# Patient Record
Sex: MALE | Race: WHITE | NOT HISPANIC OR LATINO | Employment: OTHER | URBAN - NONMETROPOLITAN AREA
[De-identification: names, ages, dates, MRNs, and addresses within clinical notes are randomized per-mention and may not be internally consistent; named-entity substitution may affect disease eponyms.]

---

## 2021-02-11 ENCOUNTER — IMMUNIZATION (OUTPATIENT)
Dept: VACCINE CLINIC | Facility: HOSPITAL | Age: 86
End: 2021-02-11

## 2021-02-11 PROCEDURE — 0001A: CPT | Performed by: INTERNAL MEDICINE

## 2021-02-11 PROCEDURE — 91300 HC SARSCOV02 VAC 30MCG/0.3ML IM: CPT | Performed by: INTERNAL MEDICINE

## 2021-03-05 ENCOUNTER — IMMUNIZATION (OUTPATIENT)
Dept: VACCINE CLINIC | Facility: HOSPITAL | Age: 86
End: 2021-03-05

## 2021-03-05 PROCEDURE — 0002A: CPT | Performed by: INTERNAL MEDICINE

## 2021-03-05 PROCEDURE — 91300 HC SARSCOV02 VAC 30MCG/0.3ML IM: CPT | Performed by: INTERNAL MEDICINE

## 2021-09-01 ENCOUNTER — APPOINTMENT (OUTPATIENT)
Dept: CT IMAGING | Facility: HOSPITAL | Age: 86
End: 2021-09-01

## 2021-09-01 ENCOUNTER — APPOINTMENT (OUTPATIENT)
Dept: GENERAL RADIOLOGY | Facility: HOSPITAL | Age: 86
End: 2021-09-01

## 2021-09-01 ENCOUNTER — HOSPITAL ENCOUNTER (INPATIENT)
Facility: HOSPITAL | Age: 86
LOS: 14 days | Discharge: HOME HEALTH | End: 2021-09-15
Attending: STUDENT IN AN ORGANIZED HEALTH CARE EDUCATION/TRAINING PROGRAM | Admitting: INTERNAL MEDICINE

## 2021-09-01 DIAGNOSIS — I50.9 CONGESTIVE HEART FAILURE, UNSPECIFIED HF CHRONICITY, UNSPECIFIED HEART FAILURE TYPE: ICD-10-CM

## 2021-09-01 DIAGNOSIS — R00.1 SYMPTOMATIC BRADYCARDIA: ICD-10-CM

## 2021-09-01 DIAGNOSIS — U07.1 COVID-19: Primary | ICD-10-CM

## 2021-09-01 DIAGNOSIS — J96.01 ACUTE RESPIRATORY FAILURE WITH HYPOXIA: ICD-10-CM

## 2021-09-01 DIAGNOSIS — R53.81 DEBILITY: ICD-10-CM

## 2021-09-01 LAB
A-A DO2: 46.9 MMHG (ref 0–300)
ABO GROUP BLD: NORMAL
ABO GROUP BLD: NORMAL
ALBUMIN SERPL-MCNC: 3.72 G/DL (ref 3.5–5.2)
ALBUMIN/GLOB SERPL: 1.2 G/DL
ALP SERPL-CCNC: 86 U/L (ref 39–117)
ALT SERPL W P-5'-P-CCNC: 44 U/L (ref 1–41)
ANION GAP SERPL CALCULATED.3IONS-SCNC: 15.1 MMOL/L (ref 5–15)
ANION GAP SERPL CALCULATED.3IONS-SCNC: 9.1 MMOL/L (ref 5–15)
APTT PPP: 30.5 SECONDS (ref 25.5–35.4)
ARTERIAL PATENCY WRIST A: POSITIVE
AST SERPL-CCNC: 65 U/L (ref 1–40)
ATMOSPHERIC PRESS: 722 MMHG
BACTERIA UR QL AUTO: ABNORMAL /HPF
BASE EXCESS BLDA CALC-SCNC: 0.7 MMOL/L (ref 0–2)
BASOPHILS # BLD AUTO: 0 10*3/MM3 (ref 0–0.2)
BASOPHILS NFR BLD AUTO: 0 % (ref 0–1.5)
BDY SITE: ABNORMAL
BILIRUB CONJ SERPL-MCNC: 0.2 MG/DL (ref 0–0.3)
BILIRUB SERPL-MCNC: 0.5 MG/DL (ref 0–1.2)
BILIRUB UR QL STRIP: NEGATIVE
BLD GP AB SCN SERPL QL: NEGATIVE
BODY TEMPERATURE: 0 C
BUN SERPL-MCNC: 15 MG/DL (ref 8–23)
BUN SERPL-MCNC: 19 MG/DL (ref 8–23)
BUN/CREAT SERPL: 12.6 (ref 7–25)
BUN/CREAT SERPL: 13.2 (ref 7–25)
CALCIUM SPEC-SCNC: 8.5 MG/DL (ref 8.2–9.6)
CALCIUM SPEC-SCNC: 8.6 MG/DL (ref 8.2–9.6)
CHLORIDE SERPL-SCNC: 104 MMOL/L (ref 98–107)
CHLORIDE SERPL-SCNC: 97 MMOL/L (ref 98–107)
CLARITY UR: CLEAR
CO2 BLDA-SCNC: 26.5 MMOL/L (ref 22–33)
CO2 SERPL-SCNC: 19.9 MMOL/L (ref 22–29)
CO2 SERPL-SCNC: 25.9 MMOL/L (ref 22–29)
COHGB MFR BLD: 0.8 % (ref 0–5)
COLOR UR: YELLOW
CREAT SERPL-MCNC: 1.19 MG/DL (ref 0.76–1.27)
CREAT SERPL-MCNC: 1.44 MG/DL (ref 0.76–1.27)
CRP SERPL-MCNC: <0.3 MG/DL (ref 0–0.5)
D DIMER PPP FEU-MCNC: 6.46 MCGFEU/ML (ref 0–0.5)
DEPRECATED RDW RBC AUTO: 43.2 FL (ref 37–54)
EOSINOPHIL # BLD AUTO: 0.02 10*3/MM3 (ref 0–0.4)
EOSINOPHIL NFR BLD AUTO: 0.5 % (ref 0.3–6.2)
ERYTHROCYTE [DISTWIDTH] IN BLOOD BY AUTOMATED COUNT: 12.6 % (ref 12.3–15.4)
FERRITIN SERPL-MCNC: 105.5 NG/ML (ref 30–400)
FLUAV SUBTYP SPEC NAA+PROBE: NOT DETECTED
FLUBV RNA ISLT QL NAA+PROBE: NOT DETECTED
GFR SERPL CREATININE-BSD FRML MDRD: 46 ML/MIN/1.73
GFR SERPL CREATININE-BSD FRML MDRD: 57 ML/MIN/1.73
GLOBULIN UR ELPH-MCNC: 3.2 GM/DL
GLUCOSE SERPL-MCNC: 107 MG/DL (ref 65–99)
GLUCOSE SERPL-MCNC: 198 MG/DL (ref 65–99)
GLUCOSE UR STRIP-MCNC: NEGATIVE MG/DL
HCO3 BLDA-SCNC: 25.3 MMOL/L (ref 20–26)
HCT VFR BLD AUTO: 40.3 % (ref 37.5–51)
HCT VFR BLD CALC: 40 % (ref 38–51)
HGB BLD-MCNC: 13.3 G/DL (ref 13–17.7)
HGB BLDA-MCNC: 13 G/DL (ref 14–18)
HGB UR QL STRIP.AUTO: ABNORMAL
HYALINE CASTS UR QL AUTO: ABNORMAL /LPF
IMM GRANULOCYTES # BLD AUTO: 0.02 10*3/MM3 (ref 0–0.05)
IMM GRANULOCYTES NFR BLD AUTO: 0.5 % (ref 0–0.5)
INHALED O2 CONCENTRATION: 21 %
INR PPP: 1.03 (ref 0.9–1.1)
KETONES UR QL STRIP: NEGATIVE
L PNEUMO1 AG UR QL IA: NEGATIVE
LDH SERPL-CCNC: 235 U/L (ref 135–225)
LEUKOCYTE ESTERASE UR QL STRIP.AUTO: NEGATIVE
LYMPHOCYTES # BLD AUTO: 1.03 10*3/MM3 (ref 0.7–3.1)
LYMPHOCYTES NFR BLD AUTO: 25.6 % (ref 19.6–45.3)
Lab: ABNORMAL
Lab: ABNORMAL
MAGNESIUM SERPL-MCNC: 1.9 MG/DL (ref 1.7–2.3)
MCH RBC QN AUTO: 31 PG (ref 26.6–33)
MCHC RBC AUTO-ENTMCNC: 33 G/DL (ref 31.5–35.7)
MCV RBC AUTO: 93.9 FL (ref 79–97)
METHGB BLD QL: 0 % (ref 0–3)
MODALITY: ABNORMAL
MONOCYTES # BLD AUTO: 0.4 10*3/MM3 (ref 0.1–0.9)
MONOCYTES NFR BLD AUTO: 9.9 % (ref 5–12)
NEUTROPHILS NFR BLD AUTO: 2.56 10*3/MM3 (ref 1.7–7)
NEUTROPHILS NFR BLD AUTO: 63.5 % (ref 42.7–76)
NITRITE UR QL STRIP: NEGATIVE
NOTE: ABNORMAL
NOTIFIED BY: ABNORMAL
NOTIFIED WHO: ABNORMAL
NRBC BLD AUTO-RTO: 0 /100 WBC (ref 0–0.2)
NT-PROBNP SERPL-MCNC: 274.8 PG/ML (ref 0–1800)
OXYHGB MFR BLDV: 86.2 % (ref 94–99)
PCO2 BLDA: 39.5 MM HG (ref 35–45)
PCO2 TEMP ADJ BLD: ABNORMAL MM[HG]
PH BLDA: 7.41 PH UNITS (ref 7.35–7.45)
PH UR STRIP.AUTO: 5.5 [PH] (ref 5–8)
PH, TEMP CORRECTED: ABNORMAL
PLATELET # BLD AUTO: 115 10*3/MM3 (ref 140–450)
PMV BLD AUTO: 10.1 FL (ref 6–12)
PO2 BLDA: 50.1 MM HG (ref 83–108)
PO2 TEMP ADJ BLD: ABNORMAL MM[HG]
POTASSIUM SERPL-SCNC: 4.1 MMOL/L (ref 3.5–5.2)
POTASSIUM SERPL-SCNC: 4.1 MMOL/L (ref 3.5–5.2)
PROCALCITONIN SERPL-MCNC: 0.08 NG/ML (ref 0–0.25)
PROT SERPL-MCNC: 6.9 G/DL (ref 6–8.5)
PROT UR QL STRIP: NEGATIVE
PROTHROMBIN TIME: 13.9 SECONDS (ref 12.8–14.5)
RBC # BLD AUTO: 4.29 10*6/MM3 (ref 4.14–5.8)
RBC # UR: ABNORMAL /HPF
REF LAB TEST METHOD: ABNORMAL
RH BLD: NEGATIVE
RH BLD: NEGATIVE
SAO2 % BLDCOA: 86.9 % (ref 94–99)
SARS-COV-2 RNA PNL SPEC NAA+PROBE: DETECTED
SODIUM SERPL-SCNC: 132 MMOL/L (ref 136–145)
SODIUM SERPL-SCNC: 139 MMOL/L (ref 136–145)
SP GR UR STRIP: 1.02 (ref 1–1.03)
SQUAMOUS #/AREA URNS HPF: ABNORMAL /HPF
T&S EXPIRATION DATE: NORMAL
TROPONIN T SERPL-MCNC: <0.01 NG/ML (ref 0–0.03)
TROPONIN T SERPL-MCNC: <0.01 NG/ML (ref 0–0.03)
TSH SERPL DL<=0.05 MIU/L-ACNC: 1.64 UIU/ML (ref 0.27–4.2)
UROBILINOGEN UR QL STRIP: ABNORMAL
VENTILATOR MODE: ABNORMAL
WBC # BLD AUTO: 4.03 10*3/MM3 (ref 3.4–10.8)
WBC UR QL AUTO: ABNORMAL /HPF

## 2021-09-01 PROCEDURE — 72125 CT NECK SPINE W/O DYE: CPT | Performed by: RADIOLOGY

## 2021-09-01 PROCEDURE — 85730 THROMBOPLASTIN TIME PARTIAL: CPT | Performed by: STUDENT IN AN ORGANIZED HEALTH CARE EDUCATION/TRAINING PROGRAM

## 2021-09-01 PROCEDURE — 36600 WITHDRAWAL OF ARTERIAL BLOOD: CPT

## 2021-09-01 PROCEDURE — 70450 CT HEAD/BRAIN W/O DYE: CPT | Performed by: RADIOLOGY

## 2021-09-01 PROCEDURE — 85610 PROTHROMBIN TIME: CPT | Performed by: STUDENT IN AN ORGANIZED HEALTH CARE EDUCATION/TRAINING PROGRAM

## 2021-09-01 PROCEDURE — 25010000002 CEFTRIAXONE PER 250 MG: Performed by: STUDENT IN AN ORGANIZED HEALTH CARE EDUCATION/TRAINING PROGRAM

## 2021-09-01 PROCEDURE — 84443 ASSAY THYROID STIM HORMONE: CPT | Performed by: NURSE PRACTITIONER

## 2021-09-01 PROCEDURE — 87040 BLOOD CULTURE FOR BACTERIA: CPT | Performed by: STUDENT IN AN ORGANIZED HEALTH CARE EDUCATION/TRAINING PROGRAM

## 2021-09-01 PROCEDURE — 81001 URINALYSIS AUTO W/SCOPE: CPT | Performed by: STUDENT IN AN ORGANIZED HEALTH CARE EDUCATION/TRAINING PROGRAM

## 2021-09-01 PROCEDURE — 93005 ELECTROCARDIOGRAM TRACING: CPT | Performed by: STUDENT IN AN ORGANIZED HEALTH CARE EDUCATION/TRAINING PROGRAM

## 2021-09-01 PROCEDURE — 85379 FIBRIN DEGRADATION QUANT: CPT | Performed by: STUDENT IN AN ORGANIZED HEALTH CARE EDUCATION/TRAINING PROGRAM

## 2021-09-01 PROCEDURE — 82248 BILIRUBIN DIRECT: CPT | Performed by: INTERNAL MEDICINE

## 2021-09-01 PROCEDURE — 25010000002 ENOXAPARIN PER 10 MG: Performed by: INTERNAL MEDICINE

## 2021-09-01 PROCEDURE — 94640 AIRWAY INHALATION TREATMENT: CPT

## 2021-09-01 PROCEDURE — 25010000002 FUROSEMIDE PER 20 MG: Performed by: STUDENT IN AN ORGANIZED HEALTH CARE EDUCATION/TRAINING PROGRAM

## 2021-09-01 PROCEDURE — 71275 CT ANGIOGRAPHY CHEST: CPT | Performed by: RADIOLOGY

## 2021-09-01 PROCEDURE — 83050 HGB METHEMOGLOBIN QUAN: CPT

## 2021-09-01 PROCEDURE — 72125 CT NECK SPINE W/O DYE: CPT

## 2021-09-01 PROCEDURE — 99285 EMERGENCY DEPT VISIT HI MDM: CPT

## 2021-09-01 PROCEDURE — 87899 AGENT NOS ASSAY W/OPTIC: CPT | Performed by: NURSE PRACTITIONER

## 2021-09-01 PROCEDURE — 25010000002 IOPAMIDOL 61 % SOLUTION: Performed by: STUDENT IN AN ORGANIZED HEALTH CARE EDUCATION/TRAINING PROGRAM

## 2021-09-01 PROCEDURE — 82805 BLOOD GASES W/O2 SATURATION: CPT

## 2021-09-01 PROCEDURE — 84145 PROCALCITONIN (PCT): CPT | Performed by: INTERNAL MEDICINE

## 2021-09-01 PROCEDURE — 84484 ASSAY OF TROPONIN QUANT: CPT | Performed by: STUDENT IN AN ORGANIZED HEALTH CARE EDUCATION/TRAINING PROGRAM

## 2021-09-01 PROCEDURE — 25010000002 DEXAMETHASONE PER 1 MG: Performed by: STUDENT IN AN ORGANIZED HEALTH CARE EDUCATION/TRAINING PROGRAM

## 2021-09-01 PROCEDURE — 84484 ASSAY OF TROPONIN QUANT: CPT | Performed by: NURSE PRACTITIONER

## 2021-09-01 PROCEDURE — 71275 CT ANGIOGRAPHY CHEST: CPT

## 2021-09-01 PROCEDURE — 86900 BLOOD TYPING SEROLOGIC ABO: CPT

## 2021-09-01 PROCEDURE — 84145 PROCALCITONIN (PCT): CPT | Performed by: STUDENT IN AN ORGANIZED HEALTH CARE EDUCATION/TRAINING PROGRAM

## 2021-09-01 PROCEDURE — 93010 ELECTROCARDIOGRAM REPORT: CPT | Performed by: INTERNAL MEDICINE

## 2021-09-01 PROCEDURE — 86901 BLOOD TYPING SEROLOGIC RH(D): CPT | Performed by: STUDENT IN AN ORGANIZED HEALTH CARE EDUCATION/TRAINING PROGRAM

## 2021-09-01 PROCEDURE — 85025 COMPLETE CBC W/AUTO DIFF WBC: CPT | Performed by: STUDENT IN AN ORGANIZED HEALTH CARE EDUCATION/TRAINING PROGRAM

## 2021-09-01 PROCEDURE — 83735 ASSAY OF MAGNESIUM: CPT | Performed by: STUDENT IN AN ORGANIZED HEALTH CARE EDUCATION/TRAINING PROGRAM

## 2021-09-01 PROCEDURE — XW033E5 INTRODUCTION OF REMDESIVIR ANTI-INFECTIVE INTO PERIPHERAL VEIN, PERCUTANEOUS APPROACH, NEW TECHNOLOGY GROUP 5: ICD-10-PCS | Performed by: INTERNAL MEDICINE

## 2021-09-01 PROCEDURE — 86140 C-REACTIVE PROTEIN: CPT | Performed by: STUDENT IN AN ORGANIZED HEALTH CARE EDUCATION/TRAINING PROGRAM

## 2021-09-01 PROCEDURE — 86900 BLOOD TYPING SEROLOGIC ABO: CPT | Performed by: STUDENT IN AN ORGANIZED HEALTH CARE EDUCATION/TRAINING PROGRAM

## 2021-09-01 PROCEDURE — 87636 SARSCOV2 & INF A&B AMP PRB: CPT | Performed by: STUDENT IN AN ORGANIZED HEALTH CARE EDUCATION/TRAINING PROGRAM

## 2021-09-01 PROCEDURE — 99222 1ST HOSP IP/OBS MODERATE 55: CPT | Performed by: NURSE PRACTITIONER

## 2021-09-01 PROCEDURE — 70450 CT HEAD/BRAIN W/O DYE: CPT

## 2021-09-01 PROCEDURE — 71045 X-RAY EXAM CHEST 1 VIEW: CPT | Performed by: RADIOLOGY

## 2021-09-01 PROCEDURE — 83880 ASSAY OF NATRIURETIC PEPTIDE: CPT | Performed by: INTERNAL MEDICINE

## 2021-09-01 PROCEDURE — 86850 RBC ANTIBODY SCREEN: CPT | Performed by: STUDENT IN AN ORGANIZED HEALTH CARE EDUCATION/TRAINING PROGRAM

## 2021-09-01 PROCEDURE — 80053 COMPREHEN METABOLIC PANEL: CPT | Performed by: STUDENT IN AN ORGANIZED HEALTH CARE EDUCATION/TRAINING PROGRAM

## 2021-09-01 PROCEDURE — 82728 ASSAY OF FERRITIN: CPT | Performed by: STUDENT IN AN ORGANIZED HEALTH CARE EDUCATION/TRAINING PROGRAM

## 2021-09-01 PROCEDURE — 82375 ASSAY CARBOXYHB QUANT: CPT

## 2021-09-01 PROCEDURE — 83615 LACTATE (LD) (LDH) ENZYME: CPT | Performed by: STUDENT IN AN ORGANIZED HEALTH CARE EDUCATION/TRAINING PROGRAM

## 2021-09-01 PROCEDURE — 86901 BLOOD TYPING SEROLOGIC RH(D): CPT

## 2021-09-01 PROCEDURE — 71045 X-RAY EXAM CHEST 1 VIEW: CPT

## 2021-09-01 RX ORDER — DEXAMETHASONE SODIUM PHOSPHATE 4 MG/ML
6 INJECTION, SOLUTION INTRA-ARTICULAR; INTRALESIONAL; INTRAMUSCULAR; INTRAVENOUS; SOFT TISSUE ONCE
Status: COMPLETED | OUTPATIENT
Start: 2021-09-01 | End: 2021-09-01

## 2021-09-01 RX ORDER — DEXAMETHASONE 4 MG/1
6 TABLET ORAL DAILY
Status: COMPLETED | OUTPATIENT
Start: 2021-09-02 | End: 2021-09-11

## 2021-09-01 RX ORDER — TERAZOSIN 1 MG/1
2 CAPSULE ORAL NIGHTLY
Status: DISCONTINUED | OUTPATIENT
Start: 2021-09-02 | End: 2021-09-08

## 2021-09-01 RX ORDER — LEVOTHYROXINE SODIUM 0.05 MG/1
50 TABLET ORAL DAILY
Status: ON HOLD | COMMUNITY
End: 2023-03-15

## 2021-09-01 RX ORDER — ASPIRIN 81 MG/1
81 TABLET ORAL DAILY
Status: DISCONTINUED | OUTPATIENT
Start: 2021-09-02 | End: 2021-09-15 | Stop reason: HOSPADM

## 2021-09-01 RX ORDER — GUAIFENESIN 600 MG/1
600 TABLET, EXTENDED RELEASE ORAL EVERY 12 HOURS SCHEDULED
Status: DISCONTINUED | OUTPATIENT
Start: 2021-09-01 | End: 2021-09-15 | Stop reason: HOSPADM

## 2021-09-01 RX ORDER — TERAZOSIN 2 MG/1
2 CAPSULE ORAL NIGHTLY
COMMUNITY

## 2021-09-01 RX ORDER — ALBUTEROL SULFATE 90 UG/1
2 AEROSOL, METERED RESPIRATORY (INHALATION)
Status: DISCONTINUED | OUTPATIENT
Start: 2021-09-01 | End: 2021-09-15 | Stop reason: HOSPADM

## 2021-09-01 RX ORDER — DEXAMETHASONE SODIUM PHOSPHATE 4 MG/ML
6 INJECTION, SOLUTION INTRA-ARTICULAR; INTRALESIONAL; INTRAMUSCULAR; INTRAVENOUS; SOFT TISSUE DAILY
Status: COMPLETED | OUTPATIENT
Start: 2021-09-02 | End: 2021-09-11

## 2021-09-01 RX ORDER — ASPIRIN 81 MG/1
81 TABLET ORAL DAILY
COMMUNITY

## 2021-09-01 RX ORDER — FUROSEMIDE 10 MG/ML
20 INJECTION INTRAMUSCULAR; INTRAVENOUS ONCE
Status: COMPLETED | OUTPATIENT
Start: 2021-09-01 | End: 2021-09-01

## 2021-09-01 RX ORDER — FINASTERIDE 5 MG/1
5 TABLET, FILM COATED ORAL DAILY
Status: DISCONTINUED | OUTPATIENT
Start: 2021-09-02 | End: 2021-09-15 | Stop reason: HOSPADM

## 2021-09-01 RX ORDER — CARBOXYMETHYLCELLULOSE SODIUM 5 MG/ML
1 SOLUTION/ DROPS OPHTHALMIC 3 TIMES DAILY PRN
Status: DISCONTINUED | OUTPATIENT
Start: 2021-09-01 | End: 2021-09-15 | Stop reason: HOSPADM

## 2021-09-01 RX ORDER — LEVOTHYROXINE SODIUM 0.05 MG/1
50 TABLET ORAL DAILY
Status: DISCONTINUED | OUTPATIENT
Start: 2021-09-02 | End: 2021-09-15 | Stop reason: HOSPADM

## 2021-09-01 RX ORDER — FINASTERIDE 5 MG/1
5 TABLET, FILM COATED ORAL DAILY
COMMUNITY

## 2021-09-01 RX ORDER — CARBOXYMETHYLCELLULOSE SODIUM 5 MG/ML
1 SOLUTION/ DROPS OPHTHALMIC 3 TIMES DAILY PRN
COMMUNITY

## 2021-09-01 RX ADMIN — REMDESIVIR 200 MG: 100 INJECTION, POWDER, LYOPHILIZED, FOR SOLUTION INTRAVENOUS at 19:57

## 2021-09-01 RX ADMIN — CEFTRIAXONE 1 G: 1 INJECTION, POWDER, FOR SOLUTION INTRAMUSCULAR; INTRAVENOUS at 16:27

## 2021-09-01 RX ADMIN — IOPAMIDOL 89 ML: 612 INJECTION, SOLUTION INTRAVENOUS at 16:00

## 2021-09-01 RX ADMIN — FUROSEMIDE 20 MG: 20 INJECTION, SOLUTION INTRAMUSCULAR; INTRAVENOUS at 17:39

## 2021-09-01 RX ADMIN — ENOXAPARIN SODIUM 40 MG: 40 INJECTION SUBCUTANEOUS at 21:16

## 2021-09-01 RX ADMIN — DEXAMETHASONE SODIUM PHOSPHATE 6 MG: 4 INJECTION, SOLUTION INTRA-ARTICULAR; INTRALESIONAL; INTRAMUSCULAR; INTRAVENOUS; SOFT TISSUE at 16:27

## 2021-09-01 RX ADMIN — SODIUM CHLORIDE 1000 ML: 9 INJECTION, SOLUTION INTRAVENOUS at 07:05

## 2021-09-01 RX ADMIN — ALBUTEROL SULFATE 2 PUFF: 90 AEROSOL, METERED RESPIRATORY (INHALATION) at 21:17

## 2021-09-01 RX ADMIN — DOXYCYCLINE 100 MG: 100 INJECTION, POWDER, LYOPHILIZED, FOR SOLUTION INTRAVENOUS at 17:39

## 2021-09-01 RX ADMIN — GUAIFENESIN 600 MG: 600 TABLET, EXTENDED RELEASE ORAL at 21:16

## 2021-09-01 RX ADMIN — TERAZOSIN HYDROCHLORIDE 2 MG: 1 CAPSULE ORAL at 23:03

## 2021-09-02 ENCOUNTER — APPOINTMENT (OUTPATIENT)
Dept: CARDIOLOGY | Facility: HOSPITAL | Age: 86
End: 2021-09-02

## 2021-09-02 ENCOUNTER — APPOINTMENT (OUTPATIENT)
Dept: ULTRASOUND IMAGING | Facility: HOSPITAL | Age: 86
End: 2021-09-02

## 2021-09-02 LAB
ALBUMIN SERPL-MCNC: 3.69 G/DL (ref 3.5–5.2)
ALBUMIN/GLOB SERPL: 1.4 G/DL
ALP SERPL-CCNC: 79 U/L (ref 39–117)
ALT SERPL W P-5'-P-CCNC: 40 U/L (ref 1–41)
ANION GAP SERPL CALCULATED.3IONS-SCNC: 11.5 MMOL/L (ref 5–15)
AST SERPL-CCNC: 58 U/L (ref 1–40)
BASOPHILS # BLD AUTO: 0.01 10*3/MM3 (ref 0–0.2)
BASOPHILS NFR BLD AUTO: 0.3 % (ref 0–1.5)
BH CV ECHO MEAS - AI DEC SLOPE: 70.9 CM/SEC^2
BH CV ECHO MEAS - AI MAX PG: 38.7 MMHG
BH CV ECHO MEAS - AI MAX VEL: 311 CM/SEC
BH CV ECHO MEAS - AI P1/2T: 1285 MSEC
BH CV ECHO MEAS - AO MAX PG: 7.1 MMHG
BH CV ECHO MEAS - AO MEAN PG: 3 MMHG
BH CV ECHO MEAS - AO V2 MAX: 133 CM/SEC
BH CV ECHO MEAS - AO V2 MEAN: 79.9 CM/SEC
BH CV ECHO MEAS - AO V2 VTI: 26.4 CM
BH CV ECHO MEAS - BSA(HAYCOCK): 1.8 M^2
BH CV ECHO MEAS - BSA: 1.8 M^2
BH CV ECHO MEAS - BZI_BMI: 22.2 KILOGRAMS/M^2
BH CV ECHO MEAS - BZI_METRIC_HEIGHT: 172.7 CM
BH CV ECHO MEAS - BZI_METRIC_WEIGHT: 66.2 KG
BH CV ECHO MEAS - EDV(CUBED): 39 ML
BH CV ECHO MEAS - EDV(MOD-SP4): 58.1 ML
BH CV ECHO MEAS - EDV(TEICH): 47.1 ML
BH CV ECHO MEAS - EF(CUBED): 47.2 %
BH CV ECHO MEAS - EF(MOD-SP4): 69 %
BH CV ECHO MEAS - EF(TEICH): 40.5 %
BH CV ECHO MEAS - ESV(CUBED): 20.6 ML
BH CV ECHO MEAS - ESV(MOD-SP4): 18 ML
BH CV ECHO MEAS - ESV(TEICH): 28 ML
BH CV ECHO MEAS - FS: 19.2 %
BH CV ECHO MEAS - IVS/LVPW: 1.1
BH CV ECHO MEAS - IVSD: 1.4 CM
BH CV ECHO MEAS - LV DIASTOLIC VOL/BSA (35-75): 32.5 ML/M^2
BH CV ECHO MEAS - LV MASS(C)D: 158.9 GRAMS
BH CV ECHO MEAS - LV MASS(C)DI: 88.9 GRAMS/M^2
BH CV ECHO MEAS - LV SYSTOLIC VOL/BSA (12-30): 10.1 ML/M^2
BH CV ECHO MEAS - LVIDD: 3.4 CM
BH CV ECHO MEAS - LVIDS: 2.7 CM
BH CV ECHO MEAS - LVLD AP4: 6.8 CM
BH CV ECHO MEAS - LVLS AP4: 5 CM
BH CV ECHO MEAS - LVPWD: 1.3 CM
BH CV ECHO MEAS - MV A MAX VEL: 97.7 CM/SEC
BH CV ECHO MEAS - MV E MAX VEL: 72 CM/SEC
BH CV ECHO MEAS - MV E/A: 0.74
BH CV ECHO MEAS - SI(CUBED): 10.3 ML/M^2
BH CV ECHO MEAS - SI(MOD-SP4): 22.4 ML/M^2
BH CV ECHO MEAS - SI(TEICH): 10.7 ML/M^2
BH CV ECHO MEAS - SV(CUBED): 18.4 ML
BH CV ECHO MEAS - SV(MOD-SP4): 40.1 ML
BH CV ECHO MEAS - SV(TEICH): 19.1 ML
BILIRUB CONJ SERPL-MCNC: 0.2 MG/DL (ref 0–0.3)
BILIRUB SERPL-MCNC: 0.4 MG/DL (ref 0–1.2)
BUN SERPL-MCNC: 18 MG/DL (ref 8–23)
BUN/CREAT SERPL: 15.3 (ref 7–25)
CALCIUM SPEC-SCNC: 8.4 MG/DL (ref 8.2–9.6)
CHLORIDE SERPL-SCNC: 103 MMOL/L (ref 98–107)
CO2 SERPL-SCNC: 21.5 MMOL/L (ref 22–29)
CREAT SERPL-MCNC: 1.18 MG/DL (ref 0.76–1.27)
CRP SERPL-MCNC: 0.98 MG/DL (ref 0–0.5)
D DIMER PPP FEU-MCNC: 2.24 MCGFEU/ML (ref 0–0.5)
DEPRECATED RDW RBC AUTO: 42 FL (ref 37–54)
EOSINOPHIL # BLD AUTO: 0 10*3/MM3 (ref 0–0.4)
EOSINOPHIL NFR BLD AUTO: 0 % (ref 0.3–6.2)
ERYTHROCYTE [DISTWIDTH] IN BLOOD BY AUTOMATED COUNT: 12.4 % (ref 12.3–15.4)
GFR SERPL CREATININE-BSD FRML MDRD: 57 ML/MIN/1.73
GLOBULIN UR ELPH-MCNC: 2.7 GM/DL
GLUCOSE BLDC GLUCOMTR-MCNC: 131 MG/DL (ref 70–130)
GLUCOSE BLDC GLUCOMTR-MCNC: 152 MG/DL (ref 70–130)
GLUCOSE BLDC GLUCOMTR-MCNC: 156 MG/DL (ref 70–130)
GLUCOSE BLDC GLUCOMTR-MCNC: 172 MG/DL (ref 70–130)
GLUCOSE SERPL-MCNC: 153 MG/DL (ref 65–99)
HBA1C MFR BLD: 5.6 % (ref 4.8–5.6)
HCT VFR BLD AUTO: 39.2 % (ref 37.5–51)
HGB BLD-MCNC: 13 G/DL (ref 13–17.7)
IMM GRANULOCYTES # BLD AUTO: 0.01 10*3/MM3 (ref 0–0.05)
IMM GRANULOCYTES NFR BLD AUTO: 0.3 % (ref 0–0.5)
INR PPP: 0.99 (ref 0.9–1.1)
LYMPHOCYTES # BLD AUTO: 0.62 10*3/MM3 (ref 0.7–3.1)
LYMPHOCYTES NFR BLD AUTO: 21.3 % (ref 19.6–45.3)
MAGNESIUM SERPL-MCNC: 2 MG/DL (ref 1.7–2.3)
MAXIMAL PREDICTED HEART RATE: 126 BPM
MCH RBC QN AUTO: 30.4 PG (ref 26.6–33)
MCHC RBC AUTO-ENTMCNC: 33.2 G/DL (ref 31.5–35.7)
MCV RBC AUTO: 91.8 FL (ref 79–97)
MONOCYTES # BLD AUTO: 0.32 10*3/MM3 (ref 0.1–0.9)
MONOCYTES NFR BLD AUTO: 11 % (ref 5–12)
NEUTROPHILS NFR BLD AUTO: 1.95 10*3/MM3 (ref 1.7–7)
NEUTROPHILS NFR BLD AUTO: 67.1 % (ref 42.7–76)
NRBC BLD AUTO-RTO: 0 /100 WBC (ref 0–0.2)
PHOSPHATE SERPL-MCNC: 3.2 MG/DL (ref 2.5–4.5)
PLATELET # BLD AUTO: 108 10*3/MM3 (ref 140–450)
PMV BLD AUTO: 10 FL (ref 6–12)
POTASSIUM SERPL-SCNC: 4 MMOL/L (ref 3.5–5.2)
PROCALCITONIN SERPL-MCNC: 0.07 NG/ML (ref 0–0.25)
PROT SERPL-MCNC: 6.4 G/DL (ref 6–8.5)
PROTHROMBIN TIME: 13.5 SECONDS (ref 12.8–14.5)
QT INTERVAL: 424 MS
QTC INTERVAL: 464 MS
RBC # BLD AUTO: 4.27 10*6/MM3 (ref 4.14–5.8)
S PNEUM AG SPEC QL LA: NEGATIVE
SODIUM SERPL-SCNC: 136 MMOL/L (ref 136–145)
STRESS TARGET HR: 107 BPM
TROPONIN T SERPL-MCNC: <0.01 NG/ML (ref 0–0.03)
TROPONIN T SERPL-MCNC: <0.01 NG/ML (ref 0–0.03)
WBC # BLD AUTO: 2.91 10*3/MM3 (ref 3.4–10.8)

## 2021-09-02 PROCEDURE — 83735 ASSAY OF MAGNESIUM: CPT | Performed by: INTERNAL MEDICINE

## 2021-09-02 PROCEDURE — 93306 TTE W/DOPPLER COMPLETE: CPT

## 2021-09-02 PROCEDURE — 63710000001 DEXAMETHASONE PER 0.25 MG: Performed by: INTERNAL MEDICINE

## 2021-09-02 PROCEDURE — 99233 SBSQ HOSP IP/OBS HIGH 50: CPT | Performed by: NURSE PRACTITIONER

## 2021-09-02 PROCEDURE — 93970 EXTREMITY STUDY: CPT | Performed by: RADIOLOGY

## 2021-09-02 PROCEDURE — 85379 FIBRIN DEGRADATION QUANT: CPT | Performed by: INTERNAL MEDICINE

## 2021-09-02 PROCEDURE — 94799 UNLISTED PULMONARY SVC/PX: CPT

## 2021-09-02 PROCEDURE — 85610 PROTHROMBIN TIME: CPT | Performed by: INTERNAL MEDICINE

## 2021-09-02 PROCEDURE — 93970 EXTREMITY STUDY: CPT

## 2021-09-02 PROCEDURE — 82248 BILIRUBIN DIRECT: CPT | Performed by: INTERNAL MEDICINE

## 2021-09-02 PROCEDURE — 84100 ASSAY OF PHOSPHORUS: CPT | Performed by: INTERNAL MEDICINE

## 2021-09-02 PROCEDURE — 85025 COMPLETE CBC W/AUTO DIFF WBC: CPT | Performed by: INTERNAL MEDICINE

## 2021-09-02 PROCEDURE — 63710000001 INSULIN ASPART PER 5 UNITS: Performed by: NURSE PRACTITIONER

## 2021-09-02 PROCEDURE — 82962 GLUCOSE BLOOD TEST: CPT

## 2021-09-02 PROCEDURE — 86140 C-REACTIVE PROTEIN: CPT | Performed by: INTERNAL MEDICINE

## 2021-09-02 PROCEDURE — 80053 COMPREHEN METABOLIC PANEL: CPT | Performed by: INTERNAL MEDICINE

## 2021-09-02 PROCEDURE — 84484 ASSAY OF TROPONIN QUANT: CPT | Performed by: NURSE PRACTITIONER

## 2021-09-02 PROCEDURE — 25010000002 ENOXAPARIN PER 10 MG: Performed by: INTERNAL MEDICINE

## 2021-09-02 PROCEDURE — 83036 HEMOGLOBIN GLYCOSYLATED A1C: CPT | Performed by: NURSE PRACTITIONER

## 2021-09-02 RX ORDER — DEXTROSE MONOHYDRATE 25 G/50ML
25 INJECTION, SOLUTION INTRAVENOUS
Status: DISCONTINUED | OUTPATIENT
Start: 2021-09-02 | End: 2021-09-15 | Stop reason: HOSPADM

## 2021-09-02 RX ORDER — NICOTINE POLACRILEX 4 MG
15 LOZENGE BUCCAL
Status: DISCONTINUED | OUTPATIENT
Start: 2021-09-02 | End: 2021-09-15 | Stop reason: HOSPADM

## 2021-09-02 RX ADMIN — ENOXAPARIN SODIUM 40 MG: 40 INJECTION SUBCUTANEOUS at 09:24

## 2021-09-02 RX ADMIN — MUPIROCIN: 20 OINTMENT TOPICAL at 12:20

## 2021-09-02 RX ADMIN — ALBUTEROL SULFATE 2 PUFF: 90 AEROSOL, METERED RESPIRATORY (INHALATION) at 12:20

## 2021-09-02 RX ADMIN — CARBOXYMETHYLCELLULOSE SODIUM 1 DROP: 5 SOLUTION/ DROPS OPHTHALMIC at 17:15

## 2021-09-02 RX ADMIN — ENOXAPARIN SODIUM 40 MG: 40 INJECTION SUBCUTANEOUS at 21:14

## 2021-09-02 RX ADMIN — REMDESIVIR 100 MG: 100 INJECTION, POWDER, LYOPHILIZED, FOR SOLUTION INTRAVENOUS at 21:13

## 2021-09-02 RX ADMIN — Medication 1 TABLET: at 09:24

## 2021-09-02 RX ADMIN — TERAZOSIN HYDROCHLORIDE 2 MG: 1 CAPSULE ORAL at 21:14

## 2021-09-02 RX ADMIN — LEVOTHYROXINE SODIUM 50 MCG: 50 TABLET ORAL at 09:24

## 2021-09-02 RX ADMIN — MUPIROCIN: 20 OINTMENT TOPICAL at 21:14

## 2021-09-02 RX ADMIN — INSULIN ASPART 2 UNITS: 100 INJECTION, SOLUTION INTRAVENOUS; SUBCUTANEOUS at 09:23

## 2021-09-02 RX ADMIN — ALBUTEROL SULFATE 2 PUFF: 90 AEROSOL, METERED RESPIRATORY (INHALATION) at 17:16

## 2021-09-02 RX ADMIN — GUAIFENESIN 600 MG: 600 TABLET, EXTENDED RELEASE ORAL at 21:14

## 2021-09-02 RX ADMIN — INSULIN ASPART 2 UNITS: 100 INJECTION, SOLUTION INTRAVENOUS; SUBCUTANEOUS at 17:18

## 2021-09-02 RX ADMIN — FINASTERIDE 5 MG: 5 TABLET, FILM COATED ORAL at 09:24

## 2021-09-02 RX ADMIN — DEXAMETHASONE 6 MG: 4 TABLET ORAL at 09:24

## 2021-09-02 RX ADMIN — GUAIFENESIN 600 MG: 600 TABLET, EXTENDED RELEASE ORAL at 09:24

## 2021-09-02 RX ADMIN — ASPIRIN 81 MG: 81 TABLET, COATED ORAL at 09:24

## 2021-09-03 LAB
ALBUMIN SERPL-MCNC: 3.81 G/DL (ref 3.5–5.2)
ALBUMIN/GLOB SERPL: 1.3 G/DL
ALP SERPL-CCNC: 80 U/L (ref 39–117)
ALT SERPL W P-5'-P-CCNC: 35 U/L (ref 1–41)
ANION GAP SERPL CALCULATED.3IONS-SCNC: 11.4 MMOL/L (ref 5–15)
AST SERPL-CCNC: 47 U/L (ref 1–40)
BASOPHILS # BLD AUTO: 0 10*3/MM3 (ref 0–0.2)
BASOPHILS NFR BLD AUTO: 0 % (ref 0–1.5)
BILIRUB CONJ SERPL-MCNC: <0.2 MG/DL (ref 0–0.3)
BILIRUB SERPL-MCNC: 0.4 MG/DL (ref 0–1.2)
BUN SERPL-MCNC: 26 MG/DL (ref 8–23)
BUN/CREAT SERPL: 23.6 (ref 7–25)
CALCIUM SPEC-SCNC: 9.1 MG/DL (ref 8.2–9.6)
CHLORIDE SERPL-SCNC: 103 MMOL/L (ref 98–107)
CO2 SERPL-SCNC: 23.6 MMOL/L (ref 22–29)
CREAT SERPL-MCNC: 1.1 MG/DL (ref 0.76–1.27)
CRP SERPL-MCNC: 0.93 MG/DL (ref 0–0.5)
DEPRECATED RDW RBC AUTO: 42 FL (ref 37–54)
EOSINOPHIL # BLD AUTO: 0 10*3/MM3 (ref 0–0.4)
EOSINOPHIL NFR BLD AUTO: 0 % (ref 0.3–6.2)
ERYTHROCYTE [DISTWIDTH] IN BLOOD BY AUTOMATED COUNT: 12.4 % (ref 12.3–15.4)
GFR SERPL CREATININE-BSD FRML MDRD: 62 ML/MIN/1.73
GLOBULIN UR ELPH-MCNC: 2.9 GM/DL
GLUCOSE BLDC GLUCOMTR-MCNC: 134 MG/DL (ref 70–130)
GLUCOSE BLDC GLUCOMTR-MCNC: 135 MG/DL (ref 70–130)
GLUCOSE BLDC GLUCOMTR-MCNC: 147 MG/DL (ref 70–130)
GLUCOSE BLDC GLUCOMTR-MCNC: 241 MG/DL (ref 70–130)
GLUCOSE BLDC GLUCOMTR-MCNC: 96 MG/DL (ref 70–130)
GLUCOSE SERPL-MCNC: 130 MG/DL (ref 65–99)
HCT VFR BLD AUTO: 40.2 % (ref 37.5–51)
HGB BLD-MCNC: 13.5 G/DL (ref 13–17.7)
IMM GRANULOCYTES # BLD AUTO: 0.02 10*3/MM3 (ref 0–0.05)
IMM GRANULOCYTES NFR BLD AUTO: 0.3 % (ref 0–0.5)
LYMPHOCYTES # BLD AUTO: 1.31 10*3/MM3 (ref 0.7–3.1)
LYMPHOCYTES NFR BLD AUTO: 21.2 % (ref 19.6–45.3)
MAGNESIUM SERPL-MCNC: 2 MG/DL (ref 1.7–2.3)
MCH RBC QN AUTO: 30.8 PG (ref 26.6–33)
MCHC RBC AUTO-ENTMCNC: 33.6 G/DL (ref 31.5–35.7)
MCV RBC AUTO: 91.8 FL (ref 79–97)
MONOCYTES # BLD AUTO: 0.53 10*3/MM3 (ref 0.1–0.9)
MONOCYTES NFR BLD AUTO: 8.6 % (ref 5–12)
NEUTROPHILS NFR BLD AUTO: 4.31 10*3/MM3 (ref 1.7–7)
NEUTROPHILS NFR BLD AUTO: 69.9 % (ref 42.7–76)
NRBC BLD AUTO-RTO: 0 /100 WBC (ref 0–0.2)
PHOSPHATE SERPL-MCNC: 3 MG/DL (ref 2.5–4.5)
PLAT MORPH BLD: NORMAL
PLATELET # BLD AUTO: 116 10*3/MM3 (ref 140–450)
PMV BLD AUTO: 10.9 FL (ref 6–12)
POTASSIUM SERPL-SCNC: 4.4 MMOL/L (ref 3.5–5.2)
PROT SERPL-MCNC: 6.7 G/DL (ref 6–8.5)
RBC # BLD AUTO: 4.38 10*6/MM3 (ref 4.14–5.8)
RBC MORPH BLD: NORMAL
SODIUM SERPL-SCNC: 138 MMOL/L (ref 136–145)
WBC # BLD AUTO: 6.17 10*3/MM3 (ref 3.4–10.8)

## 2021-09-03 PROCEDURE — 85025 COMPLETE CBC W/AUTO DIFF WBC: CPT | Performed by: INTERNAL MEDICINE

## 2021-09-03 PROCEDURE — 86140 C-REACTIVE PROTEIN: CPT | Performed by: INTERNAL MEDICINE

## 2021-09-03 PROCEDURE — 94799 UNLISTED PULMONARY SVC/PX: CPT

## 2021-09-03 PROCEDURE — 82248 BILIRUBIN DIRECT: CPT | Performed by: INTERNAL MEDICINE

## 2021-09-03 PROCEDURE — 83735 ASSAY OF MAGNESIUM: CPT | Performed by: INTERNAL MEDICINE

## 2021-09-03 PROCEDURE — 25010000002 ENOXAPARIN PER 10 MG: Performed by: INTERNAL MEDICINE

## 2021-09-03 PROCEDURE — 63710000001 DEXAMETHASONE PER 0.25 MG: Performed by: INTERNAL MEDICINE

## 2021-09-03 PROCEDURE — 85007 BL SMEAR W/DIFF WBC COUNT: CPT | Performed by: INTERNAL MEDICINE

## 2021-09-03 PROCEDURE — 84100 ASSAY OF PHOSPHORUS: CPT | Performed by: INTERNAL MEDICINE

## 2021-09-03 PROCEDURE — 82962 GLUCOSE BLOOD TEST: CPT

## 2021-09-03 PROCEDURE — 99232 SBSQ HOSP IP/OBS MODERATE 35: CPT | Performed by: NURSE PRACTITIONER

## 2021-09-03 PROCEDURE — 80053 COMPREHEN METABOLIC PANEL: CPT | Performed by: INTERNAL MEDICINE

## 2021-09-03 RX ADMIN — ASPIRIN 81 MG: 81 TABLET, COATED ORAL at 08:28

## 2021-09-03 RX ADMIN — ALBUTEROL SULFATE 2 PUFF: 90 AEROSOL, METERED RESPIRATORY (INHALATION) at 08:00

## 2021-09-03 RX ADMIN — LEVOTHYROXINE SODIUM 50 MCG: 50 TABLET ORAL at 08:28

## 2021-09-03 RX ADMIN — REMDESIVIR 100 MG: 100 INJECTION, POWDER, LYOPHILIZED, FOR SOLUTION INTRAVENOUS at 18:26

## 2021-09-03 RX ADMIN — FINASTERIDE 5 MG: 5 TABLET, FILM COATED ORAL at 08:28

## 2021-09-03 RX ADMIN — TERAZOSIN HYDROCHLORIDE 2 MG: 1 CAPSULE ORAL at 21:23

## 2021-09-03 RX ADMIN — GUAIFENESIN 600 MG: 600 TABLET, EXTENDED RELEASE ORAL at 21:23

## 2021-09-03 RX ADMIN — CARBOXYMETHYLCELLULOSE SODIUM 1 DROP: 5 SOLUTION/ DROPS OPHTHALMIC at 13:07

## 2021-09-03 RX ADMIN — GUAIFENESIN 600 MG: 600 TABLET, EXTENDED RELEASE ORAL at 08:28

## 2021-09-03 RX ADMIN — ALBUTEROL SULFATE 2 PUFF: 90 AEROSOL, METERED RESPIRATORY (INHALATION) at 18:39

## 2021-09-03 RX ADMIN — MUPIROCIN: 20 OINTMENT TOPICAL at 21:25

## 2021-09-03 RX ADMIN — ALBUTEROL SULFATE 2 PUFF: 90 AEROSOL, METERED RESPIRATORY (INHALATION) at 21:24

## 2021-09-03 RX ADMIN — Medication 1 TABLET: at 08:28

## 2021-09-03 RX ADMIN — DEXAMETHASONE 6 MG: 4 TABLET ORAL at 08:29

## 2021-09-03 RX ADMIN — ENOXAPARIN SODIUM 40 MG: 40 INJECTION SUBCUTANEOUS at 21:24

## 2021-09-03 RX ADMIN — MUPIROCIN: 20 OINTMENT TOPICAL at 08:29

## 2021-09-03 RX ADMIN — ENOXAPARIN SODIUM 40 MG: 40 INJECTION SUBCUTANEOUS at 08:29

## 2021-09-04 ENCOUNTER — APPOINTMENT (OUTPATIENT)
Dept: ULTRASOUND IMAGING | Facility: HOSPITAL | Age: 86
End: 2021-09-04

## 2021-09-04 LAB
25(OH)D3 SERPL-MCNC: 36 NG/ML (ref 30–100)
ALBUMIN SERPL-MCNC: 3.46 G/DL (ref 3.5–5.2)
ALBUMIN/GLOB SERPL: 1.3 G/DL
ALP SERPL-CCNC: 72 U/L (ref 39–117)
ALT SERPL W P-5'-P-CCNC: 27 U/L (ref 1–41)
ANION GAP SERPL CALCULATED.3IONS-SCNC: 9.2 MMOL/L (ref 5–15)
AST SERPL-CCNC: 36 U/L (ref 1–40)
BASOPHILS # BLD AUTO: 0 10*3/MM3 (ref 0–0.2)
BASOPHILS NFR BLD AUTO: 0 % (ref 0–1.5)
BILIRUB CONJ SERPL-MCNC: <0.2 MG/DL (ref 0–0.3)
BILIRUB SERPL-MCNC: 0.3 MG/DL (ref 0–1.2)
BUN SERPL-MCNC: 29 MG/DL (ref 8–23)
BUN/CREAT SERPL: 25.7 (ref 7–25)
CALCIUM SPEC-SCNC: 8.5 MG/DL (ref 8.2–9.6)
CHLORIDE SERPL-SCNC: 103 MMOL/L (ref 98–107)
CO2 SERPL-SCNC: 24.8 MMOL/L (ref 22–29)
CREAT SERPL-MCNC: 1.13 MG/DL (ref 0.76–1.27)
CRP SERPL-MCNC: 0.52 MG/DL (ref 0–0.5)
D DIMER PPP FEU-MCNC: 1.23 MCGFEU/ML (ref 0–0.5)
DEPRECATED RDW RBC AUTO: 41.6 FL (ref 37–54)
EOSINOPHIL # BLD AUTO: 0 10*3/MM3 (ref 0–0.4)
EOSINOPHIL NFR BLD AUTO: 0 % (ref 0.3–6.2)
ERYTHROCYTE [DISTWIDTH] IN BLOOD BY AUTOMATED COUNT: 12.5 % (ref 12.3–15.4)
GFR SERPL CREATININE-BSD FRML MDRD: 60 ML/MIN/1.73
GLOBULIN UR ELPH-MCNC: 2.6 GM/DL
GLUCOSE BLDC GLUCOMTR-MCNC: 120 MG/DL (ref 70–130)
GLUCOSE BLDC GLUCOMTR-MCNC: 151 MG/DL (ref 70–130)
GLUCOSE BLDC GLUCOMTR-MCNC: 158 MG/DL (ref 70–130)
GLUCOSE BLDC GLUCOMTR-MCNC: 164 MG/DL (ref 70–130)
GLUCOSE SERPL-MCNC: 124 MG/DL (ref 65–99)
HCT VFR BLD AUTO: 38.2 % (ref 37.5–51)
HGB BLD-MCNC: 12.9 G/DL (ref 13–17.7)
IMM GRANULOCYTES # BLD AUTO: 0.03 10*3/MM3 (ref 0–0.05)
IMM GRANULOCYTES NFR BLD AUTO: 0.5 % (ref 0–0.5)
INR PPP: 1.13 (ref 0.9–1.1)
LYMPHOCYTES # BLD AUTO: 1.12 10*3/MM3 (ref 0.7–3.1)
LYMPHOCYTES NFR BLD AUTO: 17.1 % (ref 19.6–45.3)
MAGNESIUM SERPL-MCNC: 2 MG/DL (ref 1.7–2.3)
MCH RBC QN AUTO: 30.8 PG (ref 26.6–33)
MCHC RBC AUTO-ENTMCNC: 33.8 G/DL (ref 31.5–35.7)
MCV RBC AUTO: 91.2 FL (ref 79–97)
MONOCYTES # BLD AUTO: 0.55 10*3/MM3 (ref 0.1–0.9)
MONOCYTES NFR BLD AUTO: 8.4 % (ref 5–12)
NEUTROPHILS NFR BLD AUTO: 4.86 10*3/MM3 (ref 1.7–7)
NEUTROPHILS NFR BLD AUTO: 74 % (ref 42.7–76)
NRBC BLD AUTO-RTO: 0 /100 WBC (ref 0–0.2)
PHOSPHATE SERPL-MCNC: 3 MG/DL (ref 2.5–4.5)
PLATELET # BLD AUTO: 144 10*3/MM3 (ref 140–450)
PMV BLD AUTO: 11 FL (ref 6–12)
POTASSIUM SERPL-SCNC: 4.3 MMOL/L (ref 3.5–5.2)
PROT SERPL-MCNC: 6.1 G/DL (ref 6–8.5)
PROTHROMBIN TIME: 14.9 SECONDS (ref 12.8–14.5)
RBC # BLD AUTO: 4.19 10*6/MM3 (ref 4.14–5.8)
SODIUM SERPL-SCNC: 137 MMOL/L (ref 136–145)
TROPONIN T SERPL-MCNC: <0.01 NG/ML (ref 0–0.03)
WBC # BLD AUTO: 6.56 10*3/MM3 (ref 3.4–10.8)

## 2021-09-04 PROCEDURE — 93005 ELECTROCARDIOGRAM TRACING: CPT | Performed by: NURSE PRACTITIONER

## 2021-09-04 PROCEDURE — 93880 EXTRACRANIAL BILAT STUDY: CPT | Performed by: RADIOLOGY

## 2021-09-04 PROCEDURE — 63710000001 DEXAMETHASONE PER 0.25 MG: Performed by: INTERNAL MEDICINE

## 2021-09-04 PROCEDURE — 85025 COMPLETE CBC W/AUTO DIFF WBC: CPT | Performed by: INTERNAL MEDICINE

## 2021-09-04 PROCEDURE — 99222 1ST HOSP IP/OBS MODERATE 55: CPT | Performed by: INTERNAL MEDICINE

## 2021-09-04 PROCEDURE — 25010000002 ENOXAPARIN PER 10 MG: Performed by: INTERNAL MEDICINE

## 2021-09-04 PROCEDURE — 99233 SBSQ HOSP IP/OBS HIGH 50: CPT | Performed by: NURSE PRACTITIONER

## 2021-09-04 PROCEDURE — 84484 ASSAY OF TROPONIN QUANT: CPT | Performed by: INTERNAL MEDICINE

## 2021-09-04 PROCEDURE — 85379 FIBRIN DEGRADATION QUANT: CPT | Performed by: INTERNAL MEDICINE

## 2021-09-04 PROCEDURE — 82306 VITAMIN D 25 HYDROXY: CPT | Performed by: NURSE PRACTITIONER

## 2021-09-04 PROCEDURE — 93880 EXTRACRANIAL BILAT STUDY: CPT

## 2021-09-04 PROCEDURE — 85610 PROTHROMBIN TIME: CPT | Performed by: INTERNAL MEDICINE

## 2021-09-04 PROCEDURE — 83735 ASSAY OF MAGNESIUM: CPT | Performed by: INTERNAL MEDICINE

## 2021-09-04 PROCEDURE — 94760 N-INVAS EAR/PLS OXIMETRY 1: CPT

## 2021-09-04 PROCEDURE — 93010 ELECTROCARDIOGRAM REPORT: CPT | Performed by: INTERNAL MEDICINE

## 2021-09-04 PROCEDURE — 93005 ELECTROCARDIOGRAM TRACING: CPT | Performed by: INTERNAL MEDICINE

## 2021-09-04 PROCEDURE — 82962 GLUCOSE BLOOD TEST: CPT

## 2021-09-04 PROCEDURE — 82248 BILIRUBIN DIRECT: CPT | Performed by: INTERNAL MEDICINE

## 2021-09-04 PROCEDURE — 80053 COMPREHEN METABOLIC PANEL: CPT | Performed by: INTERNAL MEDICINE

## 2021-09-04 PROCEDURE — 94799 UNLISTED PULMONARY SVC/PX: CPT

## 2021-09-04 PROCEDURE — 84100 ASSAY OF PHOSPHORUS: CPT | Performed by: INTERNAL MEDICINE

## 2021-09-04 PROCEDURE — 63710000001 INSULIN ASPART PER 5 UNITS: Performed by: NURSE PRACTITIONER

## 2021-09-04 PROCEDURE — 86140 C-REACTIVE PROTEIN: CPT | Performed by: INTERNAL MEDICINE

## 2021-09-04 RX ORDER — POLYETHYLENE GLYCOL 3350 17 G/17G
17 POWDER, FOR SOLUTION ORAL DAILY
Status: DISCONTINUED | OUTPATIENT
Start: 2021-09-04 | End: 2021-09-15 | Stop reason: HOSPADM

## 2021-09-04 RX ADMIN — LEVOTHYROXINE SODIUM 50 MCG: 50 TABLET ORAL at 08:53

## 2021-09-04 RX ADMIN — MUPIROCIN: 20 OINTMENT TOPICAL at 08:53

## 2021-09-04 RX ADMIN — ENOXAPARIN SODIUM 40 MG: 40 INJECTION SUBCUTANEOUS at 22:17

## 2021-09-04 RX ADMIN — ALBUTEROL SULFATE 2 PUFF: 90 AEROSOL, METERED RESPIRATORY (INHALATION) at 06:43

## 2021-09-04 RX ADMIN — INSULIN ASPART 2 UNITS: 100 INJECTION, SOLUTION INTRAVENOUS; SUBCUTANEOUS at 16:54

## 2021-09-04 RX ADMIN — Medication 1 TABLET: at 08:53

## 2021-09-04 RX ADMIN — FINASTERIDE 5 MG: 5 TABLET, FILM COATED ORAL at 08:53

## 2021-09-04 RX ADMIN — ALBUTEROL SULFATE 2 PUFF: 90 AEROSOL, METERED RESPIRATORY (INHALATION) at 04:34

## 2021-09-04 RX ADMIN — ASPIRIN 81 MG: 81 TABLET, COATED ORAL at 08:53

## 2021-09-04 RX ADMIN — INSULIN ASPART 2 UNITS: 100 INJECTION, SOLUTION INTRAVENOUS; SUBCUTANEOUS at 11:24

## 2021-09-04 RX ADMIN — ALBUTEROL SULFATE 2 PUFF: 90 AEROSOL, METERED RESPIRATORY (INHALATION) at 15:04

## 2021-09-04 RX ADMIN — MUPIROCIN: 20 OINTMENT TOPICAL at 22:15

## 2021-09-04 RX ADMIN — ENOXAPARIN SODIUM 40 MG: 40 INJECTION SUBCUTANEOUS at 08:53

## 2021-09-04 RX ADMIN — DEXAMETHASONE 6 MG: 4 TABLET ORAL at 08:53

## 2021-09-04 RX ADMIN — GUAIFENESIN 600 MG: 600 TABLET, EXTENDED RELEASE ORAL at 22:15

## 2021-09-04 RX ADMIN — REMDESIVIR 100 MG: 100 INJECTION, POWDER, LYOPHILIZED, FOR SOLUTION INTRAVENOUS at 16:55

## 2021-09-04 RX ADMIN — TERAZOSIN HYDROCHLORIDE 2 MG: 1 CAPSULE ORAL at 22:16

## 2021-09-04 RX ADMIN — POLYETHYLENE GLYCOL (3350) 17 G: 17 POWDER, FOR SOLUTION ORAL at 11:24

## 2021-09-04 RX ADMIN — ALBUTEROL SULFATE 2 PUFF: 90 AEROSOL, METERED RESPIRATORY (INHALATION) at 18:45

## 2021-09-04 RX ADMIN — GUAIFENESIN 600 MG: 600 TABLET, EXTENDED RELEASE ORAL at 08:53

## 2021-09-05 ENCOUNTER — APPOINTMENT (OUTPATIENT)
Dept: CT IMAGING | Facility: HOSPITAL | Age: 86
End: 2021-09-05

## 2021-09-05 LAB
ALBUMIN SERPL-MCNC: 3.46 G/DL (ref 3.5–5.2)
ALBUMIN/GLOB SERPL: 1.3 G/DL
ALP SERPL-CCNC: 71 U/L (ref 39–117)
ALT SERPL W P-5'-P-CCNC: 27 U/L (ref 1–41)
ANION GAP SERPL CALCULATED.3IONS-SCNC: 10.2 MMOL/L (ref 5–15)
AST SERPL-CCNC: 32 U/L (ref 1–40)
BASOPHILS # BLD AUTO: 0.01 10*3/MM3 (ref 0–0.2)
BASOPHILS NFR BLD AUTO: 0.1 % (ref 0–1.5)
BILIRUB CONJ SERPL-MCNC: 0.2 MG/DL (ref 0–0.3)
BILIRUB SERPL-MCNC: 0.5 MG/DL (ref 0–1.2)
BUN SERPL-MCNC: 30 MG/DL (ref 8–23)
BUN/CREAT SERPL: 26.1 (ref 7–25)
CALCIUM SPEC-SCNC: 8.6 MG/DL (ref 8.2–9.6)
CHLORIDE SERPL-SCNC: 104 MMOL/L (ref 98–107)
CO2 SERPL-SCNC: 23.8 MMOL/L (ref 22–29)
CREAT SERPL-MCNC: 1.15 MG/DL (ref 0.76–1.27)
CRP SERPL-MCNC: 0.31 MG/DL (ref 0–0.5)
DEPRECATED RDW RBC AUTO: 41.9 FL (ref 37–54)
EOSINOPHIL # BLD AUTO: 0 10*3/MM3 (ref 0–0.4)
EOSINOPHIL NFR BLD AUTO: 0 % (ref 0.3–6.2)
ERYTHROCYTE [DISTWIDTH] IN BLOOD BY AUTOMATED COUNT: 12.6 % (ref 12.3–15.4)
GFR SERPL CREATININE-BSD FRML MDRD: 59 ML/MIN/1.73
GLOBULIN UR ELPH-MCNC: 2.7 GM/DL
GLUCOSE BLDC GLUCOMTR-MCNC: 107 MG/DL (ref 70–130)
GLUCOSE BLDC GLUCOMTR-MCNC: 126 MG/DL (ref 70–130)
GLUCOSE BLDC GLUCOMTR-MCNC: 165 MG/DL (ref 70–130)
GLUCOSE BLDC GLUCOMTR-MCNC: 203 MG/DL (ref 70–130)
GLUCOSE SERPL-MCNC: 105 MG/DL (ref 65–99)
HCT VFR BLD AUTO: 38.8 % (ref 37.5–51)
HGB BLD-MCNC: 12.9 G/DL (ref 13–17.7)
IMM GRANULOCYTES # BLD AUTO: 0.03 10*3/MM3 (ref 0–0.05)
IMM GRANULOCYTES NFR BLD AUTO: 0.4 % (ref 0–0.5)
LYMPHOCYTES # BLD AUTO: 1.33 10*3/MM3 (ref 0.7–3.1)
LYMPHOCYTES NFR BLD AUTO: 19.1 % (ref 19.6–45.3)
MAGNESIUM SERPL-MCNC: 2 MG/DL (ref 1.7–2.3)
MCH RBC QN AUTO: 30.4 PG (ref 26.6–33)
MCHC RBC AUTO-ENTMCNC: 33.2 G/DL (ref 31.5–35.7)
MCV RBC AUTO: 91.5 FL (ref 79–97)
MONOCYTES # BLD AUTO: 0.6 10*3/MM3 (ref 0.1–0.9)
MONOCYTES NFR BLD AUTO: 8.6 % (ref 5–12)
NEUTROPHILS NFR BLD AUTO: 5 10*3/MM3 (ref 1.7–7)
NEUTROPHILS NFR BLD AUTO: 71.8 % (ref 42.7–76)
NRBC BLD AUTO-RTO: 0 /100 WBC (ref 0–0.2)
PHOSPHATE SERPL-MCNC: 3 MG/DL (ref 2.5–4.5)
PLATELET # BLD AUTO: 152 10*3/MM3 (ref 140–450)
PMV BLD AUTO: 10.8 FL (ref 6–12)
POTASSIUM SERPL-SCNC: 4 MMOL/L (ref 3.5–5.2)
PROT SERPL-MCNC: 6.2 G/DL (ref 6–8.5)
QT INTERVAL: 436 MS
QT INTERVAL: 440 MS
QTC INTERVAL: 438 MS
QTC INTERVAL: 450 MS
RBC # BLD AUTO: 4.24 10*6/MM3 (ref 4.14–5.8)
SODIUM SERPL-SCNC: 138 MMOL/L (ref 136–145)
WBC # BLD AUTO: 6.97 10*3/MM3 (ref 3.4–10.8)

## 2021-09-05 PROCEDURE — 25010000002 ENOXAPARIN PER 10 MG: Performed by: INTERNAL MEDICINE

## 2021-09-05 PROCEDURE — 94799 UNLISTED PULMONARY SVC/PX: CPT

## 2021-09-05 PROCEDURE — 70486 CT MAXILLOFACIAL W/O DYE: CPT | Performed by: RADIOLOGY

## 2021-09-05 PROCEDURE — 83735 ASSAY OF MAGNESIUM: CPT | Performed by: INTERNAL MEDICINE

## 2021-09-05 PROCEDURE — 70450 CT HEAD/BRAIN W/O DYE: CPT

## 2021-09-05 PROCEDURE — 70450 CT HEAD/BRAIN W/O DYE: CPT | Performed by: RADIOLOGY

## 2021-09-05 PROCEDURE — 80053 COMPREHEN METABOLIC PANEL: CPT | Performed by: INTERNAL MEDICINE

## 2021-09-05 PROCEDURE — 70486 CT MAXILLOFACIAL W/O DYE: CPT

## 2021-09-05 PROCEDURE — 82248 BILIRUBIN DIRECT: CPT | Performed by: INTERNAL MEDICINE

## 2021-09-05 PROCEDURE — 86140 C-REACTIVE PROTEIN: CPT | Performed by: INTERNAL MEDICINE

## 2021-09-05 PROCEDURE — 63710000001 INSULIN ASPART PER 5 UNITS: Performed by: NURSE PRACTITIONER

## 2021-09-05 PROCEDURE — 82962 GLUCOSE BLOOD TEST: CPT

## 2021-09-05 PROCEDURE — 63710000001 DEXAMETHASONE PER 0.25 MG: Performed by: INTERNAL MEDICINE

## 2021-09-05 PROCEDURE — 99233 SBSQ HOSP IP/OBS HIGH 50: CPT | Performed by: NURSE PRACTITIONER

## 2021-09-05 PROCEDURE — 85025 COMPLETE CBC W/AUTO DIFF WBC: CPT | Performed by: INTERNAL MEDICINE

## 2021-09-05 PROCEDURE — 94760 N-INVAS EAR/PLS OXIMETRY 1: CPT

## 2021-09-05 PROCEDURE — 84100 ASSAY OF PHOSPHORUS: CPT | Performed by: INTERNAL MEDICINE

## 2021-09-05 RX ORDER — BISACODYL 10 MG
10 SUPPOSITORY, RECTAL RECTAL ONCE
Status: COMPLETED | OUTPATIENT
Start: 2021-09-05 | End: 2021-09-05

## 2021-09-05 RX ORDER — AMOXICILLIN 250 MG
1 CAPSULE ORAL NIGHTLY PRN
Status: DISCONTINUED | OUTPATIENT
Start: 2021-09-05 | End: 2021-09-15 | Stop reason: HOSPADM

## 2021-09-05 RX ADMIN — DEXAMETHASONE 6 MG: 4 TABLET ORAL at 09:25

## 2021-09-05 RX ADMIN — GUAIFENESIN 600 MG: 600 TABLET, EXTENDED RELEASE ORAL at 09:26

## 2021-09-05 RX ADMIN — ALBUTEROL SULFATE 2 PUFF: 90 AEROSOL, METERED RESPIRATORY (INHALATION) at 08:09

## 2021-09-05 RX ADMIN — BISACODYL 10 MG: 10 SUPPOSITORY RECTAL at 23:46

## 2021-09-05 RX ADMIN — REMDESIVIR 100 MG: 100 INJECTION, POWDER, LYOPHILIZED, FOR SOLUTION INTRAVENOUS at 16:55

## 2021-09-05 RX ADMIN — ENOXAPARIN SODIUM 40 MG: 40 INJECTION SUBCUTANEOUS at 20:43

## 2021-09-05 RX ADMIN — MUPIROCIN: 20 OINTMENT TOPICAL at 09:26

## 2021-09-05 RX ADMIN — GUAIFENESIN 600 MG: 600 TABLET, EXTENDED RELEASE ORAL at 20:43

## 2021-09-05 RX ADMIN — LEVOTHYROXINE SODIUM 50 MCG: 50 TABLET ORAL at 09:24

## 2021-09-05 RX ADMIN — ENOXAPARIN SODIUM 40 MG: 40 INJECTION SUBCUTANEOUS at 09:25

## 2021-09-05 RX ADMIN — MUPIROCIN: 20 OINTMENT TOPICAL at 20:43

## 2021-09-05 RX ADMIN — ALBUTEROL SULFATE 2 PUFF: 90 AEROSOL, METERED RESPIRATORY (INHALATION) at 18:51

## 2021-09-05 RX ADMIN — Medication 1 TABLET: at 09:25

## 2021-09-05 RX ADMIN — ALBUTEROL SULFATE 2 PUFF: 90 AEROSOL, METERED RESPIRATORY (INHALATION) at 13:00

## 2021-09-05 RX ADMIN — POLYETHYLENE GLYCOL (3350) 17 G: 17 POWDER, FOR SOLUTION ORAL at 10:07

## 2021-09-05 RX ADMIN — DOCUSATE SODIUM 50 MG AND SENNOSIDES 8.6 MG 1 TABLET: 8.6; 5 TABLET, FILM COATED ORAL at 20:44

## 2021-09-05 RX ADMIN — TERAZOSIN HYDROCHLORIDE 2 MG: 1 CAPSULE ORAL at 20:43

## 2021-09-05 RX ADMIN — FINASTERIDE 5 MG: 5 TABLET, FILM COATED ORAL at 09:25

## 2021-09-05 RX ADMIN — ALBUTEROL SULFATE 2 PUFF: 90 AEROSOL, METERED RESPIRATORY (INHALATION) at 02:16

## 2021-09-05 RX ADMIN — DOCUSATE SODIUM 50 MG AND SENNOSIDES 8.6 MG 1 TABLET: 8.6; 5 TABLET, FILM COATED ORAL at 06:14

## 2021-09-05 RX ADMIN — ASPIRIN 81 MG: 81 TABLET, COATED ORAL at 09:24

## 2021-09-05 RX ADMIN — INSULIN ASPART 2 UNITS: 100 INJECTION, SOLUTION INTRAVENOUS; SUBCUTANEOUS at 16:56

## 2021-09-06 LAB
ALBUMIN SERPL-MCNC: 3.54 G/DL (ref 3.5–5.2)
ALBUMIN/GLOB SERPL: 1.2 G/DL
ALP SERPL-CCNC: 69 U/L (ref 39–117)
ALT SERPL W P-5'-P-CCNC: 27 U/L (ref 1–41)
ANION GAP SERPL CALCULATED.3IONS-SCNC: 9.4 MMOL/L (ref 5–15)
AST SERPL-CCNC: 28 U/L (ref 1–40)
BACTERIA SPEC AEROBE CULT: NORMAL
BACTERIA SPEC AEROBE CULT: NORMAL
BASOPHILS # BLD AUTO: 0.01 10*3/MM3 (ref 0–0.2)
BASOPHILS NFR BLD AUTO: 0.1 % (ref 0–1.5)
BILIRUB CONJ SERPL-MCNC: 0.2 MG/DL (ref 0–0.3)
BILIRUB SERPL-MCNC: 0.5 MG/DL (ref 0–1.2)
BUN SERPL-MCNC: 30 MG/DL (ref 8–23)
BUN/CREAT SERPL: 25 (ref 7–25)
CALCIUM SPEC-SCNC: 9.1 MG/DL (ref 8.2–9.6)
CHLORIDE SERPL-SCNC: 103 MMOL/L (ref 98–107)
CO2 SERPL-SCNC: 24.6 MMOL/L (ref 22–29)
CREAT SERPL-MCNC: 1.2 MG/DL (ref 0.76–1.27)
CRP SERPL-MCNC: <0.3 MG/DL (ref 0–0.5)
D DIMER PPP FEU-MCNC: 0.98 MCGFEU/ML (ref 0–0.5)
DEPRECATED RDW RBC AUTO: 41.7 FL (ref 37–54)
EOSINOPHIL # BLD AUTO: 0.01 10*3/MM3 (ref 0–0.4)
EOSINOPHIL NFR BLD AUTO: 0.1 % (ref 0.3–6.2)
ERYTHROCYTE [DISTWIDTH] IN BLOOD BY AUTOMATED COUNT: 12.4 % (ref 12.3–15.4)
GFR SERPL CREATININE-BSD FRML MDRD: 56 ML/MIN/1.73
GLOBULIN UR ELPH-MCNC: 3.1 GM/DL
GLUCOSE BLDC GLUCOMTR-MCNC: 102 MG/DL (ref 70–130)
GLUCOSE BLDC GLUCOMTR-MCNC: 151 MG/DL (ref 70–130)
GLUCOSE BLDC GLUCOMTR-MCNC: 155 MG/DL (ref 70–130)
GLUCOSE BLDC GLUCOMTR-MCNC: 275 MG/DL (ref 70–130)
GLUCOSE SERPL-MCNC: 140 MG/DL (ref 65–99)
HCT VFR BLD AUTO: 39.5 % (ref 37.5–51)
HGB BLD-MCNC: 13.4 G/DL (ref 13–17.7)
IMM GRANULOCYTES # BLD AUTO: 0.1 10*3/MM3 (ref 0–0.05)
IMM GRANULOCYTES NFR BLD AUTO: 1.4 % (ref 0–0.5)
INR PPP: 1.07 (ref 0.9–1.1)
LYMPHOCYTES # BLD AUTO: 1.01 10*3/MM3 (ref 0.7–3.1)
LYMPHOCYTES NFR BLD AUTO: 13.9 % (ref 19.6–45.3)
MAGNESIUM SERPL-MCNC: 2.2 MG/DL (ref 1.7–2.3)
MCH RBC QN AUTO: 30.8 PG (ref 26.6–33)
MCHC RBC AUTO-ENTMCNC: 33.9 G/DL (ref 31.5–35.7)
MCV RBC AUTO: 90.8 FL (ref 79–97)
MONOCYTES # BLD AUTO: 0.58 10*3/MM3 (ref 0.1–0.9)
MONOCYTES NFR BLD AUTO: 8 % (ref 5–12)
NEUTROPHILS NFR BLD AUTO: 5.57 10*3/MM3 (ref 1.7–7)
NEUTROPHILS NFR BLD AUTO: 76.5 % (ref 42.7–76)
NRBC BLD AUTO-RTO: 0 /100 WBC (ref 0–0.2)
PHOSPHATE SERPL-MCNC: 3.3 MG/DL (ref 2.5–4.5)
PLATELET # BLD AUTO: 157 10*3/MM3 (ref 140–450)
PMV BLD AUTO: 10.8 FL (ref 6–12)
POTASSIUM SERPL-SCNC: 4.7 MMOL/L (ref 3.5–5.2)
PROT SERPL-MCNC: 6.6 G/DL (ref 6–8.5)
PROTHROMBIN TIME: 14.3 SECONDS (ref 12.8–14.5)
RBC # BLD AUTO: 4.35 10*6/MM3 (ref 4.14–5.8)
SODIUM SERPL-SCNC: 137 MMOL/L (ref 136–145)
WBC # BLD AUTO: 7.28 10*3/MM3 (ref 3.4–10.8)

## 2021-09-06 PROCEDURE — 85025 COMPLETE CBC W/AUTO DIFF WBC: CPT | Performed by: INTERNAL MEDICINE

## 2021-09-06 PROCEDURE — 99232 SBSQ HOSP IP/OBS MODERATE 35: CPT | Performed by: NURSE PRACTITIONER

## 2021-09-06 PROCEDURE — 63710000001 INSULIN ASPART PER 5 UNITS: Performed by: NURSE PRACTITIONER

## 2021-09-06 PROCEDURE — 97163 PT EVAL HIGH COMPLEX 45 MIN: CPT

## 2021-09-06 PROCEDURE — 85610 PROTHROMBIN TIME: CPT | Performed by: INTERNAL MEDICINE

## 2021-09-06 PROCEDURE — 63710000001 DEXAMETHASONE PER 0.25 MG: Performed by: INTERNAL MEDICINE

## 2021-09-06 PROCEDURE — 94799 UNLISTED PULMONARY SVC/PX: CPT

## 2021-09-06 PROCEDURE — 82248 BILIRUBIN DIRECT: CPT | Performed by: INTERNAL MEDICINE

## 2021-09-06 PROCEDURE — 86140 C-REACTIVE PROTEIN: CPT | Performed by: INTERNAL MEDICINE

## 2021-09-06 PROCEDURE — 85379 FIBRIN DEGRADATION QUANT: CPT | Performed by: INTERNAL MEDICINE

## 2021-09-06 PROCEDURE — 25010000002 ENOXAPARIN PER 10 MG: Performed by: INTERNAL MEDICINE

## 2021-09-06 PROCEDURE — 82962 GLUCOSE BLOOD TEST: CPT

## 2021-09-06 PROCEDURE — 84100 ASSAY OF PHOSPHORUS: CPT | Performed by: INTERNAL MEDICINE

## 2021-09-06 PROCEDURE — 80053 COMPREHEN METABOLIC PANEL: CPT | Performed by: INTERNAL MEDICINE

## 2021-09-06 PROCEDURE — 83735 ASSAY OF MAGNESIUM: CPT | Performed by: INTERNAL MEDICINE

## 2021-09-06 RX ADMIN — Medication 1 TABLET: at 08:45

## 2021-09-06 RX ADMIN — ENOXAPARIN SODIUM 40 MG: 40 INJECTION SUBCUTANEOUS at 08:45

## 2021-09-06 RX ADMIN — TERAZOSIN HYDROCHLORIDE 2 MG: 1 CAPSULE ORAL at 19:39

## 2021-09-06 RX ADMIN — GUAIFENESIN 600 MG: 600 TABLET, EXTENDED RELEASE ORAL at 08:45

## 2021-09-06 RX ADMIN — GUAIFENESIN 600 MG: 600 TABLET, EXTENDED RELEASE ORAL at 19:39

## 2021-09-06 RX ADMIN — DEXAMETHASONE 6 MG: 4 TABLET ORAL at 08:45

## 2021-09-06 RX ADMIN — ENOXAPARIN SODIUM 40 MG: 40 INJECTION SUBCUTANEOUS at 19:39

## 2021-09-06 RX ADMIN — ALBUTEROL SULFATE 2 PUFF: 90 AEROSOL, METERED RESPIRATORY (INHALATION) at 19:37

## 2021-09-06 RX ADMIN — MUPIROCIN: 20 OINTMENT TOPICAL at 08:46

## 2021-09-06 RX ADMIN — LEVOTHYROXINE SODIUM 50 MCG: 50 TABLET ORAL at 08:45

## 2021-09-06 RX ADMIN — ALBUTEROL SULFATE 2 PUFF: 90 AEROSOL, METERED RESPIRATORY (INHALATION) at 07:12

## 2021-09-06 RX ADMIN — FINASTERIDE 5 MG: 5 TABLET, FILM COATED ORAL at 08:45

## 2021-09-06 RX ADMIN — MUPIROCIN: 20 OINTMENT TOPICAL at 19:37

## 2021-09-06 RX ADMIN — INSULIN ASPART 2 UNITS: 100 INJECTION, SOLUTION INTRAVENOUS; SUBCUTANEOUS at 11:42

## 2021-09-06 RX ADMIN — ASPIRIN 81 MG: 81 TABLET, COATED ORAL at 08:45

## 2021-09-06 RX ADMIN — INSULIN ASPART 2 UNITS: 100 INJECTION, SOLUTION INTRAVENOUS; SUBCUTANEOUS at 17:13

## 2021-09-07 LAB
ALBUMIN SERPL-MCNC: 3.53 G/DL (ref 3.5–5.2)
ALP SERPL-CCNC: 72 U/L (ref 39–117)
ALT SERPL W P-5'-P-CCNC: 28 U/L (ref 1–41)
ANION GAP SERPL CALCULATED.3IONS-SCNC: 11.1 MMOL/L (ref 5–15)
AST SERPL-CCNC: 30 U/L (ref 1–40)
BASOPHILS # BLD AUTO: 0.02 10*3/MM3 (ref 0–0.2)
BASOPHILS NFR BLD AUTO: 0.2 % (ref 0–1.5)
BILIRUB CONJ SERPL-MCNC: 0.2 MG/DL (ref 0–0.3)
BILIRUB INDIRECT SERPL-MCNC: 0.3 MG/DL
BILIRUB SERPL-MCNC: 0.5 MG/DL (ref 0–1.2)
BUN SERPL-MCNC: 33 MG/DL (ref 8–23)
BUN/CREAT SERPL: 29.2 (ref 7–25)
CALCIUM SPEC-SCNC: 8.7 MG/DL (ref 8.2–9.6)
CHLORIDE SERPL-SCNC: 101 MMOL/L (ref 98–107)
CO2 SERPL-SCNC: 19.9 MMOL/L (ref 22–29)
CREAT SERPL-MCNC: 1.08 MG/DL (ref 0.76–1.27)
CREAT SERPL-MCNC: 1.13 MG/DL (ref 0.76–1.27)
DEPRECATED RDW RBC AUTO: 43.2 FL (ref 37–54)
EOSINOPHIL # BLD AUTO: 0.01 10*3/MM3 (ref 0–0.4)
EOSINOPHIL NFR BLD AUTO: 0.1 % (ref 0.3–6.2)
ERYTHROCYTE [DISTWIDTH] IN BLOOD BY AUTOMATED COUNT: 12.5 % (ref 12.3–15.4)
GFR SERPL CREATININE-BSD FRML MDRD: 60 ML/MIN/1.73
GFR SERPL CREATININE-BSD FRML MDRD: 64 ML/MIN/1.73
GLUCOSE BLDC GLUCOMTR-MCNC: 102 MG/DL (ref 70–130)
GLUCOSE BLDC GLUCOMTR-MCNC: 167 MG/DL (ref 70–130)
GLUCOSE BLDC GLUCOMTR-MCNC: 176 MG/DL (ref 70–130)
GLUCOSE BLDC GLUCOMTR-MCNC: 183 MG/DL (ref 70–130)
GLUCOSE SERPL-MCNC: 171 MG/DL (ref 65–99)
HCT VFR BLD AUTO: 40.3 % (ref 37.5–51)
HGB BLD-MCNC: 13.3 G/DL (ref 13–17.7)
IMM GRANULOCYTES # BLD AUTO: 0.2 10*3/MM3 (ref 0–0.05)
IMM GRANULOCYTES NFR BLD AUTO: 1.9 % (ref 0–0.5)
LYMPHOCYTES # BLD AUTO: 1.33 10*3/MM3 (ref 0.7–3.1)
LYMPHOCYTES NFR BLD AUTO: 12.5 % (ref 19.6–45.3)
MCH RBC QN AUTO: 30.6 PG (ref 26.6–33)
MCHC RBC AUTO-ENTMCNC: 33 G/DL (ref 31.5–35.7)
MCV RBC AUTO: 92.6 FL (ref 79–97)
MONOCYTES # BLD AUTO: 0.58 10*3/MM3 (ref 0.1–0.9)
MONOCYTES NFR BLD AUTO: 5.5 % (ref 5–12)
NEUTROPHILS NFR BLD AUTO: 79.8 % (ref 42.7–76)
NEUTROPHILS NFR BLD AUTO: 8.49 10*3/MM3 (ref 1.7–7)
NRBC BLD AUTO-RTO: 0 /100 WBC (ref 0–0.2)
PLATELET # BLD AUTO: 170 10*3/MM3 (ref 140–450)
PMV BLD AUTO: 11.3 FL (ref 6–12)
POTASSIUM SERPL-SCNC: 3.8 MMOL/L (ref 3.5–5.2)
PROT SERPL-MCNC: 6.3 G/DL (ref 6–8.5)
RBC # BLD AUTO: 4.35 10*6/MM3 (ref 4.14–5.8)
SODIUM SERPL-SCNC: 132 MMOL/L (ref 136–145)
WBC # BLD AUTO: 10.63 10*3/MM3 (ref 3.4–10.8)

## 2021-09-07 PROCEDURE — 80076 HEPATIC FUNCTION PANEL: CPT | Performed by: INTERNAL MEDICINE

## 2021-09-07 PROCEDURE — 63710000001 INSULIN ASPART PER 5 UNITS: Performed by: NURSE PRACTITIONER

## 2021-09-07 PROCEDURE — 94799 UNLISTED PULMONARY SVC/PX: CPT

## 2021-09-07 PROCEDURE — 63710000001 DEXAMETHASONE PER 0.25 MG: Performed by: INTERNAL MEDICINE

## 2021-09-07 PROCEDURE — 82962 GLUCOSE BLOOD TEST: CPT

## 2021-09-07 PROCEDURE — 99222 1ST HOSP IP/OBS MODERATE 55: CPT | Performed by: INTERNAL MEDICINE

## 2021-09-07 PROCEDURE — 97110 THERAPEUTIC EXERCISES: CPT

## 2021-09-07 PROCEDURE — 25010000002 ENOXAPARIN PER 10 MG: Performed by: INTERNAL MEDICINE

## 2021-09-07 PROCEDURE — 85025 COMPLETE CBC W/AUTO DIFF WBC: CPT | Performed by: NURSE PRACTITIONER

## 2021-09-07 PROCEDURE — 82565 ASSAY OF CREATININE: CPT | Performed by: INTERNAL MEDICINE

## 2021-09-07 PROCEDURE — 97116 GAIT TRAINING THERAPY: CPT

## 2021-09-07 PROCEDURE — 99232 SBSQ HOSP IP/OBS MODERATE 35: CPT | Performed by: NURSE PRACTITIONER

## 2021-09-07 PROCEDURE — 80048 BASIC METABOLIC PNL TOTAL CA: CPT | Performed by: NURSE PRACTITIONER

## 2021-09-07 RX ADMIN — POLYETHYLENE GLYCOL (3350) 17 G: 17 POWDER, FOR SOLUTION ORAL at 08:31

## 2021-09-07 RX ADMIN — LEVOTHYROXINE SODIUM 50 MCG: 50 TABLET ORAL at 08:29

## 2021-09-07 RX ADMIN — ALBUTEROL SULFATE 2 PUFF: 90 AEROSOL, METERED RESPIRATORY (INHALATION) at 06:12

## 2021-09-07 RX ADMIN — TERAZOSIN HYDROCHLORIDE 2 MG: 1 CAPSULE ORAL at 20:16

## 2021-09-07 RX ADMIN — INSULIN ASPART 2 UNITS: 100 INJECTION, SOLUTION INTRAVENOUS; SUBCUTANEOUS at 12:03

## 2021-09-07 RX ADMIN — ASPIRIN 81 MG: 81 TABLET, COATED ORAL at 08:29

## 2021-09-07 RX ADMIN — MUPIROCIN: 20 OINTMENT TOPICAL at 08:30

## 2021-09-07 RX ADMIN — INSULIN ASPART 2 UNITS: 100 INJECTION, SOLUTION INTRAVENOUS; SUBCUTANEOUS at 16:50

## 2021-09-07 RX ADMIN — GUAIFENESIN 600 MG: 600 TABLET, EXTENDED RELEASE ORAL at 20:16

## 2021-09-07 RX ADMIN — ENOXAPARIN SODIUM 40 MG: 40 INJECTION SUBCUTANEOUS at 20:16

## 2021-09-07 RX ADMIN — FINASTERIDE 5 MG: 5 TABLET, FILM COATED ORAL at 08:29

## 2021-09-07 RX ADMIN — Medication 1 TABLET: at 08:29

## 2021-09-07 RX ADMIN — GUAIFENESIN 600 MG: 600 TABLET, EXTENDED RELEASE ORAL at 08:29

## 2021-09-07 RX ADMIN — DEXAMETHASONE 6 MG: 4 TABLET ORAL at 08:29

## 2021-09-07 RX ADMIN — ALBUTEROL SULFATE 2 PUFF: 90 AEROSOL, METERED RESPIRATORY (INHALATION) at 18:23

## 2021-09-07 RX ADMIN — ENOXAPARIN SODIUM 40 MG: 40 INJECTION SUBCUTANEOUS at 12:01

## 2021-09-07 RX ADMIN — ALBUTEROL SULFATE 2 PUFF: 90 AEROSOL, METERED RESPIRATORY (INHALATION) at 12:01

## 2021-09-07 RX ADMIN — MUPIROCIN 1 APPLICATION: 20 OINTMENT TOPICAL at 20:16

## 2021-09-08 LAB
ALBUMIN SERPL-MCNC: 3.43 G/DL (ref 3.5–5.2)
ALBUMIN/GLOB SERPL: 1.3 G/DL
ALP SERPL-CCNC: 79 U/L (ref 39–117)
ALT SERPL W P-5'-P-CCNC: 30 U/L (ref 1–41)
ANION GAP SERPL CALCULATED.3IONS-SCNC: 12.4 MMOL/L (ref 5–15)
AST SERPL-CCNC: 27 U/L (ref 1–40)
BILIRUB CONJ SERPL-MCNC: <0.2 MG/DL (ref 0–0.3)
BILIRUB SERPL-MCNC: 0.4 MG/DL (ref 0–1.2)
BUN SERPL-MCNC: 34 MG/DL (ref 8–23)
BUN/CREAT SERPL: 29.1 (ref 7–25)
CALCIUM SPEC-SCNC: 9.1 MG/DL (ref 8.2–9.6)
CHLORIDE SERPL-SCNC: 104 MMOL/L (ref 98–107)
CO2 SERPL-SCNC: 20.6 MMOL/L (ref 22–29)
CREAT SERPL-MCNC: 1.17 MG/DL (ref 0.76–1.27)
CRP SERPL-MCNC: <0.3 MG/DL (ref 0–0.5)
D DIMER PPP FEU-MCNC: 0.88 MCGFEU/ML (ref 0–0.5)
DEPRECATED RDW RBC AUTO: 42.1 FL (ref 37–54)
ERYTHROCYTE [DISTWIDTH] IN BLOOD BY AUTOMATED COUNT: 12.6 % (ref 12.3–15.4)
GFR SERPL CREATININE-BSD FRML MDRD: 58 ML/MIN/1.73
GLOBULIN UR ELPH-MCNC: 2.6 GM/DL
GLUCOSE BLDC GLUCOMTR-MCNC: 109 MG/DL (ref 70–130)
GLUCOSE BLDC GLUCOMTR-MCNC: 137 MG/DL (ref 70–130)
GLUCOSE BLDC GLUCOMTR-MCNC: 141 MG/DL (ref 70–130)
GLUCOSE BLDC GLUCOMTR-MCNC: 90 MG/DL (ref 70–130)
GLUCOSE SERPL-MCNC: 149 MG/DL (ref 65–99)
HCT VFR BLD AUTO: 39.3 % (ref 37.5–51)
HGB BLD-MCNC: 13.3 G/DL (ref 13–17.7)
INR PPP: 1.13 (ref 0.9–1.1)
LYMPHOCYTES # BLD MANUAL: 1.44 10*3/MM3 (ref 0.7–3.1)
LYMPHOCYTES NFR BLD MANUAL: 11 % (ref 19.6–45.3)
LYMPHOCYTES NFR BLD MANUAL: 3 % (ref 5–12)
MAGNESIUM SERPL-MCNC: 2.2 MG/DL (ref 1.7–2.3)
MCH RBC QN AUTO: 30.9 PG (ref 26.6–33)
MCHC RBC AUTO-ENTMCNC: 33.8 G/DL (ref 31.5–35.7)
MCV RBC AUTO: 91.4 FL (ref 79–97)
METAMYELOCYTES NFR BLD MANUAL: 2 % (ref 0–0)
MONOCYTES # BLD AUTO: 0.39 10*3/MM3 (ref 0.1–0.9)
MYELOCYTES NFR BLD MANUAL: 1 % (ref 0–0)
NEUTROPHILS # BLD AUTO: 10.83 10*3/MM3 (ref 1.7–7)
NEUTROPHILS NFR BLD MANUAL: 82 % (ref 42.7–76)
NEUTS BAND NFR BLD MANUAL: 1 % (ref 0–5)
PLAT MORPH BLD: NORMAL
PLATELET # BLD AUTO: 192 10*3/MM3 (ref 140–450)
PMV BLD AUTO: 11.5 FL (ref 6–12)
POTASSIUM SERPL-SCNC: 4.3 MMOL/L (ref 3.5–5.2)
PROT SERPL-MCNC: 6 G/DL (ref 6–8.5)
PROTHROMBIN TIME: 14.9 SECONDS (ref 12.8–14.5)
RBC # BLD AUTO: 4.3 10*6/MM3 (ref 4.14–5.8)
RBC MORPH BLD: NORMAL
SODIUM SERPL-SCNC: 137 MMOL/L (ref 136–145)
WBC # BLD AUTO: 13.05 10*3/MM3 (ref 3.4–10.8)

## 2021-09-08 PROCEDURE — 82962 GLUCOSE BLOOD TEST: CPT

## 2021-09-08 PROCEDURE — 94799 UNLISTED PULMONARY SVC/PX: CPT

## 2021-09-08 PROCEDURE — 25010000002 ENOXAPARIN PER 10 MG: Performed by: INTERNAL MEDICINE

## 2021-09-08 PROCEDURE — 82248 BILIRUBIN DIRECT: CPT | Performed by: INTERNAL MEDICINE

## 2021-09-08 PROCEDURE — 85007 BL SMEAR W/DIFF WBC COUNT: CPT | Performed by: NURSE PRACTITIONER

## 2021-09-08 PROCEDURE — 83735 ASSAY OF MAGNESIUM: CPT | Performed by: NURSE PRACTITIONER

## 2021-09-08 PROCEDURE — 97116 GAIT TRAINING THERAPY: CPT

## 2021-09-08 PROCEDURE — 85025 COMPLETE CBC W/AUTO DIFF WBC: CPT | Performed by: NURSE PRACTITIONER

## 2021-09-08 PROCEDURE — 80053 COMPREHEN METABOLIC PANEL: CPT | Performed by: NURSE PRACTITIONER

## 2021-09-08 PROCEDURE — 25010000002 DEXAMETHASONE PER 1 MG: Performed by: INTERNAL MEDICINE

## 2021-09-08 PROCEDURE — 97110 THERAPEUTIC EXERCISES: CPT

## 2021-09-08 PROCEDURE — 86140 C-REACTIVE PROTEIN: CPT | Performed by: NURSE PRACTITIONER

## 2021-09-08 PROCEDURE — 85379 FIBRIN DEGRADATION QUANT: CPT | Performed by: INTERNAL MEDICINE

## 2021-09-08 PROCEDURE — 85610 PROTHROMBIN TIME: CPT | Performed by: INTERNAL MEDICINE

## 2021-09-08 PROCEDURE — 99232 SBSQ HOSP IP/OBS MODERATE 35: CPT | Performed by: INTERNAL MEDICINE

## 2021-09-08 RX ADMIN — FINASTERIDE 5 MG: 5 TABLET, FILM COATED ORAL at 09:04

## 2021-09-08 RX ADMIN — Medication 1 TABLET: at 09:04

## 2021-09-08 RX ADMIN — POLYETHYLENE GLYCOL (3350) 17 G: 17 POWDER, FOR SOLUTION ORAL at 09:05

## 2021-09-08 RX ADMIN — ALBUTEROL SULFATE 2 PUFF: 90 AEROSOL, METERED RESPIRATORY (INHALATION) at 03:04

## 2021-09-08 RX ADMIN — MUPIROCIN: 20 OINTMENT TOPICAL at 09:06

## 2021-09-08 RX ADMIN — GUAIFENESIN 600 MG: 600 TABLET, EXTENDED RELEASE ORAL at 20:04

## 2021-09-08 RX ADMIN — GUAIFENESIN 600 MG: 600 TABLET, EXTENDED RELEASE ORAL at 09:04

## 2021-09-08 RX ADMIN — ALBUTEROL SULFATE 2 PUFF: 90 AEROSOL, METERED RESPIRATORY (INHALATION) at 13:00

## 2021-09-08 RX ADMIN — ALBUTEROL SULFATE 2 PUFF: 90 AEROSOL, METERED RESPIRATORY (INHALATION) at 07:50

## 2021-09-08 RX ADMIN — ENOXAPARIN SODIUM 40 MG: 40 INJECTION SUBCUTANEOUS at 09:05

## 2021-09-08 RX ADMIN — MUPIROCIN: 20 OINTMENT TOPICAL at 20:07

## 2021-09-08 RX ADMIN — ASPIRIN 81 MG: 81 TABLET, COATED ORAL at 09:04

## 2021-09-08 RX ADMIN — DEXAMETHASONE SODIUM PHOSPHATE 6 MG: 4 INJECTION, SOLUTION INTRAMUSCULAR; INTRAVENOUS at 09:05

## 2021-09-08 RX ADMIN — ALBUTEROL SULFATE 2 PUFF: 90 AEROSOL, METERED RESPIRATORY (INHALATION) at 20:04

## 2021-09-08 RX ADMIN — LEVOTHYROXINE SODIUM 50 MCG: 50 TABLET ORAL at 09:04

## 2021-09-08 RX ADMIN — ENOXAPARIN SODIUM 40 MG: 40 INJECTION SUBCUTANEOUS at 20:05

## 2021-09-09 PROBLEM — R00.1 SYMPTOMATIC BRADYCARDIA: Status: ACTIVE | Noted: 2021-09-01

## 2021-09-09 LAB
ALBUMIN SERPL-MCNC: 3.42 G/DL (ref 3.5–5.2)
ALP SERPL-CCNC: 73 U/L (ref 39–117)
ALT SERPL W P-5'-P-CCNC: 40 U/L (ref 1–41)
AST SERPL-CCNC: 33 U/L (ref 1–40)
BILIRUB CONJ SERPL-MCNC: <0.2 MG/DL (ref 0–0.3)
BILIRUB INDIRECT SERPL-MCNC: ABNORMAL MG/DL
BILIRUB SERPL-MCNC: 0.6 MG/DL (ref 0–1.2)
CREAT SERPL-MCNC: 1.29 MG/DL (ref 0.76–1.27)
GFR SERPL CREATININE-BSD FRML MDRD: 52 ML/MIN/1.73
GLUCOSE BLDC GLUCOMTR-MCNC: 123 MG/DL (ref 70–130)
GLUCOSE BLDC GLUCOMTR-MCNC: 192 MG/DL (ref 70–130)
GLUCOSE BLDC GLUCOMTR-MCNC: 98 MG/DL (ref 70–130)
PROT SERPL-MCNC: 6 G/DL (ref 6–8.5)

## 2021-09-09 PROCEDURE — 82962 GLUCOSE BLOOD TEST: CPT

## 2021-09-09 PROCEDURE — 63710000001 INSULIN ASPART PER 5 UNITS: Performed by: NURSE PRACTITIONER

## 2021-09-09 PROCEDURE — 82565 ASSAY OF CREATININE: CPT | Performed by: INTERNAL MEDICINE

## 2021-09-09 PROCEDURE — 25010000002 ENOXAPARIN PER 10 MG: Performed by: INTERNAL MEDICINE

## 2021-09-09 PROCEDURE — 99232 SBSQ HOSP IP/OBS MODERATE 35: CPT | Performed by: INTERNAL MEDICINE

## 2021-09-09 PROCEDURE — 97116 GAIT TRAINING THERAPY: CPT

## 2021-09-09 PROCEDURE — 63710000001 DEXAMETHASONE PER 0.25 MG: Performed by: INTERNAL MEDICINE

## 2021-09-09 PROCEDURE — 97110 THERAPEUTIC EXERCISES: CPT

## 2021-09-09 PROCEDURE — 80076 HEPATIC FUNCTION PANEL: CPT | Performed by: INTERNAL MEDICINE

## 2021-09-09 RX ORDER — ONDANSETRON 2 MG/ML
4 INJECTION INTRAMUSCULAR; INTRAVENOUS EVERY 6 HOURS PRN
Status: DISCONTINUED | OUTPATIENT
Start: 2021-09-09 | End: 2021-09-15 | Stop reason: HOSPADM

## 2021-09-09 RX ADMIN — ASPIRIN 81 MG: 81 TABLET, COATED ORAL at 09:45

## 2021-09-09 RX ADMIN — FINASTERIDE 5 MG: 5 TABLET, FILM COATED ORAL at 09:45

## 2021-09-09 RX ADMIN — MUPIROCIN: 20 OINTMENT TOPICAL at 09:45

## 2021-09-09 RX ADMIN — DEXAMETHASONE 6 MG: 4 TABLET ORAL at 09:45

## 2021-09-09 RX ADMIN — ENOXAPARIN SODIUM 40 MG: 40 INJECTION SUBCUTANEOUS at 09:44

## 2021-09-09 RX ADMIN — ALBUTEROL SULFATE 2 PUFF: 90 AEROSOL, METERED RESPIRATORY (INHALATION) at 16:53

## 2021-09-09 RX ADMIN — LEVOTHYROXINE SODIUM 50 MCG: 50 TABLET ORAL at 09:45

## 2021-09-09 RX ADMIN — ALBUTEROL SULFATE 2 PUFF: 90 AEROSOL, METERED RESPIRATORY (INHALATION) at 09:44

## 2021-09-09 RX ADMIN — ENOXAPARIN SODIUM 40 MG: 40 INJECTION SUBCUTANEOUS at 21:12

## 2021-09-09 RX ADMIN — MUPIROCIN: 20 OINTMENT TOPICAL at 21:12

## 2021-09-09 RX ADMIN — POLYETHYLENE GLYCOL (3350) 17 G: 17 POWDER, FOR SOLUTION ORAL at 09:45

## 2021-09-09 RX ADMIN — GUAIFENESIN 600 MG: 600 TABLET, EXTENDED RELEASE ORAL at 21:12

## 2021-09-09 RX ADMIN — INSULIN ASPART 2 UNITS: 100 INJECTION, SOLUTION INTRAVENOUS; SUBCUTANEOUS at 16:53

## 2021-09-09 RX ADMIN — Medication 1 TABLET: at 09:45

## 2021-09-09 RX ADMIN — GUAIFENESIN 600 MG: 600 TABLET, EXTENDED RELEASE ORAL at 09:45

## 2021-09-10 LAB
ALBUMIN SERPL-MCNC: 3.2 G/DL (ref 3.5–5.2)
ALP SERPL-CCNC: 80 U/L (ref 39–117)
ALT SERPL W P-5'-P-CCNC: 39 U/L (ref 1–41)
AST SERPL-CCNC: 28 U/L (ref 1–40)
BILIRUB CONJ SERPL-MCNC: <0.2 MG/DL (ref 0–0.3)
BILIRUB INDIRECT SERPL-MCNC: ABNORMAL MG/DL
BILIRUB SERPL-MCNC: 0.5 MG/DL (ref 0–1.2)
CREAT SERPL-MCNC: 1.21 MG/DL (ref 0.76–1.27)
D DIMER PPP FEU-MCNC: 0.71 MCGFEU/ML (ref 0–0.5)
GFR SERPL CREATININE-BSD FRML MDRD: 56 ML/MIN/1.73
GLUCOSE BLDC GLUCOMTR-MCNC: 120 MG/DL (ref 70–130)
GLUCOSE BLDC GLUCOMTR-MCNC: 122 MG/DL (ref 70–130)
GLUCOSE BLDC GLUCOMTR-MCNC: 147 MG/DL (ref 70–130)
GLUCOSE BLDC GLUCOMTR-MCNC: 190 MG/DL (ref 70–130)
INR PPP: 1.01 (ref 0.9–1.1)
PROT SERPL-MCNC: 6.1 G/DL (ref 6–8.5)
PROTHROMBIN TIME: 13.7 SECONDS (ref 12.8–14.5)

## 2021-09-10 PROCEDURE — 25010000002 ENOXAPARIN PER 10 MG: Performed by: INTERNAL MEDICINE

## 2021-09-10 PROCEDURE — 97116 GAIT TRAINING THERAPY: CPT

## 2021-09-10 PROCEDURE — 63710000001 DEXAMETHASONE PER 0.25 MG: Performed by: INTERNAL MEDICINE

## 2021-09-10 PROCEDURE — 82565 ASSAY OF CREATININE: CPT | Performed by: INTERNAL MEDICINE

## 2021-09-10 PROCEDURE — 85379 FIBRIN DEGRADATION QUANT: CPT | Performed by: INTERNAL MEDICINE

## 2021-09-10 PROCEDURE — 94799 UNLISTED PULMONARY SVC/PX: CPT

## 2021-09-10 PROCEDURE — 99232 SBSQ HOSP IP/OBS MODERATE 35: CPT | Performed by: INTERNAL MEDICINE

## 2021-09-10 PROCEDURE — 97110 THERAPEUTIC EXERCISES: CPT

## 2021-09-10 PROCEDURE — 85610 PROTHROMBIN TIME: CPT | Performed by: INTERNAL MEDICINE

## 2021-09-10 PROCEDURE — 80076 HEPATIC FUNCTION PANEL: CPT | Performed by: INTERNAL MEDICINE

## 2021-09-10 PROCEDURE — 82962 GLUCOSE BLOOD TEST: CPT

## 2021-09-10 PROCEDURE — 99231 SBSQ HOSP IP/OBS SF/LOW 25: CPT | Performed by: INTERNAL MEDICINE

## 2021-09-10 RX ADMIN — ENOXAPARIN SODIUM 40 MG: 40 INJECTION SUBCUTANEOUS at 08:10

## 2021-09-10 RX ADMIN — MUPIROCIN: 20 OINTMENT TOPICAL at 09:26

## 2021-09-10 RX ADMIN — ALBUTEROL SULFATE 2 PUFF: 90 AEROSOL, METERED RESPIRATORY (INHALATION) at 12:46

## 2021-09-10 RX ADMIN — LEVOTHYROXINE SODIUM 50 MCG: 50 TABLET ORAL at 09:25

## 2021-09-10 RX ADMIN — GUAIFENESIN 600 MG: 600 TABLET, EXTENDED RELEASE ORAL at 21:27

## 2021-09-10 RX ADMIN — ENOXAPARIN SODIUM 40 MG: 40 INJECTION SUBCUTANEOUS at 21:28

## 2021-09-10 RX ADMIN — MUPIROCIN: 20 OINTMENT TOPICAL at 21:27

## 2021-09-10 RX ADMIN — DEXAMETHASONE 6 MG: 4 TABLET ORAL at 09:25

## 2021-09-10 RX ADMIN — FINASTERIDE 5 MG: 5 TABLET, FILM COATED ORAL at 09:25

## 2021-09-10 RX ADMIN — GUAIFENESIN 600 MG: 600 TABLET, EXTENDED RELEASE ORAL at 09:25

## 2021-09-10 RX ADMIN — POLYETHYLENE GLYCOL (3350) 17 G: 17 POWDER, FOR SOLUTION ORAL at 09:26

## 2021-09-10 RX ADMIN — ASPIRIN 81 MG: 81 TABLET, COATED ORAL at 09:25

## 2021-09-10 RX ADMIN — ALBUTEROL SULFATE 2 PUFF: 90 AEROSOL, METERED RESPIRATORY (INHALATION) at 19:14

## 2021-09-10 RX ADMIN — Medication 1 TABLET: at 09:25

## 2021-09-10 RX ADMIN — ALBUTEROL SULFATE 2 PUFF: 90 AEROSOL, METERED RESPIRATORY (INHALATION) at 08:52

## 2021-09-11 LAB
ALBUMIN SERPL-MCNC: 3.36 G/DL (ref 3.5–5.2)
ALP SERPL-CCNC: 71 U/L (ref 39–117)
ALT SERPL W P-5'-P-CCNC: 37 U/L (ref 1–41)
AST SERPL-CCNC: 25 U/L (ref 1–40)
BILIRUB CONJ SERPL-MCNC: <0.2 MG/DL (ref 0–0.3)
BILIRUB INDIRECT SERPL-MCNC: ABNORMAL MG/DL
BILIRUB SERPL-MCNC: 0.6 MG/DL (ref 0–1.2)
CREAT SERPL-MCNC: 1.29 MG/DL (ref 0.76–1.27)
GFR SERPL CREATININE-BSD FRML MDRD: 52 ML/MIN/1.73
GLUCOSE BLDC GLUCOMTR-MCNC: 115 MG/DL (ref 70–130)
GLUCOSE BLDC GLUCOMTR-MCNC: 162 MG/DL (ref 70–130)
GLUCOSE BLDC GLUCOMTR-MCNC: 177 MG/DL (ref 70–130)
GLUCOSE BLDC GLUCOMTR-MCNC: 91 MG/DL (ref 70–130)
PROT SERPL-MCNC: 6.2 G/DL (ref 6–8.5)

## 2021-09-11 PROCEDURE — 80076 HEPATIC FUNCTION PANEL: CPT | Performed by: INTERNAL MEDICINE

## 2021-09-11 PROCEDURE — 94799 UNLISTED PULMONARY SVC/PX: CPT

## 2021-09-11 PROCEDURE — 25010000002 ENOXAPARIN PER 10 MG: Performed by: INTERNAL MEDICINE

## 2021-09-11 PROCEDURE — 63710000001 DEXAMETHASONE PER 0.25 MG: Performed by: INTERNAL MEDICINE

## 2021-09-11 PROCEDURE — 82565 ASSAY OF CREATININE: CPT | Performed by: INTERNAL MEDICINE

## 2021-09-11 PROCEDURE — 82962 GLUCOSE BLOOD TEST: CPT

## 2021-09-11 PROCEDURE — 99231 SBSQ HOSP IP/OBS SF/LOW 25: CPT | Performed by: INTERNAL MEDICINE

## 2021-09-11 PROCEDURE — 63710000001 INSULIN ASPART PER 5 UNITS: Performed by: NURSE PRACTITIONER

## 2021-09-11 RX ORDER — POTASSIUM CHLORIDE 1.5 G/1.77G
40 POWDER, FOR SOLUTION ORAL ONCE
Status: DISCONTINUED | OUTPATIENT
Start: 2021-09-11 | End: 2021-09-11

## 2021-09-11 RX ADMIN — POLYETHYLENE GLYCOL (3350) 17 G: 17 POWDER, FOR SOLUTION ORAL at 09:36

## 2021-09-11 RX ADMIN — ALBUTEROL SULFATE 2 PUFF: 90 AEROSOL, METERED RESPIRATORY (INHALATION) at 12:24

## 2021-09-11 RX ADMIN — Medication 1 TABLET: at 09:35

## 2021-09-11 RX ADMIN — GUAIFENESIN 600 MG: 600 TABLET, EXTENDED RELEASE ORAL at 09:35

## 2021-09-11 RX ADMIN — INSULIN ASPART 2 UNITS: 100 INJECTION, SOLUTION INTRAVENOUS; SUBCUTANEOUS at 17:33

## 2021-09-11 RX ADMIN — ENOXAPARIN SODIUM 40 MG: 40 INJECTION SUBCUTANEOUS at 20:17

## 2021-09-11 RX ADMIN — ALBUTEROL SULFATE 2 PUFF: 90 AEROSOL, METERED RESPIRATORY (INHALATION) at 20:17

## 2021-09-11 RX ADMIN — DEXAMETHASONE 6 MG: 4 TABLET ORAL at 09:35

## 2021-09-11 RX ADMIN — FINASTERIDE 5 MG: 5 TABLET, FILM COATED ORAL at 09:35

## 2021-09-11 RX ADMIN — ASPIRIN 81 MG: 81 TABLET, COATED ORAL at 09:35

## 2021-09-11 RX ADMIN — LEVOTHYROXINE SODIUM 50 MCG: 50 TABLET ORAL at 09:35

## 2021-09-11 RX ADMIN — MUPIROCIN: 20 OINTMENT TOPICAL at 09:38

## 2021-09-11 RX ADMIN — ALBUTEROL SULFATE 2 PUFF: 90 AEROSOL, METERED RESPIRATORY (INHALATION) at 09:38

## 2021-09-11 RX ADMIN — GUAIFENESIN 600 MG: 600 TABLET, EXTENDED RELEASE ORAL at 20:17

## 2021-09-11 RX ADMIN — MUPIROCIN: 20 OINTMENT TOPICAL at 20:17

## 2021-09-11 RX ADMIN — ENOXAPARIN SODIUM 40 MG: 40 INJECTION SUBCUTANEOUS at 09:36

## 2021-09-12 LAB
ALBUMIN SERPL-MCNC: 3.38 G/DL (ref 3.5–5.2)
ALP SERPL-CCNC: 75 U/L (ref 39–117)
ALT SERPL W P-5'-P-CCNC: 43 U/L (ref 1–41)
ANION GAP SERPL CALCULATED.3IONS-SCNC: 11 MMOL/L (ref 5–15)
AST SERPL-CCNC: 28 U/L (ref 1–40)
BILIRUB CONJ SERPL-MCNC: <0.2 MG/DL (ref 0–0.3)
BILIRUB INDIRECT SERPL-MCNC: ABNORMAL MG/DL
BILIRUB SERPL-MCNC: 0.6 MG/DL (ref 0–1.2)
BUN SERPL-MCNC: 36 MG/DL (ref 8–23)
BUN/CREAT SERPL: 32.7 (ref 7–25)
CALCIUM SPEC-SCNC: 9.2 MG/DL (ref 8.2–9.6)
CHLORIDE SERPL-SCNC: 102 MMOL/L (ref 98–107)
CO2 SERPL-SCNC: 23 MMOL/L (ref 22–29)
CREAT SERPL-MCNC: 1.1 MG/DL (ref 0.76–1.27)
D DIMER PPP FEU-MCNC: 0.61 MCGFEU/ML (ref 0–0.5)
DEPRECATED RDW RBC AUTO: 43.6 FL (ref 37–54)
ERYTHROCYTE [DISTWIDTH] IN BLOOD BY AUTOMATED COUNT: 12.9 % (ref 12.3–15.4)
GFR SERPL CREATININE-BSD FRML MDRD: 62 ML/MIN/1.73
GLUCOSE BLDC GLUCOMTR-MCNC: 100 MG/DL (ref 70–130)
GLUCOSE BLDC GLUCOMTR-MCNC: 109 MG/DL (ref 70–130)
GLUCOSE BLDC GLUCOMTR-MCNC: 119 MG/DL (ref 70–130)
GLUCOSE BLDC GLUCOMTR-MCNC: 126 MG/DL (ref 70–130)
GLUCOSE SERPL-MCNC: 115 MG/DL (ref 65–99)
HCT VFR BLD AUTO: 41.5 % (ref 37.5–51)
HGB BLD-MCNC: 13.7 G/DL (ref 13–17.7)
INR PPP: 1 (ref 0.9–1.1)
MCH RBC QN AUTO: 30.9 PG (ref 26.6–33)
MCHC RBC AUTO-ENTMCNC: 33 G/DL (ref 31.5–35.7)
MCV RBC AUTO: 93.5 FL (ref 79–97)
PLATELET # BLD AUTO: 221 10*3/MM3 (ref 140–450)
PMV BLD AUTO: 10.9 FL (ref 6–12)
POTASSIUM SERPL-SCNC: 4.6 MMOL/L (ref 3.5–5.2)
PROT SERPL-MCNC: 6.3 G/DL (ref 6–8.5)
PROTHROMBIN TIME: 13.6 SECONDS (ref 12.8–14.5)
RBC # BLD AUTO: 4.44 10*6/MM3 (ref 4.14–5.8)
SODIUM SERPL-SCNC: 136 MMOL/L (ref 136–145)
WBC # BLD AUTO: 15.35 10*3/MM3 (ref 3.4–10.8)

## 2021-09-12 PROCEDURE — 94799 UNLISTED PULMONARY SVC/PX: CPT

## 2021-09-12 PROCEDURE — 25010000002 ENOXAPARIN PER 10 MG: Performed by: INTERNAL MEDICINE

## 2021-09-12 PROCEDURE — 85027 COMPLETE CBC AUTOMATED: CPT | Performed by: INTERNAL MEDICINE

## 2021-09-12 PROCEDURE — 85610 PROTHROMBIN TIME: CPT | Performed by: INTERNAL MEDICINE

## 2021-09-12 PROCEDURE — 85379 FIBRIN DEGRADATION QUANT: CPT | Performed by: INTERNAL MEDICINE

## 2021-09-12 PROCEDURE — 99232 SBSQ HOSP IP/OBS MODERATE 35: CPT | Performed by: INTERNAL MEDICINE

## 2021-09-12 PROCEDURE — 80048 BASIC METABOLIC PNL TOTAL CA: CPT | Performed by: INTERNAL MEDICINE

## 2021-09-12 PROCEDURE — 80076 HEPATIC FUNCTION PANEL: CPT | Performed by: INTERNAL MEDICINE

## 2021-09-12 PROCEDURE — 82962 GLUCOSE BLOOD TEST: CPT

## 2021-09-12 RX ADMIN — ALBUTEROL SULFATE 2 PUFF: 90 AEROSOL, METERED RESPIRATORY (INHALATION) at 00:30

## 2021-09-12 RX ADMIN — ALBUTEROL SULFATE 2 PUFF: 90 AEROSOL, METERED RESPIRATORY (INHALATION) at 22:40

## 2021-09-12 RX ADMIN — MUPIROCIN: 20 OINTMENT TOPICAL at 20:23

## 2021-09-12 RX ADMIN — LEVOTHYROXINE SODIUM 50 MCG: 50 TABLET ORAL at 08:44

## 2021-09-12 RX ADMIN — ALBUTEROL SULFATE 2 PUFF: 90 AEROSOL, METERED RESPIRATORY (INHALATION) at 08:23

## 2021-09-12 RX ADMIN — GUAIFENESIN 600 MG: 600 TABLET, EXTENDED RELEASE ORAL at 08:44

## 2021-09-12 RX ADMIN — POLYETHYLENE GLYCOL (3350) 17 G: 17 POWDER, FOR SOLUTION ORAL at 08:44

## 2021-09-12 RX ADMIN — GUAIFENESIN 600 MG: 600 TABLET, EXTENDED RELEASE ORAL at 20:23

## 2021-09-12 RX ADMIN — CARBOXYMETHYLCELLULOSE SODIUM 1 DROP: 5 SOLUTION/ DROPS OPHTHALMIC at 00:30

## 2021-09-12 RX ADMIN — ALBUTEROL SULFATE 2 PUFF: 90 AEROSOL, METERED RESPIRATORY (INHALATION) at 12:55

## 2021-09-12 RX ADMIN — ASPIRIN 81 MG: 81 TABLET, COATED ORAL at 08:44

## 2021-09-12 RX ADMIN — FINASTERIDE 5 MG: 5 TABLET, FILM COATED ORAL at 08:44

## 2021-09-12 RX ADMIN — ENOXAPARIN SODIUM 40 MG: 40 INJECTION SUBCUTANEOUS at 20:22

## 2021-09-12 RX ADMIN — ENOXAPARIN SODIUM 40 MG: 40 INJECTION SUBCUTANEOUS at 08:44

## 2021-09-12 RX ADMIN — Medication 1 TABLET: at 08:44

## 2021-09-12 RX ADMIN — MUPIROCIN: 20 OINTMENT TOPICAL at 08:44

## 2021-09-13 ENCOUNTER — APPOINTMENT (OUTPATIENT)
Dept: GENERAL RADIOLOGY | Facility: HOSPITAL | Age: 86
End: 2021-09-13

## 2021-09-13 LAB
ALBUMIN SERPL-MCNC: 3.52 G/DL (ref 3.5–5.2)
ALBUMIN/GLOB SERPL: 1.4 G/DL
ALP SERPL-CCNC: 82 U/L (ref 39–117)
ALT SERPL W P-5'-P-CCNC: 52 U/L (ref 1–41)
ANION GAP SERPL CALCULATED.3IONS-SCNC: 8 MMOL/L (ref 5–15)
AST SERPL-CCNC: 40 U/L (ref 1–40)
BASOPHILS # BLD AUTO: 0.04 10*3/MM3 (ref 0–0.2)
BASOPHILS NFR BLD AUTO: 0.2 % (ref 0–1.5)
BILIRUB SERPL-MCNC: 0.8 MG/DL (ref 0–1.2)
BUN SERPL-MCNC: 39 MG/DL (ref 8–23)
BUN/CREAT SERPL: 30.2 (ref 7–25)
CALCIUM SPEC-SCNC: 9.2 MG/DL (ref 8.2–9.6)
CHLORIDE SERPL-SCNC: 101 MMOL/L (ref 98–107)
CO2 SERPL-SCNC: 25 MMOL/L (ref 22–29)
CREAT SERPL-MCNC: 1.29 MG/DL (ref 0.76–1.27)
DEPRECATED RDW RBC AUTO: 44.2 FL (ref 37–54)
EOSINOPHIL # BLD AUTO: 0.21 10*3/MM3 (ref 0–0.4)
EOSINOPHIL NFR BLD AUTO: 1.2 % (ref 0.3–6.2)
ERYTHROCYTE [DISTWIDTH] IN BLOOD BY AUTOMATED COUNT: 12.9 % (ref 12.3–15.4)
GFR SERPL CREATININE-BSD FRML MDRD: 52 ML/MIN/1.73
GLOBULIN UR ELPH-MCNC: 2.6 GM/DL
GLUCOSE BLDC GLUCOMTR-MCNC: 100 MG/DL (ref 70–130)
GLUCOSE BLDC GLUCOMTR-MCNC: 101 MG/DL (ref 70–130)
GLUCOSE BLDC GLUCOMTR-MCNC: 115 MG/DL (ref 70–130)
GLUCOSE BLDC GLUCOMTR-MCNC: 93 MG/DL (ref 70–130)
GLUCOSE SERPL-MCNC: 92 MG/DL (ref 65–99)
HCT VFR BLD AUTO: 42.1 % (ref 37.5–51)
HGB BLD-MCNC: 13.8 G/DL (ref 13–17.7)
IMM GRANULOCYTES # BLD AUTO: 0.75 10*3/MM3 (ref 0–0.05)
IMM GRANULOCYTES NFR BLD AUTO: 4.2 % (ref 0–0.5)
LYMPHOCYTES # BLD AUTO: 1.88 10*3/MM3 (ref 0.7–3.1)
LYMPHOCYTES NFR BLD AUTO: 10.5 % (ref 19.6–45.3)
MAGNESIUM SERPL-MCNC: 2.1 MG/DL (ref 1.7–2.3)
MCH RBC QN AUTO: 30.9 PG (ref 26.6–33)
MCHC RBC AUTO-ENTMCNC: 32.8 G/DL (ref 31.5–35.7)
MCV RBC AUTO: 94.2 FL (ref 79–97)
MONOCYTES # BLD AUTO: 1.22 10*3/MM3 (ref 0.1–0.9)
MONOCYTES NFR BLD AUTO: 6.8 % (ref 5–12)
NEUTROPHILS NFR BLD AUTO: 13.81 10*3/MM3 (ref 1.7–7)
NEUTROPHILS NFR BLD AUTO: 77.1 % (ref 42.7–76)
NRBC BLD AUTO-RTO: 0 /100 WBC (ref 0–0.2)
PHOSPHATE SERPL-MCNC: 3.6 MG/DL (ref 2.5–4.5)
PLAT MORPH BLD: NORMAL
PLATELET # BLD AUTO: 193 10*3/MM3 (ref 140–450)
PMV BLD AUTO: 10.1 FL (ref 6–12)
POTASSIUM SERPL-SCNC: 4.3 MMOL/L (ref 3.5–5.2)
PROT SERPL-MCNC: 6.1 G/DL (ref 6–8.5)
RBC # BLD AUTO: 4.47 10*6/MM3 (ref 4.14–5.8)
RBC MORPH BLD: NORMAL
SODIUM SERPL-SCNC: 134 MMOL/L (ref 136–145)
WBC # BLD AUTO: 17.91 10*3/MM3 (ref 3.4–10.8)

## 2021-09-13 PROCEDURE — 84100 ASSAY OF PHOSPHORUS: CPT | Performed by: INTERNAL MEDICINE

## 2021-09-13 PROCEDURE — L3660 SO 8 AB RSTR CAN/WEB PRE OTS: HCPCS | Performed by: INTERNAL MEDICINE

## 2021-09-13 PROCEDURE — C1898 LEAD, PMKR, OTHER THAN TRANS: HCPCS | Performed by: INTERNAL MEDICINE

## 2021-09-13 PROCEDURE — 99153 MOD SED SAME PHYS/QHP EA: CPT | Performed by: INTERNAL MEDICINE

## 2021-09-13 PROCEDURE — 25010000002 VANCOMYCIN 1 G RECONSTITUTED SOLUTION 1 EACH VIAL: Performed by: INTERNAL MEDICINE

## 2021-09-13 PROCEDURE — 82962 GLUCOSE BLOOD TEST: CPT

## 2021-09-13 PROCEDURE — 99152 MOD SED SAME PHYS/QHP 5/>YRS: CPT | Performed by: INTERNAL MEDICINE

## 2021-09-13 PROCEDURE — 99232 SBSQ HOSP IP/OBS MODERATE 35: CPT | Performed by: INTERNAL MEDICINE

## 2021-09-13 PROCEDURE — 85007 BL SMEAR W/DIFF WBC COUNT: CPT | Performed by: INTERNAL MEDICINE

## 2021-09-13 PROCEDURE — 71045 X-RAY EXAM CHEST 1 VIEW: CPT

## 2021-09-13 PROCEDURE — 02H63JZ INSERTION OF PACEMAKER LEAD INTO RIGHT ATRIUM, PERCUTANEOUS APPROACH: ICD-10-PCS | Performed by: INTERNAL MEDICINE

## 2021-09-13 PROCEDURE — 85025 COMPLETE CBC W/AUTO DIFF WBC: CPT | Performed by: INTERNAL MEDICINE

## 2021-09-13 PROCEDURE — 25010000002 FENTANYL CITRATE (PF) 50 MCG/ML SOLUTION: Performed by: INTERNAL MEDICINE

## 2021-09-13 PROCEDURE — 0JH606Z INSERTION OF PACEMAKER, DUAL CHAMBER INTO CHEST SUBCUTANEOUS TISSUE AND FASCIA, OPEN APPROACH: ICD-10-PCS | Performed by: INTERNAL MEDICINE

## 2021-09-13 PROCEDURE — 25010000002 VANCOMYCIN 5 G RECONSTITUTED SOLUTION 5,000 MG VIAL: Performed by: INTERNAL MEDICINE

## 2021-09-13 PROCEDURE — 83735 ASSAY OF MAGNESIUM: CPT | Performed by: INTERNAL MEDICINE

## 2021-09-13 PROCEDURE — C1785 PMKR, DUAL, RATE-RESP: HCPCS | Performed by: INTERNAL MEDICINE

## 2021-09-13 PROCEDURE — 02HK3JZ INSERTION OF PACEMAKER LEAD INTO RIGHT VENTRICLE, PERCUTANEOUS APPROACH: ICD-10-PCS | Performed by: INTERNAL MEDICINE

## 2021-09-13 PROCEDURE — 33208 INSRT HEART PM ATRIAL & VENT: CPT | Performed by: INTERNAL MEDICINE

## 2021-09-13 PROCEDURE — 93010 ELECTROCARDIOGRAM REPORT: CPT | Performed by: INTERNAL MEDICINE

## 2021-09-13 PROCEDURE — 97165 OT EVAL LOW COMPLEX 30 MIN: CPT

## 2021-09-13 PROCEDURE — 25010000002 ENOXAPARIN PER 10 MG: Performed by: INTERNAL MEDICINE

## 2021-09-13 PROCEDURE — 94799 UNLISTED PULMONARY SVC/PX: CPT

## 2021-09-13 PROCEDURE — 93005 ELECTROCARDIOGRAM TRACING: CPT | Performed by: INTERNAL MEDICINE

## 2021-09-13 PROCEDURE — 80053 COMPREHEN METABOLIC PANEL: CPT | Performed by: INTERNAL MEDICINE

## 2021-09-13 DEVICE — GEN PM ASSURITY MRI DR RF PM2272: Type: IMPLANTABLE DEVICE | Status: FUNCTIONAL

## 2021-09-13 DEVICE — IMPLANTABLE DEVICE: Type: IMPLANTABLE DEVICE | Status: FUNCTIONAL

## 2021-09-13 DEVICE — LD PM ISOFLX OPTIM BIPOL IS1 J 7F46CM: Type: IMPLANTABLE DEVICE | Status: FUNCTIONAL

## 2021-09-13 RX ORDER — SODIUM CHLORIDE 0.9 % (FLUSH) 0.9 %
10 SYRINGE (ML) INJECTION AS NEEDED
Status: DISCONTINUED | OUTPATIENT
Start: 2021-09-13 | End: 2021-09-15 | Stop reason: HOSPADM

## 2021-09-13 RX ORDER — SODIUM CHLORIDE 9 MG/ML
INJECTION, SOLUTION INTRAVENOUS CONTINUOUS PRN
Status: COMPLETED | OUTPATIENT
Start: 2021-09-13 | End: 2021-09-13

## 2021-09-13 RX ORDER — FENTANYL CITRATE 50 UG/ML
INJECTION, SOLUTION INTRAMUSCULAR; INTRAVENOUS AS NEEDED
Status: DISCONTINUED | OUTPATIENT
Start: 2021-09-13 | End: 2021-09-13 | Stop reason: HOSPADM

## 2021-09-13 RX ORDER — SODIUM CHLORIDE 0.9 % (FLUSH) 0.9 %
3 SYRINGE (ML) INJECTION EVERY 12 HOURS SCHEDULED
Status: DISCONTINUED | OUTPATIENT
Start: 2021-09-13 | End: 2021-09-15 | Stop reason: HOSPADM

## 2021-09-13 RX ORDER — LIDOCAINE HYDROCHLORIDE 20 MG/ML
INJECTION, SOLUTION INFILTRATION; PERINEURAL AS NEEDED
Status: DISCONTINUED | OUTPATIENT
Start: 2021-09-13 | End: 2021-09-13 | Stop reason: HOSPADM

## 2021-09-13 RX ADMIN — GUAIFENESIN 600 MG: 600 TABLET, EXTENDED RELEASE ORAL at 20:20

## 2021-09-13 RX ADMIN — MUPIROCIN: 20 OINTMENT TOPICAL at 08:15

## 2021-09-13 RX ADMIN — SODIUM CHLORIDE, PRESERVATIVE FREE 3 ML: 5 INJECTION INTRAVENOUS at 20:20

## 2021-09-13 RX ADMIN — ALBUTEROL SULFATE 2 PUFF: 90 AEROSOL, METERED RESPIRATORY (INHALATION) at 01:00

## 2021-09-13 RX ADMIN — MUPIROCIN: 20 OINTMENT TOPICAL at 20:20

## 2021-09-13 RX ADMIN — ENOXAPARIN SODIUM 40 MG: 40 INJECTION SUBCUTANEOUS at 08:15

## 2021-09-14 LAB
GLUCOSE BLDC GLUCOMTR-MCNC: 107 MG/DL (ref 70–130)
GLUCOSE BLDC GLUCOMTR-MCNC: 116 MG/DL (ref 70–130)
GLUCOSE BLDC GLUCOMTR-MCNC: 133 MG/DL (ref 70–130)
GLUCOSE BLDC GLUCOMTR-MCNC: 94 MG/DL (ref 70–130)

## 2021-09-14 PROCEDURE — 94799 UNLISTED PULMONARY SVC/PX: CPT

## 2021-09-14 PROCEDURE — 99232 SBSQ HOSP IP/OBS MODERATE 35: CPT | Performed by: SPECIALIST

## 2021-09-14 PROCEDURE — 82962 GLUCOSE BLOOD TEST: CPT

## 2021-09-14 PROCEDURE — 25010000002 ENOXAPARIN PER 10 MG: Performed by: INTERNAL MEDICINE

## 2021-09-14 PROCEDURE — 99231 SBSQ HOSP IP/OBS SF/LOW 25: CPT | Performed by: INTERNAL MEDICINE

## 2021-09-14 PROCEDURE — 97110 THERAPEUTIC EXERCISES: CPT

## 2021-09-14 PROCEDURE — 97116 GAIT TRAINING THERAPY: CPT

## 2021-09-14 RX ADMIN — ALBUTEROL SULFATE 2 PUFF: 90 AEROSOL, METERED RESPIRATORY (INHALATION) at 08:27

## 2021-09-14 RX ADMIN — MUPIROCIN: 20 OINTMENT TOPICAL at 08:29

## 2021-09-14 RX ADMIN — Medication 1 TABLET: at 08:26

## 2021-09-14 RX ADMIN — SODIUM CHLORIDE, PRESERVATIVE FREE 3 ML: 5 INJECTION INTRAVENOUS at 20:31

## 2021-09-14 RX ADMIN — ASPIRIN 81 MG: 81 TABLET, COATED ORAL at 08:26

## 2021-09-14 RX ADMIN — GUAIFENESIN 600 MG: 600 TABLET, EXTENDED RELEASE ORAL at 08:26

## 2021-09-14 RX ADMIN — LEVOTHYROXINE SODIUM 50 MCG: 50 TABLET ORAL at 08:26

## 2021-09-14 RX ADMIN — ENOXAPARIN SODIUM 40 MG: 40 INJECTION SUBCUTANEOUS at 08:26

## 2021-09-14 RX ADMIN — MUPIROCIN: 20 OINTMENT TOPICAL at 20:32

## 2021-09-14 RX ADMIN — POLYETHYLENE GLYCOL (3350) 17 G: 17 POWDER, FOR SOLUTION ORAL at 08:26

## 2021-09-14 RX ADMIN — SODIUM CHLORIDE, PRESERVATIVE FREE 3 ML: 5 INJECTION INTRAVENOUS at 08:29

## 2021-09-14 RX ADMIN — ALBUTEROL SULFATE 2 PUFF: 90 AEROSOL, METERED RESPIRATORY (INHALATION) at 19:11

## 2021-09-14 RX ADMIN — GUAIFENESIN 600 MG: 600 TABLET, EXTENDED RELEASE ORAL at 20:31

## 2021-09-14 RX ADMIN — FINASTERIDE 5 MG: 5 TABLET, FILM COATED ORAL at 08:26

## 2021-09-15 ENCOUNTER — READMISSION MANAGEMENT (OUTPATIENT)
Dept: CALL CENTER | Facility: HOSPITAL | Age: 86
End: 2021-09-15

## 2021-09-15 VITALS
WEIGHT: 143.2 LBS | BODY MASS INDEX: 21.7 KG/M2 | TEMPERATURE: 97.6 F | DIASTOLIC BLOOD PRESSURE: 54 MMHG | HEART RATE: 71 BPM | OXYGEN SATURATION: 97 % | SYSTOLIC BLOOD PRESSURE: 95 MMHG | HEIGHT: 68 IN | RESPIRATION RATE: 18 BRPM

## 2021-09-15 LAB
ALBUMIN SERPL-MCNC: 3.25 G/DL (ref 3.5–5.2)
ALBUMIN/GLOB SERPL: 1.2 G/DL
ALP SERPL-CCNC: 103 U/L (ref 39–117)
ALT SERPL W P-5'-P-CCNC: 28 U/L (ref 1–41)
ANION GAP SERPL CALCULATED.3IONS-SCNC: 9.3 MMOL/L (ref 5–15)
AST SERPL-CCNC: 18 U/L (ref 1–40)
BILIRUB SERPL-MCNC: 0.9 MG/DL (ref 0–1.2)
BUN SERPL-MCNC: 29 MG/DL (ref 8–23)
BUN/CREAT SERPL: 25.7 (ref 7–25)
CALCIUM SPEC-SCNC: 8.7 MG/DL (ref 8.2–9.6)
CHLORIDE SERPL-SCNC: 101 MMOL/L (ref 98–107)
CO2 SERPL-SCNC: 23.7 MMOL/L (ref 22–29)
CREAT SERPL-MCNC: 1.13 MG/DL (ref 0.76–1.27)
DEPRECATED RDW RBC AUTO: 45.4 FL (ref 37–54)
ERYTHROCYTE [DISTWIDTH] IN BLOOD BY AUTOMATED COUNT: 12.8 % (ref 12.3–15.4)
GFR SERPL CREATININE-BSD FRML MDRD: 60 ML/MIN/1.73
GLOBULIN UR ELPH-MCNC: 2.7 GM/DL
GLUCOSE BLDC GLUCOMTR-MCNC: 109 MG/DL (ref 70–130)
GLUCOSE BLDC GLUCOMTR-MCNC: 125 MG/DL (ref 70–130)
GLUCOSE SERPL-MCNC: 132 MG/DL (ref 65–99)
HCT VFR BLD AUTO: 42 % (ref 37.5–51)
HGB BLD-MCNC: 13.4 G/DL (ref 13–17.7)
MCH RBC QN AUTO: 30.9 PG (ref 26.6–33)
MCHC RBC AUTO-ENTMCNC: 31.9 G/DL (ref 31.5–35.7)
MCV RBC AUTO: 97 FL (ref 79–97)
PLATELET # BLD AUTO: 159 10*3/MM3 (ref 140–450)
PMV BLD AUTO: 10 FL (ref 6–12)
POTASSIUM SERPL-SCNC: 4.2 MMOL/L (ref 3.5–5.2)
PROT SERPL-MCNC: 5.9 G/DL (ref 6–8.5)
QT INTERVAL: 392 MS
QTC INTERVAL: 449 MS
RBC # BLD AUTO: 4.33 10*6/MM3 (ref 4.14–5.8)
SODIUM SERPL-SCNC: 134 MMOL/L (ref 136–145)
WBC # BLD AUTO: 17.83 10*3/MM3 (ref 3.4–10.8)

## 2021-09-15 PROCEDURE — 94799 UNLISTED PULMONARY SVC/PX: CPT

## 2021-09-15 PROCEDURE — 85027 COMPLETE CBC AUTOMATED: CPT | Performed by: INTERNAL MEDICINE

## 2021-09-15 PROCEDURE — 25010000002 ENOXAPARIN PER 10 MG: Performed by: INTERNAL MEDICINE

## 2021-09-15 PROCEDURE — 82962 GLUCOSE BLOOD TEST: CPT

## 2021-09-15 PROCEDURE — 80053 COMPREHEN METABOLIC PANEL: CPT | Performed by: INTERNAL MEDICINE

## 2021-09-15 PROCEDURE — 99239 HOSP IP/OBS DSCHRG MGMT >30: CPT | Performed by: PHYSICIAN ASSISTANT

## 2021-09-15 RX ORDER — ALBUTEROL SULFATE 90 UG/1
2 AEROSOL, METERED RESPIRATORY (INHALATION)
Qty: 18 G | Refills: 0 | Status: SHIPPED | OUTPATIENT
Start: 2021-09-15 | End: 2021-10-16

## 2021-09-15 RX ADMIN — MUPIROCIN: 20 OINTMENT TOPICAL at 09:19

## 2021-09-15 RX ADMIN — ENOXAPARIN SODIUM 40 MG: 40 INJECTION SUBCUTANEOUS at 09:18

## 2021-09-15 RX ADMIN — LEVOTHYROXINE SODIUM 50 MCG: 50 TABLET ORAL at 09:18

## 2021-09-15 RX ADMIN — GUAIFENESIN 600 MG: 600 TABLET, EXTENDED RELEASE ORAL at 09:18

## 2021-09-15 RX ADMIN — ASPIRIN 81 MG: 81 TABLET, COATED ORAL at 09:18

## 2021-09-15 RX ADMIN — ALBUTEROL SULFATE 2 PUFF: 90 AEROSOL, METERED RESPIRATORY (INHALATION) at 08:59

## 2021-09-15 RX ADMIN — SODIUM CHLORIDE, PRESERVATIVE FREE 3 ML: 5 INJECTION INTRAVENOUS at 09:18

## 2021-09-15 RX ADMIN — POLYETHYLENE GLYCOL (3350) 17 G: 17 POWDER, FOR SOLUTION ORAL at 09:18

## 2021-09-15 RX ADMIN — ALBUTEROL SULFATE 2 PUFF: 90 AEROSOL, METERED RESPIRATORY (INHALATION) at 03:08

## 2021-09-15 RX ADMIN — Medication 1 TABLET: at 09:19

## 2021-09-15 RX ADMIN — FINASTERIDE 5 MG: 5 TABLET, FILM COATED ORAL at 09:19

## 2021-09-15 NOTE — OUTREACH NOTE
Prep Survey      Responses   [facility] patient discharged from?  [facility]   Is LACE score < 7 ?  No   Emergency Room discharge w/ pulse ox?  No   Eligibility  Readm Mgmt   Discharge diagnosis  Sepsis secondary to COVID-19 pneumonia   Does the patient have one of the following disease processes/diagnoses(primary or secondary)?  COVID-19   Does the patient have Home health ordered?  Yes   What is the Home health agency?   [facility]    Is there a DME ordered?  Yes   What DME was ordered?  [employer] [facility]    Prep survey completed?  Yes          Rubi Boyd RN

## 2021-09-16 ENCOUNTER — READMISSION MANAGEMENT (OUTPATIENT)
Dept: CALL CENTER | Facility: HOSPITAL | Age: 86
End: 2021-09-16

## 2021-09-16 NOTE — OUTREACH NOTE
COVID-19 Week 1 Survey      Responses   [facility] patient discharged from?  [facility]   Does the patient have one of the following disease processes/diagnoses(primary or secondary)?  COVID-19   COVID-19 underlying condition?  None   Call Number  Call 1   Week 1 Call successful?  Yes   Call start time  1240   Call end time  1247   Discharge diagnosis  Sepsis secondary to COVID-19 pneumonia   Is patient permission given to speak with other caregiver?  Yes   List who call center can speak with  wife   Person spoke with today (if not patient) and relationship  wife   Meds reviewed with patient/caregiver?  Yes   Is the patient having any side effects they believe may be caused by any medication additions or changes?  No   Does the patient have all medications ordered at discharge?  Yes   Is the patient taking all medications as directed (includes completed medication regime)?  Yes   Comments regarding appointments  scheduled in 1 week   Does the patient have a primary care provider?   Yes   Has the patient kept scheduled appointments due by today?  N/A   What is the Home health agency?   [facility]    Has home health visited the patient within 72 hours of discharge?  No   What DME was ordered?  [employer] [facility]    Psychosocial issues?  No   Notified Case Management  Home Health   Comments  have not heard from  services as of yet   Did the patient receive a copy of their discharge instructions?  Yes   Did the patient receive a copy of COVID-19 specific instructions?  Yes   Nursing interventions  Reviewed instructions with patient   What is the patient's perception of their health status since discharge?  Improving   Does the patient have any of the following symptoms?  None   Nursing Interventions  Nurse provided patient education   Pulse Ox monitoring  None   Is the patient/caregiver able to teach back steps to recovery at home?  Rest and rebuild strength, gradually increase activity,  Eat a well-balance diet, Set small, achievable goals for return to baseline health   If the patient is a current smoker, are they able to teach back resources for cessation?  Not a smoker   Is the patient/caregiver able to teach back the hierarchy of who to call/visit for symptoms/problems? PCP, Specialist, Home health nurse, Urgent Care, ED, 911  Yes   COVID-19 call completed?  Yes   Wrap up additional comments  PATIENT IS WEAK BUT STATES IMPROVING, NEEDS ASSISTANCE WITH ALL ACTIVITY AND ADLS. AWAITING HH. CM NOTIFIED. FOLLOW-UP APPT SCHEDULED, DENIES NEW COMPLAINTS OR CONCERNS          Julienne Baird RN

## 2021-09-17 ENCOUNTER — READMISSION MANAGEMENT (OUTPATIENT)
Dept: CALL CENTER | Facility: HOSPITAL | Age: 86
End: 2021-09-17

## 2021-09-17 NOTE — OUTREACH NOTE
COVID-19 Week 1 Survey      Responses   [facility] patient discharged from?  [place]   Does the patient have one of the following disease processes/diagnoses(primary or secondary)?  COVID-19   COVID-19 underlying condition?  None   Call Number  Call 2   Week 1 Call successful?  Yes   Call start time  1255   Call end time  1311   Discharge diagnosis  Sepsis secondary to COVID-19 pneumonia   Is patient permission given to speak with other caregiver?  Yes   List who call center can speak with  wife   Person spoke with today (if not patient) and relationship  wife and patient    Is the patient taking all medications as directed (includes completed medication regime)?  Yes   What is the Home health agency?   [facility]    Has home health visited the patient within 72 hours of discharge?  No   Home health comments  HH has not called pt. Spoke with Jaylon from [facility] in [place] and she states they are waiting on orders to be signed by Dr Roche. I told Mrs Rogers this and she argues that Mr Rogers only sees Dr Roche one time per year. I told her she must ask the pt's PCP to write orderes and be able to follow pt with HH. I told her she needed to contact the PCP and ask for HH orders.    What is the patient's perception of their health status since discharge?  Improving   Does the patient have any of the following symptoms?  None   Nursing Interventions  Nurse provided patient education   Pulse Ox monitoring  None   Is the patient/caregiver able to teach back steps to recovery at home?  Rest and rebuild strength, gradually increase activity   Is the patient/caregiver able to teach back the hierarchy of who to call/visit for symptoms/problems? PCP, Specialist, Home health nurse, Urgent Care, ED, 911  Yes   COVID-19 call completed?  Yes   Wrap up additional comments  Reiterated to pt and his wife that a physician must sign orders for HH. Wife very inistent that pt neeed HH into the home and I  discussed what HH stated to me about Dr Roche signing orders. I told her she could ask another PCP if pt sees this provider if he would write for orders.           Maureen Adams RN

## 2021-09-18 ENCOUNTER — READMISSION MANAGEMENT (OUTPATIENT)
Dept: CALL CENTER | Facility: HOSPITAL | Age: 86
End: 2021-09-18

## 2021-09-18 NOTE — OUTREACH NOTE
COVID-19 Week 1 Survey      Responses   [facility] patient discharged from?  [facility]   Does the patient have one of the following disease processes/diagnoses(primary or secondary)?  COVID-19   COVID-19 underlying condition?  Sepsis   Call Number  Call 3   Week 1 Call successful?  Yes   Call start time  1105   Discharge diagnosis  Sepsis secondary to COVID-19 pneumonia   Is patient permission given to speak with other caregiver?  Yes   Person spoke with today (if not patient) and relationship  wife and patient    Meds reviewed with patient/caregiver?  Yes   Is the patient having any side effects they believe may be caused by any medication additions or changes?  No   Does the patient have all medications ordered at discharge?  Yes   Is the patient taking all medications as directed (includes completed medication regime)?  Yes   Does the patient have a primary care provider?   Yes   Does the patient have an appointment with their PCP or specialist within 7 days of discharge?  Yes   Has the patient kept scheduled appointments due by today?  N/A   What is the Home health agency?   [facility]    Has home health visited the patient within 72 hours of discharge?  No   What DME was ordered?  [employer] [facility]    Psychosocial issues?  No   Notified Case Management  Home Health   Comments  HH orders need to be signed.   Did the patient receive a copy of their discharge instructions?  Yes   Did the patient receive a copy of COVID-19 specific instructions?  Yes   Nursing interventions  Reviewed instructions with patient   What is the patient's perception of their health status since discharge?  Improving   Does the patient have any of the following symptoms?  None   Nursing Interventions  Nurse provided patient education   Pulse Ox monitoring  None   Is the patient/caregiver able to teach back steps to recovery at home?  Set small, achievable goals for return to baseline health, Rest and  rebuild strength, gradually increase activity, Weigh daily, Make a list of questions for provider's appointment, Eat a well-balance diet   If the patient is a current smoker, are they able to teach back resources for cessation?  Not a smoker   Is the patient/caregiver able to teach back the hierarchy of who to call/visit for symptoms/problems? PCP, Specialist, Home health nurse, Urgent Care, ED, 911  Yes          Mariela Garrett RN

## 2021-09-19 ENCOUNTER — TREATMENT (OUTPATIENT)
Dept: CARDIOLOGY | Facility: CLINIC | Age: 86
End: 2021-09-19

## 2021-09-19 DIAGNOSIS — I49.5 SSS (SICK SINUS SYNDROME): Primary | ICD-10-CM

## 2021-09-19 PROCEDURE — 93296 REM INTERROG EVL PM/IDS: CPT | Performed by: INTERNAL MEDICINE

## 2021-09-19 PROCEDURE — 93294 REM INTERROG EVL PM/LDLS PM: CPT | Performed by: INTERNAL MEDICINE

## 2021-09-20 ENCOUNTER — LAB REQUISITION (OUTPATIENT)
Dept: LAB | Facility: HOSPITAL | Age: 86
End: 2021-09-20

## 2021-09-20 DIAGNOSIS — R53.82 CHRONIC FATIGUE, UNSPECIFIED: ICD-10-CM

## 2021-09-20 LAB
BASOPHILS # BLD AUTO: 0.01 10*3/MM3 (ref 0–0.2)
BASOPHILS NFR BLD AUTO: 0.1 % (ref 0–1.5)
DEPRECATED RDW RBC AUTO: 44.3 FL (ref 37–54)
EOSINOPHIL # BLD AUTO: 0.22 10*3/MM3 (ref 0–0.4)
EOSINOPHIL NFR BLD AUTO: 3.2 % (ref 0.3–6.2)
ERYTHROCYTE [DISTWIDTH] IN BLOOD BY AUTOMATED COUNT: 12.9 % (ref 12.3–15.4)
HCT VFR BLD AUTO: 36 % (ref 37.5–51)
HGB BLD-MCNC: 11.9 G/DL (ref 13–17.7)
IMM GRANULOCYTES # BLD AUTO: 0.05 10*3/MM3 (ref 0–0.05)
IMM GRANULOCYTES NFR BLD AUTO: 0.7 % (ref 0–0.5)
LYMPHOCYTES # BLD AUTO: 1.14 10*3/MM3 (ref 0.7–3.1)
LYMPHOCYTES NFR BLD AUTO: 16.4 % (ref 19.6–45.3)
MCH RBC QN AUTO: 31.4 PG (ref 26.6–33)
MCHC RBC AUTO-ENTMCNC: 33.1 G/DL (ref 31.5–35.7)
MCV RBC AUTO: 95 FL (ref 79–97)
MONOCYTES # BLD AUTO: 0.49 10*3/MM3 (ref 0.1–0.9)
MONOCYTES NFR BLD AUTO: 7 % (ref 5–12)
NEUTROPHILS NFR BLD AUTO: 5.05 10*3/MM3 (ref 1.7–7)
NEUTROPHILS NFR BLD AUTO: 72.6 % (ref 42.7–76)
NRBC BLD AUTO-RTO: 0 /100 WBC (ref 0–0.2)
PLATELET # BLD AUTO: 127 10*3/MM3 (ref 140–450)
PMV BLD AUTO: 10 FL (ref 6–12)
RBC # BLD AUTO: 3.79 10*6/MM3 (ref 4.14–5.8)
WBC # BLD AUTO: 6.96 10*3/MM3 (ref 3.4–10.8)

## 2021-09-20 PROCEDURE — 85025 COMPLETE CBC W/AUTO DIFF WBC: CPT | Performed by: INTERNAL MEDICINE

## 2021-09-22 ENCOUNTER — READMISSION MANAGEMENT (OUTPATIENT)
Dept: CALL CENTER | Facility: HOSPITAL | Age: 86
End: 2021-09-22

## 2021-09-22 NOTE — OUTREACH NOTE
COVID-19 Week 2 Survey      Responses   [facility] patient discharged from?  [facility]   Does the patient have one of the following disease processes/diagnoses(primary or secondary)?  COVID-19   COVID-19 underlying condition?  Sepsis   Call Number  Call 1   COVID-19 Week 2: Call 1 attempt successful?  Yes   Call start time  1114   Call end time  1117   Discharge diagnosis  Sepsis secondary to COVID-19 pneumonia   Is patient permission given to speak with other caregiver?  Yes   Person spoke with today (if not patient) and relationship  wife and patient    Meds reviewed with patient/caregiver?  Yes   Is the patient having any side effects they believe may be caused by any medication additions or changes?  No   Does the patient have all medications ordered at discharge?  Yes   Is the patient taking all medications as directed (includes completed medication regime)?  Yes   Comments regarding appointments  SCHEDULED APPT FOR NEXT WEEK   Does the patient have a primary care provider?   Yes   Does the patient have an appointment with their PCP or specialist within 7 days of discharge?  Yes   Has the patient kept scheduled appointments due by today?  Yes   What is the Home health agency?   [facility]    Has home health visited the patient within 72 hours of discharge?  No   What DME was ordered?  [employer] [facility]    Psychosocial issues?  No   Did the patient receive a copy of their discharge instructions?  Yes   Did the patient receive a copy of COVID-19 specific instructions?  Yes   What is the patient's perception of their health status since discharge?  Improving   Does the patient have any of the following symptoms?  None   Nursing Interventions  Nurse provided patient education   Is the patient/caregiver able to teach back steps to recovery at home?  Rest and rebuild strength, gradually increase activity, Eat a well-balance diet   If the patient is a current smoker, are they able to  teach back resources for cessation?  Not a smoker   Is the patient/caregiver able to teach back the hierarchy of who to call/visit for symptoms/problems? PCP, Specialist, Home health nurse, Urgent Care, ED, 911  Yes   Is the patient/caregiver able to teach back signs and symptoms of worsening condition:  Fever, Rapid heart rate (>90), Shortness of breath/rapid respiratory rate, Altered mental status(confusion/coma)   COVID-19 call completed?  Yes   Wrap up additional comments  PATIENT STATES HE IS IMPROVING, WALKING IN HOUSE WITHOUT WALKER, HH IS FOLLOWING, PCP FOLLOW-UP SCHEDULES, DENIES QUESTIONS OR CONCERNS          Julienne Baird RN

## 2021-09-24 ENCOUNTER — TELEPHONE (OUTPATIENT)
Dept: CARDIOLOGY | Facility: CLINIC | Age: 86
End: 2021-09-24

## 2021-09-24 NOTE — TELEPHONE ENCOUNTER
Pt wife called in and wanted to know how long pt has to keep arm sling on. I ask when surgery was and she said 2 weeks ago and I ask if he had a wound check and she said no so I advised he needed to come in and get a wound check and she said they would come Monday  And I advised to come in between 8an and 11am and we wound check his wound and let him know then Pt agreed

## 2021-09-27 ENCOUNTER — CLINICAL SUPPORT (OUTPATIENT)
Dept: CARDIOLOGY | Facility: CLINIC | Age: 86
End: 2021-09-27

## 2021-09-27 NOTE — PROGRESS NOTES
Patient came in for first wound check since pacer insert on 09/13.  He had been in a sling since too.  Patient did not have staples or stitches. Patient did have a old bruising but no sign of infection.  Patient had no complaints other then then mild tenderness. Patient advised to keep follow up on 10/26/21.

## 2021-09-29 ENCOUNTER — READMISSION MANAGEMENT (OUTPATIENT)
Dept: CALL CENTER | Facility: HOSPITAL | Age: 86
End: 2021-09-29

## 2021-09-29 NOTE — OUTREACH NOTE
COVID-19 Week 3 Survey      Responses   [facility] patient discharged from?  [facility]   Does the patient have one of the following disease processes/diagnoses(primary or secondary)?  COVID-19   COVID-19 underlying condition?  Sepsis   Call Number  Call 1   COVID-19 Week 3: Call 1 attempt successful?  Yes   Call start time  1257   Call end time  1259   Discharge diagnosis  Sepsis secondary to COVID-19 pneumonia   Is patient permission given to speak with other caregiver?  Yes   Person spoke with today (if not patient) and relationship  wife and patient    Meds reviewed with patient/caregiver?  Yes   Is the patient having any side effects they believe may be caused by any medication additions or changes?  No   Does the patient have all medications ordered at discharge?  Yes   Is the patient taking all medications as directed (includes completed medication regime)?  Yes   Comments regarding appointments  KEPT PCP APPT.   Does the patient have a primary care provider?   Yes   Does the patient have an appointment with their PCP or specialist within 7 days of discharge?  Yes   Has the patient kept scheduled appointments due by today?  Yes   What is the Home health agency?   [facility]    Has home health visited the patient within 72 hours of discharge?  No   Home health comments  HH COMES TUESDAY AND THURSDAY   What DME was ordered?  [employer] [facility]    Psychosocial issues?  No   Did the patient receive a copy of their discharge instructions?  Yes   Did the patient receive a copy of COVID-19 specific instructions?  Yes   What is the patient's perception of their health status since discharge?  Improving   Does the patient have any of the following symptoms?  None   Nursing Interventions  Nurse provided patient education   Pulse Ox monitoring  None   Is the patient/caregiver able to teach back steps to recovery at home?  Set small, achievable goals for return to baseline health, Rest and  rebuild strength, gradually increase activity, Eat a well-balance diet   If the patient is a current smoker, are they able to teach back resources for cessation?  Not a smoker   Is the patient/caregiver able to teach back the hierarchy of who to call/visit for symptoms/problems? PCP, Specialist, Home health nurse, Urgent Care, ED, 911  Yes   Is the patient/caregiver able to teach back Sepsis?  S - Sleepy, difficult to arouse,confused, S - Short of breath, E - Extreme pain or generalized discomfort (worst ever,especially abdomen), S - Shivering,fever or very cold, P - Pale or discolored skin   Nursing interventions  Nurse provided patient education   Is patient/caregiver able to teach back steps to recovery at home?  Set small, achievable goals for return to baseline health, Exercise as tolerated, Rest and regain strength, Make a list of questions for PCP appoinment   Is the patient/caregiver able to teach back signs and symptoms of worsening condition:  Fever, Rapid heart rate (>90), Shortness of breath/rapid respiratory rate, Altered mental status(confusion/coma), Edema   Wrap up additional comments  PATIENT STATES HE IS IMPROVING, WALKING IN HOUSE WITH [facility] HH IS FOLLOWING 2X WEEKLT,  PCP FOLLOW-UP COMPLETED, DENIES QUESTIONS OR CONCERNS          Julienne Baird RN

## 2021-10-06 ENCOUNTER — READMISSION MANAGEMENT (OUTPATIENT)
Dept: CALL CENTER | Facility: HOSPITAL | Age: 86
End: 2021-10-06

## 2021-10-06 NOTE — OUTREACH NOTE
COVID-19 Week 4 Survey      Responses   [facility] patient discharged from?  [facility]   Does the patient have one of the following disease processes/diagnoses(primary or secondary)?  COVID-19   COVID-19 underlying condition?  Sepsis   Call Number  Call 1   COVID-19 Week 4: Call 1 attempt successful?  Yes   Call start time  1058   Call end time  1102   Discharge diagnosis  Sepsis secondary to COVID-19 pneumonia   Meds reviewed with patient/caregiver?  Yes   Is the patient taking all medications as directed (includes completed medication regime)?  Yes   Has the patient kept scheduled appointments due by today?  Yes   Is the patient still receiving Home Health Services?  Yes   What is the patient's perception of their health status since discharge?  Improving   Does the patient have any of the following symptoms?  None   Nursing Interventions  Nurse provided patient education   Pulse Ox monitoring  None   Is the patient/caregiver able to teach back the hierarchy of who to call/visit for symptoms/problems? PCP, Specialist, Home health nurse, Urgent Care, ED, 911  Yes   Is the patient interested in additional calls from an ambulatory ?  NOTE:  applies to high risk patients requiring additional follow-up.  No   Did the patient feel the follow up calls were helpful during their recovery period?  Yes   Wrap up additional comments  Doing well. Very much appreciates care received. Instructed on Nurse Call Center.          Angelina Feng RN

## 2021-10-26 ENCOUNTER — OFFICE VISIT (OUTPATIENT)
Dept: CARDIOLOGY | Facility: CLINIC | Age: 86
End: 2021-10-26

## 2021-10-26 VITALS
SYSTOLIC BLOOD PRESSURE: 98 MMHG | HEART RATE: 76 BPM | HEIGHT: 68 IN | RESPIRATION RATE: 16 BRPM | TEMPERATURE: 96.9 F | DIASTOLIC BLOOD PRESSURE: 58 MMHG | BODY MASS INDEX: 22.49 KG/M2 | WEIGHT: 148.4 LBS

## 2021-10-26 DIAGNOSIS — R00.1 SYMPTOMATIC BRADYCARDIA: Primary | ICD-10-CM

## 2021-10-26 PROCEDURE — 99024 POSTOP FOLLOW-UP VISIT: CPT | Performed by: NURSE PRACTITIONER

## 2021-10-26 NOTE — PROGRESS NOTES
Uday Roche DO  Joaquín Rogers  5/19/1927  10/26/2021    Patient Active Problem List   Diagnosis   • COVID-19   • Acute hypoxemic respiratory failure due to COVID-19 (HCC)   • Symptomatic bradycardia       Dear Uday Roche DO:    Subjective     Chief Complaint   Patient presents with   • Follow-up     pacemaker implant 09/13/2021   • Med Management     presented           History of Present Illness:    Joaquín Rogers is a 89 y.o. male with a past medical history of symptomatic bradycardia and recent COVID 19 infection. While inpatient, patient underwent dual chamber permanent pacemaker implantation by Dr. Burgess with a [employer] device. He presents today for cardiology follow up. Reports he has been doing well. He has had no further episodes of near syncope, syncope, dizziness, or lightheadedness. He reports the pacemaker insertion site has healed well with no complications.   He denies any palpitations, chest pains, or shortness of breath.         No Known Allergies:      Current Outpatient Medications:   •  aspirin 81 MG EC tablet, Take 81 mg by mouth Daily., Disp: , Rfl:   •  calcium carbonate-cholecalciferol 500-400 MG-UNIT tablet tablet, Take 1 tablet by mouth Daily., Disp: , Rfl:   •  carboxymethylcellulose (REFRESH PLUS) 0.5 % solution, Administer 1 drop to both eyes 3 (Three) Times a Day As Needed for Dry Eyes., Disp: , Rfl:   •  finasteride (PROSCAR) 5 MG tablet, Take 5 mg by mouth Daily., Disp: , Rfl:   •  levothyroxine (SYNTHROID, LEVOTHROID) 50 MCG tablet, Take 50 mcg by mouth Daily., Disp: , Rfl:   •  terazosin (HYTRIN) 2 MG capsule, Take 2 mg by mouth Every Night., Disp: , Rfl:       The following portions of the patient's history were reviewed and updated as appropriate: allergies, current medications, past family history, past medical history, past social history, past surgical history and problem list.    Social History     Tobacco Use   • Smoking status: Former Smoker      "Packs/day: 1.00     Types: Cigarettes     Quit date:      Years since quittin.8   • Smokeless tobacco: Former User     Types: Chew     Quit date:    Substance Use Topics   • Alcohol use: Never   • Drug use: Never       ROS    Objective   Vitals:    10/26/21 1027 10/26/21 1030   BP: (!) 79/54 (!) 87/54   BP Location: Left arm Right arm   Pulse: 77 76   Resp: 16    Temp: 96.9 °F (36.1 °C)    Weight: 67.3 kg (148 lb 6.4 oz)    Height: 172.7 cm (68\")      Body mass index is 22.56 kg/m².        Vitals reviewed.   Constitutional:       Appearance: Healthy appearance. Well-developed and not in distress. Frail.   HENT:      Head: Normocephalic and atraumatic.   Pulmonary:      Effort: Pulmonary effort is normal.      Breath sounds: Normal breath sounds. No wheezing. No rales.   Chest:      Comments: Left chest wall incision closed, no drainage.  Resolving ecchymosis noted.  Cardiovascular:      Normal rate. Regular rhythm.      Murmurs: There is no murmur.      . No S3 and S4 gallop.   Edema:     Peripheral edema absent.   Abdominal:      General: Bowel sounds are normal.      Palpations: Abdomen is soft.   Skin:     General: Skin is warm and dry.   Neurological:      Mental Status: Alert, oriented to person, place, and time and oriented to person, place and time.   Psychiatric:         Mood and Affect: Mood normal.         Behavior: Behavior normal.         Lab Results   Component Value Date     (L) 09/15/2021    K 4.2 09/15/2021     09/15/2021    CO2 23.7 09/15/2021    BUN 29 (H) 09/15/2021    CREATININE 1.13 09/15/2021    GLUCOSE 132 (H) 09/15/2021    CALCIUM 8.7 09/15/2021    AST 18 09/15/2021    ALT 28 09/15/2021    ALKPHOS 103 09/15/2021    LABIL2 1.4 (L) 2015     No results found for: CKTOTAL  Lab Results   Component Value Date    WBC 6.96 2021    HGB 11.9 (L) 2021    HCT 36.0 (L) 2021     (L) 2021     Lab Results   Component Value Date    INR 1.00 " 09/12/2021    INR 1.01 09/10/2021    INR 1.13 (H) 09/08/2021     Lab Results   Component Value Date    MG 2.1 09/13/2021     Lab Results   Component Value Date    TSH 1.640 09/01/2021      No results found for: BNP        Procedures      Assessment/Plan    Diagnosis Plan   1. Symptomatic bradycardia s/p PPM implantation with a [employer] Device                  Recommendations:    1. Will monitor with routine pacemaker check. Will schedule for 3 months.  2. Follow up in 4 to 5 months         Return in about 4 months (around 2/26/2022) for Recheck.    As always, I appreciate very much the opportunity to participate in the cardiovascular care of your patients.      With Best Regards,    Camila Miller, ALFREDO

## 2021-12-19 ENCOUNTER — TREATMENT (OUTPATIENT)
Dept: CARDIOLOGY | Facility: CLINIC | Age: 86
End: 2021-12-19

## 2021-12-19 DIAGNOSIS — I49.5 SSS (SICK SINUS SYNDROME): Primary | ICD-10-CM

## 2021-12-19 PROCEDURE — 93294 REM INTERROG EVL PM/LDLS PM: CPT | Performed by: INTERNAL MEDICINE

## 2021-12-19 PROCEDURE — 93296 REM INTERROG EVL PM/IDS: CPT | Performed by: INTERNAL MEDICINE

## 2022-03-20 ENCOUNTER — TREATMENT (OUTPATIENT)
Dept: CARDIOLOGY | Facility: CLINIC | Age: 87
End: 2022-03-20

## 2022-03-20 DIAGNOSIS — I49.5 SSS (SICK SINUS SYNDROME): Primary | ICD-10-CM

## 2022-03-20 PROCEDURE — 93294 REM INTERROG EVL PM/LDLS PM: CPT | Performed by: INTERNAL MEDICINE

## 2022-03-25 PROCEDURE — 93296 REM INTERROG EVL PM/IDS: CPT | Performed by: INTERNAL MEDICINE

## 2022-03-28 ENCOUNTER — OFFICE VISIT (OUTPATIENT)
Dept: CARDIOLOGY | Facility: CLINIC | Age: 87
End: 2022-03-28

## 2022-03-28 VITALS
TEMPERATURE: 97.1 F | WEIGHT: 156.6 LBS | OXYGEN SATURATION: 95 % | HEART RATE: 69 BPM | HEIGHT: 68 IN | DIASTOLIC BLOOD PRESSURE: 69 MMHG | BODY MASS INDEX: 23.73 KG/M2 | SYSTOLIC BLOOD PRESSURE: 148 MMHG

## 2022-03-28 DIAGNOSIS — R00.1 SYMPTOMATIC BRADYCARDIA: Primary | ICD-10-CM

## 2022-03-28 PROCEDURE — 99213 OFFICE O/P EST LOW 20 MIN: CPT | Performed by: NURSE PRACTITIONER

## 2022-03-28 PROCEDURE — 93000 ELECTROCARDIOGRAM COMPLETE: CPT | Performed by: NURSE PRACTITIONER

## 2022-03-28 NOTE — PROGRESS NOTES
"Uday Roche DO  Joaquín Rogers  5/19/1927 03/28/2022    Patient Active Problem List   Diagnosis   • COVID-19   • Acute hypoxemic respiratory failure due to COVID-19 (HCC)   • Symptomatic bradycardia       Dear Uday Roche DO:    Subjective     Chief Complaint   Patient presents with   • Follow-up     5 months   • Med Management     list   • pacemaker     Pt feels like it has knots, wants it looked at by provider            History of Present Illness:    Joaquín Rogers is a 89 y.o. 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 device.  He presents today for cardiology follow-up.  Reports he has been doing well.  Denies any chest pains, shortness of breath, palpitations, dizziness, or lightheadedness.  He states he feels like he has some \"knots\" around the pacemaker site but this is nontender.          No Known Allergies:      Current Outpatient Medications:   •  aspirin 81 MG EC tablet, Take 81 mg by mouth Daily., Disp: , Rfl:   •  calcium carbonate-cholecalciferol 500-400 MG-UNIT tablet tablet, Take 1 tablet by mouth Daily., Disp: , Rfl:   •  carboxymethylcellulose (REFRESH PLUS) 0.5 % solution, Administer 1 drop to both eyes 3 (Three) Times a Day As Needed for Dry Eyes., Disp: , Rfl:   •  finasteride (PROSCAR) 5 MG tablet, Take 5 mg by mouth Daily., Disp: , Rfl:   •  levothyroxine (SYNTHROID, LEVOTHROID) 50 MCG tablet, Take 50 mcg by mouth Daily., Disp: , Rfl:   •  terazosin (HYTRIN) 2 MG capsule, Take 2 mg by mouth Every Night., Disp: , Rfl:       The following portions of the patient's history were reviewed and updated as appropriate: allergies, current medications, past family history, past medical history, past social history, past surgical history and problem list.    Social History     Tobacco Use   • Smoking status: Former Smoker     Packs/day: 1.00     Types: Cigarettes     Quit date: 1990     Years since " "quittin.2   • Smokeless tobacco: Former User     Types: Chew     Quit date:    Vaping Use   • Vaping Use: Never used   Substance Use Topics   • Alcohol use: Never   • Drug use: Never       ROS    Objective   Vitals:    22 1007   BP: 148/69   BP Location: Right arm   Patient Position: Sitting   Cuff Size: Adult   Pulse: 69   Temp: 97.1 °F (36.2 °C)   TempSrc: Temporal   SpO2: 95%   Weight: 71 kg (156 lb 9.6 oz)   Height: 172.7 cm (68\")     Body mass index is 23.81 kg/m².        Vitals reviewed.   Constitutional:       Appearance: Healthy appearance. Well-developed and not in distress.   HENT:      Head: Normocephalic and atraumatic.   Pulmonary:      Effort: Pulmonary effort is normal.      Breath sounds: Normal breath sounds. No wheezing. No rales.   Chest:      Comments: Pacemaker site dry, clean, and intact.  No palpable abnormalities noted.  Cardiovascular:      Normal rate. Regular rhythm.      Murmurs: There is no murmur.      . No S3 and S4 gallop.   Edema:     Peripheral edema absent.   Abdominal:      General: Bowel sounds are normal.      Palpations: Abdomen is soft.   Skin:     General: Skin is warm and dry.   Neurological:      Mental Status: Alert, oriented to person, place, and time and oriented to person, place and time.   Psychiatric:         Mood and Affect: Mood normal.         Behavior: Behavior normal.         Lab Results   Component Value Date     (L) 09/15/2021    K 4.2 09/15/2021     09/15/2021    CO2 23.7 09/15/2021    BUN 29 (H) 09/15/2021    CREATININE 1.13 09/15/2021    GLUCOSE 132 (H) 09/15/2021    CALCIUM 8.7 09/15/2021    AST 18 09/15/2021    ALT 28 09/15/2021    ALKPHOS 103 09/15/2021    LABIL2 1.4 (L) 2015     No results found for: CKTOTAL  Lab Results   Component Value Date    WBC 6.96 2021    HGB 11.9 (L) 2021    HCT 36.0 (L) 2021     (L) 2021     Lab Results   Component Value Date    INR 1.00 2021    INR 1.01 " 09/10/2021    INR 1.13 (H) 09/08/2021     Lab Results   Component Value Date    MG 2.1 09/13/2021     Lab Results   Component Value Date    TSH 1.640 09/01/2021      No results found for: BNP          ECG 12 Lead    Date/Time: 3/28/2022 10:04 AM  Performed by: Camila Miller APRN  Authorized by: Camila Miller APRN   Comparison: compared with previous ECG   Similar to previous ECG  Rhythm: sinus rhythm  Comments: Without magnet normal sinus rhythm  With magnet AV paced rhythm              Assessment/Plan    Diagnosis Plan   1. Symptomatic bradycardia s/p PPM implantation with a [employer] Device  ECG 12 Lead                Recommendations:    1. I did assure the patient there were no palpable abnormalities on physical exam today.  Dr. Burgess examined the patient and reassured him as well.  2. We will schedule a pacemaker interrogation in office.  Appointment given to the patient today.  3. Follow-up in 6 months or sooner if needed.        Return in about 6 months (around 9/28/2022) for Recheck.    As always, I appreciate very much the opportunity to participate in the cardiovascular care of your patients.      With Best Regards,    ALFREDO Causey

## 2022-05-10 ENCOUNTER — TELEPHONE (OUTPATIENT)
Dept: CARDIOLOGY | Facility: CLINIC | Age: 87
End: 2022-05-10

## 2022-05-12 ENCOUNTER — CLINICAL SUPPORT (OUTPATIENT)
Dept: CARDIOLOGY | Facility: CLINIC | Age: 87
End: 2022-05-12

## 2022-05-12 DIAGNOSIS — I49.5 SSS (SICK SINUS SYNDROME): Primary | ICD-10-CM

## 2022-05-12 PROCEDURE — 93288 INTERROG EVL PM/LDLS PM IP: CPT | Performed by: INTERNAL MEDICINE

## 2022-05-27 ENCOUNTER — TRANSCRIBE ORDERS (OUTPATIENT)
Dept: ADMINISTRATIVE | Facility: HOSPITAL | Age: 87
End: 2022-05-27

## 2022-05-27 DIAGNOSIS — N18.32 CHRONIC KIDNEY DISEASE (CKD) STAGE G3B/A1, MODERATELY DECREASED GLOMERULAR FILTRATION RATE (GFR) BETWEEN 30-44 ML/MIN/1.73 SQUARE METER AND ALBUMINURIA CREATININE RATIO LESS THAN 30 MG/G (CMS/H*: Primary | ICD-10-CM

## 2022-06-23 ENCOUNTER — HOSPITAL ENCOUNTER (OUTPATIENT)
Dept: ULTRASOUND IMAGING | Facility: HOSPITAL | Age: 87
Discharge: HOME | End: 2022-06-23

## 2022-06-23 ENCOUNTER — TRANSCRIBE ORDERS (OUTPATIENT)
Dept: ADMINISTRATIVE | Facility: HOSPITAL | Age: 87
End: 2022-06-23

## 2022-06-23 DIAGNOSIS — Z13.6 SCREENING FOR AAA (AORTIC ABDOMINAL ANEURYSM): ICD-10-CM

## 2022-06-23 DIAGNOSIS — Z13.6 SCREENING FOR AAA (AORTIC ABDOMINAL ANEURYSM): Primary | ICD-10-CM

## 2022-06-23 DIAGNOSIS — N18.32 CHRONIC KIDNEY DISEASE (CKD) STAGE G3B/A1, MODERATELY DECREASED GLOMERULAR FILTRATION RATE (GFR) BETWEEN 30-44 ML/MIN/1.73 SQUARE METER AND ALBUMINURIA CREATININE RATIO LESS THAN 30 MG/G (CMS/H*: ICD-10-CM

## 2022-06-23 PROCEDURE — 76775 US EXAM ABDO BACK WALL LIM: CPT | Performed by: RADIOLOGY

## 2022-06-23 PROCEDURE — 76706 US ABDL AORTA SCREEN AAA: CPT | Performed by: RADIOLOGY

## 2022-06-23 PROCEDURE — 76775 US EXAM ABDO BACK WALL LIM: CPT

## 2022-06-23 PROCEDURE — 76706 US ABDL AORTA SCREEN AAA: CPT

## 2022-09-29 ENCOUNTER — OFFICE VISIT (OUTPATIENT)
Dept: CARDIOLOGY | Facility: CLINIC | Age: 87
End: 2022-09-29

## 2022-09-29 VITALS
WEIGHT: 153.4 LBS | SYSTOLIC BLOOD PRESSURE: 143 MMHG | HEART RATE: 63 BPM | RESPIRATION RATE: 16 BRPM | HEIGHT: 68 IN | BODY MASS INDEX: 23.25 KG/M2 | DIASTOLIC BLOOD PRESSURE: 72 MMHG

## 2022-09-29 DIAGNOSIS — I49.5 SSS (SICK SINUS SYNDROME): Primary | ICD-10-CM

## 2022-09-29 PROCEDURE — 99213 OFFICE O/P EST LOW 20 MIN: CPT | Performed by: INTERNAL MEDICINE

## 2022-09-29 PROCEDURE — 93000 ELECTROCARDIOGRAM COMPLETE: CPT | Performed by: INTERNAL MEDICINE

## 2023-02-16 ENCOUNTER — APPOINTMENT (OUTPATIENT)
Dept: CT IMAGING | Facility: HOSPITAL | Age: 88
End: 2023-02-16
Payer: OTHER GOVERNMENT

## 2023-02-16 ENCOUNTER — HOSPITAL ENCOUNTER (EMERGENCY)
Facility: HOSPITAL | Age: 88
Discharge: HOME | End: 2023-02-16
Attending: STUDENT IN AN ORGANIZED HEALTH CARE EDUCATION/TRAINING PROGRAM | Admitting: STUDENT IN AN ORGANIZED HEALTH CARE EDUCATION/TRAINING PROGRAM
Payer: OTHER GOVERNMENT

## 2023-02-16 VITALS
HEIGHT: 68 IN | DIASTOLIC BLOOD PRESSURE: 70 MMHG | BODY MASS INDEX: 22.28 KG/M2 | SYSTOLIC BLOOD PRESSURE: 130 MMHG | RESPIRATION RATE: 16 BRPM | TEMPERATURE: 98.7 F | WEIGHT: 147 LBS | HEART RATE: 70 BPM | OXYGEN SATURATION: 98 %

## 2023-02-16 DIAGNOSIS — K59.00 CONSTIPATION, UNSPECIFIED CONSTIPATION TYPE: Primary | ICD-10-CM

## 2023-02-16 LAB
ALBUMIN SERPL-MCNC: 4.1 G/DL (ref 3.5–5.2)
ALBUMIN/GLOB SERPL: 1.4 G/DL
ALP SERPL-CCNC: 75 U/L (ref 39–117)
ALT SERPL W P-5'-P-CCNC: 18 U/L (ref 1–41)
ANION GAP SERPL CALCULATED.3IONS-SCNC: 8.3 MMOL/L (ref 5–15)
AST SERPL-CCNC: 33 U/L (ref 1–40)
BASOPHILS # BLD AUTO: 0.01 10*3/MM3 (ref 0–0.2)
BASOPHILS NFR BLD AUTO: 0.2 % (ref 0–1.5)
BILIRUB SERPL-MCNC: 0.6 MG/DL (ref 0–1.2)
BUN SERPL-MCNC: 20 MG/DL (ref 8–23)
BUN/CREAT SERPL: 13.7 (ref 7–25)
CALCIUM SPEC-SCNC: 9.8 MG/DL (ref 8.2–9.6)
CHLORIDE SERPL-SCNC: 103 MMOL/L (ref 98–107)
CO2 SERPL-SCNC: 27.7 MMOL/L (ref 22–29)
CREAT SERPL-MCNC: 1.46 MG/DL (ref 0.76–1.27)
D-LACTATE SERPL-SCNC: 1.4 MMOL/L (ref 0.5–2)
DEPRECATED RDW RBC AUTO: 57.6 FL (ref 37–54)
EGFRCR SERPLBLD CKD-EPI 2021: 44 ML/MIN/1.73
EOSINOPHIL # BLD AUTO: 0.2 10*3/MM3 (ref 0–0.4)
EOSINOPHIL NFR BLD AUTO: 3.4 % (ref 0.3–6.2)
ERYTHROCYTE [DISTWIDTH] IN BLOOD BY AUTOMATED COUNT: 17.7 % (ref 12.3–15.4)
GLOBULIN UR ELPH-MCNC: 3 GM/DL
GLUCOSE SERPL-MCNC: 120 MG/DL (ref 65–99)
HCT VFR BLD AUTO: 41.1 % (ref 37.5–51)
HGB BLD-MCNC: 13.2 G/DL (ref 13–17.7)
HOLD SPECIMEN: NORMAL
HOLD SPECIMEN: NORMAL
IMM GRANULOCYTES # BLD AUTO: 0.02 10*3/MM3 (ref 0–0.05)
IMM GRANULOCYTES NFR BLD AUTO: 0.3 % (ref 0–0.5)
LIPASE SERPL-CCNC: 32 U/L (ref 13–60)
LYMPHOCYTES # BLD AUTO: 1.92 10*3/MM3 (ref 0.7–3.1)
LYMPHOCYTES NFR BLD AUTO: 32.3 % (ref 19.6–45.3)
MCH RBC QN AUTO: 28.9 PG (ref 26.6–33)
MCHC RBC AUTO-ENTMCNC: 32.1 G/DL (ref 31.5–35.7)
MCV RBC AUTO: 89.9 FL (ref 79–97)
MONOCYTES # BLD AUTO: 0.49 10*3/MM3 (ref 0.1–0.9)
MONOCYTES NFR BLD AUTO: 8.2 % (ref 5–12)
NEUTROPHILS NFR BLD AUTO: 3.3 10*3/MM3 (ref 1.7–7)
NEUTROPHILS NFR BLD AUTO: 55.6 % (ref 42.7–76)
NRBC BLD AUTO-RTO: 0 /100 WBC (ref 0–0.2)
PLATELET # BLD AUTO: 181 10*3/MM3 (ref 140–450)
PMV BLD AUTO: 9.5 FL (ref 6–12)
POTASSIUM SERPL-SCNC: 5.3 MMOL/L (ref 3.5–5.2)
PROT SERPL-MCNC: 7.1 G/DL (ref 6–8.5)
RBC # BLD AUTO: 4.57 10*6/MM3 (ref 4.14–5.8)
SODIUM SERPL-SCNC: 139 MMOL/L (ref 136–145)
WBC NRBC COR # BLD: 5.94 10*3/MM3 (ref 3.4–10.8)
WHOLE BLOOD HOLD COAG: NORMAL
WHOLE BLOOD HOLD SPECIMEN: NORMAL

## 2023-02-16 PROCEDURE — 85025 COMPLETE CBC W/AUTO DIFF WBC: CPT | Performed by: PHYSICIAN ASSISTANT

## 2023-02-16 PROCEDURE — 74176 CT ABD & PELVIS W/O CONTRAST: CPT

## 2023-02-16 PROCEDURE — 99283 EMERGENCY DEPT VISIT LOW MDM: CPT

## 2023-02-16 PROCEDURE — 83690 ASSAY OF LIPASE: CPT | Performed by: PHYSICIAN ASSISTANT

## 2023-02-16 PROCEDURE — 36415 COLL VENOUS BLD VENIPUNCTURE: CPT

## 2023-02-16 PROCEDURE — 83605 ASSAY OF LACTIC ACID: CPT | Performed by: PHYSICIAN ASSISTANT

## 2023-02-16 PROCEDURE — 80053 COMPREHEN METABOLIC PANEL: CPT | Performed by: PHYSICIAN ASSISTANT

## 2023-02-16 RX ORDER — POLYETHYLENE GLYCOL 3350 17 G/17G
17 POWDER, FOR SOLUTION ORAL 3 TIMES DAILY
Qty: 12 PACKET | Refills: 0 | Status: SHIPPED | OUTPATIENT
Start: 2023-02-16 | End: 2023-02-20

## 2023-02-16 RX ORDER — SODIUM CHLORIDE 0.9 % (FLUSH) 0.9 %
10 SYRINGE (ML) INJECTION AS NEEDED
Status: DISCONTINUED | OUTPATIENT
Start: 2023-02-16 | End: 2023-02-17 | Stop reason: HOSPADM

## 2023-02-16 RX ORDER — DOCUSATE SODIUM 100 MG/1
100 CAPSULE, LIQUID FILLED ORAL 2 TIMES DAILY
Qty: 8 CAPSULE | Refills: 0 | Status: SHIPPED | OUTPATIENT
Start: 2023-02-16 | End: 2023-02-20

## 2023-02-18 ENCOUNTER — APPOINTMENT (OUTPATIENT)
Dept: GENERAL RADIOLOGY | Facility: HOSPITAL | Age: 88
End: 2023-02-18
Payer: MEDICARE

## 2023-02-18 ENCOUNTER — APPOINTMENT (OUTPATIENT)
Dept: CT IMAGING | Facility: HOSPITAL | Age: 88
End: 2023-02-18
Payer: MEDICARE

## 2023-02-18 LAB
ALBUMIN SERPL-MCNC: 4 G/DL (ref 3.5–5.2)
ALBUMIN/GLOB SERPL: 1.4 G/DL
ALP SERPL-CCNC: 81 U/L (ref 39–117)
ALT SERPL W P-5'-P-CCNC: 17 U/L (ref 1–41)
ANION GAP SERPL CALCULATED.3IONS-SCNC: 10.1 MMOL/L (ref 5–15)
AST SERPL-CCNC: 35 U/L (ref 1–40)
BASOPHILS # BLD AUTO: 0.03 10*3/MM3 (ref 0–0.2)
BASOPHILS NFR BLD AUTO: 0.5 % (ref 0–1.5)
BILIRUB SERPL-MCNC: 0.4 MG/DL (ref 0–1.2)
BUN SERPL-MCNC: 20 MG/DL (ref 8–23)
BUN/CREAT SERPL: 14.9 (ref 7–25)
CALCIUM SPEC-SCNC: 9.4 MG/DL (ref 8.2–9.6)
CHLORIDE SERPL-SCNC: 104 MMOL/L (ref 98–107)
CO2 SERPL-SCNC: 25.9 MMOL/L (ref 22–29)
CREAT SERPL-MCNC: 1.34 MG/DL (ref 0.76–1.27)
DEPRECATED RDW RBC AUTO: 55.8 FL (ref 37–54)
EGFRCR SERPLBLD CKD-EPI 2021: 48.8 ML/MIN/1.73
EOSINOPHIL # BLD AUTO: 0.25 10*3/MM3 (ref 0–0.4)
EOSINOPHIL NFR BLD AUTO: 4.3 % (ref 0.3–6.2)
ERYTHROCYTE [DISTWIDTH] IN BLOOD BY AUTOMATED COUNT: 17.2 % (ref 12.3–15.4)
GLOBULIN UR ELPH-MCNC: 2.9 GM/DL
GLUCOSE SERPL-MCNC: 109 MG/DL (ref 65–99)
HCT VFR BLD AUTO: 39.7 % (ref 37.5–51)
HGB BLD-MCNC: 12.6 G/DL (ref 13–17.7)
HOLD SPECIMEN: NORMAL
HOLD SPECIMEN: NORMAL
IMM GRANULOCYTES # BLD AUTO: 0.01 10*3/MM3 (ref 0–0.05)
IMM GRANULOCYTES NFR BLD AUTO: 0.2 % (ref 0–0.5)
LIPASE SERPL-CCNC: 37 U/L (ref 13–60)
LYMPHOCYTES # BLD AUTO: 1.81 10*3/MM3 (ref 0.7–3.1)
LYMPHOCYTES NFR BLD AUTO: 31 % (ref 19.6–45.3)
MCH RBC QN AUTO: 28.3 PG (ref 26.6–33)
MCHC RBC AUTO-ENTMCNC: 31.7 G/DL (ref 31.5–35.7)
MCV RBC AUTO: 89.2 FL (ref 79–97)
MONOCYTES # BLD AUTO: 0.5 10*3/MM3 (ref 0.1–0.9)
MONOCYTES NFR BLD AUTO: 8.6 % (ref 5–12)
NEUTROPHILS NFR BLD AUTO: 3.23 10*3/MM3 (ref 1.7–7)
NEUTROPHILS NFR BLD AUTO: 55.4 % (ref 42.7–76)
NRBC BLD AUTO-RTO: 0 /100 WBC (ref 0–0.2)
PLATELET # BLD AUTO: 177 10*3/MM3 (ref 140–450)
PMV BLD AUTO: 9.6 FL (ref 6–12)
POTASSIUM SERPL-SCNC: 4.4 MMOL/L (ref 3.5–5.2)
PROT SERPL-MCNC: 6.9 G/DL (ref 6–8.5)
RBC # BLD AUTO: 4.45 10*6/MM3 (ref 4.14–5.8)
SODIUM SERPL-SCNC: 140 MMOL/L (ref 136–145)
WBC NRBC COR # BLD: 5.83 10*3/MM3 (ref 3.4–10.8)
WHOLE BLOOD HOLD COAG: NORMAL
WHOLE BLOOD HOLD SPECIMEN: NORMAL

## 2023-02-18 PROCEDURE — 99284 EMERGENCY DEPT VISIT MOD MDM: CPT

## 2023-02-18 PROCEDURE — 83690 ASSAY OF LIPASE: CPT | Performed by: PHYSICIAN ASSISTANT

## 2023-02-18 PROCEDURE — 85025 COMPLETE CBC W/AUTO DIFF WBC: CPT | Performed by: PHYSICIAN ASSISTANT

## 2023-02-18 PROCEDURE — 36415 COLL VENOUS BLD VENIPUNCTURE: CPT

## 2023-02-18 PROCEDURE — 74176 CT ABD & PELVIS W/O CONTRAST: CPT

## 2023-02-18 PROCEDURE — 80053 COMPREHEN METABOLIC PANEL: CPT | Performed by: PHYSICIAN ASSISTANT

## 2023-02-19 ENCOUNTER — HOSPITAL ENCOUNTER (EMERGENCY)
Facility: HOSPITAL | Age: 88
Discharge: HOME | End: 2023-02-19
Attending: EMERGENCY MEDICINE | Admitting: EMERGENCY MEDICINE
Payer: MEDICARE

## 2023-02-19 VITALS
OXYGEN SATURATION: 98 % | TEMPERATURE: 98.1 F | SYSTOLIC BLOOD PRESSURE: 142 MMHG | BODY MASS INDEX: 22.28 KG/M2 | RESPIRATION RATE: 18 BRPM | WEIGHT: 147 LBS | HEART RATE: 68 BPM | HEIGHT: 68 IN | DIASTOLIC BLOOD PRESSURE: 68 MMHG

## 2023-02-19 DIAGNOSIS — K59.00 CONSTIPATION, UNSPECIFIED CONSTIPATION TYPE: Primary | ICD-10-CM

## 2023-02-19 LAB
BILIRUB UR QL STRIP: NEGATIVE
CLARITY UR: CLEAR
COLOR UR: YELLOW
GLUCOSE UR STRIP-MCNC: NEGATIVE MG/DL
HGB UR QL STRIP.AUTO: NEGATIVE
KETONES UR QL STRIP: NEGATIVE
LEUKOCYTE ESTERASE UR QL STRIP.AUTO: NEGATIVE
NITRITE UR QL STRIP: NEGATIVE
PH UR STRIP.AUTO: 5.5 [PH] (ref 5–8)
PROT UR QL STRIP: NEGATIVE
SP GR UR STRIP: 1.03 (ref 1–1.03)
UROBILINOGEN UR QL STRIP: NORMAL

## 2023-02-19 PROCEDURE — 81003 URINALYSIS AUTO W/O SCOPE: CPT | Performed by: PHYSICIAN ASSISTANT

## 2023-02-19 NOTE — ED NOTES
MEDICAL SCREENING:    Reason for Visit: constipation      Patient initially seen in triage.  The patient was advised further evaluation and diagnostic testing will be needed, some of the treatment and testing will be initiated in the lobby in order to begin the process.  The patient will be returned to the waiting area for the time being and possibly be re-assessed by a subsequent ED provider.  The patient will be brought back to the treatment area in as timely manner as possible.         Claudette Rowe PA  02/18/23 3574

## 2023-02-19 NOTE — ED PROVIDER NOTES
Subjective   History of Present Illness  89-year-old male presents with a chief complaint of constipation, states that he has not had a bowel movement since he was here on Thursday.  He states that he came here Thursday, was prescribed Colace and MiraLAX, states has been taking it as prescribed but has not had a bowel movement.  He feels bloated and uncomfortable.  He denies fever, chills, chest pain, shortness of breath, abdominal pain, nausea, vomiting, syncope or near syncope, focal numbness or weakness, other symptoms or other complaints.        Review of Systems   All other systems reviewed and are negative.      Past Medical History:   Diagnosis Date   • Bradycardia    • Thyroid disease        No Known Allergies    Past Surgical History:   Procedure Laterality Date   • CARDIAC ELECTROPHYSIOLOGY PROCEDURE N/A 2021    Procedure: Pacemaker DC new;  Surgeon: Kendrick Burgess MD;  Location: [facility] INVASIVE LOCATION;  Service: Cardiology;  Laterality: N/A;   • HERNIA REPAIR     • INSERT / REPLACE / REMOVE PACEMAKER  2021    [employer] device       No family history on file.    Social History     Socioeconomic History   • Marital status:    Tobacco Use   • Smoking status: Former     Packs/day: 1.00     Types: Cigarettes     Quit date:      Years since quittin.1   • Smokeless tobacco: Former     Types: Chew     Quit date:    Vaping Use   • Vaping Use: Never used   Substance and Sexual Activity   • Alcohol use: Never   • Drug use: Never           Objective   Physical Exam  Vitals and nursing note reviewed.   Constitutional:       General: He is not in acute distress.     Appearance: Normal appearance. He is well-developed. He is not ill-appearing, toxic-appearing or diaphoretic.      Comments: Pleasant nearly male, alert and well-oriented, ambulates without difficulty.  He is in no apparent distress.   HENT:      Head: Normocephalic and atraumatic.   Eyes:      General: No scleral  icterus.     Pupils: Pupils are equal, round, and reactive to light.   Neck:      Trachea: No tracheal deviation.   Cardiovascular:      Rate and Rhythm: Normal rate and regular rhythm.   Pulmonary:      Effort: Pulmonary effort is normal. No respiratory distress.      Breath sounds: Normal breath sounds.   Chest:      Chest wall: No tenderness.   Abdominal:      General: Bowel sounds are normal.      Palpations: Abdomen is soft.      Tenderness: There is no abdominal tenderness. There is no guarding or rebound.      Comments: Soft and nontender throughout.   Musculoskeletal:         General: No tenderness. Normal range of motion.      Cervical back: Normal range of motion and neck supple. No rigidity or tenderness.      Right lower leg: No edema.      Left lower leg: No edema.   Skin:     General: Skin is warm and dry.      Capillary Refill: Capillary refill takes less than 2 seconds.      Coloration: Skin is not pale.   Neurological:      General: No focal deficit present.      Mental Status: He is alert and oriented to person, place, and time.      GCS: GCS eye subscore is 4. GCS verbal subscore is 5. GCS motor subscore is 6.      Motor: No abnormal muscle tone.   Psychiatric:         Mood and Affect: Mood normal.         Behavior: Behavior normal.         Procedures  CT Abdomen Pelvis Without Contrast   Final Result      1. No evidence of constipation. The colon contains prominently gas as well as a small amount of stool. No evidence of colitis.   2. Probable mild small bowel ileus. No obstruction.   3. No renal or ureteral stones. No hydronephrosis.   4. Prostate enlargement.   5. Additional nonacute findings as above.               Signer Name: Regan Jack MD    Signed: 2/19/2023 12:01 AM    Workstation Name: [id number]     [facility]        Results for orders placed or performed during the hospital encounter of 02/19/23   Comprehensive Metabolic Panel    Specimen: Arm, Left;  Blood   Result Value Ref Range    Glucose 109 (H) 65 - 99 mg/dL    BUN 20 8 - 23 mg/dL    Creatinine 1.34 (H) 0.76 - 1.27 mg/dL    Sodium 140 136 - 145 mmol/L    Potassium 4.4 3.5 - 5.2 mmol/L    Chloride 104 98 - 107 mmol/L    CO2 25.9 22.0 - 29.0 mmol/L    Calcium 9.4 8.2 - 9.6 mg/dL    Total Protein 6.9 6.0 - 8.5 g/dL    Albumin 4.0 3.5 - 5.2 g/dL    ALT (SGPT) 17 1 - 41 U/L    AST (SGOT) 35 1 - 40 U/L    Alkaline Phosphatase 81 39 - 117 U/L    Total Bilirubin 0.4 0.0 - 1.2 mg/dL    Globulin 2.9 gm/dL    A/G Ratio 1.4 g/dL    BUN/Creatinine Ratio 14.9 7.0 - 25.0    Anion Gap 10.1 5.0 - 15.0 mmol/L    eGFR 48.8 (L) >60.0 mL/min/1.73   Lipase    Specimen: Arm, Left; Blood   Result Value Ref Range    Lipase 37 13 - 60 U/L   Urinalysis With Microscopic If Indicated (No Culture) - Urine, Clean Catch    Specimen: Urine, Clean Catch   Result Value Ref Range    Color, UA Yellow Yellow, Straw    Appearance, UA Clear Clear    pH, UA 5.5 5.0 - 8.0    Specific Gravity, UA 1.026 1.005 - 1.030    Glucose, UA Negative Negative    Ketones, UA Negative Negative    Bilirubin, UA Negative Negative    Blood, UA Negative Negative    Protein, UA Negative Negative    Leuk Esterase, UA Negative Negative    Nitrite, UA Negative Negative    Urobilinogen, UA 1.0 E.U./dL 0.2 - 1.0 E.U./dL   CBC Auto Differential    Specimen: Arm, Left; Blood   Result Value Ref Range    WBC 5.83 3.40 - 10.80 10*3/mm3    RBC 4.45 4.14 - 5.80 10*6/mm3    Hemoglobin 12.6 (L) 13.0 - 17.7 g/dL    Hematocrit 39.7 37.5 - 51.0 %    MCV 89.2 79.0 - 97.0 fL    MCH 28.3 26.6 - 33.0 pg    MCHC 31.7 31.5 - 35.7 g/dL    RDW 17.2 (H) 12.3 - 15.4 %    RDW-SD 55.8 (H) 37.0 - 54.0 fl    MPV 9.6 6.0 - 12.0 fL    Platelets 177 140 - 450 10*3/mm3    Neutrophil % 55.4 42.7 - 76.0 %    Lymphocyte % 31.0 19.6 - 45.3 %    Monocyte % 8.6 5.0 - 12.0 %    Eosinophil % 4.3 0.3 - 6.2 %    Basophil % 0.5 0.0 - 1.5 %    Immature Grans % 0.2 0.0 - 0.5 %    Neutrophils, Absolute 3.23 1.70 -  7.00 10*3/mm3    Lymphocytes, Absolute 1.81 0.70 - 3.10 10*3/mm3    Monocytes, Absolute 0.50 0.10 - 0.90 10*3/mm3    Eosinophils, Absolute 0.25 0.00 - 0.40 10*3/mm3    Basophils, Absolute 0.03 0.00 - 0.20 10*3/mm3    Immature Grans, Absolute 0.01 0.00 - 0.05 10*3/mm3    nRBC 0.0 0.0 - 0.2 /100 WBC   Green Top (Gel)   Result Value Ref Range    Extra Tube Hold for add-ons.    Lavender Top   Result Value Ref Range    Extra Tube hold for add-on    Gold Top - SST   Result Value Ref Range    Extra Tube Hold for add-ons.    Light Blue Top   Result Value Ref Range    Extra Tube Hold for add-ons.                 ED Course  ED Course as of 02/19/23 0131   Sat Feb 18, 2023   2352 I explained to the patient that the CT scan does not show very much stool in his colon.  Patient would like to have an enema, this is ordered.  Awaiting urine specimen for urinalysis. [CM]   Sun Feb 19, 2023   0128 Staff reports good results with the soapsuds enema. [CM]      ED Course User Index  [CM] Joaquín Trejo MD                                           [facility]    Final diagnoses:   Constipation, unspecified constipation type       ED Disposition  ED Disposition     ED Disposition   Discharge    Condition   Stable    Comment   --             Uday Roche, DO  [address]  [facility] [address]  [phone]    Go in 3 days      [facility] Emergency Department  [address] [place] [id number] [phone]  Go to   If symptoms worsen         Medication List      No changes were made to your prescriptions during this visit.       Please note that portions of this note were completed with a voice recognition program.        Joaquín Trejo MD  02/19/23 0131

## 2023-02-28 ENCOUNTER — APPOINTMENT (OUTPATIENT)
Dept: GENERAL RADIOLOGY | Facility: HOSPITAL | Age: 88
End: 2023-02-28
Payer: OTHER GOVERNMENT

## 2023-02-28 ENCOUNTER — HOSPITAL ENCOUNTER (EMERGENCY)
Facility: HOSPITAL | Age: 88
Discharge: HOME | End: 2023-02-28
Attending: STUDENT IN AN ORGANIZED HEALTH CARE EDUCATION/TRAINING PROGRAM | Admitting: STUDENT IN AN ORGANIZED HEALTH CARE EDUCATION/TRAINING PROGRAM
Payer: OTHER GOVERNMENT

## 2023-02-28 ENCOUNTER — APPOINTMENT (OUTPATIENT)
Dept: CT IMAGING | Facility: HOSPITAL | Age: 88
End: 2023-02-28
Payer: OTHER GOVERNMENT

## 2023-02-28 VITALS
BODY MASS INDEX: 22.13 KG/M2 | HEIGHT: 68 IN | RESPIRATION RATE: 20 BRPM | TEMPERATURE: 98.1 F | HEART RATE: 73 BPM | OXYGEN SATURATION: 94 % | WEIGHT: 146 LBS | DIASTOLIC BLOOD PRESSURE: 77 MMHG | SYSTOLIC BLOOD PRESSURE: 152 MMHG

## 2023-02-28 DIAGNOSIS — J44.1 COPD EXACERBATION: Primary | ICD-10-CM

## 2023-02-28 LAB
ALBUMIN SERPL-MCNC: 3.7 G/DL (ref 3.5–5.2)
ALBUMIN/GLOB SERPL: 1.4 G/DL
ALP SERPL-CCNC: 72 U/L (ref 39–117)
ALT SERPL W P-5'-P-CCNC: 15 U/L (ref 1–41)
ANION GAP SERPL CALCULATED.3IONS-SCNC: 7.5 MMOL/L (ref 5–15)
AST SERPL-CCNC: 26 U/L (ref 1–40)
BASOPHILS # BLD AUTO: 0.01 10*3/MM3 (ref 0–0.2)
BASOPHILS NFR BLD AUTO: 0.2 % (ref 0–1.5)
BILIRUB SERPL-MCNC: 0.4 MG/DL (ref 0–1.2)
BUN SERPL-MCNC: 17 MG/DL (ref 8–23)
BUN/CREAT SERPL: 14 (ref 7–25)
CALCIUM SPEC-SCNC: 9.6 MG/DL (ref 8.2–9.6)
CHLORIDE SERPL-SCNC: 106 MMOL/L (ref 98–107)
CO2 SERPL-SCNC: 26.5 MMOL/L (ref 22–29)
CREAT SERPL-MCNC: 1.21 MG/DL (ref 0.76–1.27)
DEPRECATED RDW RBC AUTO: 55.8 FL (ref 37–54)
EGFRCR SERPLBLD CKD-EPI 2021: 55.1 ML/MIN/1.73
EOSINOPHIL # BLD AUTO: 0.23 10*3/MM3 (ref 0–0.4)
EOSINOPHIL NFR BLD AUTO: 4.2 % (ref 0.3–6.2)
ERYTHROCYTE [DISTWIDTH] IN BLOOD BY AUTOMATED COUNT: 16.6 % (ref 12.3–15.4)
FLUAV RNA RESP QL NAA+PROBE: NOT DETECTED
FLUBV RNA RESP QL NAA+PROBE: NOT DETECTED
GLOBULIN UR ELPH-MCNC: 2.7 GM/DL
GLUCOSE SERPL-MCNC: 112 MG/DL (ref 65–99)
HCT VFR BLD AUTO: 36.8 % (ref 37.5–51)
HGB BLD-MCNC: 11.7 G/DL (ref 13–17.7)
HOLD SPECIMEN: NORMAL
HOLD SPECIMEN: NORMAL
IMM GRANULOCYTES # BLD AUTO: 0.01 10*3/MM3 (ref 0–0.05)
IMM GRANULOCYTES NFR BLD AUTO: 0.2 % (ref 0–0.5)
LYMPHOCYTES # BLD AUTO: 1.59 10*3/MM3 (ref 0.7–3.1)
LYMPHOCYTES NFR BLD AUTO: 29.1 % (ref 19.6–45.3)
MCH RBC QN AUTO: 28.7 PG (ref 26.6–33)
MCHC RBC AUTO-ENTMCNC: 31.8 G/DL (ref 31.5–35.7)
MCV RBC AUTO: 90.4 FL (ref 79–97)
MONOCYTES # BLD AUTO: 0.51 10*3/MM3 (ref 0.1–0.9)
MONOCYTES NFR BLD AUTO: 9.3 % (ref 5–12)
NEUTROPHILS NFR BLD AUTO: 3.11 10*3/MM3 (ref 1.7–7)
NEUTROPHILS NFR BLD AUTO: 57 % (ref 42.7–76)
NRBC BLD AUTO-RTO: 0 /100 WBC (ref 0–0.2)
NT-PROBNP SERPL-MCNC: 211.4 PG/ML (ref 0–1800)
PLATELET # BLD AUTO: 161 10*3/MM3 (ref 140–450)
PMV BLD AUTO: 9.6 FL (ref 6–12)
POTASSIUM SERPL-SCNC: 4.7 MMOL/L (ref 3.5–5.2)
PROT SERPL-MCNC: 6.4 G/DL (ref 6–8.5)
RBC # BLD AUTO: 4.07 10*6/MM3 (ref 4.14–5.8)
SARS-COV-2 RNA RESP QL NAA+PROBE: NOT DETECTED
SODIUM SERPL-SCNC: 140 MMOL/L (ref 136–145)
TROPONIN T SERPL HS-MCNC: 20 NG/L
TROPONIN T SERPL HS-MCNC: 20 NG/L
WBC NRBC COR # BLD: 5.46 10*3/MM3 (ref 3.4–10.8)
WHOLE BLOOD HOLD COAG: NORMAL
WHOLE BLOOD HOLD SPECIMEN: NORMAL

## 2023-02-28 PROCEDURE — 96374 THER/PROPH/DIAG INJ IV PUSH: CPT

## 2023-02-28 PROCEDURE — 93010 ELECTROCARDIOGRAM REPORT: CPT | Performed by: SPECIALIST

## 2023-02-28 PROCEDURE — 36415 COLL VENOUS BLD VENIPUNCTURE: CPT

## 2023-02-28 PROCEDURE — 80053 COMPREHEN METABOLIC PANEL: CPT | Performed by: STUDENT IN AN ORGANIZED HEALTH CARE EDUCATION/TRAINING PROGRAM

## 2023-02-28 PROCEDURE — 99285 EMERGENCY DEPT VISIT HI MDM: CPT

## 2023-02-28 PROCEDURE — 25010000002 METHYLPREDNISOLONE PER 125 MG: Performed by: STUDENT IN AN ORGANIZED HEALTH CARE EDUCATION/TRAINING PROGRAM

## 2023-02-28 PROCEDURE — 71250 CT THORAX DX C-: CPT

## 2023-02-28 PROCEDURE — 93005 ELECTROCARDIOGRAM TRACING: CPT | Performed by: STUDENT IN AN ORGANIZED HEALTH CARE EDUCATION/TRAINING PROGRAM

## 2023-02-28 PROCEDURE — 71045 X-RAY EXAM CHEST 1 VIEW: CPT

## 2023-02-28 PROCEDURE — 94640 AIRWAY INHALATION TREATMENT: CPT

## 2023-02-28 PROCEDURE — 94799 UNLISTED PULMONARY SVC/PX: CPT

## 2023-02-28 PROCEDURE — 87636 SARSCOV2 & INF A&B AMP PRB: CPT | Performed by: STUDENT IN AN ORGANIZED HEALTH CARE EDUCATION/TRAINING PROGRAM

## 2023-02-28 PROCEDURE — 84484 ASSAY OF TROPONIN QUANT: CPT | Performed by: STUDENT IN AN ORGANIZED HEALTH CARE EDUCATION/TRAINING PROGRAM

## 2023-02-28 PROCEDURE — 83880 ASSAY OF NATRIURETIC PEPTIDE: CPT | Performed by: STUDENT IN AN ORGANIZED HEALTH CARE EDUCATION/TRAINING PROGRAM

## 2023-02-28 PROCEDURE — 85025 COMPLETE CBC W/AUTO DIFF WBC: CPT | Performed by: STUDENT IN AN ORGANIZED HEALTH CARE EDUCATION/TRAINING PROGRAM

## 2023-02-28 RX ORDER — DOXYCYCLINE 100 MG/1
100 CAPSULE ORAL 2 TIMES DAILY
Qty: 13 CAPSULE | Refills: 0 | Status: ON HOLD | OUTPATIENT
Start: 2023-02-28 | End: 2023-03-15

## 2023-02-28 RX ORDER — SODIUM CHLORIDE 0.9 % (FLUSH) 0.9 %
10 SYRINGE (ML) INJECTION AS NEEDED
Status: DISCONTINUED | OUTPATIENT
Start: 2023-02-28 | End: 2023-02-28 | Stop reason: HOSPADM

## 2023-02-28 RX ORDER — PREDNISONE 50 MG/1
50 TABLET ORAL DAILY
Qty: 5 TABLET | Refills: 0 | Status: ON HOLD | OUTPATIENT
Start: 2023-02-28 | End: 2023-03-15

## 2023-02-28 RX ORDER — DOXYCYCLINE 100 MG/1
100 CAPSULE ORAL ONCE
Status: COMPLETED | OUTPATIENT
Start: 2023-02-28 | End: 2023-02-28

## 2023-02-28 RX ORDER — IPRATROPIUM BROMIDE AND ALBUTEROL SULFATE 2.5; .5 MG/3ML; MG/3ML
3 SOLUTION RESPIRATORY (INHALATION) ONCE
Status: COMPLETED | OUTPATIENT
Start: 2023-02-28 | End: 2023-02-28

## 2023-02-28 RX ORDER — METHYLPREDNISOLONE SODIUM SUCCINATE 125 MG/2ML
125 INJECTION, POWDER, LYOPHILIZED, FOR SOLUTION INTRAMUSCULAR; INTRAVENOUS ONCE
Status: COMPLETED | OUTPATIENT
Start: 2023-02-28 | End: 2023-02-28

## 2023-02-28 RX ADMIN — IPRATROPIUM BROMIDE AND ALBUTEROL SULFATE 3 ML: 2.5; .5 SOLUTION RESPIRATORY (INHALATION) at 18:08

## 2023-02-28 RX ADMIN — METHYLPREDNISOLONE SODIUM SUCCINATE 125 MG: 125 INJECTION, POWDER, FOR SOLUTION INTRAMUSCULAR; INTRAVENOUS at 18:14

## 2023-02-28 RX ADMIN — DOXYCYCLINE 100 MG: 100 CAPSULE ORAL at 19:48

## 2023-03-01 LAB
QT INTERVAL: 438 MS
QTC INTERVAL: 451 MS

## 2023-03-01 NOTE — ED NOTES
Clark Rogers-Child [phone]  Aaron Rogers-Grandchild [phone]    Please call with any updates.

## 2023-03-01 NOTE — ED PROVIDER NOTES
Subjective   History of Present Illness     Joaquín is a 89 year old male presenting to the ED for constipation. Patient reports he has not been able to have bowel movement in 3 days and usually goes every other day. He reports generalized abdominal cramping sensation, intermittently. Patient also reports increased gas. Patient has attempted to use an enema with minima relief. Patietn denies any fevers, cough or other infectious sypmtoms. No bloody stools. No urinaly changes.     Review of Systems   Constitutional: Negative for activity change and fatigue.   HENT: Negative for congestion and sinus pressure.    Respiratory: Negative for cough and shortness of breath.    Cardiovascular: Negative for chest pain.   Gastrointestinal: Positive for abdominal pain and constipation. Negative for abdominal distention.   Genitourinary: Negative for difficulty urinating, dysuria and hematuria.   Musculoskeletal: Negative for arthralgias.   Skin: Negative for color change and rash.   Neurological: Negative for dizziness, weakness and light-headedness.   All other systems reviewed and are negative.      Past Medical History:   Diagnosis Date   • Bradycardia    • Thyroid disease        No Known Allergies    Past Surgical History:   Procedure Laterality Date   • CARDIAC ELECTROPHYSIOLOGY PROCEDURE N/A 2021    Procedure: Pacemaker DC new;  Surgeon: Kendrick Burgess MD;  Location: [facility] INVASIVE LOCATION;  Service: Cardiology;  Laterality: N/A;   • HERNIA REPAIR     • INSERT / REPLACE / REMOVE PACEMAKER  2021    [employer] device       No family history on file.    Social History     Socioeconomic History   • Marital status:    Tobacco Use   • Smoking status: Former     Packs/day: 1.00     Types: Cigarettes     Quit date:      Years since quittin.1   • Smokeless tobacco: Former     Types: Chew     Quit date:    Vaping Use   • Vaping Use: Never used   Substance and Sexual Activity   • Alcohol use:  Never   • Drug use: Never           Objective   Physical Exam  Constitutional:       General: He is not in acute distress.     Appearance: Normal appearance. He is not ill-appearing.   HENT:      Head: Normocephalic and atraumatic.      Nose: No congestion or rhinorrhea.   Eyes:      Extraocular Movements: Extraocular movements intact.      Pupils: Pupils are equal, round, and reactive to light.   Cardiovascular:      Rate and Rhythm: Normal rate and regular rhythm.   Pulmonary:      Effort: Pulmonary effort is normal.      Breath sounds: Normal breath sounds.   Abdominal:      General: Bowel sounds are normal.      Palpations: Abdomen is soft.   Musculoskeletal:         General: Normal range of motion.      Cervical back: Normal range of motion.   Skin:     General: Skin is warm.      Capillary Refill: Capillary refill takes less than 2 seconds.   Neurological:      General: No focal deficit present.      Mental Status: He is alert and oriented to person, place, and time.      Cranial Nerves: No cranial nerve deficit.      Sensory: No sensory deficit.      Motor: No weakness.      Deep Tendon Reflexes: Reflexes normal.         Procedures           ED Course                                           Medical Decision Making  Joaquín is a 89 year old male presenting to the ED w/ constipation. Patient is afebrile and hemodynamically stabel on arrival. Physical exam is reassuring abdomen is non tender on exam. Abdomen non distended. Patietn continue to pass flatulence and has reassuring exam , low suspicion for obstructive process. CT abdomen pelvis w/out contrast shows no obstructive process. NO evidence of colitis. Mild prostomegaly and bladder distension. Basic labs, UA, lipase, lactic acid are non actionable. Patient is given enema with bowel movement. Patient is discharged w/ miralax and docusate senna and informed to fu w/ his pcp in 2-3 days. Discharged in stable condition.     Constipation, unspecified  constipation type: complicated acute illness or injury  Amount and/or Complexity of Data Reviewed  Labs: ordered.  Radiology: ordered.      Risk  OTC drugs.          Final diagnoses:   Constipation, unspecified constipation type       ED Disposition  ED Disposition     ED Disposition   Discharge    Condition   Stable    Comment   --             Uday Roche,   [address]  [place] [address]  [facility] [address]  [phone]    Go in 2 days  Call your primary care physician tomorrow to set up appointment         Medication List      ASK your doctor about these medications    docusate sodium 100 MG capsule  Commonly known as: COLACE  Take 1 capsule by mouth 2 (Two) Times a Day for 4 days.  Ask about: Should I take this medication?     polyethylene glycol 17 g packet  Commonly known as: MIRALAX  Take 17 g by mouth 3 (Three) Times a Day for 4 days.  Ask about: Should I take this medication?           Where to Get Your Medications      These medications were sent to [id number] - [place], [place] - [address] [facility] [address] - [phone] [facility] [phone]   [address] [facility] [place] [address]    Phone: [phone]   · docusate sodium 100 MG capsule  · polyethylene glycol 17 g packet          Jeanette Barriga MD  03/01/23 6057

## 2023-03-01 NOTE — ED PROVIDER NOTES
Subjective   History of Present Illness  89-year-old male with a 50-pack-year smoking history and a past medical history of bradycardia with hypothyroidism presents to the ER due to concerns for increasing shortness of breath.  No chest pain.  No significant sputum production.  No significant wheezing.  Patient noted no obvious aggravating or alleviating factors.  Patient noted he does not feel well overall.  Vital stable.  Afebrile        Review of Systems   Constitutional: Positive for fatigue.   Respiratory: Positive for cough and shortness of breath.    All other systems reviewed and are negative.      Past Medical History:   Diagnosis Date   • Bradycardia    • Thyroid disease        No Known Allergies    Past Surgical History:   Procedure Laterality Date   • CARDIAC ELECTROPHYSIOLOGY PROCEDURE N/A 2021    Procedure: Pacemaker DC new;  Surgeon: Kendrick Burgess MD;  Location: [facility] INVASIVE LOCATION;  Service: Cardiology;  Laterality: N/A;   • HERNIA REPAIR     • INSERT / REPLACE / REMOVE PACEMAKER  2021    [employer] device       History reviewed. No pertinent family history.    Social History     Socioeconomic History   • Marital status:    Tobacco Use   • Smoking status: Former     Packs/day: 1.00     Types: Cigarettes     Quit date:      Years since quittin.1   • Smokeless tobacco: Former     Types: Chew     Quit date:    Vaping Use   • Vaping Use: Never used   Substance and Sexual Activity   • Alcohol use: Never   • Drug use: Never           Objective   Physical Exam  Constitutional:       General: He is not in acute distress.     Appearance: He is well-developed. He is not ill-appearing.   HENT:      Head: Normocephalic and atraumatic.   Eyes:      Extraocular Movements: Extraocular movements intact.      Pupils: Pupils are equal, round, and reactive to light.   Neck:      Vascular: No JVD.   Cardiovascular:      Rate and Rhythm: Normal rate and regular rhythm.      Heart  sounds: Normal heart sounds. No murmur heard.  Pulmonary:      Effort: No tachypnea, accessory muscle usage or respiratory distress.      Breath sounds: Normal breath sounds. No stridor. No decreased breath sounds, wheezing, rhonchi or rales.   Chest:      Chest wall: No deformity, tenderness or crepitus.   Abdominal:      General: Bowel sounds are normal.      Palpations: Abdomen is soft.      Tenderness: There is no abdominal tenderness. There is no guarding or rebound.   Musculoskeletal:         General: Normal range of motion.      Cervical back: Normal range of motion and neck supple.      Right lower leg: No tenderness. No edema.      Left lower leg: No tenderness. No edema.   Lymphadenopathy:      Cervical: No cervical adenopathy.   Skin:     General: Skin is warm and dry.      Coloration: Skin is not cyanotic.      Findings: No ecchymosis or erythema.   Neurological:      General: No focal deficit present.      Mental Status: He is alert and oriented to person, place, and time.      Cranial Nerves: No cranial nerve deficit.      Motor: No weakness.   Psychiatric:         Mood and Affect: Mood normal. Mood is not anxious.         Behavior: Behavior normal. Behavior is not agitated.         Procedures           ED Course  ED Course as of 02/28/23 1945 Tue Feb 28, 2023   1652 EKG notes sinus rhythm.  Right bundle branch block noted.  Left anterior fascicular block noted.  Bifascicular block.  64 bpm.  QTc of 451.  No acute ST elevation    Electronically signed by Ad Curry DO, 02/28/23, 4:52 PM EST.   [SF]      ED Course User Index  [SF] Ad Curry DO      CT Chest Without Contrast Diagnostic    Result Date: 2/28/2023  1.  Emphysematous changes of the lungs with left basilar pulmonary opacities favored to reflect atelectasis over infiltrate. 2.  Coronary artery calcifications and/or stents. 3.  Mildly ectatic thoracic aorta with atherosclerosis, unchanged. Signer Name: Jacinto Healy MD  Signed:  2/28/2023 7:07 PM  Workstation Name: [id number]  [facility]    XR Chest 1 View    Result Date: 2/28/2023  No active disease. No change since 9/13/2021. Signer Name: Diallo Valenzuela MD  Signed: 2/28/2023 5:40 PM  Workstation Name: [id number]  [facility]      Results for orders placed or performed during the hospital encounter of 02/28/23   COVID-19 and FLU A/B PCR - Swab, Nasopharynx    Specimen: Nasopharynx; Swab   Result Value Ref Range    COVID19 Not Detected Not Detected - Ref. Range    Influenza A PCR Not Detected Not Detected    Influenza B PCR Not Detected Not Detected   Comprehensive Metabolic Panel    Specimen: Blood   Result Value Ref Range    Glucose 112 (H) 65 - 99 mg/dL    BUN 17 8 - 23 mg/dL    Creatinine 1.21 0.76 - 1.27 mg/dL    Sodium 140 136 - 145 mmol/L    Potassium 4.7 3.5 - 5.2 mmol/L    Chloride 106 98 - 107 mmol/L    CO2 26.5 22.0 - 29.0 mmol/L    Calcium 9.6 8.2 - 9.6 mg/dL    Total Protein 6.4 6.0 - 8.5 g/dL    Albumin 3.7 3.5 - 5.2 g/dL    ALT (SGPT) 15 1 - 41 U/L    AST (SGOT) 26 1 - 40 U/L    Alkaline Phosphatase 72 39 - 117 U/L    Total Bilirubin 0.4 0.0 - 1.2 mg/dL    Globulin 2.7 gm/dL    A/G Ratio 1.4 g/dL    BUN/Creatinine Ratio 14.0 7.0 - 25.0    Anion Gap 7.5 5.0 - 15.0 mmol/L    eGFR 55.1 (L) >60.0 mL/min/1.73   BNP    Specimen: Blood   Result Value Ref Range    proBNP 211.4 0.0 - 1,800.0 pg/mL   Single High Sensitivity Troponin T    Specimen: Blood   Result Value Ref Range    HS Troponin T 20 (H) <15 ng/L   CBC Auto Differential    Specimen: Blood   Result Value Ref Range    WBC 5.46 3.40 - 10.80 10*3/mm3    RBC 4.07 (L) 4.14 - 5.80 10*6/mm3    Hemoglobin 11.7 (L) 13.0 - 17.7 g/dL    Hematocrit 36.8 (L) 37.5 - 51.0 %    MCV 90.4 79.0 - 97.0 fL    MCH 28.7 26.6 - 33.0 pg    MCHC 31.8 31.5 - 35.7 g/dL    RDW 16.6 (H) 12.3 - 15.4 %    RDW-SD 55.8 (H) 37.0 - 54.0 fl    MPV 9.6 6.0 - 12.0 fL    Platelets 161 140 - 450 10*3/mm3     Neutrophil % 57.0 42.7 - 76.0 %    Lymphocyte % 29.1 19.6 - 45.3 %    Monocyte % 9.3 5.0 - 12.0 %    Eosinophil % 4.2 0.3 - 6.2 %    Basophil % 0.2 0.0 - 1.5 %    Immature Grans % 0.2 0.0 - 0.5 %    Neutrophils, Absolute 3.11 1.70 - 7.00 10*3/mm3    Lymphocytes, Absolute 1.59 0.70 - 3.10 10*3/mm3    Monocytes, Absolute 0.51 0.10 - 0.90 10*3/mm3    Eosinophils, Absolute 0.23 0.00 - 0.40 10*3/mm3    Basophils, Absolute 0.01 0.00 - 0.20 10*3/mm3    Immature Grans, Absolute 0.01 0.00 - 0.05 10*3/mm3    nRBC 0.0 0.0 - 0.2 /100 WBC   High Sensitivity Troponin T    Specimen: Arm, Right; Blood   Result Value Ref Range    HS Troponin T 20 (H) <15 ng/L   ECG 12 Lead Dyspnea   Result Value Ref Range    QT Interval 438 ms    QTC Interval 451 ms   Green Top (Gel)   Result Value Ref Range    Extra Tube Hold for add-ons.    Lavender Top   Result Value Ref Range    Extra Tube hold for add-on    Gold Top - SST   Result Value Ref Range    Extra Tube Hold for add-ons.    Light Blue Top   Result Value Ref Range    Extra Tube Hold for add-ons.                                           Medical Decision Making  CBC unremarkable.  CMP unremarkable.  Troponin trended x2 and unremarkable.  No significant delta per [facility] high-sensitivity troponin protocol.  Chest x-ray notes emphysematous changes.  CT chest without contrast also notes emphysematous changes with possible atelectasis.  Concerns for possible COPD exacerbation.  Patient received Solu-Medrol, DuoNeb therapy, and doxycycline.  Patient will continue these at discharge.  Work up and results were discussed throughly with the patient.  The patient will be discharged for further monitoring and management with their PCP.  Red flags, warning signs, worsening symptoms, and when to return to the ER discussed with and understood by the patient.  Patient will follow up with their PCP in a timely manner.  Vitals stable at discharge.    COPD exacerbation (HCC): complicated acute  illness or injury  Amount and/or Complexity of Data Reviewed  Labs: ordered. Decision-making details documented in ED Course.  Radiology: ordered. Decision-making details documented in ED Course.  ECG/medicine tests: ordered.      Risk  Prescription drug management.          Final diagnoses:   COPD exacerbation (HCC)       ED Disposition  ED Disposition     ED Disposition   Discharge    Condition   Stable    Comment   --             Uday Roche DO  [address]  [facility] [address]  [phone]    In 1 week      [facility] Emergency Department  [address] [facility] [address] [phone]    If symptoms worsen         Medication List      New Prescriptions    doxycycline 100 MG capsule  Commonly known as: MONODOX  Take 1 capsule by mouth 2 (Two) Times a Day.     predniSONE 50 MG tablet  Commonly known as: DELTASONE  Take 1 tablet by mouth Daily.           Where to Get Your Medications      These medications were sent to [id number] - [place], [place] - [address] AT [facility] [address] - [phone]  - [phone]   [address] [place] [address]    Phone: [phone]   · doxycycline 100 MG capsule  · predniSONE 50 MG tablet          Ad Curry DO  02/28/23 1945

## 2023-03-13 ENCOUNTER — APPOINTMENT (OUTPATIENT)
Dept: GENERAL RADIOLOGY | Facility: HOSPITAL | Age: 88
End: 2023-03-13
Payer: OTHER GOVERNMENT

## 2023-03-13 ENCOUNTER — HOSPITAL ENCOUNTER (EMERGENCY)
Facility: HOSPITAL | Age: 88
Discharge: HOME | End: 2023-03-14
Attending: STUDENT IN AN ORGANIZED HEALTH CARE EDUCATION/TRAINING PROGRAM | Admitting: STUDENT IN AN ORGANIZED HEALTH CARE EDUCATION/TRAINING PROGRAM
Payer: OTHER GOVERNMENT

## 2023-03-13 DIAGNOSIS — N17.9 AKI (ACUTE KIDNEY INJURY): ICD-10-CM

## 2023-03-13 DIAGNOSIS — U07.1 COVID-19: Primary | ICD-10-CM

## 2023-03-13 LAB
A-A DO2: 42.2 MMHG (ref 0–300)
ALBUMIN SERPL-MCNC: 3.5 G/DL (ref 3.5–5.2)
ALBUMIN/GLOB SERPL: 1.3 G/DL
ALP SERPL-CCNC: 99 U/L (ref 39–117)
ALT SERPL W P-5'-P-CCNC: 22 U/L (ref 1–41)
ANION GAP SERPL CALCULATED.3IONS-SCNC: 10.9 MMOL/L (ref 5–15)
ARTERIAL PATENCY WRIST A: POSITIVE
AST SERPL-CCNC: 28 U/L (ref 1–40)
ATMOSPHERIC PRESS: 730 MMHG
BASE EXCESS BLDA CALC-SCNC: -3.3 MMOL/L (ref 0–2)
BASOPHILS # BLD AUTO: 0.02 10*3/MM3 (ref 0–0.2)
BASOPHILS NFR BLD AUTO: 0.2 % (ref 0–1.5)
BDY SITE: ABNORMAL
BILIRUB SERPL-MCNC: 0.3 MG/DL (ref 0–1.2)
BUN SERPL-MCNC: 17 MG/DL (ref 8–23)
BUN/CREAT SERPL: 10.2 (ref 7–25)
CALCIUM SPEC-SCNC: 8.6 MG/DL (ref 8.2–9.6)
CHLORIDE SERPL-SCNC: 104 MMOL/L (ref 98–107)
CO2 BLDA-SCNC: 20.9 MMOL/L (ref 22–33)
CO2 SERPL-SCNC: 22.1 MMOL/L (ref 22–29)
COHGB MFR BLD: 1 % (ref 0–5)
CREAT SERPL-MCNC: 1.66 MG/DL (ref 0.76–1.27)
CRP SERPL-MCNC: 0.74 MG/DL (ref 0–0.5)
DEPRECATED RDW RBC AUTO: 55.5 FL (ref 37–54)
EGFRCR SERPLBLD CKD-EPI 2021: 37.7 ML/MIN/1.73
EOSINOPHIL # BLD AUTO: 0.13 10*3/MM3 (ref 0–0.4)
EOSINOPHIL NFR BLD AUTO: 1.5 % (ref 0.3–6.2)
ERYTHROCYTE [DISTWIDTH] IN BLOOD BY AUTOMATED COUNT: 16.8 % (ref 12.3–15.4)
FLUAV RNA RESP QL NAA+PROBE: NOT DETECTED
FLUBV RNA ISLT QL NAA+PROBE: NOT DETECTED
GLOBULIN UR ELPH-MCNC: 2.8 GM/DL
GLUCOSE SERPL-MCNC: 111 MG/DL (ref 65–99)
HCO3 BLDA-SCNC: 20 MMOL/L (ref 20–26)
HCT VFR BLD AUTO: 35.6 % (ref 37.5–51)
HCT VFR BLD CALC: 32.4 % (ref 38–51)
HGB BLD-MCNC: 11.6 G/DL (ref 13–17.7)
HGB BLDA-MCNC: 10.6 G/DL (ref 14–18)
IMM GRANULOCYTES # BLD AUTO: 0.06 10*3/MM3 (ref 0–0.05)
IMM GRANULOCYTES NFR BLD AUTO: 0.7 % (ref 0–0.5)
INHALED O2 CONCENTRATION: 21 %
LYMPHOCYTES # BLD AUTO: 0.63 10*3/MM3 (ref 0.7–3.1)
LYMPHOCYTES NFR BLD AUTO: 7.2 % (ref 19.6–45.3)
Lab: ABNORMAL
MCH RBC QN AUTO: 29.3 PG (ref 26.6–33)
MCHC RBC AUTO-ENTMCNC: 32.6 G/DL (ref 31.5–35.7)
MCV RBC AUTO: 89.9 FL (ref 79–97)
METHGB BLD QL: 0.5 % (ref 0–3)
MODALITY: ABNORMAL
MONOCYTES # BLD AUTO: 0.73 10*3/MM3 (ref 0.1–0.9)
MONOCYTES NFR BLD AUTO: 8.3 % (ref 5–12)
NEUTROPHILS NFR BLD AUTO: 7.19 10*3/MM3 (ref 1.7–7)
NEUTROPHILS NFR BLD AUTO: 82.1 % (ref 42.7–76)
NOTE: ABNORMAL
NRBC BLD AUTO-RTO: 0 /100 WBC (ref 0–0.2)
OXYHGB MFR BLDV: 93.6 % (ref 94–99)
PCO2 BLDA: 29.4 MM HG (ref 35–45)
PCO2 TEMP ADJ BLD: ABNORMAL MM[HG]
PH BLDA: 7.44 PH UNITS (ref 7.35–7.45)
PH, TEMP CORRECTED: ABNORMAL
PLATELET # BLD AUTO: 155 10*3/MM3 (ref 140–450)
PMV BLD AUTO: 9.9 FL (ref 6–12)
PO2 BLDA: 68 MM HG (ref 83–108)
PO2 TEMP ADJ BLD: ABNORMAL MM[HG]
POTASSIUM SERPL-SCNC: 4.7 MMOL/L (ref 3.5–5.2)
PROT SERPL-MCNC: 6.3 G/DL (ref 6–8.5)
RBC # BLD AUTO: 3.96 10*6/MM3 (ref 4.14–5.8)
SAO2 % BLDCOA: 95 % (ref 94–99)
SARS-COV-2 RNA RESP QL NAA+PROBE: DETECTED
SODIUM SERPL-SCNC: 137 MMOL/L (ref 136–145)
VENTILATOR MODE: ABNORMAL
WBC NRBC COR # BLD: 8.76 10*3/MM3 (ref 3.4–10.8)

## 2023-03-13 PROCEDURE — C9803 HOPD COVID-19 SPEC COLLECT: HCPCS | Performed by: STUDENT IN AN ORGANIZED HEALTH CARE EDUCATION/TRAINING PROGRAM

## 2023-03-13 PROCEDURE — 93005 ELECTROCARDIOGRAM TRACING: CPT | Performed by: STUDENT IN AN ORGANIZED HEALTH CARE EDUCATION/TRAINING PROGRAM

## 2023-03-13 PROCEDURE — 82805 BLOOD GASES W/O2 SATURATION: CPT

## 2023-03-13 PROCEDURE — 82375 ASSAY CARBOXYHB QUANT: CPT

## 2023-03-13 PROCEDURE — 86140 C-REACTIVE PROTEIN: CPT | Performed by: STUDENT IN AN ORGANIZED HEALTH CARE EDUCATION/TRAINING PROGRAM

## 2023-03-13 PROCEDURE — 80053 COMPREHEN METABOLIC PANEL: CPT | Performed by: STUDENT IN AN ORGANIZED HEALTH CARE EDUCATION/TRAINING PROGRAM

## 2023-03-13 PROCEDURE — 36600 WITHDRAWAL OF ARTERIAL BLOOD: CPT

## 2023-03-13 PROCEDURE — 87636 SARSCOV2 & INF A&B AMP PRB: CPT | Performed by: STUDENT IN AN ORGANIZED HEALTH CARE EDUCATION/TRAINING PROGRAM

## 2023-03-13 PROCEDURE — 99284 EMERGENCY DEPT VISIT MOD MDM: CPT

## 2023-03-13 PROCEDURE — 71045 X-RAY EXAM CHEST 1 VIEW: CPT

## 2023-03-13 PROCEDURE — 83050 HGB METHEMOGLOBIN QUAN: CPT

## 2023-03-13 PROCEDURE — 85025 COMPLETE CBC W/AUTO DIFF WBC: CPT | Performed by: STUDENT IN AN ORGANIZED HEALTH CARE EDUCATION/TRAINING PROGRAM

## 2023-03-13 RX ADMIN — SODIUM CHLORIDE 1000 ML: 9 INJECTION, SOLUTION INTRAVENOUS at 22:01

## 2023-03-14 ENCOUNTER — APPOINTMENT (OUTPATIENT)
Dept: NUCLEAR MEDICINE | Facility: HOSPITAL | Age: 88
DRG: 177 | End: 2023-03-14
Payer: OTHER GOVERNMENT

## 2023-03-14 ENCOUNTER — HOSPITAL ENCOUNTER (INPATIENT)
Facility: HOSPITAL | Age: 88
LOS: 5 days | Discharge: HOME | DRG: 177 | End: 2023-03-19
Attending: STUDENT IN AN ORGANIZED HEALTH CARE EDUCATION/TRAINING PROGRAM | Admitting: STUDENT IN AN ORGANIZED HEALTH CARE EDUCATION/TRAINING PROGRAM
Payer: OTHER GOVERNMENT

## 2023-03-14 ENCOUNTER — APPOINTMENT (OUTPATIENT)
Dept: GENERAL RADIOLOGY | Facility: HOSPITAL | Age: 88
DRG: 177 | End: 2023-03-14
Payer: OTHER GOVERNMENT

## 2023-03-14 ENCOUNTER — APPOINTMENT (OUTPATIENT)
Dept: ULTRASOUND IMAGING | Facility: HOSPITAL | Age: 88
DRG: 177 | End: 2023-03-14
Payer: OTHER GOVERNMENT

## 2023-03-14 VITALS
DIASTOLIC BLOOD PRESSURE: 76 MMHG | HEART RATE: 88 BPM | HEIGHT: 68 IN | WEIGHT: 146 LBS | SYSTOLIC BLOOD PRESSURE: 132 MMHG | BODY MASS INDEX: 22.13 KG/M2 | RESPIRATION RATE: 18 BRPM | OXYGEN SATURATION: 98 % | TEMPERATURE: 97.6 F

## 2023-03-14 DIAGNOSIS — U07.1 COVID-19: Primary | ICD-10-CM

## 2023-03-14 LAB
A-A DO2: 48.6 MMHG (ref 0–300)
A-A DO2: 48.6 MMHG (ref 0–300)
ALBUMIN SERPL-MCNC: 3.3 G/DL (ref 3.5–5.2)
ALBUMIN SERPL-MCNC: 3.3 G/DL (ref 3.5–5.2)
ALBUMIN/GLOB SERPL: 1.1 G/DL
ALP SERPL-CCNC: 95 U/L (ref 39–117)
ALP SERPL-CCNC: 95 U/L (ref 39–117)
ALT SERPL W P-5'-P-CCNC: 16 U/L (ref 1–41)
ALT SERPL W P-5'-P-CCNC: 17 U/L (ref 1–41)
ANION GAP SERPL CALCULATED.3IONS-SCNC: 10 MMOL/L (ref 5–15)
ANION GAP SERPL CALCULATED.3IONS-SCNC: 9.5 MMOL/L (ref 5–15)
APTT PPP: 28.3 SECONDS (ref 26.5–34.5)
ARTERIAL PATENCY WRIST A: ABNORMAL
ARTERIAL PATENCY WRIST A: ABNORMAL
AST SERPL-CCNC: 24 U/L (ref 1–40)
AST SERPL-CCNC: 24 U/L (ref 1–40)
ATMOSPHERIC PRESS: 733 MMHG
ATMOSPHERIC PRESS: 733 MMHG
BASE EXCESS BLDA CALC-SCNC: -2.2 MMOL/L (ref 0–2)
BASE EXCESS BLDA CALC-SCNC: -2.2 MMOL/L (ref 0–2)
BASOPHILS # BLD AUTO: 0.02 10*3/MM3 (ref 0–0.2)
BASOPHILS NFR BLD AUTO: 0.3 % (ref 0–1.5)
BDY SITE: ABNORMAL
BDY SITE: ABNORMAL
BILIRUB CONJ SERPL-MCNC: 0.2 MG/DL (ref 0–0.3)
BILIRUB INDIRECT SERPL-MCNC: 0.4 MG/DL
BILIRUB SERPL-MCNC: 0.6 MG/DL (ref 0–1.2)
BILIRUB SERPL-MCNC: 0.6 MG/DL (ref 0–1.2)
BODY TEMPERATURE: 37 C
BODY TEMPERATURE: 37 C
BUN SERPL-MCNC: 15 MG/DL (ref 8–23)
BUN SERPL-MCNC: 16 MG/DL (ref 8–23)
BUN/CREAT SERPL: 10 (ref 7–25)
BUN/CREAT SERPL: 9.4 (ref 7–25)
CALCIUM SPEC-SCNC: 8.4 MG/DL (ref 8.2–9.6)
CALCIUM SPEC-SCNC: 8.5 MG/DL (ref 8.2–9.6)
CHLORIDE SERPL-SCNC: 105 MMOL/L (ref 98–107)
CHLORIDE SERPL-SCNC: 107 MMOL/L (ref 98–107)
CO2 BLDA-SCNC: 22 MMOL/L (ref 22–33)
CO2 BLDA-SCNC: 22 MMOL/L (ref 22–33)
CO2 SERPL-SCNC: 20.5 MMOL/L (ref 22–29)
CO2 SERPL-SCNC: 21 MMOL/L (ref 22–29)
COHGB MFR BLD: 1.1 % (ref 0–5)
COHGB MFR BLD: 1.1 % (ref 0–5)
CREAT SERPL-MCNC: 1.59 MG/DL (ref 0.76–1.27)
CREAT SERPL-MCNC: 1.59 MG/DL (ref 0.76–1.27)
CREAT SERPL-MCNC: 1.6 MG/DL (ref 0.76–1.27)
CRP SERPL-MCNC: 1.12 MG/DL (ref 0–0.5)
CRP SERPL-MCNC: 1.23 MG/DL (ref 0–0.5)
D DIMER PPP FEU-MCNC: 1.62 MCGFEU/ML (ref 0–0.95)
D-LACTATE SERPL-SCNC: 1.3 MMOL/L (ref 0.5–2)
DEPRECATED RDW RBC AUTO: 57.3 FL (ref 37–54)
EGFRCR SERPLBLD CKD-EPI 2021: 39.4 ML/MIN/1.73
EGFRCR SERPLBLD CKD-EPI 2021: 39.7 ML/MIN/1.73
EGFRCR SERPLBLD CKD-EPI 2021: 39.7 ML/MIN/1.73
EOSINOPHIL # BLD AUTO: 0.13 10*3/MM3 (ref 0–0.4)
EOSINOPHIL NFR BLD AUTO: 1.8 % (ref 0.3–6.2)
ERYTHROCYTE [DISTWIDTH] IN BLOOD BY AUTOMATED COUNT: 17.2 % (ref 12.3–15.4)
FERRITIN SERPL-MCNC: 61.6 NG/ML (ref 30–400)
FERRITIN SERPL-MCNC: 64.99 NG/ML (ref 30–400)
FIBRINOGEN PPP-MCNC: 414 MG/DL (ref 173–524)
GEN 5 2HR TROPONIN T REFLEX: 24 NG/L
GLOBULIN UR ELPH-MCNC: 2.9 GM/DL
GLUCOSE SERPL-MCNC: 97 MG/DL (ref 65–99)
GLUCOSE SERPL-MCNC: 99 MG/DL (ref 65–99)
HBA1C MFR BLD: 5.8 % (ref 4.8–5.6)
HCO3 BLDA-SCNC: 21.1 MMOL/L (ref 20–26)
HCO3 BLDA-SCNC: 21.1 MMOL/L (ref 20–26)
HCT VFR BLD AUTO: 36.2 % (ref 37.5–51)
HCT VFR BLD CALC: 34.1 % (ref 38–51)
HCT VFR BLD CALC: 34.1 % (ref 38–51)
HGB BLD-MCNC: 11.8 G/DL (ref 13–17.7)
HGB BLDA-MCNC: 11.1 G/DL (ref 14–18)
HGB BLDA-MCNC: 11.1 G/DL (ref 14–18)
IMM GRANULOCYTES # BLD AUTO: 0.05 10*3/MM3 (ref 0–0.05)
IMM GRANULOCYTES NFR BLD AUTO: 0.7 % (ref 0–0.5)
INHALED O2 CONCENTRATION: 21 %
INHALED O2 CONCENTRATION: 21 %
INR PPP: 1.1 (ref 0.9–1.1)
LDH SERPL-CCNC: 201 U/L (ref 135–225)
LDH SERPL-CCNC: 223 U/L (ref 135–225)
LYMPHOCYTES # BLD AUTO: 1.01 10*3/MM3 (ref 0.7–3.1)
LYMPHOCYTES NFR BLD AUTO: 14.4 % (ref 19.6–45.3)
Lab: ABNORMAL
Lab: ABNORMAL
MAGNESIUM SERPL-MCNC: 2 MG/DL (ref 1.7–2.3)
MCH RBC QN AUTO: 29.6 PG (ref 26.6–33)
MCHC RBC AUTO-ENTMCNC: 32.6 G/DL (ref 31.5–35.7)
MCV RBC AUTO: 90.7 FL (ref 79–97)
METHGB BLD QL: 0.1 % (ref 0–3)
METHGB BLD QL: 0.1 % (ref 0–3)
MODALITY: ABNORMAL
MODALITY: ABNORMAL
MONOCYTES # BLD AUTO: 0.75 10*3/MM3 (ref 0.1–0.9)
MONOCYTES NFR BLD AUTO: 10.7 % (ref 5–12)
NEUTROPHILS NFR BLD AUTO: 5.07 10*3/MM3 (ref 1.7–7)
NEUTROPHILS NFR BLD AUTO: 72.1 % (ref 42.7–76)
NOTE: ABNORMAL
NOTE: ABNORMAL
NRBC BLD AUTO-RTO: 0 /100 WBC (ref 0–0.2)
NT-PROBNP SERPL-MCNC: 386.4 PG/ML (ref 0–1800)
OXYHGB MFR BLDV: 92 % (ref 94–99)
OXYHGB MFR BLDV: 92 % (ref 94–99)
PCO2 BLDA: 30.5 MM HG (ref 35–45)
PCO2 BLDA: 30.5 MM HG (ref 35–45)
PCO2 TEMP ADJ BLD: 30.5 MM HG (ref 35–48)
PCO2 TEMP ADJ BLD: 30.5 MM HG (ref 35–48)
PH BLDA: 7.45 PH UNITS (ref 7.35–7.45)
PH BLDA: 7.45 PH UNITS (ref 7.35–7.45)
PH, TEMP CORRECTED: 7.45 PH UNITS
PH, TEMP CORRECTED: 7.45 PH UNITS
PLATELET # BLD AUTO: 138 10*3/MM3 (ref 140–450)
PMV BLD AUTO: 9.5 FL (ref 6–12)
PO2 BLDA: 60.9 MM HG (ref 83–108)
PO2 BLDA: 60.9 MM HG (ref 83–108)
PO2 TEMP ADJ BLD: 60.9 MM HG (ref 83–108)
PO2 TEMP ADJ BLD: 60.9 MM HG (ref 83–108)
POTASSIUM SERPL-SCNC: 4.5 MMOL/L (ref 3.5–5.2)
POTASSIUM SERPL-SCNC: 4.6 MMOL/L (ref 3.5–5.2)
PROCALCITONIN SERPL-MCNC: 0.04 NG/ML (ref 0–0.25)
PROCALCITONIN SERPL-MCNC: 0.05 NG/ML (ref 0–0.25)
PROT SERPL-MCNC: 6.2 G/DL (ref 6–8.5)
PROT SERPL-MCNC: 6.2 G/DL (ref 6–8.5)
PROTHROMBIN TIME: 14.5 SECONDS (ref 12.1–14.7)
QT INTERVAL: 382 MS
QT INTERVAL: 402 MS
QTC INTERVAL: 443 MS
QTC INTERVAL: 460 MS
RBC # BLD AUTO: 3.99 10*6/MM3 (ref 4.14–5.8)
SAO2 % BLDCOA: 93.1 % (ref 94–99)
SAO2 % BLDCOA: 93.1 % (ref 94–99)
SODIUM SERPL-SCNC: 135 MMOL/L (ref 136–145)
SODIUM SERPL-SCNC: 138 MMOL/L (ref 136–145)
T4 FREE SERPL-MCNC: 1.48 NG/DL (ref 0.93–1.7)
TROPONIN T DELTA: -2 NG/L
TROPONIN T SERPL HS-MCNC: 26 NG/L
TSH SERPL DL<=0.05 MIU/L-ACNC: 3.05 UIU/ML (ref 0.27–4.2)
VENTILATOR MODE: ABNORMAL
VENTILATOR MODE: ABNORMAL
WBC NRBC COR # BLD: 7.03 10*3/MM3 (ref 3.4–10.8)

## 2023-03-14 PROCEDURE — 85384 FIBRINOGEN ACTIVITY: CPT | Performed by: NURSE PRACTITIONER

## 2023-03-14 PROCEDURE — 82805 BLOOD GASES W/O2 SATURATION: CPT

## 2023-03-14 PROCEDURE — 83615 LACTATE (LD) (LDH) ENZYME: CPT | Performed by: STUDENT IN AN ORGANIZED HEALTH CARE EDUCATION/TRAINING PROGRAM

## 2023-03-14 PROCEDURE — 82248 BILIRUBIN DIRECT: CPT | Performed by: NURSE PRACTITIONER

## 2023-03-14 PROCEDURE — 25010000002 DEXAMETHASONE PER 1 MG: Performed by: NURSE PRACTITIONER

## 2023-03-14 PROCEDURE — 78580 LUNG PERFUSION IMAGING: CPT | Performed by: RADIOLOGY

## 2023-03-14 PROCEDURE — 84484 ASSAY OF TROPONIN QUANT: CPT | Performed by: NURSE PRACTITIONER

## 2023-03-14 PROCEDURE — 93970 EXTREMITY STUDY: CPT

## 2023-03-14 PROCEDURE — 93005 ELECTROCARDIOGRAM TRACING: CPT | Performed by: NURSE PRACTITIONER

## 2023-03-14 PROCEDURE — 71045 X-RAY EXAM CHEST 1 VIEW: CPT | Performed by: RADIOLOGY

## 2023-03-14 PROCEDURE — 94799 UNLISTED PULMONARY SVC/PX: CPT

## 2023-03-14 PROCEDURE — XW033E5 INTRODUCTION OF REMDESIVIR ANTI-INFECTIVE INTO PERIPHERAL VEIN, PERCUTANEOUS APPROACH, NEW TECHNOLOGY GROUP 5: ICD-10-PCS | Performed by: STUDENT IN AN ORGANIZED HEALTH CARE EDUCATION/TRAINING PROGRAM

## 2023-03-14 PROCEDURE — 71045 X-RAY EXAM CHEST 1 VIEW: CPT

## 2023-03-14 PROCEDURE — 99285 EMERGENCY DEPT VISIT HI MDM: CPT

## 2023-03-14 PROCEDURE — 0 TECHNETIUM ALBUMIN AGGREGATED: Performed by: STUDENT IN AN ORGANIZED HEALTH CARE EDUCATION/TRAINING PROGRAM

## 2023-03-14 PROCEDURE — 84443 ASSAY THYROID STIM HORMONE: CPT

## 2023-03-14 PROCEDURE — 83036 HEMOGLOBIN GLYCOSYLATED A1C: CPT

## 2023-03-14 PROCEDURE — 85025 COMPLETE CBC W/AUTO DIFF WBC: CPT | Performed by: NURSE PRACTITIONER

## 2023-03-14 PROCEDURE — 36415 COLL VENOUS BLD VENIPUNCTURE: CPT

## 2023-03-14 PROCEDURE — 85730 THROMBOPLASTIN TIME PARTIAL: CPT | Performed by: NURSE PRACTITIONER

## 2023-03-14 PROCEDURE — 80053 COMPREHEN METABOLIC PANEL: CPT | Performed by: NURSE PRACTITIONER

## 2023-03-14 PROCEDURE — 25010000002 ENOXAPARIN PER 10 MG

## 2023-03-14 PROCEDURE — 84145 PROCALCITONIN (PCT): CPT | Performed by: STUDENT IN AN ORGANIZED HEALTH CARE EDUCATION/TRAINING PROGRAM

## 2023-03-14 PROCEDURE — 82565 ASSAY OF CREATININE: CPT | Performed by: STUDENT IN AN ORGANIZED HEALTH CARE EDUCATION/TRAINING PROGRAM

## 2023-03-14 PROCEDURE — 84439 ASSAY OF FREE THYROXINE: CPT

## 2023-03-14 PROCEDURE — 93970 EXTREMITY STUDY: CPT | Performed by: RADIOLOGY

## 2023-03-14 PROCEDURE — 83880 ASSAY OF NATRIURETIC PEPTIDE: CPT | Performed by: NURSE PRACTITIONER

## 2023-03-14 PROCEDURE — 85379 FIBRIN DEGRADATION QUANT: CPT | Performed by: STUDENT IN AN ORGANIZED HEALTH CARE EDUCATION/TRAINING PROGRAM

## 2023-03-14 PROCEDURE — A9540 TC99M MAA: HCPCS | Performed by: STUDENT IN AN ORGANIZED HEALTH CARE EDUCATION/TRAINING PROGRAM

## 2023-03-14 PROCEDURE — 83605 ASSAY OF LACTIC ACID: CPT | Performed by: NURSE PRACTITIONER

## 2023-03-14 PROCEDURE — 86140 C-REACTIVE PROTEIN: CPT | Performed by: NURSE PRACTITIONER

## 2023-03-14 PROCEDURE — 85610 PROTHROMBIN TIME: CPT | Performed by: NURSE PRACTITIONER

## 2023-03-14 PROCEDURE — 83050 HGB METHEMOGLOBIN QUAN: CPT

## 2023-03-14 PROCEDURE — 83615 LACTATE (LD) (LDH) ENZYME: CPT | Performed by: NURSE PRACTITIONER

## 2023-03-14 PROCEDURE — 84145 PROCALCITONIN (PCT): CPT | Performed by: NURSE PRACTITIONER

## 2023-03-14 PROCEDURE — 82728 ASSAY OF FERRITIN: CPT | Performed by: NURSE PRACTITIONER

## 2023-03-14 PROCEDURE — 78580 LUNG PERFUSION IMAGING: CPT

## 2023-03-14 PROCEDURE — 82375 ASSAY CARBOXYHB QUANT: CPT

## 2023-03-14 PROCEDURE — 36600 WITHDRAWAL OF ARTERIAL BLOOD: CPT

## 2023-03-14 PROCEDURE — 86140 C-REACTIVE PROTEIN: CPT | Performed by: STUDENT IN AN ORGANIZED HEALTH CARE EDUCATION/TRAINING PROGRAM

## 2023-03-14 PROCEDURE — 99223 1ST HOSP IP/OBS HIGH 75: CPT | Performed by: STUDENT IN AN ORGANIZED HEALTH CARE EDUCATION/TRAINING PROGRAM

## 2023-03-14 PROCEDURE — 83735 ASSAY OF MAGNESIUM: CPT

## 2023-03-14 PROCEDURE — 82728 ASSAY OF FERRITIN: CPT | Performed by: STUDENT IN AN ORGANIZED HEALTH CARE EDUCATION/TRAINING PROGRAM

## 2023-03-14 RX ORDER — GUAIFENESIN/DEXTROMETHORPHAN 100-10MG/5
10 SYRUP ORAL EVERY 4 HOURS PRN
Status: DISCONTINUED | OUTPATIENT
Start: 2023-03-14 | End: 2023-03-19 | Stop reason: HOSPADM

## 2023-03-14 RX ORDER — ENOXAPARIN SODIUM 100 MG/ML
30 INJECTION SUBCUTANEOUS EVERY 24 HOURS
Status: DISCONTINUED | OUTPATIENT
Start: 2023-03-14 | End: 2023-03-19 | Stop reason: HOSPADM

## 2023-03-14 RX ORDER — ALBUTEROL SULFATE 90 UG/1
2 AEROSOL, METERED RESPIRATORY (INHALATION) EVERY 6 HOURS PRN
Status: DISCONTINUED | OUTPATIENT
Start: 2023-03-14 | End: 2023-03-19 | Stop reason: HOSPADM

## 2023-03-14 RX ORDER — ACETAMINOPHEN 325 MG/1
650 TABLET ORAL EVERY 4 HOURS PRN
Status: DISCONTINUED | OUTPATIENT
Start: 2023-03-14 | End: 2023-03-19 | Stop reason: HOSPADM

## 2023-03-14 RX ORDER — LEVOTHYROXINE SODIUM 0.05 MG/1
50 TABLET ORAL DAILY
Status: DISCONTINUED | OUTPATIENT
Start: 2023-03-14 | End: 2023-03-15

## 2023-03-14 RX ORDER — DEXAMETHASONE SODIUM PHOSPHATE 4 MG/ML
6 INJECTION, SOLUTION INTRA-ARTICULAR; INTRALESIONAL; INTRAMUSCULAR; INTRAVENOUS; SOFT TISSUE ONCE
Status: COMPLETED | OUTPATIENT
Start: 2023-03-14 | End: 2023-03-14

## 2023-03-14 RX ORDER — ASPIRIN 81 MG/1
81 TABLET ORAL DAILY
Status: DISCONTINUED | OUTPATIENT
Start: 2023-03-14 | End: 2023-03-19 | Stop reason: HOSPADM

## 2023-03-14 RX ORDER — TERAZOSIN 1 MG/1
2 CAPSULE ORAL NIGHTLY
Status: DISCONTINUED | OUTPATIENT
Start: 2023-03-15 | End: 2023-03-19 | Stop reason: HOSPADM

## 2023-03-14 RX ORDER — DEXAMETHASONE SODIUM PHOSPHATE 4 MG/ML
6 INJECTION, SOLUTION INTRA-ARTICULAR; INTRALESIONAL; INTRAMUSCULAR; INTRAVENOUS; SOFT TISSUE DAILY
Status: DISCONTINUED | OUTPATIENT
Start: 2023-03-14 | End: 2023-03-19 | Stop reason: HOSPADM

## 2023-03-14 RX ORDER — HEPARIN SODIUM 5000 [USP'U]/ML
5000 INJECTION, SOLUTION INTRAVENOUS; SUBCUTANEOUS EVERY 8 HOURS SCHEDULED
Status: DISCONTINUED | OUTPATIENT
Start: 2023-03-14 | End: 2023-03-14

## 2023-03-14 RX ORDER — FINASTERIDE 5 MG/1
5 TABLET, FILM COATED ORAL DAILY
Status: DISCONTINUED | OUTPATIENT
Start: 2023-03-14 | End: 2023-03-19 | Stop reason: HOSPADM

## 2023-03-14 RX ORDER — DEXAMETHASONE 4 MG/1
6 TABLET ORAL DAILY
Status: DISCONTINUED | OUTPATIENT
Start: 2023-03-14 | End: 2023-03-19 | Stop reason: HOSPADM

## 2023-03-14 RX ORDER — ACETAMINOPHEN 650 MG/1
650 SUPPOSITORY RECTAL EVERY 4 HOURS PRN
Status: DISCONTINUED | OUTPATIENT
Start: 2023-03-14 | End: 2023-03-19 | Stop reason: HOSPADM

## 2023-03-14 RX ORDER — GINGER ROOT/GINGER ROOT EXT 262.5 MG
1 CAPSULE ORAL DAILY
Status: DISCONTINUED | OUTPATIENT
Start: 2023-03-14 | End: 2023-03-19 | Stop reason: HOSPADM

## 2023-03-14 RX ORDER — CLOPIDOGREL BISULFATE 75 MG/1
TABLET ORAL
Status: DISPENSED
Start: 2023-03-14 | End: 2023-03-15

## 2023-03-14 RX ADMIN — FINASTERIDE 5 MG: 5 TABLET, FILM COATED ORAL at 18:15

## 2023-03-14 RX ADMIN — LEVOTHYROXINE SODIUM 50 MCG: 50 TABLET ORAL at 18:16

## 2023-03-14 RX ADMIN — DEXAMETHASONE SODIUM PHOSPHATE 6 MG: 4 INJECTION, SOLUTION INTRA-ARTICULAR; INTRALESIONAL; INTRAMUSCULAR; INTRAVENOUS; SOFT TISSUE at 13:27

## 2023-03-14 RX ADMIN — ENOXAPARIN SODIUM 30 MG: 100 INJECTION SUBCUTANEOUS at 18:18

## 2023-03-14 RX ADMIN — KIT FOR THE PREPARATION OF TECHNETIUM TC 99M ALBUMIN AGGREGATED 1 DOSE: 2.5 INJECTION, POWDER, FOR SOLUTION INTRAVENOUS at 16:13

## 2023-03-14 RX ADMIN — Medication 1 TABLET: at 18:16

## 2023-03-14 RX ADMIN — ASPIRIN 81 MG: 81 TABLET, COATED ORAL at 18:16

## 2023-03-14 NOTE — ED PROVIDER NOTES
"  [facility]  emergency department encounter        Pt Name: Joaquín Rogers  MRN: [id number]  Birthdate 1927  Date of evaluation: 3/14/2023    Chief Complaint   Patient presents with   • Shortness of Breath             HISTORY OF PRESENT ILLNESS:   Joaquín Rogers is a 89 y.o. male PMH significant for HTN, thyroid disease, \"undiagnosed\" COPD.      Presents complaining predominantly of dry mouth, dry throat and feeling short of breath.  Symptom onset earlier tonight, constant, progressively worsening.  Additionally endorses cough with productive sputum, rhinorrhea.  No chest pain, no abdominal pain or GI complaints.              PCP: Uday Roche DO          REVIEW OF SYSTEMS:     Review of Systems   Constitutional: Negative for fever.   HENT: Positive for rhinorrhea.    Eyes: Negative for visual disturbance.   Respiratory: Positive for cough and shortness of breath.    Cardiovascular: Negative for chest pain.   Gastrointestinal: Negative for abdominal pain, nausea and vomiting.   Genitourinary: Negative for dysuria.   Musculoskeletal: Negative for myalgias.   Skin: Negative for rash.   Neurological: Negative for headaches.   Psychiatric/Behavioral: Negative for confusion.       Review of systems otherwise as per HPI.          PREVIOUS HISTORY:         Past Medical History:   Diagnosis Date   • Bradycardia    • Thyroid disease            Past Surgical History:   Procedure Laterality Date   • CARDIAC ELECTROPHYSIOLOGY PROCEDURE N/A 2021    Procedure: Pacemaker DC new;  Surgeon: Kendrick Burgess MD;  Location: [facility] INVASIVE LOCATION;  Service: Cardiology;  Laterality: N/A;   • HERNIA REPAIR     • INSERT / REPLACE / REMOVE PACEMAKER  2021    [employer] device           Social History     Socioeconomic History   • Marital status:    Tobacco Use   • Smoking status: Former     Packs/day: 1.00     Types: Cigarettes     Quit date:      Years since quittin.2   • Smokeless " "tobacco: Former     Types: Chew     Quit date: 1990   Vaping Use   • Vaping Use: Never used   Substance and Sexual Activity   • Alcohol use: Never   • Drug use: Never         No family history on file.      Current Outpatient Medications   Medication Instructions   • aspirin 81 mg, Oral, Daily   • calcium carbonate-cholecalciferol 500-400 MG-UNIT tablet tablet 1 tablet, Oral, Daily   • carboxymethylcellulose (REFRESH PLUS) 0.5 % solution 1 drop, Both Eyes, 3 Times Daily PRN   • doxycycline (MONODOX) 100 mg, Oral, 2 Times Daily   • finasteride (PROSCAR) 5 mg, Oral, Daily   • levothyroxine (SYNTHROID, LEVOTHROID) 50 mcg, Oral, Daily   • predniSONE (DELTASONE) 50 mg, Oral, Daily   • terazosin (HYTRIN) 2 mg, Oral, Nightly         Allergies:  Patient has no known allergies.          PHYSICAL EXAM:     Patient Vitals for the past 24 hrs:   BP Temp Temp src Pulse Resp SpO2 Height Weight   03/13/23 2111 127/73 97.5 °F (36.4 °C) Oral 86 18 96 % 172.7 cm (68\") 66.2 kg (146 lb)       Physical Exam  Vitals and nursing note reviewed.   Constitutional:       General: He is not in acute distress.     Appearance: Normal appearance.   HENT:      Head: Normocephalic and atraumatic.   Eyes:      Extraocular Movements: Extraocular movements intact.      Conjunctiva/sclera: Conjunctivae normal.   Cardiovascular:      Rate and Rhythm: Normal rate and regular rhythm.   Pulmonary:      Effort: Pulmonary effort is normal. No respiratory distress.      Breath sounds: No stridor. No wheezing, rhonchi or rales.      Comments: Lungs clear to auscultation bilaterally  Abdominal:      General: Abdomen is flat. There is no distension.      Tenderness: There is no abdominal tenderness. There is no guarding or rebound.   Musculoskeletal:         General: No deformity. Normal range of motion.      Cervical back: Normal range of motion. No rigidity.      Right lower leg: No edema.      Left lower leg: No edema.   Skin:     Coloration: Skin is not " jaundiced.      Findings: No rash.   Neurological:      General: No focal deficit present.      Mental Status: He is alert and oriented to person, place, and time.   Psychiatric:         Mood and Affect: Mood normal.         Behavior: Behavior normal.             COMPLETED DIAGNOSTIC STUDIES AND INTERVENTIONS:     Lab Results (last 24 hours)     Procedure Component Value Units Date/Time    CBC & Differential [[id number]]  (Abnormal) Collected: 03/13/23 2154    Specimen: Blood Updated: 03/13/23 2200    Narrative:      The following orders were created for panel order CBC & Differential.  Procedure                               Abnormality         Status                     ---------                               -----------         ------                     CBC Auto Differential[[id number]]        Abnormal            Final result                 Please view results for these tests on the individual orders.    Comprehensive Metabolic Panel [[id number]]  (Abnormal) Collected: 03/13/23 2154    Specimen: Blood Updated: 03/13/23 2227     Glucose 111 mg/dL      BUN 17 mg/dL      Creatinine 1.66 mg/dL      Sodium 137 mmol/L      Potassium 4.7 mmol/L      Chloride 104 mmol/L      CO2 22.1 mmol/L      Calcium 8.6 mg/dL      Total Protein 6.3 g/dL      Albumin 3.5 g/dL      ALT (SGPT) 22 U/L      AST (SGOT) 28 U/L      Alkaline Phosphatase 99 U/L      Total Bilirubin 0.3 mg/dL      Globulin 2.8 gm/dL      A/G Ratio 1.3 g/dL      BUN/Creatinine Ratio 10.2     Anion Gap 10.9 mmol/L      eGFR 37.7 mL/min/1.73     Narrative:      GFR Normal >60  Chronic Kidney Disease <60  Kidney Failure <15    The GFR formula is only valid for adults with stable renal function between ages 18 and 70.    C-reactive Protein [[id number]]  (Abnormal) Collected: 03/13/23 2154    Specimen: Blood Updated: 03/13/23 2227     C-Reactive Protein 0.74 mg/dL     CBC Auto Differential [[id number]]  (Abnormal) Collected: 03/13/23 2154    Specimen: Blood Updated:  03/13/23 2200     WBC 8.76 10*3/mm3      RBC 3.96 10*6/mm3      Hemoglobin 11.6 g/dL      Hematocrit 35.6 %      MCV 89.9 fL      MCH 29.3 pg      MCHC 32.6 g/dL      RDW 16.8 %      RDW-SD 55.5 fl      MPV 9.9 fL      Platelets 155 10*3/mm3      Neutrophil % 82.1 %      Lymphocyte % 7.2 %      Monocyte % 8.3 %      Eosinophil % 1.5 %      Basophil % 0.2 %      Immature Grans % 0.7 %      Neutrophils, Absolute 7.19 10*3/mm3      Lymphocytes, Absolute 0.63 10*3/mm3      Monocytes, Absolute 0.73 10*3/mm3      Eosinophils, Absolute 0.13 10*3/mm3      Basophils, Absolute 0.02 10*3/mm3      Immature Grans, Absolute 0.06 10*3/mm3      nRBC 0.0 /100 WBC     COVID PRE-OP / PRE-PROCEDURE SCREENING ORDER (NO ISOLATION) - Swab, Nasal Cavity [[id number]]  (Abnormal) Collected: 03/13/23 2204    Specimen: Swab from Nasal Cavity Updated: 03/13/23 2231    Narrative:      The following orders were created for panel order COVID PRE-OP / PRE-PROCEDURE SCREENING ORDER (NO ISOLATION) - Swab, Nasal Cavity.  Procedure                               Abnormality         Status                     ---------                               -----------         ------                     COVID-19 and FLU A/B PCR...[[id number]]  Abnormal            Final result                 Please view results for these tests on the individual orders.    COVID-19 and FLU A/B PCR - Swab, Nasopharynx [[id number]]  (Abnormal) Collected: 03/13/23 2204    Specimen: Swab from Nasopharynx Updated: 03/13/23 2231     COVID19 Detected     Influenza A PCR Not Detected     Influenza B PCR Not Detected    Narrative:      Fact sheet for providers: https://www.fda.gov/media/954659/download    Fact sheet for patients: https://www.fda.gov/media/147687/download    Test performed by PCR.  Influenza A and Influenza B negative results should be considered presumptive in samples that have a positive SARS-CoV-2 result.    Competitive inhibition studies showed that SARS-CoV-2 virus, when  present at concentrations above 3.6E+04 copies/mL, can inhibit the detection and amplification of influenza A and influenza B virus RNA if present at or below 1.8E+02 copies/mL or 4.9E+02 copies/mL, respectively, and may lead to false negative influenza virus results. If co-infection with influenza A or influenza B virus is suspected in samples with a positive SARS-CoV-2 result, the sample should be re-tested with another FDA cleared, approved, or authorized influenza test, if influenza virus detection would change clinical management.    Blood Gas, Arterial With Co-Ox [[id number]]  (Abnormal) Collected: 03/13/23 2313    Specimen: Arterial Blood Updated: 03/13/23 2318     Site Left Radial     Michael's Test Positive     pH, Arterial 7.441 pH units      pCO2, Arterial 29.4 mm Hg      Comment: 84 Value below reference range        pO2, Arterial 68.0 mm Hg      Comment: 84 Value below reference range        HCO3, Arterial 20.0 mmol/L      Comment: 84 Value below reference range        Base Excess, Arterial -3.3 mmol/L      O2 Saturation, Arterial 95.0 %      Hemoglobin, Blood Gas 10.6 g/dL      Comment: 84 Value below reference range        Hematocrit, Blood Gas 32.4 %      Comment: 84 Value below reference range        Oxyhemoglobin 93.6 %      Comment: 84 Value below reference range        Methemoglobin 0.50 %      Carboxyhemoglobin 1.0 %      A-a DO2 42.2 mmHg      CO2 Content 20.9 mmol/L      Barometric Pressure for Blood Gas 730 mmHg      Modality Room Air     FIO2 21 %      Ventilator Mode NA     Note --     Collected by [id number]     Comment: Meter: [id number]     :  [id number]        pH, Temp Corrected --     pCO2, Temperature Corrected --     pO2, Temperature Corrected --    Basic Metabolic Panel [[id number]]  (Abnormal) Collected: 03/13/23 2358    Specimen: Blood from Arm, Left Updated: 03/14/23 0021     Glucose 97 mg/dL      BUN 16 mg/dL      Creatinine 1.60 mg/dL      Sodium 138 mmol/L      Potassium  4.6 mmol/L      Chloride 107 mmol/L      CO2 21.0 mmol/L      Calcium 8.4 mg/dL      BUN/Creatinine Ratio 10.0     Anion Gap 10.0 mmol/L      eGFR 39.4 mL/min/1.73     Narrative:      GFR Normal >60  Chronic Kidney Disease <60  Kidney Failure <15    The GFR formula is only valid for adults with stable renal function between ages 18 and 70.            XR Chest 1 View   Final Result   No acute cardiopulmonary findings.      Signer Name: Jay Tovar MD    Signed: 3/13/2023 10:18 PM    Workstation Name: [email or web address]     [facility]            New Medications Ordered This Visit   Medications   • sodium chloride 0.9 % bolus 1,000 mL         Procedures            MEDICAL DECISION-MAKING AND ED COURSE:     ED Course as of 03/21/23 1945   Mon Mar 13, 2023   2133 EKG at 2124 sinus rhythm, 81 bpm, , QTc 443, RBBB, no STEMI. [KP]   2225 89-year-old male presents with dry mouth dry throat, shortness of breath productive sputum with questionable history of COPD.  Lungs clear to auscultation bilaterally.  Suspect acute bronchitis, no white count elevation, CRP pending, chest x-ray personally reviewed and no evidence of pneumonia or other acute process.  EKG nonischemic, no significant change from prior, no chest pain or symptoms that would be concerning for ACS.  COVID flu CMP pending.  Hydrating with 1 L NS IVF. [KP]   2226 XR Chest 1 View  No acute cardiopulmonary findings. [KP]   2233 COVID19(!!): Detected [KP]   2251 Patient has received all his COVID shots including 2 boosters.  Was hospitalized for COVID previously.  Patient has no hypoxia, no indication for admission.  Will order ABG to more closely assess oxygen levels.  [KP]   2252 Creatinine(!): 1.66  Level of 1.1. [KP]   2252 eGFR(!): 37.7  High of 55 last month [KP]   2329 pO2, Arterial(!): 68.0 [KP]   2329 O2 Saturation, Arterial: 95.0 [KP]   2335 Patient reports feeling significantly better after fluid bolus.  No hypoxia.  Mild to  moderate creatinine bump.  Recommended patient follow-up with his primary care provider as soon as possible regarding renal function.  Discussed with patient return precautions for any new onset or worsening symptoms and especially if he develops worsening shortness of breath.  Recommended patient reduce coffee intake as he is drinking 2 cups daily to further reduce dehydration and to drink plentiful fluids.  Discussed risk benefits of Paxlovid and EUA provided.  Patient consents and would like to proceed with Paxlovid treatment. Prescribed appropriate renal dosing. [KP]   2339 Creatinine clearance too low for Paxlovid.  We will repeat BMP after IVF rehydration and assess for sufficient improvement in creatinine clearance to treat with Paxlovid. [KP]   Tue Mar 14, 2023   0026 eGFR(!): 39.4  Paxlovid not appropriate given patient's creatinine clearance. [KP]   0027 Discussed results with patient and wife.  Recommended that he follow-up with his primary care provider soon as possible for reassessment and reevaluation of kidney function..  Recommend return here for any new development of symptoms, worsening shortness of breath.  Patient agreeable to plan, all questions answered. [KP]      ED Course User Index  [KP] Frankie Sosa MD       ?      FINAL IMPRESSION:       1. COVID-19    2. LD (acute kidney injury) (HCC)         The complaints listed here are new problems to this examiner.       Medication List      No changes were made to your prescriptions during this visit.         FOLLOW-UP  Uday Roche, DO  [address] [facility] [address]  [facility] [address]  [phone]    Schedule an appointment as soon as possible for a visit       [facility]  [address] [place] [address] [phone]    If symptoms worsen      DISPOSITION  ED Disposition     ED Disposition   Discharge    Condition   Stable    Comment   --                 Please note that portions of this note  were completed with a voice recognition program.      Frankie Sosa MD  00:32 EDT  3/14/2023             Frankie Sosa MD  03/14/23 0032       Frankie Sosa MD  03/21/23 1945

## 2023-03-14 NOTE — PLAN OF CARE
Goal Outcome Evaluation:               Patient arrived to unit via stretcher and bed with spouse at bedside. Patient resting on room air with oxygen sat 96% and in no acute distress and has no complaints. VSS. Will monitor.

## 2023-03-14 NOTE — ED PROVIDER NOTES
Subjective   History of Present Illness  Patient is a 89-year-old white male presents emergency room today with complaint of shortness of breath and weakness.  Patient reports he was seen in the ER last night and diagnosed with COVID.  Patient reports he went home and woke this morning feeling much worse.  Patient denies chest discomfort but does report shortness of breath.  Patient reports fever.  Patient reports weakness.  Patient denies abdominal pain, nausea and vomiting.  Patient reports that shortness of breath is worse with exertion.  Patient reports that this improves somewhat with rest.        Review of Systems   Constitutional: Negative.    HENT: Negative.    Eyes: Negative.    Respiratory: Negative.    Cardiovascular: Negative.    Gastrointestinal: Negative.    Endocrine: Negative.    Genitourinary: Negative.    Musculoskeletal: Negative.    Skin: Negative.    Allergic/Immunologic: Negative.    Neurological: Negative.    Hematological: Negative.    Psychiatric/Behavioral: Negative.        Past Medical History:   Diagnosis Date   • Bradycardia    • Thyroid disease        No Known Allergies    Past Surgical History:   Procedure Laterality Date   • CARDIAC ELECTROPHYSIOLOGY PROCEDURE N/A 2021    Procedure: Pacemaker DC new;  Surgeon: Kendrick Burgess MD;  Location: [facility] INVASIVE LOCATION;  Service: Cardiology;  Laterality: N/A;   • HERNIA REPAIR     • INSERT / REPLACE / REMOVE PACEMAKER  2021    [employer] device       No family history on file.    Social History     Socioeconomic History   • Marital status:    Tobacco Use   • Smoking status: Former     Packs/day: 1.00     Types: Cigarettes     Quit date:      Years since quittin.2     Passive exposure: Past   • Smokeless tobacco: Former     Types: Chew     Quit date:    Vaping Use   • Vaping Use: Never used   Substance and Sexual Activity   • Alcohol use: Never   • Drug use: Never   • Sexual activity: Defer            Objective   Physical Exam  Vitals and nursing note reviewed.   Constitutional:       Appearance: He is well-developed.   HENT:      Head: Normocephalic.      Right Ear: External ear normal.      Left Ear: External ear normal.   Eyes:      Conjunctiva/sclera: Conjunctivae normal.      Pupils: Pupils are equal, round, and reactive to light.   Cardiovascular:      Rate and Rhythm: Normal rate and regular rhythm.      Heart sounds: Normal heart sounds.   Pulmonary:      Effort: Pulmonary effort is normal.      Breath sounds: Normal breath sounds.   Abdominal:      General: Bowel sounds are normal.      Palpations: Abdomen is soft.   Musculoskeletal:         General: Normal range of motion.      Cervical back: Normal range of motion and neck supple.   Skin:     General: Skin is warm and dry.      Capillary Refill: Capillary refill takes less than 2 seconds.   Neurological:      Mental Status: He is alert and oriented to person, place, and time.   Psychiatric:         Behavior: Behavior normal.         Thought Content: Thought content normal.         Procedures           ED Course  ED Course as of 03/14/23 2141   Tue Mar 14, 2023   1059 EKG notes sinus rhythm.  79 bpm.  Right bundle branch block noted.  No acute ST elevation.    Electronically signed by Ad Curry DO, 03/14/23, 10:59 AM EDT.   [SF]      ED Course User Index  [SF] Ad Curry DO                                           Medical Decision Making  Patient is a 89-year-old white male presents emergency room today with complaint of shortness of breath and weakness.  Patient reports he was seen in the ER last night and diagnosed with COVID.  Patient reports he went home and woke this morning feeling much worse.  Patient denies chest discomfort but does report shortness of breath.  Patient reports fever.  Patient reports weakness.  Patient denies abdominal pain, nausea and vomiting.  Patient reports that shortness of breath is worse with  exertion.  Patient reports that this improves somewhat with rest.    Patient was found to be more hypoxic and more symptomatic on workup. Discussed with hospitalist who accepted patient for admission and further treatment.     COVID-19: chronic illness or injury  Amount and/or Complexity of Data Reviewed  Labs: ordered.  Radiology: ordered.  ECG/medicine tests: ordered.      Risk  Prescription drug management.  Decision regarding hospitalization.          Final diagnoses:   COVID-19       ED Disposition  ED Disposition     ED Disposition   Decision to Admit    Condition   --    Comment   Level of Care: Telemetry [5]   Diagnosis: COVID-19 [[id number]]   Certification: I Certify That Inpatient Hospital Services Are Medically Necessary For Greater Than 2 Midnights               No follow-up provider specified.       Medication List      No changes were made to your prescriptions during this visit.          Solomon Olvera P, APRN  03/14/23 [id number]

## 2023-03-14 NOTE — NURSING NOTE
"Patient son at nurses station stating that he has already consulted an  and will hold people accountable if remdesivir is given. Son was educated that the patient is oriented and will be able to make his own decisions. He states that he wants to know the cycle rates for our PCR COVID testing, lab called and RN was told that this is a lengthy process to find and they will call the unit back.  Son spoke to RN for a long time about what he had researched, other doctors that he has consulted with and how \"some\" health care members are in it for payouts related to ordering this medication and placing patients on vents. Patient currently off the floor for testing and son was told that when he returned the MD will be over to see him and discuss concerns with family at that time.  "

## 2023-03-14 NOTE — NURSING NOTE
"RN called patient son who lives in the home with him and spouse about patient spouse wanting to leave and drive home. Patient son, Clark, states that mother has no short term memory and will be fine to drive home as her long term is intact and she knows the way. When he was updated on patient current status and plan of care he began to scream at me \"he is not to have remdesivir, you are not allowed to kill him. I want him to have early treatment of D3, Zithromax, and Zinc.\" MD made aware and NP in room to speak with patient.  "

## 2023-03-14 NOTE — DISCHARGE INSTRUCTIONS
Please call your primary care provider soon as possible, note that your kidney function has worsened with a creatinine of 1.6 and that you are COVID-positive.  Return here for any new onset or worsening symptoms such as but not limited to worsening shortness of breath or severe generalized weakness.

## 2023-03-14 NOTE — H&P
"  [facility] Medicine Services  History & Physical    Patient Identification:  Name:  Joaquín Rogers  Age:  89 y.o.  Sex:  male  :  1927  MRN:  [id number]   Visit Number:  [id number]  Admit Date: 3/14/2023   Primary Care Physician:  Uday Roche DO    Subjective     Chief complaint: Shortness of Breath    History of presenting illness:      Joaquín Rogers is a 89 y.o. male with past medical history significant for hypothyroidism, CKD stage IIIb, reported history of COPD, former tobacco abuse, essential hypertension, grade 1 diastolic dysfunction, and history of symptomatic bradycardia s/p PPM. Patient presented to [facility] Emergency Department today due to shortness of breath. He had been evaluated in our facility's ED on 3/13/2023 and was diagnosed with COVID-19. He was not hypoxic at the time and was discharged back home from the ED during that encounter. Patient states that symptoms had worsened overnight and this morning he \"felt like he couldn't breathe\" and has been coughing up thick, white sputum. He denies fever or chills. ABG obtained in the ED revealed mild hypoxemia with pO2 of 60.9 and O2 saturation of 93.1% on room air. SpO2 saturations by continuous pulse ox have been maintained > 92%. Mr. Rogers does not utilize supplemental O2 at home. He states that he has had COVID-19 in the past. He has had both initial COVID-19 vaccinations and 2 boosters. He typically lives at home with his spouse and is independent with ADLs. He is alert and oriented to self, place, time and situation. Discussed initiation of treatment with Remdesivir and Decadron. He voiced agreement and understanding. D-Dimer obtained and was elevated at 1.62. NM lung perfusion scan ordered along with bilateral lower extremity dopplers to rule out PE/DVT in setting of acute hypoxemic respiratory failure and COVID-19. CT angiogram deferred due to renal function. Patient appears to have renal " insufficiency on CKD stage IIIb. He states that he does not follow with nephrologist in the outpatient setting. Denies any difficulty urinating, dysuria, flank pain, and hematuria. Plan to hold nephrotoxic agents as able and repeat labs in the a.m. to closely monitor. Lung sounds clear on exam bilaterally. No wheezing, rhonchi or rales. Cardiac exam revealed mild systolic murmur. Regular rate and rhythm. Rest of physical exam within normal limits. Please see physical exam outlined below. Patient is calm, cooperative, and pleasant. Spouse was present at bedside during exam. Discussed plan with patient and spouse. Patient voiced agreement with no further questions or concerns at this time.    Upon arrival to the ED, vital signs were temperature 98.4, pulse 88, respirations 20, blood pressure 130/72, SPO2 saturation 97% on room air.  ABG with pH 7.447, PCO2 30.5, PO2 60.9, HCO3 21.1, O2 saturation of 93.1% on room air.  High-sensitivity troponin T 26 with -2 delta on repeat.  proBNP 386.4.  .  CMP with glucose 99, sodium 135, CO2 20.5, creatinine 1.59, EGFR 39.7, albumin 3.3, otherwise unremarkable.  Ferritin 64.99.  C-reactive protein 1.12.  Lactate 1.3.  Pro-Jesus 0.05.  D-dimer 1.62.  NM lung perfusion scan and venous Doppler ultrasounds of bilateral lower extremities pending.  Fibrinogen 414.  PT 14.5, INR 1.10.  PTT 28.3.  CBC with RBCs 3.99, hemoglobin 11.8, hematocrit 36.2, RDW 17.2, platelets 138, otherwise unremarkable.  COVID-19 and flu A/B swab positive for COVID-19.  Chest x-ray unremarkable.    Known Emergency Department medications received prior to my evaluation included 6 mg IV Decadron, 1 L normal saline bolus. Emergency Department Room location at the time of my evaluation was 320.  Discussed with admitting physician, Tayler Malik DO.    ---------------------------------------------------------------------------------------------------------------------   Review of Systems    Constitutional: Negative for chills, diaphoresis and fever.   HENT: Negative for congestion, sinus pressure, sinus pain, sore throat and trouble swallowing.    Respiratory: Positive for cough (white sputum) and shortness of breath. Negative for choking and wheezing.    Gastrointestinal: Negative for abdominal distention, abdominal pain, constipation, diarrhea, nausea and vomiting.   Genitourinary: Negative for difficulty urinating and dysuria.   Musculoskeletal: Negative for back pain, neck pain and neck stiffness.   Neurological: Negative for dizziness, tremors, seizures, syncope, facial asymmetry, speech difficulty, light-headedness, numbness and headaches.   Psychiatric/Behavioral: Negative for confusion.     ---------------------------------------------------------------------------------------------------------------------   Past Medical History:   Diagnosis Date   • Bradycardia    • Thyroid disease      Past Surgical History:   Procedure Laterality Date   • CARDIAC ELECTROPHYSIOLOGY PROCEDURE N/A 2021    Procedure: Pacemaker DC new;  Surgeon: Kendrick Burgess MD;  Location: [facility] LOCATION;  Service: Cardiology;  Laterality: N/A;   • HERNIA REPAIR     • INSERT / REPLACE / REMOVE PACEMAKER  2021    [employer] device     No family history on file.  Social History     Socioeconomic History   • Marital status:    Tobacco Use   • Smoking status: Former     Packs/day: 1.00     Types: Cigarettes     Quit date:      Years since quittin.2     Passive exposure: Past   • Smokeless tobacco: Former     Types: Chew     Quit date:    Vaping Use   • Vaping Use: Never used   Substance and Sexual Activity   • Alcohol use: Never   • Drug use: Never   • Sexual activity: Defer     ---------------------------------------------------------------------------------------------------------------------   Allergies:  Patient has no known  allergies.  ---------------------------------------------------------------------------------------------------------------------   Home medications:    Medications below are reported home medications pulling from within the system; at this time, these medications have not been reconciled unless otherwise specified and are in the verification process for further verifcation as current home medications.  Medications Prior to Admission   Medication Sig Dispense Refill Last Dose   • aspirin 81 MG EC tablet Take 81 mg by mouth Daily.      • calcium carbonate-cholecalciferol 500-400 MG-UNIT tablet tablet Take 1 tablet by mouth Daily.      • carboxymethylcellulose (REFRESH PLUS) 0.5 % solution Administer 1 drop to both eyes 3 (Three) Times a Day As Needed for Dry Eyes.      • doxycycline (MONODOX) 100 MG capsule Take 1 capsule by mouth 2 (Two) Times a Day. 13 capsule 0    • finasteride (PROSCAR) 5 MG tablet Take 5 mg by mouth Daily.      • levothyroxine (SYNTHROID, LEVOTHROID) 50 MCG tablet Take 50 mcg by mouth Daily.      • predniSONE (DELTASONE) 50 MG tablet Take 1 tablet by mouth Daily. 5 tablet 0    • terazosin (HYTRIN) 2 MG capsule Take 2 mg by mouth Every Night.          Hospital Scheduled Meds:  dexamethasone, 6 mg, Oral, Daily   Or  dexamethasone, 6 mg, Intravenous, Daily  heparin (porcine), 5,000 Units, Subcutaneous, Q8H  remdesivir, 200 mg, Intravenous, Q24H   Followed by  [START ON 3/15/2023] remdesivir, 100 mg, Intravenous, Q24H      Pharmacy Consult - Remdesivir,         Current listed hospital scheduled medications may not yet reflect those currently placed in orders that are signed and held awaiting patient's arrival to floor.   ---------------------------------------------------------------------------------------------------------------------     Objective     Vital Signs:  Temp:  [97.5 °F (36.4 °C)-98.6 °F (37 °C)] 98.6 °F (37 °C)  Heart Rate:  [69-88] 80  Resp:  [18-20] 18  BP: (108-133)/(52-76)  126/52      03/14/23  1026   Weight: 66.2 kg (146 lb)     Body mass index is 22.2 kg/m².  ---------------------------------------------------------------------------------------------------------------------       Physical Exam  Vitals and nursing note reviewed.   Constitutional:       General: He is awake. He is not in acute distress.     Appearance: He is not ill-appearing or diaphoretic.   HENT:      Head: Normocephalic and atraumatic.      Mouth/Throat:      Mouth: Mucous membranes are moist.      Pharynx: Oropharynx is clear.   Eyes:      Extraocular Movements: Extraocular movements intact.      Pupils: Pupils are equal, round, and reactive to light.   Cardiovascular:      Rate and Rhythm: Normal rate and regular rhythm.      Pulses: Normal pulses.           Dorsalis pedis pulses are 2+ on the right side and 2+ on the left side.      Heart sounds: Murmur (mild, systolic) heard.     No friction rub.   Pulmonary:      Effort: Pulmonary effort is normal. No accessory muscle usage, respiratory distress or retractions.      Breath sounds: No wheezing, rhonchi or rales.   Abdominal:      General: Bowel sounds are normal. There is no distension.      Palpations: Abdomen is soft.      Tenderness: There is no abdominal tenderness. There is no guarding.   Musculoskeletal:      Cervical back: Neck supple. No rigidity.      Right lower leg: No edema.      Left lower leg: No edema.   Skin:     General: Skin is warm and dry.      Capillary Refill: Capillary refill takes 2 to 3 seconds.   Neurological:      Mental Status: He is alert and oriented to person, place, and time. Mental status is at baseline.      Cranial Nerves: No dysarthria or facial asymmetry.      Sensory: Sensation is intact.      Motor: No weakness or tremor.   Psychiatric:         Attention and Perception: Attention normal.         Mood and Affect: Mood normal.         Speech: Speech normal.         Behavior: Behavior normal. Behavior is cooperative.          Thought Content: Thought content normal.         Cognition and Memory: Cognition and memory normal.         Judgment: Judgment normal.       ---------------------------------------------------------------------------------------------------------------------  EKG:  Appearing normal sinus rhythm 70s with right bundle branch block and septal infarct of undetermined age. Confirmed by cardiology, Ryan Walker MD.     Telemetry:  Appearing sinus rhythm 70s with BBB.   I have personally looked at both the EKG and the telemetry strips.  ---------------------------------------------------------------------------------------------------------------------   Results from last 7 days   Lab Units 03/14/23  1234 03/14/23  1030 03/13/23  2154   CRP mg/dL 1.23* 1.12* 0.74*   LACTATE mmol/L  --  1.3  --    WBC 10*3/mm3  --  7.03 8.76   HEMOGLOBIN g/dL  --  11.8* 11.6*   HEMATOCRIT %  --  36.2* 35.6*   MCV fL  --  90.7 89.9   MCHC g/dL  --  32.6 32.6   PLATELETS 10*3/mm3  --  138* 155   INR   --  1.10  --      Results from last 7 days   Lab Units 03/14/23  1106 03/13/23  2313   PH, ARTERIAL pH units 7.447  7.447 7.441   PO2 ART mm Hg 60.9*  60.9* 68.0*   PCO2, ARTERIAL mm Hg 30.5*  30.5* 29.4*   HCO3 ART mmol/L 21.1  21.1 20.0     Results from last 7 days   Lab Units 03/14/23  1353 03/14/23  1030 03/13/23  2358 03/13/23  2154   SODIUM mmol/L  --  135* 138 137   POTASSIUM mmol/L  --  4.5 4.6 4.7   CHLORIDE mmol/L  --  105 107 104   CO2 mmol/L  --  20.5* 21.0* 22.1   BUN mg/dL  --  15 16 17   CREATININE mg/dL 1.59* 1.59* 1.60* 1.66*   CALCIUM mg/dL  --  8.5 8.4 8.6   GLUCOSE mg/dL  --  99 97 111*   ALBUMIN g/dL 3.3* 3.3*  --  3.5   BILIRUBIN mg/dL 0.6 0.6  --  0.3   ALK PHOS U/L 95 95  --  99   AST (SGOT) U/L 24 24  --  28   ALT (SGPT) U/L 16 17  --  22   Estimated Creatinine Clearance: 26 mL/min (A) (by C-G formula based on SCr of 1.59 mg/dL (H)).  No results found for: AMMONIA  Results from last 7 days   Lab Units  03/14/23  1234 03/14/23  1030   HSTROP T ng/L 24* 26*     Results from last 7 days   Lab Units 03/14/23  1030   PROBNP pg/mL 386.4     Lab Results   Component Value Date    HGBA1C 5.60 09/02/2021     Lab Results   Component Value Date    TSH 1.640 09/01/2021     No results found for: PREGTESTUR, PREGSERUM, HCG, HCGQUANT  Pain Management Panel    There is no flowsheet data to display.           ---------------------------------------------------------------------------------------------------------------------  Imaging Results (Last 7 Days)     Procedure Component Value Units Date/Time    XR Chest AP [[id number]] Collected: 03/14/23 1210     Updated: 03/14/23 1212    Narrative:      EXAM:    XR Chest, 1 View     EXAM DATE:    3/14/2023 11:02 AM     CLINICAL HISTORY:    COVID Evaluation, Cough, Fever     TECHNIQUE:    Frontal view of the chest.     COMPARISON:    03/13/2023     FINDINGS:    Lungs:  Lungs are clear.    Pleural space:  Unremarkable.  No pneumothorax.    Heart:  Cardiomegaly is again noted.    Mediastinum:  Unremarkable.    Bones/joints:  Unremarkable.    Tubes, lines and devices:  Left cardiac pacer device stable in  positioning.       Impression:        Stable chest with no acute cardiopulmonary findings identified.     This report was finalized on 3/14/2023 12:10 PM by Dr. Frankie Leonardo MD.             Last echocardiogram:  Results for orders placed during the hospital encounter of 09/01/21    Adult Transthoracic Echo Complete W/ Cont if Necessary Per Protocol    Interpretation Summary  · Left ventricular wall thickness is consistent with mild concentric hypertrophy.  · Left ventricular ejection fraction appears to be 56 - 60%. Left ventricular systolic function is normal.  · Left ventricular diastolic function is consistent with (grade I) impaired relaxation.  · Normal cardiac chamber dimensions.  · Moderate aortic valve regurgitation is present.  · There is no evidence of pericardial  effusion.          I have personally reviewed the above radiology images and read the final radiology report on 03/14/23  ---------------------------------------------------------------------------------------------------------------------  Assessment / Plan     Active Hospital Problems    Diagnosis  POA   • **COVID-19 [U07.1]  Yes       ASSESSMENT/PLAN:  -Acute COVID-19 infection, POA  -Acute hypoxemic respiratory failure POA due to above  -Indeterminate troponin elevation, POA  -Patient not meeting sepsis criteria.   -COVID-19 positive date: 3/13/2023  -Vaccination status: Fully vaccinated with boosters x2.  -Lactic acid 1.3, CRP 1.12, ferritin 64.99, . Pro-calcitonin 0.05.   -D Dimer elevated.  NM lung perfusion scan and venous Doppler ultrasounds of bilateral lower extremities ordered to rule out PE/DVT.  CT angiogram of the chest contraindicated due to renal function.  Lovenox with pharmacy to dose for VTE prophylaxis.  -Hold off on further antibiotics for now, as superimposing bacterial pneumonia is not suspected. Chest xray unremarkable. He has been afebrile with stable vital signs.  -Initiate IV Remdesivir with pharmacy consult in place, monitor LFTs closely.   -Continue 6 mg decadron IV/PO daily, monitor glucose levels closely.   -Albuterol HFA.   -Continue supplemental O2 as needed to titrate SPO2 > 90%. Continuous pulse oximetry and cardiac monitoring.   -Strongly encourage incentive spirometry as tolerated.   -Continue enhanced droplet/contact isolation per protocol for COVID-19.  -Continue to monitor COVID-19 progression labs per protocol: Q24h CBC, CMP, CK, CRP, and Ferritin level in addition to Q48h LDH, D-dimer, and Fibrinogen levels.   -High-sensitivity troponin T indeterminately elevated at 26 with -2 delta on repeat.  -Initial EKG revealed normal sinus rhythm 70s  with right bundle branch block which is not new finding.  No acute ST elevation appreciated. Repeat EKG without ischemic  changes.  -Denies chest pain on exam.  -Obtain hemoglobin A1c and lipid profile for risk stratification.  -Continue 81 mg aspirin daily.   -Not previously on statin.    -Grade 1 diastolic dysfunction  -History of symptomatic bradycardia s/p PPM  -History of moderate AVR  -Systolic murmur   -Currently normal sinus rhythm 60-70s with BBB.   -Continuous cardiac monitoring in place.  -Appears euvolemic on exam.   -Monitor strict I's and O's; daily weights.  -Transthoracic echocardiogram from 9/2/2021 reviewed. Revealed left ventricular hypertrophy. LVEF 56-60%. Grade 1 diastolic dysfunction. Moderate aortic valve regurgitation.     -Essential hypertension  -BP appears well controlled.  -Plan to resume home antihypertensive regimen once reconciled per pharmacy with appropriate holding parameters to prevent hypotension and/or bradycardia.   -Closely monitor BP per hospital protocol, titrate medications as necessary.    -Renal insufficiency on CKD IIIb  -Baseline creatinine 1.2-1.4 with creatinine on admission of 1.5.  -Monitor urine output and renal function closely.  -Avoid nephrotoxins as able.  -Repeat chemistry panel in AM.    -Reported hx of COPD, not formally diagnosed  -On room air at baseline.  -Currently on 2 L nasal cannula.  -Continuous pulse oximetry.  -Titrate supplemental oxygen to maintain SpO2 saturations > 90%.   -Encourage controlled cough/deep breathing exercises.  -Currently on Remdesivir, Decadron and PRN albuterol inhaler for treatment of acute COVID-19 infection.     -Hypothyroidism  -Obtain TSH and free T4.  -Plan to resume home levothyroxine dosage when reconciled per pharmacy, adjust dosage if necessary depending on laboratory results.      ----------  -DVT prophylaxis: Lovenox, pharmacy to dose.   -Activity: Up with assistance as tolerated.  -Expected length of stay:   INPATIENT status due to the need for care which can only be reasonably provided in an hospital setting such as  aggressive/expedited ancillary services and/or consultation services, the necessity for IV medications, close physician monitoring and/or the possible need for procedures.  In such, I feel patient's risk for adverse outcomes and need for care warrant INPATIENT evaluation and predict the patient's care encounter to likely last beyond 2 midnights.  -Disposition pending clinical course. Plans to return home with family once medically stable.    High risk secondary to acute COVID-19 infection with acute hypoxemic respiratory failure.       CODE STATUS: FULL CODE, discussed with patient and spouse at bedside.       Torri Mcmillan, APRN   03/14/23  14:37 EDT  Pager #[phone]  ---------------------------------------------------------------------------------------------------------------------

## 2023-03-15 LAB
ALBUMIN SERPL-MCNC: 3.3 G/DL (ref 3.5–5.2)
ALBUMIN/GLOB SERPL: 1.1 G/DL
ALP SERPL-CCNC: 100 U/L (ref 39–117)
ALT SERPL W P-5'-P-CCNC: 18 U/L (ref 1–41)
ANION GAP SERPL CALCULATED.3IONS-SCNC: 11.7 MMOL/L (ref 5–15)
AST SERPL-CCNC: 24 U/L (ref 1–40)
BASOPHILS # BLD AUTO: 0.01 10*3/MM3 (ref 0–0.2)
BASOPHILS NFR BLD AUTO: 0.2 % (ref 0–1.5)
BILIRUB CONJ SERPL-MCNC: <0.2 MG/DL (ref 0–0.3)
BILIRUB SERPL-MCNC: 0.2 MG/DL (ref 0–1.2)
BUN SERPL-MCNC: 16 MG/DL (ref 8–23)
BUN/CREAT SERPL: 11.5 (ref 7–25)
CALCIUM SPEC-SCNC: 9 MG/DL (ref 8.2–9.6)
CHLORIDE SERPL-SCNC: 106 MMOL/L (ref 98–107)
CHOLEST SERPL-MCNC: 144 MG/DL (ref 0–200)
CO2 SERPL-SCNC: 20.3 MMOL/L (ref 22–29)
CREAT SERPL-MCNC: 1.39 MG/DL (ref 0.76–1.27)
CRP SERPL-MCNC: 2.16 MG/DL (ref 0–0.5)
D DIMER PPP FEU-MCNC: 0.93 MCGFEU/ML (ref 0–0.95)
DEPRECATED RDW RBC AUTO: 56.5 FL (ref 37–54)
EGFRCR SERPLBLD CKD-EPI 2021: 46.7 ML/MIN/1.73
EOSINOPHIL # BLD AUTO: 0 10*3/MM3 (ref 0–0.4)
EOSINOPHIL NFR BLD AUTO: 0 % (ref 0.3–6.2)
ERYTHROCYTE [DISTWIDTH] IN BLOOD BY AUTOMATED COUNT: 16.8 % (ref 12.3–15.4)
FERRITIN SERPL-MCNC: 75.02 NG/ML (ref 30–400)
FIBRINOGEN PPP-MCNC: 383 MG/DL (ref 173–524)
GLOBULIN UR ELPH-MCNC: 2.9 GM/DL
GLUCOSE SERPL-MCNC: 152 MG/DL (ref 65–99)
HCT VFR BLD AUTO: 36.3 % (ref 37.5–51)
HDLC SERPL-MCNC: 42 MG/DL (ref 40–60)
HGB BLD-MCNC: 11.5 G/DL (ref 13–17.7)
IMM GRANULOCYTES # BLD AUTO: 0.06 10*3/MM3 (ref 0–0.05)
IMM GRANULOCYTES NFR BLD AUTO: 0.9 % (ref 0–0.5)
LDH SERPL-CCNC: 219 U/L (ref 135–225)
LDLC SERPL CALC-MCNC: 88 MG/DL (ref 0–100)
LDLC/HDLC SERPL: 2.09 {RATIO}
LYMPHOCYTES # BLD AUTO: 1.46 10*3/MM3 (ref 0.7–3.1)
LYMPHOCYTES NFR BLD AUTO: 23 % (ref 19.6–45.3)
MCH RBC QN AUTO: 29.2 PG (ref 26.6–33)
MCHC RBC AUTO-ENTMCNC: 31.7 G/DL (ref 31.5–35.7)
MCV RBC AUTO: 92.1 FL (ref 79–97)
MONOCYTES # BLD AUTO: 0.63 10*3/MM3 (ref 0.1–0.9)
MONOCYTES NFR BLD AUTO: 9.9 % (ref 5–12)
NEUTROPHILS NFR BLD AUTO: 4.18 10*3/MM3 (ref 1.7–7)
NEUTROPHILS NFR BLD AUTO: 66 % (ref 42.7–76)
NRBC BLD AUTO-RTO: 0 /100 WBC (ref 0–0.2)
PLATELET # BLD AUTO: 137 10*3/MM3 (ref 140–450)
PMV BLD AUTO: 10.1 FL (ref 6–12)
POTASSIUM SERPL-SCNC: 4.6 MMOL/L (ref 3.5–5.2)
PROCALCITONIN SERPL-MCNC: 0.04 NG/ML (ref 0–0.25)
PROT SERPL-MCNC: 6.2 G/DL (ref 6–8.5)
RBC # BLD AUTO: 3.94 10*6/MM3 (ref 4.14–5.8)
SODIUM SERPL-SCNC: 138 MMOL/L (ref 136–145)
TRIGL SERPL-MCNC: 71 MG/DL (ref 0–150)
VLDLC SERPL-MCNC: 14 MG/DL (ref 5–40)
WBC NRBC COR # BLD: 6.34 10*3/MM3 (ref 3.4–10.8)

## 2023-03-15 PROCEDURE — 25010000002 REMDESIVIR 100 MG RECONSTITUTED SOLUTION: Performed by: STUDENT IN AN ORGANIZED HEALTH CARE EDUCATION/TRAINING PROGRAM

## 2023-03-15 PROCEDURE — 99232 SBSQ HOSP IP/OBS MODERATE 35: CPT | Performed by: STUDENT IN AN ORGANIZED HEALTH CARE EDUCATION/TRAINING PROGRAM

## 2023-03-15 PROCEDURE — 82728 ASSAY OF FERRITIN: CPT | Performed by: STUDENT IN AN ORGANIZED HEALTH CARE EDUCATION/TRAINING PROGRAM

## 2023-03-15 PROCEDURE — 63710000001 DEXAMETHASONE PER 0.25 MG: Performed by: STUDENT IN AN ORGANIZED HEALTH CARE EDUCATION/TRAINING PROGRAM

## 2023-03-15 PROCEDURE — 94761 N-INVAS EAR/PLS OXIMETRY MLT: CPT

## 2023-03-15 PROCEDURE — 86140 C-REACTIVE PROTEIN: CPT | Performed by: STUDENT IN AN ORGANIZED HEALTH CARE EDUCATION/TRAINING PROGRAM

## 2023-03-15 PROCEDURE — 84145 PROCALCITONIN (PCT): CPT | Performed by: STUDENT IN AN ORGANIZED HEALTH CARE EDUCATION/TRAINING PROGRAM

## 2023-03-15 PROCEDURE — 85379 FIBRIN DEGRADATION QUANT: CPT | Performed by: STUDENT IN AN ORGANIZED HEALTH CARE EDUCATION/TRAINING PROGRAM

## 2023-03-15 PROCEDURE — 83615 LACTATE (LD) (LDH) ENZYME: CPT

## 2023-03-15 PROCEDURE — 85384 FIBRINOGEN ACTIVITY: CPT

## 2023-03-15 PROCEDURE — 80061 LIPID PANEL: CPT

## 2023-03-15 PROCEDURE — 82248 BILIRUBIN DIRECT: CPT | Performed by: STUDENT IN AN ORGANIZED HEALTH CARE EDUCATION/TRAINING PROGRAM

## 2023-03-15 PROCEDURE — 85025 COMPLETE CBC W/AUTO DIFF WBC: CPT | Performed by: STUDENT IN AN ORGANIZED HEALTH CARE EDUCATION/TRAINING PROGRAM

## 2023-03-15 PROCEDURE — 94799 UNLISTED PULMONARY SVC/PX: CPT

## 2023-03-15 PROCEDURE — 94762 N-INVAS EAR/PLS OXIMTRY CONT: CPT

## 2023-03-15 PROCEDURE — 25010000002 ENOXAPARIN PER 10 MG

## 2023-03-15 PROCEDURE — 97161 PT EVAL LOW COMPLEX 20 MIN: CPT

## 2023-03-15 PROCEDURE — 80053 COMPREHEN METABOLIC PANEL: CPT | Performed by: STUDENT IN AN ORGANIZED HEALTH CARE EDUCATION/TRAINING PROGRAM

## 2023-03-15 RX ORDER — LEVOTHYROXINE SODIUM 0.07 MG/1
75 TABLET ORAL DAILY
Status: CANCELLED | OUTPATIENT
Start: 2023-03-15

## 2023-03-15 RX ORDER — LEVOTHYROXINE SODIUM 0.07 MG/1
75 TABLET ORAL DAILY
COMMUNITY

## 2023-03-15 RX ORDER — FERROUS SULFATE 325(65) MG
325 TABLET ORAL 2 TIMES DAILY
Status: CANCELLED | OUTPATIENT
Start: 2023-03-15

## 2023-03-15 RX ORDER — OMEPRAZOLE 40 MG/1
40 CAPSULE, DELAYED RELEASE ORAL DAILY
COMMUNITY

## 2023-03-15 RX ORDER — LEVOTHYROXINE SODIUM 0.07 MG/1
75 TABLET ORAL
Status: CANCELLED | OUTPATIENT
Start: 2023-03-16

## 2023-03-15 RX ORDER — LEVOTHYROXINE SODIUM 0.07 MG/1
75 TABLET ORAL DAILY
Status: DISCONTINUED | OUTPATIENT
Start: 2023-03-16 | End: 2023-03-19 | Stop reason: HOSPADM

## 2023-03-15 RX ORDER — SULFAMETHOXAZOLE AND TRIMETHOPRIM 800; 160 MG/1; MG/1
1 TABLET ORAL 2 TIMES DAILY
COMMUNITY
End: 2023-03-19 | Stop reason: HOSPADM

## 2023-03-15 RX ORDER — POLYETHYLENE GLYCOL 3350 17 G/17G
17 POWDER, FOR SOLUTION ORAL DAILY
COMMUNITY

## 2023-03-15 RX ORDER — FERROUS SULFATE 325(65) MG
325 TABLET ORAL 2 TIMES DAILY
COMMUNITY

## 2023-03-15 RX ORDER — PANTOPRAZOLE SODIUM 40 MG/1
40 TABLET, DELAYED RELEASE ORAL
Status: CANCELLED | OUTPATIENT
Start: 2023-03-15

## 2023-03-15 RX ADMIN — FINASTERIDE 5 MG: 5 TABLET, FILM COATED ORAL at 12:02

## 2023-03-15 RX ADMIN — ASPIRIN 81 MG: 81 TABLET, COATED ORAL at 08:36

## 2023-03-15 RX ADMIN — REMDESIVIR 200 MG: 100 INJECTION, POWDER, LYOPHILIZED, FOR SOLUTION INTRAVENOUS at 02:52

## 2023-03-15 RX ADMIN — TERAZOSIN HYDROCHLORIDE 2 MG: 5 CAPSULE ORAL at 00:20

## 2023-03-15 RX ADMIN — LEVOTHYROXINE SODIUM 50 MCG: 50 TABLET ORAL at 08:36

## 2023-03-15 RX ADMIN — OFLOXACIN 50000 UNITS: 300 TABLET, COATED ORAL at 12:02

## 2023-03-15 RX ADMIN — REMDESIVIR 100 MG: 100 INJECTION, POWDER, LYOPHILIZED, FOR SOLUTION INTRAVENOUS at 17:13

## 2023-03-15 RX ADMIN — Medication 1 TABLET: at 08:36

## 2023-03-15 RX ADMIN — ENOXAPARIN SODIUM 30 MG: 100 INJECTION SUBCUTANEOUS at 17:32

## 2023-03-15 RX ADMIN — TERAZOSIN HYDROCHLORIDE 2 MG: 5 CAPSULE ORAL at 22:36

## 2023-03-15 RX ADMIN — DEXAMETHASONE 6 MG: 4 TABLET ORAL at 08:36

## 2023-03-15 NOTE — CASE MANAGEMENT/SOCIAL WORK
Discharge Planning Assessment  [facility]     Patient Name: Joaquín Rogers  MRN: [id number]  Today's Date: 3/15/2023    Admit Date: 3/14/2023    Plan: SS received a consult for discharge planning. SS unable to speak with pt in room due to pt being in Covid isolation. SS unable to get in contact with pt's spouse, holly and Son, mark via phone. SS contacted pt's son, Rustam. Rustam states pt lives with spouse and son, Mark at [address] [place] [address] [place] [address]. [facility] Pt does not utilize home health services or DME. Pt's son states he plans for pt to return home at discharge and family to provide transportation. Pt's son Rustam states he refuses to answer any other questions regarding discharge planning. SS will be signing off at this time and can be reconsulted at a later date if needed. No other needs identified at this time.   Discharge Needs Assessment     Row Name 03/15/23 1705       Living Environment    People in Home child(raquel), adult;spouse    Name(s) of People in Home Spouse, Holly and Son, Mark    Current Living Arrangements home    Primary Care Provided by self    Provides Primary Care For no one    Family Caregiver if Needed spouse;child(raquel), adult    Family Caregiver Names spouse-Holly, son-Mark and Son-Rustam.    Quality of Family Relationships unable to assess    Able to Return to Prior Arrangements yes       Resource/Environmental Concerns    Resource/Environmental Concerns none    Transportation Concerns none       Transition Planning    Patient/Family Anticipates Transition to home with family    Transportation Anticipated family or friend will provide       Discharge Needs Assessment    Equipment Currently Used at Home none    Anticipated Changes Related to Illness none    Equipment Needed After Discharge none               Discharge Plan     Row Name 03/15/23 7409       Plan    Plan SS received a consult for discharge planning. SS unable to speak with pt in room due  to pt being in Covid isolation. SS unable to get in contact with pt's spouse, holly and Son, mark via phone. SS contacted pt's son, Rustam. Rustam states pt lives with spouse and son, Mark at [address] [place] [address] [place] [address]. [facility]. Pt does not utilize home health services or DME. Pt's son states he plans for pt to return home at discharge and family to provide transportation. Pt's son Rustam states he refuses to answer any other questions regarding discharge planning. SS will be signing off at this time and can be reconsulted at a later date if needed. No other needs identified at this time.    Patient/Family in Agreement with Plan yes              Continued Care and Services - Admitted Since 3/14/2023    Coordination has not been started for this encounter.       Expected Discharge Date and Time     Expected Discharge Date Expected Discharge Time    Mar 18, 2023          Demographic Summary     Row Name 03/15/23 4601       General Information    Admission Type inpatient    Referral Source nursing    Reason for Consult discharge planning  SS received a consult for discharge planning.              HELGA PradoW

## 2023-03-15 NOTE — PROGRESS NOTES
[facility] HOSPITALIST PROGRESS NOTE     Patient Identification:  Name:  Joaquín Rogers  Age:  89 y.o.  Sex:  male  :  1927  MRN:  [id number]  Visit Number:  [id number]  ROOM: [address] [place] [address]     Primary Care Provider:  Uady Roche DO    Length of stay in inpatient status:  1    Subjective     Chief Compliant:    Chief Complaint   Patient presents with   • Shortness of Breath       History of Presenting Illness:    Patient seen and examined earlier this afternoon. He says he is feeling better today and denies current shortness of breath. He still reports cough. No new complaints and no needs at time of my exam.    Objective     Current Hospital Meds:aspirin, 81 mg, Oral, Daily  Calcium Carb-Cholecalciferol, 1 tablet, Oral, Daily  cholecalciferol, 50,000 Units, Oral, Daily  dexamethasone, 6 mg, Oral, Daily   Or  dexamethasone, 6 mg, Intravenous, Daily  enoxaparin, 30 mg, Subcutaneous, Q24H  finasteride, 5 mg, Oral, Daily  levothyroxine, 50 mcg, Oral, Daily  remdesivir, 100 mg, Intravenous, Q24H  terazosin, 2 mg, Oral, Nightly    Pharmacy Consult - Remdesivir,         Current Antimicrobial Therapy:  Anti-Infectives (From admission, onward)    Ordered     Dose/Rate Route Frequency Start Stop    23 1344  remdesivir 100 mg in 270 mL NS        Ordering Provider: Tayler Barriga DO   See Bud for full Linked Orders Report.    100 mg  over 60 Minutes Intravenous Every 24 Hours 03/15/23 1500 23 1459    23 1344  remdesivir 200 mg in 290 mL NS        Ordering Provider: Tayler Barriga DO   See Hyperssheila for full Linked Orders Report.    200 mg  over 60 Minutes Intravenous Every 24 Hours 23 1500 03/15/23 0352        Current Diuretic Therapy:  Diuretics (From admission, onward)    None        ----------------------------------------------------------------------------------------------------------------------  Vital Signs:  Temp:  [97.8 °F (36.6 °C)-98.7 °F (37.1 °C)] 97.8 °F  (36.6 °C)  Heart Rate:  [63-83] 83  Resp:  [16-18] 18  BP: (110-143)/(55-68) 111/62  SpO2:  [93 %-97 %] 96 %  on   ;   Device (Oxygen Therapy): room air  Body mass index is 22.64 kg/m².    Wt Readings from Last 3 Encounters:   03/15/23 67.5 kg (148 lb 14.4 oz)   03/13/23 66.2 kg (146 lb)   02/28/23 66.2 kg (146 lb)     Intake & Output (last 3 days)       03/12 0701  03/13 0700 03/13 0701  03/14 0700 03/14 0701  03/15 0700 03/15 0701  03/16 0700    P.O.    720    Total Intake(mL/kg)    720 (10.7)    Net    +720            Urine Unmeasured Occurrence   4 x 3 x    Stool Unmeasured Occurrence    1 x        Diet: Regular/House Diet; Texture: Regular Texture (IDDSI 7); Fluid Consistency: Thin (IDDSI 0)  ----------------------------------------------------------------------------------------------------------------------  Physical exam:   Constitutional:  Well-developed. Elderly and thin appearing.  No acute distress.      HENT:  Head:  Normocephalic and atraumatic.    Cardiovascular:  Normal rate, regular rhythm   Pulmonary/Chest:  No respiratory distress, no wheezes, no crackles. Slightly diminished breath sounds.   Musculoskeletal:  No deformity.    Neurological: Awake, alert, no focal deficit on gross examination. No slurred speech or facial droop.   Skin:  Skin is warm and dry. No rash noted. No pallor.   Peripheral vascular:  No cyanosis, no edema.  Psychiatric: Appropriate mood and affect  Edited by: Tayler Barriga DO at 3/15/2023 1811  ----------------------------------------------------------------------------------------------------------------------  Results from last 7 days   Lab Units 03/15/23  0533 03/14/23  1234 03/14/23  1030 03/13/23  2154   CRP mg/dL 2.16* 1.23* 1.12* 0.74*   LACTATE mmol/L  --   --  1.3  --    WBC 10*3/mm3 6.34  --  7.03 8.76   HEMOGLOBIN g/dL 11.5*  --  11.8* 11.6*   HEMATOCRIT % 36.3*  --  36.2* 35.6*   MCV fL 92.1  --  90.7 89.9   MCHC g/dL 31.7  --  32.6 32.6   PLATELETS 10*3/mm3  137*  --  138* 155   INR   --   --  1.10  --      Results from last 7 days   Lab Units 03/14/23  1106   PH, ARTERIAL pH units 7.447  7.447   PO2 ART mm Hg 60.9*  60.9*   PCO2, ARTERIAL mm Hg 30.5*  30.5*   HCO3 ART mmol/L 21.1  21.1     Results from last 7 days   Lab Units 03/15/23  0533 03/14/23  1353 03/14/23  1030 03/13/23  2358   SODIUM mmol/L 138  --  135* 138   POTASSIUM mmol/L 4.6  --  4.5 4.6   MAGNESIUM mg/dL  --  2.0  --   --    CHLORIDE mmol/L 106  --  105 107   CO2 mmol/L 20.3*  --  20.5* 21.0*   BUN mg/dL 16  --  15 16   CREATININE mg/dL 1.39* 1.59* 1.59* 1.60*   CALCIUM mg/dL 9.0  --  8.5 8.4   GLUCOSE mg/dL 152*  --  99 97   ALBUMIN g/dL 3.3* 3.3* 3.3*  --    BILIRUBIN mg/dL 0.2 0.6 0.6  --    ALK PHOS U/L 100 95 95  --    AST (SGOT) U/L 24 24 24  --    ALT (SGPT) U/L 18 16 17  --    Estimated Creatinine Clearance: 30.4 mL/min (A) (by C-G formula based on SCr of 1.39 mg/dL (H)).  No results found for: AMMONIA  Results from last 7 days   Lab Units 03/14/23  1234 03/14/23  1030   HSTROP T ng/L 24* 26*     Results from last 7 days   Lab Units 03/14/23  1030   PROBNP pg/mL 386.4     Results from last 7 days   Lab Units 03/15/23  0533   CHOLESTEROL mg/dL 144   TRIGLYCERIDES mg/dL 71   HDL CHOL mg/dL 42   LDL CHOL mg/dL 88     Hemoglobin A1C   Date/Time Value Ref Range Status   03/14/2023 1030 5.80 (H) 4.80 - 5.60 % Final     Lab Results   Component Value Date    TSH 3.050 03/14/2023    FREET4 1.48 03/14/2023     No results found for: PREGTESTUR, PREGSERUM, HCG, HCGQUANT  Pain Management Panel    There is no flowsheet data to display.       Brief Urine Lab Results  (Last result in the past 365 days)      Color   Clarity   Blood   Leuk Est   Nitrite   Protein   CREAT   Urine HCG        02/19/23 0113 Yellow   Clear   Negative   Negative   Negative   Negative               No results found for: BLOODCX  No results found for: URINECX  No results found for: WOUNDCX  No results found for: STOOLCX  No results  found for: RESPCX  No results found for: AFBCX  Results from last 7 days   Lab Units 03/15/23  0533 03/14/23  1353 03/14/23  1234 03/14/23  1030 03/13/23  2154   PROCALCITONIN ng/mL 0.04 0.04  --  0.05  --    LACTATE mmol/L  --   --   --  1.3  --    CRP mg/dL 2.16*  --  1.23* 1.12* 0.74*       I have personally looked at the labs and they are summarized above.  ----------------------------------------------------------------------------------------------------------------------  Detailed radiology reports for the last 24 hours:  Imaging Results (Last 24 Hours)     Procedure Component Value Units Date/Time    US Venous Doppler Lower Extremity Bilateral (duplex) [[id number]] Collected: 03/15/23 0749     Updated: 03/15/23 0752    Narrative:      US VENOUS DOPPLER LOWER EXTREMITY BILATERAL (DUPLEX)-     CLINICAL INDICATION: Elevated D dimer, rule out DVT; U07.1-COVID-19        COMPARISON: None immediately available      TECHNIQUE: Color Doppler imaging was used with compression and  augmentation to evaluate the lower extremity deep venous system.     FINDINGS:   There is patent spontaneous flow from the common femoral vein through  the posterior tibial veins.  There was no internal clot or area of noncompressibility.  Normal augmentation was elicited where applicable.       Impression:      No DVT in the lower extremities on today's exam.      This report was finalized on 3/15/2023 7:50 AM by Dr. Shilo Leary MD.           Assessment & Plan      #Acute COVID-19 infection, present on admission  #Acute hypoxemic respiratory failure secondary to above  #Indeterminate high sensitivity troponin elevation  - COVID-19 positive date: 3/13/2023. PO2 low on ABG and he had worsening symptoms prior to admission. He is high risk for worsening/complications of covid due to his age.  - D dimer elevated, but no evidence of DVT on lower extremity venous dopplers and VQ scan was low suspicion for PE. CTA chest contraindicated due to  patient's renal function.  - Continue lovenox, pharmacy to dose, for VTE prophylaxis  - Low suspicion for bacterial pneumonia given no evidence of pneumonia on CXR and low procalcitonin.  - Patient desires to receive remdesivir and dexamethasone. These were started yesterday--continue same and monitor clinical status and renal function closely.  - Continue albuterol HFA prn shortness of breath or cough  - Continue enhanced droplet/contact isolation per protocol for COVID-19.  - Continue to monitor COVID-19 progression labs per protocol: Q24h CBC, CMP, CK, CRP, and Ferritin level in addition to Q48h LDH, D-dimer, and Fibrinogen levels.   - High-sensitivity troponin T indeterminately elevated at 26 with -2 delta on repeat. EKG showed no acute ischemic changes. No chest pain reported. Troponin elevation not consistent with ACS.  - Continue aspirin 81mg daily  - Monitor on telemetry.    #Grade 1 diastolic dysfunction  #History of symptomatic bradycardia s/p PPM  #History of moderate AVR  #Systolic murmur   - Appears euvolemic  - Continue telemetry monitoring  - Monitor I&Os, daily weights  - TTE in 2021 showed LV hypertrophy, LV EF 56-60%, grade I diastolic dysfunction, and moderate aortic valve regurgitation. Since heart failure appears stable we will defer repeating TTE for now.    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    #Reported history of COPD, not formally diagnosed  - on room air at baseline  - Initially was on 2L here, now back on room air  - continue pulse oximetry     #Hypothyroidism  - TSH WNL at admission  - Continue home levothyroxine    Edited by: Tayler Barriga DO at 3/15/2023 1811    VTE Prophylaxis:   Mechanical  Order History:     None      Pharmalogical Order History:      Ordered     Dose Route Frequency Stop    03/14/23 1523  Enoxaparin Sodium (LOVENOX) syringe 30 mg         30 mg SC Every 24 Hours --    03/14/23 1519  Pharmacy to Dose enoxaparin (LOVENOX)  Status:  Discontinued         -- XX Continuous PRN 03/14/23 1524    03/14/23 1344  heparin (porcine) 5000 UNIT/ML injection 5,000 Units  Status:  Discontinued         5,000 Units SC Every 8 Hours Scheduled 03/14/23 1519                Dispo:  likely home at discharge pending clinical improvement     Tayler Barriga DO  [facility] Hospitalist  03/15/23  18:11 EDT

## 2023-03-15 NOTE — PLAN OF CARE
Goal Outcome Evaluation:              Outcome Evaluation: Pt demonstrates good mobility with some balance concern w/o AD. D/C rec for home with supervision 2/2 fall risk. Potential HEP to work on hip and knee flexion strength however pt is functional. Does not demonstrate need for skilled therapy intervention at this time.

## 2023-03-15 NOTE — THERAPY EVALUATION
Acute Care - Physical Therapy Initial Evaluation   Harvinder     Patient Name: Joaquín Rogers  : 1927  MRN: [id number]  Today's Date: 3/15/2023   Onset of Illness/Injury or Date of Surgery: 23  Visit Dx:     ICD-10-CM ICD-9-CM   1. COVID-19  U07.1 079.89     Patient Active Problem List   Diagnosis   • COVID-19   • Acute hypoxemic respiratory failure due to COVID-19 (HCC)   • Symptomatic bradycardia     Past Medical History:   Diagnosis Date   • Bradycardia    • Thyroid disease      Past Surgical History:   Procedure Laterality Date   • CARDIAC ELECTROPHYSIOLOGY PROCEDURE N/A 2021    Procedure: Pacemaker DC new;  Surgeon: Kendrick Burgess MD;  Location: [facility] INVASIVE LOCATION;  Service: Cardiology;  Laterality: N/A;   • HERNIA REPAIR     • INSERT / REPLACE / REMOVE PACEMAKER  2021    [employer] device     PT Assessment (last 12 hours)     PT Evaluation and Treatment     Row Name 03/15/23 1402          Physical Therapy Time and Intention    Document Type evaluation  -     Mode of Treatment physical therapy  -     Patient Effort adequate  -     Comment Pt seen for evaluation this date with concern for weakness. Pt is living at home with spouse, son, and grandson. Pt was ambulatory PLOF and expresses he was using a cane for balance. He has RW and BSC that have not been used and is considering donating them. Pt states he has not had a fall since labor day a year ago. He has no stairs at home. Pt states he has assist available at home, wife is not in best of health.  -     Row Name 03/15/23 1404          General Information    Patient Profile Reviewed yes  -     Onset of Illness/Injury or Date of Surgery 23  -     Patient Observations alert;cooperative  -     General Observations of Patient Pt seen supine in hospital bed, pleasant and cooperative with therapy.  -     Equipment Currently Used at Home other (see comments)  cane  -     Existing Precautions/Restrictions  fall  -[facility] Name 03/15/23 1402          Living Environment    Current Living Arrangements home  -     People in Home spouse;child(raquel), adult;grandchild(raquel)  -[facility] Name 03/15/23 1402          Range of Motion Comprehensive    Comment, General Range of Motion AROM grossly WFL hip abd maybe 75% of normal however seems fxnal? no issues from legs  -[facility] Name 03/15/23 1402          Strength Comprehensive (MMT)    Comment, General Manual Muscle Testing (MMT) Assessment Gross MMT: kf 4 hf 4 to 5/5  -[facility] Name 03/15/23 1402          Bed Mobility    Bed Mobility supine-sit;sit-supine  -     Supine-Sit [place] (Bed Mobility) independent;modified independence  -     Sit-Supine [place] (Bed Mobility) independent;modified independence  -KH     Row Name 03/15/23 1402          Transfers    Transfers sit-stand transfer;stand-sit transfer  -KH     Row Name 03/15/23 1402          Sit-Stand Transfer    Sit-Stand [place] (Transfers) standby assist;supervision;contact guard  -[facility] Name 03/15/23 1402          Stand-Sit Transfer    Stand-Sit [place] (Transfers) standby assist;supervision  -KH     Row Name 03/15/23 1402          Gait/Stairs (Locomotion)    Gait/Stairs Locomotion gait/ambulation independence  -     [place] Level (Gait) standby assist;supervision;contact guard  -     Distance in Feet (Gait) Grossly 10' or so  -[facility] Name 03/15/23 1402          Balance    Comment, Balance With perturbations pt demonstrates fair/good balance, rec for supervision at home given hx of fall  -[facility] Name 03/15/23 1402          Plan of Care Review    Outcome Evaluation Pt demonstrates good mobility with some balance concern w/o AD. D/C rec for home with supervision 2/2 fall risk. Potential HEP to work on hip and knee flexion strength however pt is functional. Does not demonstrate need for skilled therapy intervention at this time.  -[facility] Name 03/15/23 1402           Positioning and Restraints    Pre-Treatment Position in bed  -     Post Treatment Position bed  -     In Bed supine;call light within reach  -     Row Name 03/15/23 1402          Therapy Assessment/Plan (PT)    Therapy Frequency (PT) evaluation only  -           User Key  (r) = Recorded By, (t) = Taken By, (c) = Cosigned By    Initials Name Provider Type    Meghan Santiago, [facility] Physical Therapist                  PT Recommendation and Plan  Anticipated Discharge Disposition (PT): home with supervision, home with assist  Therapy Frequency (PT): evaluation only  Outcome Evaluation: Pt demonstrates good mobility with some balance concern w/o AD. D/C rec for home with supervision 2/2 fall risk. Potential HEP to work on hip and knee flexion strength however pt is functional. Does not demonstrate need for skilled therapy intervention at this time.       Time Calculation:    PT Charges     Row Name 03/15/23 1550             Time Calculation    Start Time 1410  -KH      PT Received On 03/15/23  -            User Key  (r) = Recorded By, (t) = Taken By, (c) = Cosigned By    Initials Name Provider Type    Meghan Santiago PT Physical Therapist              Therapy Charges for Today     Code Description Service Date Service Provider Modifiers Qty    28152849181 HC PT EVAL LOW COMPLEXITY 4 3/15/2023 Meghan Herrera, PT GP 1          PT G-Codes  AM-PAC 6 Clicks Score (PT): 23    Meghan Herrera PT  3/15/2023

## 2023-03-15 NOTE — PLAN OF CARE
Goal Outcome Evaluation:  Plan of Care Reviewed With: patient   Pt resting in bed, VSS, SPO2 stable on RA. Continues on Remdesivir, tolerating well. Will continue with plan  of care.     Progress: improving

## 2023-03-15 NOTE — PLAN OF CARE
Pt A&Ox4. Pt's son visited around 2000 right after pt's spouse left. RN received call from pt's other son (German Rogers), son wanting to know when Remdesivir would be administered- told him as soon as pharmacy brought up medication. Son was happy about this, states pt received Remdesivir about a year ago when he had Covid too and pt did well on it. Remdesivir currently running in Counts include 234 beds at the [facility], no issues. No complaints or symptoms of distress noted. Continue plan of care.

## 2023-03-16 LAB
ALBUMIN SERPL-MCNC: 3.5 G/DL (ref 3.5–5.2)
ALBUMIN/GLOB SERPL: 1.1 G/DL
ALP SERPL-CCNC: 103 U/L (ref 39–117)
ALT SERPL W P-5'-P-CCNC: 29 U/L (ref 1–41)
ANION GAP SERPL CALCULATED.3IONS-SCNC: 11.1 MMOL/L (ref 5–15)
AST SERPL-CCNC: 35 U/L (ref 1–40)
BASOPHILS # BLD AUTO: 0.02 10*3/MM3 (ref 0–0.2)
BASOPHILS NFR BLD AUTO: 0.2 % (ref 0–1.5)
BILIRUB CONJ SERPL-MCNC: <0.2 MG/DL (ref 0–0.3)
BILIRUB SERPL-MCNC: 0.2 MG/DL (ref 0–1.2)
BUN SERPL-MCNC: 22 MG/DL (ref 8–23)
BUN/CREAT SERPL: 17.5 (ref 7–25)
CALCIUM SPEC-SCNC: 9.2 MG/DL (ref 8.2–9.6)
CHLORIDE SERPL-SCNC: 105 MMOL/L (ref 98–107)
CO2 SERPL-SCNC: 21.9 MMOL/L (ref 22–29)
CREAT SERPL-MCNC: 1.26 MG/DL (ref 0.76–1.27)
CRP SERPL-MCNC: 1.5 MG/DL (ref 0–0.5)
DEPRECATED RDW RBC AUTO: 57.2 FL (ref 37–54)
EGFRCR SERPLBLD CKD-EPI 2021: 52.5 ML/MIN/1.73
EOSINOPHIL # BLD AUTO: 0 10*3/MM3 (ref 0–0.4)
EOSINOPHIL NFR BLD AUTO: 0 % (ref 0.3–6.2)
ERYTHROCYTE [DISTWIDTH] IN BLOOD BY AUTOMATED COUNT: 17 % (ref 12.3–15.4)
FERRITIN SERPL-MCNC: 64.13 NG/ML (ref 30–400)
GLOBULIN UR ELPH-MCNC: 3.1 GM/DL
GLUCOSE SERPL-MCNC: 120 MG/DL (ref 65–99)
HCT VFR BLD AUTO: 39.7 % (ref 37.5–51)
HGB BLD-MCNC: 12.4 G/DL (ref 13–17.7)
IMM GRANULOCYTES # BLD AUTO: 0.06 10*3/MM3 (ref 0–0.05)
IMM GRANULOCYTES NFR BLD AUTO: 0.6 % (ref 0–0.5)
LYMPHOCYTES # BLD AUTO: 2.23 10*3/MM3 (ref 0.7–3.1)
LYMPHOCYTES NFR BLD AUTO: 20.7 % (ref 19.6–45.3)
MCH RBC QN AUTO: 28.6 PG (ref 26.6–33)
MCHC RBC AUTO-ENTMCNC: 31.2 G/DL (ref 31.5–35.7)
MCV RBC AUTO: 91.5 FL (ref 79–97)
MONOCYTES # BLD AUTO: 0.61 10*3/MM3 (ref 0.1–0.9)
MONOCYTES NFR BLD AUTO: 5.7 % (ref 5–12)
NEUTROPHILS NFR BLD AUTO: 7.86 10*3/MM3 (ref 1.7–7)
NEUTROPHILS NFR BLD AUTO: 72.8 % (ref 42.7–76)
NRBC BLD AUTO-RTO: 0 /100 WBC (ref 0–0.2)
PLATELET # BLD AUTO: 178 10*3/MM3 (ref 140–450)
PMV BLD AUTO: 10.4 FL (ref 6–12)
POTASSIUM SERPL-SCNC: 4.4 MMOL/L (ref 3.5–5.2)
PROT SERPL-MCNC: 6.6 G/DL (ref 6–8.5)
RBC # BLD AUTO: 4.34 10*6/MM3 (ref 4.14–5.8)
SODIUM SERPL-SCNC: 138 MMOL/L (ref 136–145)
WBC NRBC COR # BLD: 10.78 10*3/MM3 (ref 3.4–10.8)

## 2023-03-16 PROCEDURE — 94762 N-INVAS EAR/PLS OXIMTRY CONT: CPT

## 2023-03-16 PROCEDURE — 94799 UNLISTED PULMONARY SVC/PX: CPT

## 2023-03-16 PROCEDURE — 85025 COMPLETE CBC W/AUTO DIFF WBC: CPT | Performed by: STUDENT IN AN ORGANIZED HEALTH CARE EDUCATION/TRAINING PROGRAM

## 2023-03-16 PROCEDURE — 63710000001 DEXAMETHASONE PER 0.25 MG: Performed by: STUDENT IN AN ORGANIZED HEALTH CARE EDUCATION/TRAINING PROGRAM

## 2023-03-16 PROCEDURE — 82248 BILIRUBIN DIRECT: CPT | Performed by: STUDENT IN AN ORGANIZED HEALTH CARE EDUCATION/TRAINING PROGRAM

## 2023-03-16 PROCEDURE — 94761 N-INVAS EAR/PLS OXIMETRY MLT: CPT

## 2023-03-16 PROCEDURE — 25010000002 ENOXAPARIN PER 10 MG

## 2023-03-16 PROCEDURE — 82728 ASSAY OF FERRITIN: CPT | Performed by: STUDENT IN AN ORGANIZED HEALTH CARE EDUCATION/TRAINING PROGRAM

## 2023-03-16 PROCEDURE — 80053 COMPREHEN METABOLIC PANEL: CPT | Performed by: STUDENT IN AN ORGANIZED HEALTH CARE EDUCATION/TRAINING PROGRAM

## 2023-03-16 PROCEDURE — 25010000002 REMDESIVIR 100 MG RECONSTITUTED SOLUTION: Performed by: STUDENT IN AN ORGANIZED HEALTH CARE EDUCATION/TRAINING PROGRAM

## 2023-03-16 PROCEDURE — 99232 SBSQ HOSP IP/OBS MODERATE 35: CPT | Performed by: STUDENT IN AN ORGANIZED HEALTH CARE EDUCATION/TRAINING PROGRAM

## 2023-03-16 PROCEDURE — 86140 C-REACTIVE PROTEIN: CPT | Performed by: STUDENT IN AN ORGANIZED HEALTH CARE EDUCATION/TRAINING PROGRAM

## 2023-03-16 RX ORDER — FAMOTIDINE 20 MG/1
20 TABLET, FILM COATED ORAL
Status: DISCONTINUED | OUTPATIENT
Start: 2023-03-16 | End: 2023-03-19 | Stop reason: HOSPADM

## 2023-03-16 RX ORDER — FERROUS SULFATE 325(65) MG
325 TABLET ORAL 2 TIMES DAILY WITH MEALS
Status: DISCONTINUED | OUTPATIENT
Start: 2023-03-16 | End: 2023-03-19 | Stop reason: HOSPADM

## 2023-03-16 RX ORDER — POLYETHYLENE GLYCOL 3350 17 G/17G
17 POWDER, FOR SOLUTION ORAL DAILY
Status: DISCONTINUED | OUTPATIENT
Start: 2023-03-16 | End: 2023-03-19 | Stop reason: HOSPADM

## 2023-03-16 RX ORDER — PANTOPRAZOLE SODIUM 40 MG/1
40 TABLET, DELAYED RELEASE ORAL
Status: CANCELLED | OUTPATIENT
Start: 2023-03-16

## 2023-03-16 RX ADMIN — ENOXAPARIN SODIUM 30 MG: 100 INJECTION SUBCUTANEOUS at 17:06

## 2023-03-16 RX ADMIN — ASPIRIN 81 MG: 81 TABLET, COATED ORAL at 09:16

## 2023-03-16 RX ADMIN — FINASTERIDE 5 MG: 5 TABLET, FILM COATED ORAL at 09:16

## 2023-03-16 RX ADMIN — FERROUS SULFATE TAB 325 MG (65 MG ELEMENTAL FE) 325 MG: 325 (65 FE) TAB at 09:16

## 2023-03-16 RX ADMIN — OFLOXACIN 50000 UNITS: 300 TABLET, COATED ORAL at 09:15

## 2023-03-16 RX ADMIN — FERROUS SULFATE TAB 325 MG (65 MG ELEMENTAL FE) 325 MG: 325 (65 FE) TAB at 18:12

## 2023-03-16 RX ADMIN — Medication 1 TABLET: at 09:16

## 2023-03-16 RX ADMIN — LINACLOTIDE 72 MCG: 72 CAPSULE, GELATIN COATED ORAL at 09:16

## 2023-03-16 RX ADMIN — POLYETHYLENE GLYCOL 3350 17 G: 17 POWDER, FOR SOLUTION ORAL at 09:16

## 2023-03-16 RX ADMIN — TERAZOSIN HYDROCHLORIDE 2 MG: 5 CAPSULE ORAL at 21:28

## 2023-03-16 RX ADMIN — FAMOTIDINE 20 MG: 20 TABLET ORAL at 17:06

## 2023-03-16 RX ADMIN — LEVOTHYROXINE SODIUM 75 MCG: 0.07 TABLET ORAL at 09:16

## 2023-03-16 RX ADMIN — REMDESIVIR 100 MG: 100 INJECTION, POWDER, LYOPHILIZED, FOR SOLUTION INTRAVENOUS at 17:05

## 2023-03-16 RX ADMIN — FAMOTIDINE 20 MG: 20 TABLET ORAL at 09:16

## 2023-03-16 RX ADMIN — DEXAMETHASONE 6 MG: 4 TABLET ORAL at 09:16

## 2023-03-16 NOTE — PLAN OF CARE
Goal Outcome Evaluation:  Plan of Care Reviewed With: patient   Pt resting in bed, VSS, SPO2 stable on RA, no c/o CP or acute distress this shift. Continues on Remdesivir, pt tolerating well. Continues in ISO for COVID, wife at bedside. Will continue to follow plan of care.     Progress: improving

## 2023-03-16 NOTE — PROGRESS NOTES
"    [facility] HOSPITALIST PROGRESS NOTE     Patient Identification:  Name:  Joaquín Rogers  Age:  89 y.o.  Sex:  male  :  1927  MRN:  [id number]  Visit Number:  [id number]  ROOM: [address] [place] [address]     Primary Care Provider:  Uday Roche DO    Length of stay in inpatient status:  2    Subjective     Chief Compliant:    Chief Complaint   Patient presents with   • Shortness of Breath       History of Presenting Illness:    Patient seen and examined earlier this afternoon with his wife present at bedside. Patient reported he was feeling pretty well. Denied any shortness of breath or chest pain. He did report constipation and had asked nursing for his home linzess this morning, so it was reordered. He reports having a bowel movement today that was dark \"but not as black as it has been.\" He reports seeing black stools for about a month now. Denies any abdominal pain.    Objective     Current Hospital Meds:aspirin, 81 mg, Oral, Daily  Calcium Carb-Cholecalciferol, 1 tablet, Oral, Daily  cholecalciferol, 50,000 Units, Oral, Daily  dexamethasone, 6 mg, Oral, Daily   Or  dexamethasone, 6 mg, Intravenous, Daily  enoxaparin, 30 mg, Subcutaneous, Q24H  famotidine, 20 mg, Oral, BID AC  ferrous sulfate, 325 mg, Oral, BID With Meals  finasteride, 5 mg, Oral, Daily  levothyroxine, 75 mcg, Oral, Daily  linaclotide, 72 mcg, Oral, QAM AC  polyethylene glycol, 17 g, Oral, Daily  remdesivir, 100 mg, Intravenous, Q24H  terazosin, 2 mg, Oral, Nightly    Pharmacy Consult - Remdesivir,         Current Antimicrobial Therapy:  Anti-Infectives (From admission, onward)    Ordered     Dose/Rate Route Frequency Start Stop    23 1344  remdesivir 100 mg in 270 mL NS        Ordering Provider: Tayler Barriga DO   See Hyperspace for full Linked Orders Report.    100 mg  over 60 Minutes Intravenous Every 24 Hours 03/15/23 1500 23 1459    23 1344  remdesivir 200 mg in 290 mL NS        Ordering Provider: Tayler Barriga, " DO   See Bud for full Linked Orders Report.    200 mg  over 60 Minutes Intravenous Every 24 Hours 03/14/23 1500 03/15/23 0352        Current Diuretic Therapy:  Diuretics (From admission, onward)    None        ----------------------------------------------------------------------------------------------------------------------  Vital Signs:  Temp:  [96.7 °F (35.9 °C)-98 °F (36.7 °C)] 97.6 °F (36.4 °C)  Heart Rate:  [60-76] 76  Resp:  [18-20] 18  BP: (100-140)/(53-58) 102/58  SpO2:  [96 %-99 %] 98 %  on   ;   Device (Oxygen Therapy): room air  Body mass index is 21.79 kg/m².    Wt Readings from Last 3 Encounters:   03/16/23 65 kg (143 lb 4.8 oz)   03/13/23 66.2 kg (146 lb)   02/28/23 66.2 kg (146 lb)     Intake & Output (last 3 days)       03/13 0701  03/14 0700 03/14 0701  03/15 0700 03/15 0701  03/16 0700 03/16 0701  03/17 0700    P.O.   720 600    Total Intake(mL/kg)   720 (11.1) 600 (9.2)    Net   +720 +600            Urine Unmeasured Occurrence  4 x 7 x 2 x    Stool Unmeasured Occurrence   1 x         Diet: Regular/House Diet; Texture: Regular Texture (IDDSI 7); Fluid Consistency: Thin (IDDSI 0)  ----------------------------------------------------------------------------------------------------------------------  Physical exam:   Constitutional:  Well-developed. Elderly and thin appearing.  No acute distress.      HENT:  Head:  Normocephalic and atraumatic.    Cardiovascular:  Normal rate, regular rhythm   Pulmonary/Chest:  No respiratory distress, no wheezes. Very mild crackles in all posterior lung fields bilaterally. Slightly diminished breath sounds.   Musculoskeletal:  No deformity.    Neurological: Awake, alert, no focal deficit on gross examination. No slurred speech or facial droop.   Skin:  Skin is warm and dry. No rash noted. No pallor.   Peripheral vascular:  No cyanosis, no edema.  Psychiatric: Appropriate mood and affect  Edited by: Tayler Barriga DO at 3/16/2023  1820  ----------------------------------------------------------------------------------------------------------------------  Results from last 7 days   Lab Units 03/16/23  0329 03/15/23  0533 03/14/23  1234 03/14/23  1030   CRP mg/dL 1.50* 2.16* 1.23* 1.12*   LACTATE mmol/L  --   --   --  1.3   WBC 10*3/mm3 10.78 6.34  --  7.03   HEMOGLOBIN g/dL 12.4* 11.5*  --  11.8*   HEMATOCRIT % 39.7 36.3*  --  36.2*   MCV fL 91.5 92.1  --  90.7   MCHC g/dL 31.2* 31.7  --  32.6   PLATELETS 10*3/mm3 178 137*  --  138*   INR   --   --   --  1.10     Results from last 7 days   Lab Units 03/14/23  1106   PH, ARTERIAL pH units 7.447  7.447   PO2 ART mm Hg 60.9*  60.9*   PCO2, ARTERIAL mm Hg 30.5*  30.5*   HCO3 ART mmol/L 21.1  21.1     Results from last 7 days   Lab Units 03/16/23  0329 03/15/23  0533 03/14/23  1353 03/14/23  1030   SODIUM mmol/L 138 138  --  135*   POTASSIUM mmol/L 4.4 4.6  --  4.5   MAGNESIUM mg/dL  --   --  2.0  --    CHLORIDE mmol/L 105 106  --  105   CO2 mmol/L 21.9* 20.3*  --  20.5*   BUN mg/dL 22 16  --  15   CREATININE mg/dL 1.26 1.39* 1.59* 1.59*   CALCIUM mg/dL 9.2 9.0  --  8.5   GLUCOSE mg/dL 120* 152*  --  99   ALBUMIN g/dL 3.5 3.3* 3.3* 3.3*   BILIRUBIN mg/dL 0.2 0.2 0.6 0.6   ALK PHOS U/L 103 100 95 95   AST (SGOT) U/L 35 24 24 24   ALT (SGPT) U/L 29 18 16 17   Estimated Creatinine Clearance: 32.2 mL/min (by C-G formula based on SCr of 1.26 mg/dL).  No results found for: AMMONIA  Results from last 7 days   Lab Units 03/14/23  1234 03/14/23  1030   HSTROP T ng/L 24* 26*     Results from last 7 days   Lab Units 03/14/23  1030   PROBNP pg/mL 386.4     Results from last 7 days   Lab Units 03/15/23  0533   CHOLESTEROL mg/dL 144   TRIGLYCERIDES mg/dL 71   HDL CHOL mg/dL 42   LDL CHOL mg/dL 88     Hemoglobin A1C   Date/Time Value Ref Range Status   03/14/2023 1030 5.80 (H) 4.80 - 5.60 % Final     Lab Results   Component Value Date    TSH 3.050 03/14/2023    FREET4 1.48 03/14/2023     No results found for:  PREGTESTUR, PREGSERUM, HCG, HCGQUANT  Pain Management Panel    There is no flowsheet data to display.       Brief Urine Lab Results  (Last result in the past 365 days)      Color   Clarity   Blood   Leuk Est   Nitrite   Protein   CREAT   Urine HCG        02/19/23 0113 Yellow   Clear   Negative   Negative   Negative   Negative               No results found for: BLOODCX  No results found for: URINECX  No results found for: WOUNDCX  No results found for: STOOLCX  No results found for: RESPCX  No results found for: AFBCX  Results from last 7 days   Lab Units 03/16/23  0329 03/15/23  0533 03/14/23  1353 03/14/23  1234 03/14/23  1030 03/13/23  2154   PROCALCITONIN ng/mL  --  0.04 0.04  --  0.05  --    LACTATE mmol/L  --   --   --   --  1.3  --    CRP mg/dL 1.50* 2.16*  --  1.23* 1.12* 0.74*       I have personally looked at the labs and they are summarized above.  ----------------------------------------------------------------------------------------------------------------------  Detailed radiology reports for the last 24 hours:  Imaging Results (Last 24 Hours)     ** No results found for the last 24 hours. **        Assessment & Plan      #Acute COVID-19 infection, present on admission  #Acute hypoxemic respiratory failure secondary to above  #Indeterminate high sensitivity troponin elevation  - COVID-19 positive date: 3/13/2023. PO2 low on ABG and he had worsening symptoms prior to admission. He is high risk for worsening/complications of covid due to his age.  - D dimer elevated, but no evidence of DVT on lower extremity venous dopplers and VQ scan was low suspicion for PE. CTA chest contraindicated due to patient's renal function.  - Continue lovenox, pharmacy to dose, for VTE prophylaxis  - Low suspicion for bacterial pneumonia given no evidence of pneumonia on CXR and low procalcitonin.  - Patient desires to receive remdesivir and dexamethasone. These were started yesterday--continue same and monitor clinical  status and renal function closely.  - Continue albuterol HFA prn shortness of breath or cough  - Continue enhanced droplet/contact isolation per protocol for COVID-19.  - Continue to monitor COVID-19 progression labs per protocol: Q24h CBC, CMP, CK, CRP, and Ferritin level in addition to Q48h LDH, D-dimer, and Fibrinogen levels. Inflammatory markers are improving.  - High-sensitivity troponin T indeterminately elevated at 26 with -2 delta on repeat. EKG showed no acute ischemic changes. No chest pain reported. Troponin elevation not consistent with ACS.  - Continue aspirin 81mg daily  - Monitor on telemetry.  - Repeat chest x-ray as he now has mild crackles on lung exam. No current increased O2 requirement.     #Grade 1 diastolic dysfunction  #History of symptomatic bradycardia s/p PPM  #History of moderate AVR  #Systolic murmur   - Appears euvolemic  - Continue telemetry monitoring  - Monitor I&Os, daily weights  - TTE in 2021 showed LV hypertrophy, LV EF 56-60%, grade I diastolic dysfunction, and moderate aortic valve regurgitation. Since heart failure appears stable we will defer repeating TTE for now. If volume status appears to worsen then we will repeat an echo.    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    #Reported history of COPD, not formally diagnosed  - on room air at baseline  - Initially was on 2L here, now back on room air  - continue pulse oximetry   - PRN albuterol as noted above    #Hypothyroidism  - TSH WNL at admission  - Continue home levothyroxine    #Reported melena  - H+H have been stable around 11-12 during this admission so far. Monitor with repeat cbc in am. If no significant acute blood loss noted then  I would recommend outpatient follow up with General Surgery or GI. He reports they discussed doing a colonoscopy with him recently but he was told the risks likely outweighed the benefits with his age.    Edited by: Tayler Barriga DO at 3/16/2023 2930    VTE Prophylaxis:   Mechanical Order History:     None      Pharmalogical Order History:      Ordered     Dose Route Frequency Stop    03/14/23 1523  Enoxaparin Sodium (LOVENOX) syringe 30 mg         30 mg SC Every 24 Hours --    03/14/23 1519  Pharmacy to Dose enoxaparin (LOVENOX)  Status:  Discontinued         -- XX Continuous PRN 03/14/23 1524    03/14/23 1344  heparin (porcine) 5000 UNIT/ML injection 5,000 Units  Status:  Discontinued         5,000 Units SC Every 8 Hours Scheduled 03/14/23 1519                Dispo:  likely home at discharge pending clinical improvement     Tayler Barriga DO  [facility]  03/16/23  18:20 EDT

## 2023-03-16 NOTE — PLAN OF CARE
Patient resting in the bed throughout the night. No s/s of distress noted. No complaints. Will continue to monitor and follow care plan.

## 2023-03-17 ENCOUNTER — APPOINTMENT (OUTPATIENT)
Dept: GENERAL RADIOLOGY | Facility: HOSPITAL | Age: 88
DRG: 177 | End: 2023-03-17
Payer: OTHER GOVERNMENT

## 2023-03-17 LAB
ALBUMIN SERPL-MCNC: 3.1 G/DL (ref 3.5–5.2)
ALBUMIN/GLOB SERPL: 1.1 G/DL
ALP SERPL-CCNC: 88 U/L (ref 39–117)
ALT SERPL W P-5'-P-CCNC: 28 U/L (ref 1–41)
ANION GAP SERPL CALCULATED.3IONS-SCNC: 9.7 MMOL/L (ref 5–15)
AST SERPL-CCNC: 29 U/L (ref 1–40)
BASOPHILS # BLD AUTO: 0.01 10*3/MM3 (ref 0–0.2)
BASOPHILS NFR BLD AUTO: 0.1 % (ref 0–1.5)
BILIRUB CONJ SERPL-MCNC: <0.2 MG/DL (ref 0–0.3)
BILIRUB SERPL-MCNC: 0.2 MG/DL (ref 0–1.2)
BUN SERPL-MCNC: 28 MG/DL (ref 8–23)
BUN/CREAT SERPL: 20.6 (ref 7–25)
CALCIUM SPEC-SCNC: 9.1 MG/DL (ref 8.2–9.6)
CHLORIDE SERPL-SCNC: 107 MMOL/L (ref 98–107)
CO2 SERPL-SCNC: 22.3 MMOL/L (ref 22–29)
CREAT SERPL-MCNC: 1.36 MG/DL (ref 0.76–1.27)
CRP SERPL-MCNC: 0.74 MG/DL (ref 0–0.5)
D DIMER PPP FEU-MCNC: 0.79 MCGFEU/ML (ref 0–0.95)
DEPRECATED RDW RBC AUTO: 55.9 FL (ref 37–54)
EGFRCR SERPLBLD CKD-EPI 2021: 47.9 ML/MIN/1.73
EOSINOPHIL # BLD AUTO: 0.01 10*3/MM3 (ref 0–0.4)
EOSINOPHIL NFR BLD AUTO: 0.1 % (ref 0.3–6.2)
ERYTHROCYTE [DISTWIDTH] IN BLOOD BY AUTOMATED COUNT: 17.3 % (ref 12.3–15.4)
FERRITIN SERPL-MCNC: 47.76 NG/ML (ref 30–400)
FIBRINOGEN PPP-MCNC: 325 MG/DL (ref 173–524)
GLOBULIN UR ELPH-MCNC: 2.8 GM/DL
GLUCOSE SERPL-MCNC: 109 MG/DL (ref 65–99)
HCT VFR BLD AUTO: 34.4 % (ref 37.5–51)
HGB BLD-MCNC: 11.2 G/DL (ref 13–17.7)
IMM GRANULOCYTES # BLD AUTO: 0.04 10*3/MM3 (ref 0–0.05)
IMM GRANULOCYTES NFR BLD AUTO: 0.5 % (ref 0–0.5)
LDH SERPL-CCNC: 192 U/L (ref 135–225)
LYMPHOCYTES # BLD AUTO: 1.52 10*3/MM3 (ref 0.7–3.1)
LYMPHOCYTES NFR BLD AUTO: 17.4 % (ref 19.6–45.3)
MCH RBC QN AUTO: 29.2 PG (ref 26.6–33)
MCHC RBC AUTO-ENTMCNC: 32.6 G/DL (ref 31.5–35.7)
MCV RBC AUTO: 89.8 FL (ref 79–97)
MONOCYTES # BLD AUTO: 0.41 10*3/MM3 (ref 0.1–0.9)
MONOCYTES NFR BLD AUTO: 4.7 % (ref 5–12)
NEUTROPHILS NFR BLD AUTO: 6.74 10*3/MM3 (ref 1.7–7)
NEUTROPHILS NFR BLD AUTO: 77.2 % (ref 42.7–76)
NRBC BLD AUTO-RTO: 0 /100 WBC (ref 0–0.2)
PLATELET # BLD AUTO: 161 10*3/MM3 (ref 140–450)
PMV BLD AUTO: 10.4 FL (ref 6–12)
POTASSIUM SERPL-SCNC: 4.6 MMOL/L (ref 3.5–5.2)
PROCALCITONIN SERPL-MCNC: 0.03 NG/ML (ref 0–0.25)
PROT SERPL-MCNC: 5.9 G/DL (ref 6–8.5)
RBC # BLD AUTO: 3.83 10*6/MM3 (ref 4.14–5.8)
SODIUM SERPL-SCNC: 139 MMOL/L (ref 136–145)
WBC NRBC COR # BLD: 8.73 10*3/MM3 (ref 3.4–10.8)

## 2023-03-17 PROCEDURE — 86140 C-REACTIVE PROTEIN: CPT | Performed by: STUDENT IN AN ORGANIZED HEALTH CARE EDUCATION/TRAINING PROGRAM

## 2023-03-17 PROCEDURE — 99232 SBSQ HOSP IP/OBS MODERATE 35: CPT | Performed by: STUDENT IN AN ORGANIZED HEALTH CARE EDUCATION/TRAINING PROGRAM

## 2023-03-17 PROCEDURE — 94761 N-INVAS EAR/PLS OXIMETRY MLT: CPT

## 2023-03-17 PROCEDURE — 83615 LACTATE (LD) (LDH) ENZYME: CPT

## 2023-03-17 PROCEDURE — 94799 UNLISTED PULMONARY SVC/PX: CPT

## 2023-03-17 PROCEDURE — 25010000002 ENOXAPARIN PER 10 MG

## 2023-03-17 PROCEDURE — 63710000001 DEXAMETHASONE PER 0.25 MG: Performed by: STUDENT IN AN ORGANIZED HEALTH CARE EDUCATION/TRAINING PROGRAM

## 2023-03-17 PROCEDURE — 85384 FIBRINOGEN ACTIVITY: CPT

## 2023-03-17 PROCEDURE — 25010000002 REMDESIVIR 100 MG RECONSTITUTED SOLUTION: Performed by: STUDENT IN AN ORGANIZED HEALTH CARE EDUCATION/TRAINING PROGRAM

## 2023-03-17 PROCEDURE — 85379 FIBRIN DEGRADATION QUANT: CPT | Performed by: STUDENT IN AN ORGANIZED HEALTH CARE EDUCATION/TRAINING PROGRAM

## 2023-03-17 PROCEDURE — 71045 X-RAY EXAM CHEST 1 VIEW: CPT

## 2023-03-17 PROCEDURE — 82728 ASSAY OF FERRITIN: CPT | Performed by: STUDENT IN AN ORGANIZED HEALTH CARE EDUCATION/TRAINING PROGRAM

## 2023-03-17 PROCEDURE — 85025 COMPLETE CBC W/AUTO DIFF WBC: CPT | Performed by: STUDENT IN AN ORGANIZED HEALTH CARE EDUCATION/TRAINING PROGRAM

## 2023-03-17 PROCEDURE — 84145 PROCALCITONIN (PCT): CPT | Performed by: STUDENT IN AN ORGANIZED HEALTH CARE EDUCATION/TRAINING PROGRAM

## 2023-03-17 PROCEDURE — 71045 X-RAY EXAM CHEST 1 VIEW: CPT | Performed by: RADIOLOGY

## 2023-03-17 PROCEDURE — 80053 COMPREHEN METABOLIC PANEL: CPT | Performed by: STUDENT IN AN ORGANIZED HEALTH CARE EDUCATION/TRAINING PROGRAM

## 2023-03-17 PROCEDURE — 82248 BILIRUBIN DIRECT: CPT | Performed by: STUDENT IN AN ORGANIZED HEALTH CARE EDUCATION/TRAINING PROGRAM

## 2023-03-17 RX ADMIN — FERROUS SULFATE TAB 325 MG (65 MG ELEMENTAL FE) 325 MG: 325 (65 FE) TAB at 18:48

## 2023-03-17 RX ADMIN — ASPIRIN 81 MG: 81 TABLET, COATED ORAL at 08:13

## 2023-03-17 RX ADMIN — LINACLOTIDE 72 MCG: 72 CAPSULE, GELATIN COATED ORAL at 08:11

## 2023-03-17 RX ADMIN — REMDESIVIR 100 MG: 100 INJECTION, POWDER, LYOPHILIZED, FOR SOLUTION INTRAVENOUS at 14:43

## 2023-03-17 RX ADMIN — FERROUS SULFATE TAB 325 MG (65 MG ELEMENTAL FE) 325 MG: 325 (65 FE) TAB at 08:12

## 2023-03-17 RX ADMIN — DEXAMETHASONE 6 MG: 4 TABLET ORAL at 08:12

## 2023-03-17 RX ADMIN — POLYETHYLENE GLYCOL 3350 17 G: 17 POWDER, FOR SOLUTION ORAL at 08:13

## 2023-03-17 RX ADMIN — Medication 1 TABLET: at 08:13

## 2023-03-17 RX ADMIN — ENOXAPARIN SODIUM 30 MG: 100 INJECTION SUBCUTANEOUS at 18:49

## 2023-03-17 RX ADMIN — FAMOTIDINE 20 MG: 20 TABLET ORAL at 18:48

## 2023-03-17 RX ADMIN — FINASTERIDE 5 MG: 5 TABLET, FILM COATED ORAL at 08:12

## 2023-03-17 RX ADMIN — TERAZOSIN HYDROCHLORIDE 2 MG: 5 CAPSULE ORAL at 19:44

## 2023-03-17 RX ADMIN — LEVOTHYROXINE SODIUM 75 MCG: 0.07 TABLET ORAL at 08:13

## 2023-03-17 RX ADMIN — FAMOTIDINE 20 MG: 20 TABLET ORAL at 08:12

## 2023-03-17 RX ADMIN — OFLOXACIN 50000 UNITS: 300 TABLET, COATED ORAL at 08:11

## 2023-03-17 NOTE — PLAN OF CARE
Goal Outcome Evaluation:  Plan of Care Reviewed With: patient        Progress: improving  Outcome Evaluation: Pt resting in bed throughout shift at this time. Pt Alert and Oriented x 4. Patient denies any complaints at this time. VSS. Pt currently on RA at this time. Will continue plan of care throughout shift.

## 2023-03-17 NOTE — PROGRESS NOTES
[facility] HOSPITALIST PROGRESS NOTE     Patient Identification:  Name:  Joaquín Rogers  Age:  89 y.o.  Sex:  male  :  1927  MRN:  [id number]  Visit Number:  [id number]  ROOM: [address] [place] [address]     Primary Care Provider:  Uday Roche DO    Length of stay in inpatient status:  3    Subjective     Chief Compliant:    Chief Complaint   Patient presents with   • Shortness of Breath       History of Presenting Illness:    Patient seen and examined early this afternoon. He reported feeling well, denied any shortness of breath or cough. No acute events noted overnight. No needs noted at time of my exam.    Objective     Current Hospital Meds:aspirin, 81 mg, Oral, Daily  Calcium Carb-Cholecalciferol, 1 tablet, Oral, Daily  cholecalciferol, 50,000 Units, Oral, Daily  dexamethasone, 6 mg, Oral, Daily   Or  dexamethasone, 6 mg, Intravenous, Daily  enoxaparin, 30 mg, Subcutaneous, Q24H  famotidine, 20 mg, Oral, BID AC  ferrous sulfate, 325 mg, Oral, BID With Meals  finasteride, 5 mg, Oral, Daily  levothyroxine, 75 mcg, Oral, Daily  linaclotide, 72 mcg, Oral, QAM AC  polyethylene glycol, 17 g, Oral, Daily  remdesivir, 100 mg, Intravenous, Q24H  terazosin, 2 mg, Oral, Nightly    Pharmacy Consult - Remdesivir,         Current Antimicrobial Therapy:  Anti-Infectives (From admission, onward)    Ordered     Dose/Rate Route Frequency Start Stop    23 1344  remdesivir 100 mg in 270 mL NS        Ordering Provider: Tayler Barriga DO   See Hyperssheila for full Linked Orders Report.    100 mg  over 60 Minutes Intravenous Every 24 Hours 03/15/23 1500 23 1459    23 1344  remdesivir 200 mg in 290 mL NS        Ordering Provider: Tayler Barriga DO   See Hyperspace for full Linked Orders Report.    200 mg  over 60 Minutes Intravenous Every 24 Hours 23 1500 03/15/23 0352        Current Diuretic Therapy:  Diuretics (From admission, onward)    None         ----------------------------------------------------------------------------------------------------------------------  Vital Signs:  Temp:  [97.6 °F (36.4 °C)-98.1 °F (36.7 °C)] 97.6 °F (36.4 °C)  Heart Rate:  [64-74] 67  Resp:  [16-18] 16  BP: (100-116)/(50-68) 113/68  SpO2:  [95 %-98 %] 96 %  on   ;   Device (Oxygen Therapy): room air  Body mass index is 21.93 kg/m².    Wt Readings from Last 3 Encounters:   03/17/23 65.4 kg (144 lb 3.2 oz)   03/13/23 66.2 kg (146 lb)   02/28/23 66.2 kg (146 lb)     Intake & Output (last 3 days)       03/15 0701  03/16 0700 03/16 0701  03/17 0700 03/17 0701  03/18 0700    P.O. 720 720 720    Total Intake(mL/kg) 720 (11.1) 720 (11) 720 (11)    Urine (mL/kg/hr)   300 (0.4)    Total Output   300    Net +720 +720 +420           Urine Unmeasured Occurrence 7 x 2 x 3 x    Stool Unmeasured Occurrence 1 x          Diet: Regular/House Diet; Texture: Regular Texture (IDDSI 7); Fluid Consistency: Thin (IDDSI 0)  ----------------------------------------------------------------------------------------------------------------------  Physical exam:   Constitutional:  Well-developed. Elderly and thin appearing.  No acute distress.      HENT:  Head:  Normocephalic and atraumatic.    Cardiovascular:  Normal rate, regular rhythm   Pulmonary/Chest:  No respiratory distress, no wheezes. Very mild crackles in all posterior lung fields bilaterally, unchanged from yesterday. Slightly diminished breath sounds.   Musculoskeletal:  No deformity.    Neurological: Awake, alert, no focal deficit on gross examination. No slurred speech or facial droop.   Skin:  Skin is warm and dry. No rash noted. No pallor.   Peripheral vascular:  No cyanosis, no edema.  Psychiatric: Appropriate mood and affect  Edited by: Tayler Barriga DO at 3/17/2023 1907  ----------------------------------------------------------------------------------------------------------------------  Results from last 7 days   Lab Units  03/17/23  0405 03/16/23  0329 03/15/23  0533 03/14/23  1234 03/14/23  1030   CRP mg/dL 0.74* 1.50* 2.16*   < > 1.12*   LACTATE mmol/L  --   --   --   --  1.3   WBC 10*3/mm3 8.73 10.78 6.34  --  7.03   HEMOGLOBIN g/dL 11.2* 12.4* 11.5*  --  11.8*   HEMATOCRIT % 34.4* 39.7 36.3*  --  36.2*   MCV fL 89.8 91.5 92.1  --  90.7   MCHC g/dL 32.6 31.2* 31.7  --  32.6   PLATELETS 10*3/mm3 161 178 137*  --  138*   INR   --   --   --   --  1.10    < > = values in this interval not displayed.     Results from last 7 days   Lab Units 03/14/23  1106   PH, ARTERIAL pH units 7.447  7.447   PO2 ART mm Hg 60.9*  60.9*   PCO2, ARTERIAL mm Hg 30.5*  30.5*   HCO3 ART mmol/L 21.1  21.1     Results from last 7 days   Lab Units 03/17/23  0405 03/16/23  0329 03/15/23  0533 03/14/23  1353   SODIUM mmol/L 139 138 138  --    POTASSIUM mmol/L 4.6 4.4 4.6  --    MAGNESIUM mg/dL  --   --   --  2.0   CHLORIDE mmol/L 107 105 106  --    CO2 mmol/L 22.3 21.9* 20.3*  --    BUN mg/dL 28* 22 16  --    CREATININE mg/dL 1.36* 1.26 1.39* 1.59*   CALCIUM mg/dL 9.1 9.2 9.0  --    GLUCOSE mg/dL 109* 120* 152*  --    ALBUMIN g/dL 3.1* 3.5 3.3* 3.3*   BILIRUBIN mg/dL 0.2 0.2 0.2 0.6   ALK PHOS U/L 88 103 100 95   AST (SGOT) U/L 29 35 24 24   ALT (SGPT) U/L 28 29 18 16   Estimated Creatinine Clearance: 30.1 mL/min (A) (by C-G formula based on SCr of 1.36 mg/dL (H)).  No results found for: AMMONIA  Results from last 7 days   Lab Units 03/14/23  1234 03/14/23  1030   HSTROP T ng/L 24* 26*     Results from last 7 days   Lab Units 03/14/23  1030   PROBNP pg/mL 386.4     Results from last 7 days   Lab Units 03/15/23  0533   CHOLESTEROL mg/dL 144   TRIGLYCERIDES mg/dL 71   HDL CHOL mg/dL 42   LDL CHOL mg/dL 88     No results found for: HGBA1C, POCGLU  Lab Results   Component Value Date    TSH 3.050 03/14/2023    FREET4 1.48 03/14/2023     No results found for: PREGTESTUR, PREGSERUM, HCG, HCGQUANT  Pain Management Panel    There is no flowsheet data to display.        Brief Urine Lab Results  (Last result in the past 365 days)      Color   Clarity   Blood   Leuk Est   Nitrite   Protein   CREAT   Urine HCG        02/19/23 0113 Yellow   Clear   Negative   Negative   Negative   Negative               No results found for: BLOODCX  No results found for: URINECX  No results found for: WOUNDCX  No results found for: STOOLCX  No results found for: RESPCX  No results found for: AFBCX  Results from last 7 days   Lab Units 03/17/23  0405 03/16/23  0329 03/15/23  0533 03/14/23  1353 03/14/23  1234 03/14/23  1030 03/13/23  2154   PROCALCITONIN ng/mL 0.03  --  0.04 0.04  --  0.05  --    LACTATE mmol/L  --   --   --   --   --  1.3  --    CRP mg/dL 0.74* 1.50* 2.16*  --  1.23* 1.12* 0.74*       I have personally looked at the labs and they are summarized above.  ----------------------------------------------------------------------------------------------------------------------  Detailed radiology reports for the last 24 hours:  Imaging Results (Last 24 Hours)     Procedure Component Value Units Date/Time    XR Chest 1 View [[id number]] Collected: 03/17/23 1054     Updated: 03/17/23 1057    Narrative:      EXAM:    XR Chest, 1 View     EXAM DATE:    3/17/2023 9:53 AM     CLINICAL HISTORY:    follow up COVID-19, mild crackles on lung exam; U07.1-COVID-19     TECHNIQUE:    Frontal view of the chest.     COMPARISON:    03/14/2023     FINDINGS:    Lungs:  Unremarkable.  No consolidation.    Pleural space:  Unremarkable.  No pneumothorax.    Heart:  Mild cardiomegaly is noted.    Mediastinum:  Unremarkable.    Bones/joints:  Bony structures are stable.    Tubes, lines and devices:  Left cardiac pacer device is stable.    Upper abdomen:  Eventration or elevation of left hemidiaphragm.       Impression:        Mild cardiomegaly. Otherwise stable chest with no acute changes.     This report was finalized on 3/17/2023 10:54 AM by Dr. Frankie Leonardo MD.           Assessment & Plan    #Acute COVID-19  infection, present on admission  #Acute hypoxemic respiratory failure secondary to above  #Indeterminate high sensitivity troponin elevation  - COVID-19 positive date: 3/13/2023. PO2 low on ABG and he had worsening symptoms prior to admission. He is high risk for worsening/complications of covid due to his age.  - D dimer elevated, but no evidence of DVT on lower extremity venous dopplers and VQ scan was low suspicion for PE. CTA chest contraindicated due to patient's renal function.  - Continue lovenox, pharmacy to dose, for VTE prophylaxis  - Low suspicion for bacterial pneumonia given no evidence of pneumonia on CXR and low procalcitonin.  - Patient desires to receive remdesivir and dexamethasone. These were started yesterday--continue same and monitor clinical status and renal function closely.  - Continue albuterol HFA prn shortness of breath or cough  - Continue enhanced droplet/contact isolation per protocol for COVID-19.  - Continue to monitor COVID-19 progression labs per protocol: Q24h CBC, CMP, CK, CRP, and Ferritin level in addition to Q48h LDH, D-dimer, and Fibrinogen levels. Inflammatory markers continue to improve.  - High-sensitivity troponin T indeterminately elevated at 26 with -2 delta on repeat. EKG showed no acute ischemic changes. No chest pain reported. Troponin elevation not consistent with ACS.  - Continue aspirin 81mg daily  - Monitor on telemetry.  - Repeat chest x-ray showed mild cardiomegaly, otherwise stable chest with no acute changes.     #Grade 1 diastolic dysfunction  #History of symptomatic bradycardia s/p PPM  #History of moderate AVR  #Systolic murmur   - Appears euvolemic  - Continue telemetry monitoring  - Monitor I&Os, daily weights  - TTE in 2021 showed LV hypertrophy, LV EF 56-60%, grade I diastolic dysfunction, and moderate aortic valve regurgitation. Since heart failure appears stable we will defer repeating TTE for now. If volume status appears to worsen then we will  repeat an echo.     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     #Reported history of COPD, not formally diagnosed  - on room air at baseline  - Initially was on 2L here, now back on room air  - continue pulse oximetry   - PRN albuterol as noted above     #Hypothyroidism  - TSH WNL at admission  - Continue home levothyroxine     #Reported melena  - H+H have been stable around 11-12 during this admission so far. Monitor with repeat cbc in am. If no significant acute blood loss noted then I would recommend outpatient follow up with General Surgery or GI. He reports they discussed doing a colonoscopy with him recently but he was told the risks likely outweighed the benefits with his age.  Edited by: Tayler Barriga DO at 3/17/2023 1907    VTE Prophylaxis:   Mechanical Order History:     None      Pharmalogical Order History:      Ordered     Dose Route Frequency Stop    03/14/23 1523  Enoxaparin Sodium (LOVENOX) syringe 30 mg         30 mg SC Every 24 Hours --    03/14/23 1519  Pharmacy to Dose enoxaparin (LOVENOX)  Status:  Discontinued         -- XX Continuous PRN 03/14/23 1524    03/14/23 1344  heparin (porcine) 5000 UNIT/ML injection 5,000 Units  Status:  Discontinued         5,000 Units SC Every 8 Hours Scheduled 03/14/23 1519                Dispo:  likely home at discharge pending completion of remdesivir therapy, likely within 48-72 hours    Tayler Barriga DO  [facility] Hospitalist  03/17/23  19:07 EDT

## 2023-03-17 NOTE — PLAN OF CARE
Problem: Adult Inpatient Plan of Care  Goal: Absence of Hospital-Acquired Illness or Injury  Intervention: Identify and Manage Fall Risk  Recent Flowsheet Documentation  Taken 3/17/2023 1700 by Gem Fuentes RN  Safety Promotion/Fall Prevention: safety round/check completed  Taken 3/17/2023 1500 by Gem Fuentes RN  Safety Promotion/Fall Prevention: safety round/check completed  Taken 3/17/2023 1300 by Gem Fuentes RN  Safety Promotion/Fall Prevention: safety round/check completed  Taken 3/17/2023 1100 by Gem Fuentes RN  Safety Promotion/Fall Prevention: safety round/check completed  Taken 3/17/2023 0900 by Gem Fuentes RN  Safety Promotion/Fall Prevention: safety round/check completed  Taken 3/17/2023 0810 by Gem Fuentes RN  Safety Promotion/Fall Prevention: safety round/check completed  Taken 3/17/2023 0705 by Gem Fuentes RN  Safety Promotion/Fall Prevention: safety round/check completed     Problem: Adult Inpatient Plan of Care  Goal: Absence of Hospital-Acquired Illness or Injury  Intervention: Prevent Skin Injury  Recent Flowsheet Documentation  Taken 3/17/2023 1700 by Gem Fuentes RN  Body Position: position changed independently  Taken 3/17/2023 1500 by Gem Fuentes RN  Body Position: position changed independently  Taken 3/17/2023 1300 by Gem Fuentes RN  Body Position: position changed independently  Taken 3/17/2023 1100 by Gem Fuentes RN  Body Position: position changed independently  Taken 3/17/2023 0900 by Gem Fuentes RN  Body Position: position changed independently  Taken 3/17/2023 0810 by Gem Fuentes RN  Body Position: position changed independently  Skin Protection: adhesive use limited  Taken 3/17/2023 0705 by Gem Fuentes RN  Body Position: position changed independently   Goal Outcome Evaluation:              Outcome Evaluation: Pt resting in bed No c/o pain and in distress VS stable Will continue to monitor and follow plan of care.

## 2023-03-18 LAB
ALBUMIN SERPL-MCNC: 3.5 G/DL (ref 3.5–5.2)
ALP SERPL-CCNC: 87 U/L (ref 39–117)
ALT SERPL W P-5'-P-CCNC: 34 U/L (ref 1–41)
AST SERPL-CCNC: 31 U/L (ref 1–40)
BILIRUB CONJ SERPL-MCNC: <0.2 MG/DL (ref 0–0.3)
BILIRUB INDIRECT SERPL-MCNC: NORMAL MG/DL
BILIRUB SERPL-MCNC: 0.3 MG/DL (ref 0–1.2)
CREAT SERPL-MCNC: 1.1 MG/DL (ref 0.76–1.27)
EGFRCR SERPLBLD CKD-EPI 2021: 61.8 ML/MIN/1.73
PROT SERPL-MCNC: 6.4 G/DL (ref 6–8.5)

## 2023-03-18 PROCEDURE — 82565 ASSAY OF CREATININE: CPT | Performed by: STUDENT IN AN ORGANIZED HEALTH CARE EDUCATION/TRAINING PROGRAM

## 2023-03-18 PROCEDURE — 99231 SBSQ HOSP IP/OBS SF/LOW 25: CPT | Performed by: STUDENT IN AN ORGANIZED HEALTH CARE EDUCATION/TRAINING PROGRAM

## 2023-03-18 PROCEDURE — 94799 UNLISTED PULMONARY SVC/PX: CPT

## 2023-03-18 PROCEDURE — 25010000002 REMDESIVIR 100 MG RECONSTITUTED SOLUTION: Performed by: STUDENT IN AN ORGANIZED HEALTH CARE EDUCATION/TRAINING PROGRAM

## 2023-03-18 PROCEDURE — 63710000001 DEXAMETHASONE PER 0.25 MG: Performed by: STUDENT IN AN ORGANIZED HEALTH CARE EDUCATION/TRAINING PROGRAM

## 2023-03-18 PROCEDURE — 80076 HEPATIC FUNCTION PANEL: CPT | Performed by: STUDENT IN AN ORGANIZED HEALTH CARE EDUCATION/TRAINING PROGRAM

## 2023-03-18 PROCEDURE — 25010000002 ENOXAPARIN PER 10 MG

## 2023-03-18 RX ADMIN — ASPIRIN 81 MG: 81 TABLET, COATED ORAL at 08:13

## 2023-03-18 RX ADMIN — FAMOTIDINE 20 MG: 20 TABLET ORAL at 08:13

## 2023-03-18 RX ADMIN — FERROUS SULFATE TAB 325 MG (65 MG ELEMENTAL FE) 325 MG: 325 (65 FE) TAB at 17:49

## 2023-03-18 RX ADMIN — REMDESIVIR 100 MG: 100 INJECTION, POWDER, LYOPHILIZED, FOR SOLUTION INTRAVENOUS at 14:41

## 2023-03-18 RX ADMIN — FINASTERIDE 5 MG: 5 TABLET, FILM COATED ORAL at 08:14

## 2023-03-18 RX ADMIN — TERAZOSIN HYDROCHLORIDE 2 MG: 5 CAPSULE ORAL at 21:02

## 2023-03-18 RX ADMIN — LEVOTHYROXINE SODIUM 75 MCG: 0.07 TABLET ORAL at 08:13

## 2023-03-18 RX ADMIN — LINACLOTIDE 72 MCG: 72 CAPSULE, GELATIN COATED ORAL at 08:12

## 2023-03-18 RX ADMIN — ENOXAPARIN SODIUM 30 MG: 100 INJECTION SUBCUTANEOUS at 17:49

## 2023-03-18 RX ADMIN — OFLOXACIN 50000 UNITS: 300 TABLET, COATED ORAL at 10:50

## 2023-03-18 RX ADMIN — Medication 1 TABLET: at 08:12

## 2023-03-18 RX ADMIN — POLYETHYLENE GLYCOL 3350 17 G: 17 POWDER, FOR SOLUTION ORAL at 08:13

## 2023-03-18 RX ADMIN — FERROUS SULFATE TAB 325 MG (65 MG ELEMENTAL FE) 325 MG: 325 (65 FE) TAB at 08:12

## 2023-03-18 RX ADMIN — DEXAMETHASONE 6 MG: 4 TABLET ORAL at 08:13

## 2023-03-18 NOTE — PLAN OF CARE
Goal Outcome Evaluation:   Patient resting well in room. A&Ox4 and tolerating the oxygen well on RA.  Isolation continues per protocol. HOB elevated and call bell in reach. Patient refuses bed alarm. Will continue follow the POC.

## 2023-03-18 NOTE — PLAN OF CARE
Goal Outcome Evaluation:Pt resting in bed No c/o pain and in no distress. Will continue to monitor and follow plan of care.                Problem: Adult Inpatient Plan of Care  Goal: Absence of Hospital-Acquired Illness or Injury  Intervention: Identify and Manage Fall Risk  Recent Flowsheet Documentation  Taken 3/18/2023 1700 by Gem Fuentes RN  Safety Promotion/Fall Prevention: safety round/check completed  Taken 3/18/2023 1500 by Gem Fuentes RN  Safety Promotion/Fall Prevention: safety round/check completed  Taken 3/18/2023 1300 by Gem Fuentes RN  Safety Promotion/Fall Prevention: safety round/check completed  Taken 3/18/2023 1100 by Gem Fuentes RN  Safety Promotion/Fall Prevention: safety round/check completed  Taken 3/18/2023 0900 by Gem Fuentes RN  Safety Promotion/Fall Prevention: safety round/check completed  Taken 3/18/2023 0750 by Gem Fuentes RN  Safety Promotion/Fall Prevention: safety round/check completed     Problem: Adult Inpatient Plan of Care  Goal: Absence of Hospital-Acquired Illness or Injury  Intervention: Prevent Skin Injury  Recent Flowsheet Documentation  Taken 3/18/2023 1700 by Gem Fuentes RN  Body Position: position changed independently  Taken 3/18/2023 1500 by Gem Fuentes RN  Body Position: position changed independently  Taken 3/18/2023 1300 by Gem Fuentes RN  Body Position: position changed independently  Taken 3/18/2023 1100 by Gem Fuentes RN  Body Position: position changed independently  Taken 3/18/2023 0900 by Gem Fuentes RN  Body Position: position changed independently  Taken 3/18/2023 0750 by Gem Fuentes RN  Body Position: position changed independently  Skin Protection: adhesive use limited     Problem: Adult Inpatient Plan of Care  Goal: Absence of Hospital-Acquired Illness or Injury  Intervention: Prevent and Manage VTE (Venous Thromboembolism) Risk  Recent Flowsheet Documentation  Taken 3/18/2023 1700 by Alfredo  Gem VILLASENOR RN  Activity Management: up ad rae  Taken 3/18/2023 1500 by Gem Fuentes RN  Activity Management: up ad rae  Taken 3/18/2023 1300 by Gem Fuentes RN  Activity Management: up ad rae  Taken 3/18/2023 1100 by Gem Fuentes RN  Activity Management: activity encouraged  Taken 3/18/2023 0900 by Gem Fuentes RN  Activity Management: activity encouraged  Taken 3/18/2023 0750 by Gem Fuentes RN  Activity Management: up ad rae  VTE Prevention/Management: patient refused intervention     Problem: Adult Inpatient Plan of Care  Goal: Absence of Hospital-Acquired Illness or Injury  Intervention: Prevent Infection  Recent Flowsheet Documentation  Taken 3/18/2023 1700 by Gem Fuentes RN  Infection Prevention:   hand hygiene promoted   single patient room provided  Taken 3/18/2023 1500 by Gem Fuentes RN  Infection Prevention:   single patient room provided   hand hygiene promoted  Taken 3/18/2023 1300 by Gem Fuentes RN  Infection Prevention:   single patient room provided   hand hygiene promoted  Taken 3/18/2023 1100 by Gem Fuentes RN  Infection Prevention: hand hygiene promoted  Taken 3/18/2023 0900 by Gem Fuentes RN  Infection Prevention:   hand hygiene promoted   single patient room provided  Taken 3/18/2023 0750 by Gem Fuentes RN  Infection Prevention:   hand hygiene promoted   single patient room provided

## 2023-03-18 NOTE — PROGRESS NOTES
[facility] HOSPITALIST PROGRESS NOTE     Patient Identification:  Name:  Joaquín Rogers  Age:  89 y.o.  Sex:  male  :  1927  MRN:  [id number]  Visit Number:  [id number]  ROOM: [address] [place] [address]     Primary Care Provider:  Uday Roche DO    Length of stay in inpatient status:  4    Subjective     Chief Compliant:    Chief Complaint   Patient presents with   • Shortness of Breath       History of Presenting Illness:    Patient seen and examined early this afternoon. He reports he is feeling well today and denies shortness of breath. Reports mild cough. No acute events noted overnight, no needs noted at this time.    Objective     Current Hospital Meds:aspirin, 81 mg, Oral, Daily  Calcium Carb-Cholecalciferol, 1 tablet, Oral, Daily  cholecalciferol, 50,000 Units, Oral, Daily  dexamethasone, 6 mg, Oral, Daily   Or  dexamethasone, 6 mg, Intravenous, Daily  enoxaparin, 30 mg, Subcutaneous, Q24H  famotidine, 20 mg, Oral, BID AC  ferrous sulfate, 325 mg, Oral, BID With Meals  finasteride, 5 mg, Oral, Daily  levothyroxine, 75 mcg, Oral, Daily  linaclotide, 72 mcg, Oral, QAM AC  polyethylene glycol, 17 g, Oral, Daily  terazosin, 2 mg, Oral, Nightly    Pharmacy Consult - Remdesivir,         Current Antimicrobial Therapy:  Anti-Infectives (From admission, onward)    Ordered     Dose/Rate Route Frequency Start Stop    23 1344  remdesivir 100 mg in 270 mL NS        Ordering Provider: Tayler Barriga DO   See Hyperspace for full Linked Orders Report.    100 mg  over 60 Minutes Intravenous Every 24 Hours 03/15/23 1500 23 1541    23 1344  remdesivir 200 mg in 290 mL NS        Ordering Provider: Tayler Barriga DO   See Hyperspace for full Linked Orders Report.    200 mg  over 60 Minutes Intravenous Every 24 Hours 23 1500 03/15/23 0352        Current Diuretic Therapy:  Diuretics (From admission, onward)    None         ----------------------------------------------------------------------------------------------------------------------  Vital Signs:  Temp:  [97.5 °F (36.4 °C)-98.1 °F (36.7 °C)] 97.7 °F (36.5 °C)  Heart Rate:  [60-92] 73  Resp:  [16-18] 18  BP: ()/(53-69) 96/53  SpO2:  [92 %-96 %] 96 %  on   ;   Device (Oxygen Therapy): room air  Body mass index is 22.02 kg/m².    Wt Readings from Last 3 Encounters:   03/18/23 65.7 kg (144 lb 12.8 oz)   03/13/23 66.2 kg (146 lb)   02/28/23 66.2 kg (146 lb)     Intake & Output (last 3 days)       03/15 0701  03/16 0700 03/16 0701  03/17 0700 03/17 0701  03/18 0700 03/18 0701  03/19 0700    P.O. 720 720 720 720    Total Intake(mL/kg) 720 (11.1) 720 (11) 720 (11) 720 (11)    Urine (mL/kg/hr)   300 (0.2)     Total Output   300     Net +720 +720 +420 +720            Urine Unmeasured Occurrence 7 x 2 x 5 x 2 x    Stool Unmeasured Occurrence 1 x           Diet: Regular/House Diet; Texture: Regular Texture (IDDSI 7); Fluid Consistency: Thin (IDDSI 0)  ----------------------------------------------------------------------------------------------------------------------  Physical exam:   Constitutional:  Well-developed. Elderly and thin appearing.  No acute distress.      HENT:  Head:  Normocephalic and atraumatic.    Cardiovascular:  Normal rate, regular rhythm   Pulmonary/Chest:  No respiratory distress, no wheezes. Slightly diminished breath sounds but no obvious crackles or wheezing.  Musculoskeletal:  No deformity.    Neurological: Awake, alert, no focal deficit on gross examination. No slurred speech or facial droop.   Skin:  Skin is warm and dry. No rash noted. No pallor.   Peripheral vascular:  No cyanosis, no edema.  Psychiatric: Appropriate mood and affect  Edited by: Tayler Barriga DO at 3/18/2023 1710  ----------------------------------------------------------------------------------------------------------------------  Results from last 7 days   Lab Units  03/17/23  0405 03/16/23  0329 03/15/23  0533 03/14/23  1234 03/14/23  1030   CRP mg/dL 0.74* 1.50* 2.16*   < > 1.12*   LACTATE mmol/L  --   --   --   --  1.3   WBC 10*3/mm3 8.73 10.78 6.34  --  7.03   HEMOGLOBIN g/dL 11.2* 12.4* 11.5*  --  11.8*   HEMATOCRIT % 34.4* 39.7 36.3*  --  36.2*   MCV fL 89.8 91.5 92.1  --  90.7   MCHC g/dL 32.6 31.2* 31.7  --  32.6   PLATELETS 10*3/mm3 161 178 137*  --  138*   INR   --   --   --   --  1.10    < > = values in this interval not displayed.     Results from last 7 days   Lab Units 03/14/23  1106   PH, ARTERIAL pH units 7.447  7.447   PO2 ART mm Hg 60.9*  60.9*   PCO2, ARTERIAL mm Hg 30.5*  30.5*   HCO3 ART mmol/L 21.1  21.1     Results from last 7 days   Lab Units 03/18/23  0806 03/17/23  0405 03/16/23 0329 03/15/23  0533 03/14/23  1353   SODIUM mmol/L  --  139 138 138  --    POTASSIUM mmol/L  --  4.6 4.4 4.6  --    MAGNESIUM mg/dL  --   --   --   --  2.0   CHLORIDE mmol/L  --  107 105 106  --    CO2 mmol/L  --  22.3 21.9* 20.3*  --    BUN mg/dL  --  28* 22 16  --    CREATININE mg/dL 1.10 1.36* 1.26 1.39* 1.59*   CALCIUM mg/dL  --  9.1 9.2 9.0  --    GLUCOSE mg/dL  --  109* 120* 152*  --    ALBUMIN g/dL 3.5 3.1* 3.5 3.3* 3.3*   BILIRUBIN mg/dL 0.3 0.2 0.2 0.2 0.6   ALK PHOS U/L 87 88 103 100 95   AST (SGOT) U/L 31 29 35 24 24   ALT (SGPT) U/L 34 28 29 18 16   Estimated Creatinine Clearance: 37.3 mL/min (by C-G formula based on SCr of 1.1 mg/dL).  No results found for: AMMONIA  Results from last 7 days   Lab Units 03/14/23  1234 03/14/23  1030   HSTROP T ng/L 24* 26*     Results from last 7 days   Lab Units 03/14/23  1030   PROBNP pg/mL 386.4     Results from last 7 days   Lab Units 03/15/23  0533   CHOLESTEROL mg/dL 144   TRIGLYCERIDES mg/dL 71   HDL CHOL mg/dL 42   LDL CHOL mg/dL 88     No results found for: HGBA1C, POCGLU  Lab Results   Component Value Date    TSH 3.050 03/14/2023    FREET4 1.48 03/14/2023     No results found for: PREGTESTUR, PREGSERUM, HCG,  HCGQUANT  Pain Management Panel    There is no flowsheet data to display.       Brief Urine Lab Results  (Last result in the past 365 days)      Color   Clarity   Blood   Leuk Est   Nitrite   Protein   CREAT   Urine HCG        02/19/23 0113 Yellow   Clear   Negative   Negative   Negative   Negative               No results found for: BLOODCX  No results found for: URINECX  No results found for: WOUNDCX  No results found for: STOOLCX  No results found for: RESPCX  No results found for: AFBCX  Results from last 7 days   Lab Units 03/17/23  0405 03/16/23  0329 03/15/23  0533 03/14/23  1353 03/14/23  1234 03/14/23  1030 03/13/23  2154   PROCALCITONIN ng/mL 0.03  --  0.04 0.04  --  0.05  --    LACTATE mmol/L  --   --   --   --   --  1.3  --    CRP mg/dL 0.74* 1.50* 2.16*  --  1.23* 1.12* 0.74*       I have personally looked at the labs and they are summarized above.  ----------------------------------------------------------------------------------------------------------------------  Detailed radiology reports for the last 24 hours:  Imaging Results (Last 24 Hours)     ** No results found for the last 24 hours. **        Assessment & Plan    #Acute COVID-19 infection, present on admission  #Acute hypoxemic respiratory failure secondary to above  #Indeterminate high sensitivity troponin elevation  - COVID-19 positive date: 3/13/2023. PO2 low on ABG and he had worsening symptoms prior to admission. He is high risk for worsening/complications of covid due to his age.  - D dimer elevated, but no evidence of DVT on lower extremity venous dopplers and VQ scan was low suspicion for PE. CTA chest contraindicated due to patient's renal function.  - Continue lovenox, pharmacy to dose, for VTE prophylaxis  - Low suspicion for bacterial pneumonia given no evidence of pneumonia on CXR and low procalcitonin.  - Patient desires to receive remdesivir and dexamethasone. Complete IV course of remdesivir and since he is doing so well he  can likely complete the decadron course after discharge.  - Continue albuterol HFA prn shortness of breath or cough  - Continue enhanced droplet/contact isolation per protocol for COVID-19.  - Continue to monitor COVID-19 progression labs per protocol: Q24h CBC, CMP, CK, CRP, and Ferritin level in addition to Q48h LDH, D-dimer, and Fibrinogen levels. Inflammatory markers continue to improve.  - High-sensitivity troponin T indeterminately elevated at 26 with -2 delta on repeat. EKG showed no acute ischemic changes. No chest pain reported. Troponin elevation not consistent with ACS.  - Continue aspirin 81mg daily  - Monitor on telemetry.  - Repeat chest x-ray showed mild cardiomegaly, otherwise stable chest with no acute changes.     #Grade 1 diastolic dysfunction  #History of symptomatic bradycardia s/p PPM  #History of moderate AVR  #Systolic murmur   - Appears euvolemic  - Continue telemetry monitoring  - Monitor I&Os, daily weights  - TTE in 2021 showed LV hypertrophy, LV EF 56-60%, grade I diastolic dysfunction, and moderate aortic valve regurgitation. Since heart failure appears stable we will defer repeating TTE for now. If volume status appears to worsen then we will repeat an echo.     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     #Reported history of COPD, not formally diagnosed  - on room air at baseline  - Initially was on 2L here, now back on room air  - continue pulse oximetry   - PRN albuterol as noted above     #Hypothyroidism  - TSH WNL at admission  - Continue home levothyroxine     #Reported melena  - H+H have been stable around 11-12 throughout this admission. Monitor with repeat cbc in am. If no significant acute blood  loss noted then I would recommend outpatient follow up with General Surgery or GI. He reports they discussed doing a colonoscopy with him recently but he was told the risks likely outweighed the benefits with his age.  Edited by: Tayler Barriga DO at 3/18/2023 1710    VTE Prophylaxis:   Mechanical Order History:     None      Pharmalogical Order History:      Ordered     Dose Route Frequency Stop    03/14/23 1523  Enoxaparin Sodium (LOVENOX) syringe 30 mg         30 mg SC Every 24 Hours --    03/14/23 1519  Pharmacy to Dose enoxaparin (LOVENOX)  Status:  Discontinued         -- XX Continuous PRN 03/14/23 1524    03/14/23 1344  heparin (porcine) 5000 UNIT/ML injection 5,000 Units  Status:  Discontinued         5,000 Units SC Every 8 Hours Scheduled 03/14/23 1519                Dispo:  Home at discharge, likely within 24 hours    Tayler Barriga DO  [facility]  03/18/23  17:11 EDT

## 2023-03-19 ENCOUNTER — READMISSION MANAGEMENT (OUTPATIENT)
Dept: CALL CENTER | Facility: HOSPITAL | Age: 88
End: 2023-03-19
Payer: MEDICARE

## 2023-03-19 VITALS
BODY MASS INDEX: 21.5 KG/M2 | TEMPERATURE: 98.3 F | DIASTOLIC BLOOD PRESSURE: 56 MMHG | SYSTOLIC BLOOD PRESSURE: 117 MMHG | HEART RATE: 60 BPM | RESPIRATION RATE: 18 BRPM | OXYGEN SATURATION: 97 % | HEIGHT: 68 IN | WEIGHT: 141.9 LBS

## 2023-03-19 LAB
D DIMER PPP FEU-MCNC: 0.84 MCGFEU/ML (ref 0–0.95)
FIBRINOGEN PPP-MCNC: 286 MG/DL (ref 173–524)
LDH SERPL-CCNC: 308 U/L (ref 135–225)
PROCALCITONIN SERPL-MCNC: 0.03 NG/ML (ref 0–0.25)

## 2023-03-19 PROCEDURE — 83615 LACTATE (LD) (LDH) ENZYME: CPT

## 2023-03-19 PROCEDURE — 63710000001 DEXAMETHASONE PER 0.25 MG: Performed by: STUDENT IN AN ORGANIZED HEALTH CARE EDUCATION/TRAINING PROGRAM

## 2023-03-19 PROCEDURE — 85379 FIBRIN DEGRADATION QUANT: CPT | Performed by: STUDENT IN AN ORGANIZED HEALTH CARE EDUCATION/TRAINING PROGRAM

## 2023-03-19 PROCEDURE — 84145 PROCALCITONIN (PCT): CPT | Performed by: STUDENT IN AN ORGANIZED HEALTH CARE EDUCATION/TRAINING PROGRAM

## 2023-03-19 PROCEDURE — 85384 FIBRINOGEN ACTIVITY: CPT

## 2023-03-19 PROCEDURE — 94799 UNLISTED PULMONARY SVC/PX: CPT

## 2023-03-19 RX ORDER — DEXAMETHASONE 6 MG/1
6 TABLET ORAL DAILY
Qty: 5 TABLET | Refills: 0 | Status: SHIPPED | OUTPATIENT
Start: 2023-03-20 | End: 2023-03-25

## 2023-03-19 RX ADMIN — LEVOTHYROXINE SODIUM 75 MCG: 0.07 TABLET ORAL at 08:29

## 2023-03-19 RX ADMIN — ASPIRIN 81 MG: 81 TABLET, COATED ORAL at 08:29

## 2023-03-19 RX ADMIN — Medication 1 TABLET: at 08:29

## 2023-03-19 RX ADMIN — FERROUS SULFATE TAB 325 MG (65 MG ELEMENTAL FE) 325 MG: 325 (65 FE) TAB at 08:29

## 2023-03-19 RX ADMIN — DEXAMETHASONE 6 MG: 4 TABLET ORAL at 08:29

## 2023-03-19 RX ADMIN — FAMOTIDINE 20 MG: 20 TABLET ORAL at 08:29

## 2023-03-19 RX ADMIN — POLYETHYLENE GLYCOL 3350 17 G: 17 POWDER, FOR SOLUTION ORAL at 08:30

## 2023-03-19 RX ADMIN — OFLOXACIN 50000 UNITS: 300 TABLET, COATED ORAL at 08:29

## 2023-03-19 RX ADMIN — FINASTERIDE 5 MG: 5 TABLET, FILM COATED ORAL at 08:29

## 2023-03-19 NOTE — OUTREACH NOTE
Prep Survey    Flowsheet Row Responses   [facility] facility patient discharged from? [place]   Is LACE score < 7 ? No   Eligibility Readm Mgmt   Discharge diagnosis COVID-19   Does the patient have one of the following disease processes/diagnoses(primary or secondary)? Other   Does the patient have Home health ordered? No   Is there a DME ordered? No   Prep survey completed? Yes          Donna TADEO - Registered Nurse

## 2023-03-19 NOTE — DISCHARGE SUMMARY
[facility] HOSPITALISTS DISCHARGE SUMMARY    Patient Identification:  Name:  Joaquín Rogers  Age:  89 y.o.  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 male presented to [facility] complaining of shortness of breath.  Please see the admitting history and physical for further details.      Patient was found to have COVID-19 during an ER visit the day prior to admission, but did not meet criteria for admission so was sent home. He returned the next day with worsening shortness of breath and was found to have mild hypoxemia, slightly worse than the day prior. Given this and his high risk for poor outcome from COVID-19 due to his age he was admitted and treated with remdesivir and decadron. He did not have any evidence of superimposed bacterial pneumonia. Inflammatory markers were trended and improved with the remdesivir and decadron treatment, and he reported resolution of his shortness of breath.    At admission patient was found to have a high sensitivity troponin elevated in the indeterminate range, with a negative delta on repeat testing. He did not have any acute ischemic changes noted on his EKG, and denied any chest pain, so this was felt most likely secondary to his acute illness.    During admission patient reported having dark, tarry stools several times, which he reported had been a problem in the past. He reported he had recently been  evaluated by GI and they recommended watchful waiting instead of colonoscopy due to his age and comorbidities. Patient's hemoglobin was monitored and remained stable during admission.    On day of discharge patient reported he was feeling well and denied any chest pain or shortness of breath. It was felt he had received maximum benefit of hospitalization and he was discharged home to complete the remainder of his decadron course orally.    Follow up:  - Melena and repeat H+H    VITAL SIGNS:  Temp:  [97.5 °F (36.4 °C)-98.3 °F (36.8 °C)] 98.3 °F (36.8 °C)  Heart Rate:  [60-73] 60  Resp:  [18] 18  BP: ()/(52-69) 117/56  SpO2:  [95 %-97 %] 97 %  on   ;   Device (Oxygen Therapy): room air    Body mass index is 21.58 kg/m².  Wt Readings from Last 3 Encounters:   03/19/23 64.4 kg (141 lb 14.4 oz)   03/13/23 66.2 kg (146 lb)   02/28/23 66.2 kg (146 lb)       PHYSICAL EXAM:   Constitutional:  Well-developed and well-nourished.  No acute distress.      HENT:  Head:  Normocephalic and atraumatic.     Cardiovascular:  Normal rate, regular rhythm  Pulmonary/Chest:  Normal rate and effort. Breath sounds clear to auscultation bilaterally but with decreased air movement.  Abdominal:  Soft. No distension and no tenderness. Normal bowel sounds present.  Musculoskeletal:  No deformity.    Neurological: Awake, alert, no focal deficit on gross examination. No slurred speech or facial droop.   Skin:  Skin is warm and dry.   Peripheral vascular:  No cyanosis, no edema.    DISCHARGE DISPOSITION   Stable    DISCHARGE MEDICATIONS:     Discharge Medications      New Medications      Instructions Start Date   dexamethasone 6 MG tablet  Commonly known as: DECADRON   6 mg, Oral, Daily   Start Date: March 20, 2023        Continue These Medications      Instructions Start Date   aspirin 81 MG EC tablet   81 mg, Oral, Daily      calcium carbonate-cholecalciferol 500-400 MG-UNIT tablet tablet   1 tablet, Oral, 2 Times Daily       carboxymethylcellulose 0.5 % solution  Commonly known as: REFRESH PLUS   1 drop, Both Eyes, 3 Times Daily PRN      ferrous sulfate 325 (65 FE) MG tablet   325 mg, Oral, 2 Times Daily      finasteride 5 MG tablet  Commonly known as: PROSCAR   5 mg, Oral, Daily      levothyroxine 75 MCG tablet  Commonly known as: SYNTHROID, LEVOTHROID   75 mcg, Oral, Daily      linaclotide 72 MCG capsule capsule  Commonly known as: LINZESS   72 mcg, Oral, Every Morning Before Breakfast      omeprazole 40 MG capsule  Commonly known as: priLOSEC   40 mg, Oral, Daily      polyethylene glycol 17 g packet  Commonly known as: MIRALAX   17 g, Oral, Daily      terazosin 2 MG capsule  Commonly known as: HYTRIN   2 mg, Oral, Nightly         Stop These Medications    sulfamethoxazole-trimethoprim 800-160 MG per tablet  Commonly known as: BACTRIM DS,SEPTRA DS            Diet Instructions     Diet: Regular/House Diet; Texture: Regular Texture (IDDSI 7); Fluid Consistency: Thin (IDDSI 0)      Discharge Diet: Regular/House Diet    Texture: Regular Texture (IDDSI 7)    Fluid Consistency: Thin (IDDSI 0)        Activity Instructions     Activity as Tolerated          Additional Instructions for the Follow-ups that You Need to Schedule     Call MD With Problems / Concerns   As directed      Instructions: Return to the ED as needed for worsening symptoms.    Order Comments: Instructions: Return to the ED as needed for worsening symptoms.          Discharge Follow-up with PCP   As directed       Currently Documented PCP:    Uday Roche DO    PCP Phone Number:    [phone]     Follow Up Details: within 1-2 weeks            Follow-up Information     Uday Roche DO .    Specialty: Internal Medicine  Why: within 1-2 weeks  Contact information:  Jesusita MONTES [facility] [id number]  [place] [address]  [phone]                             CODE STATUS  Code Status and Medical Interventions:   Ordered at: 03/14/23 1710     Level Of Support Discussed  With:    Patient    Next of Kin (If No Surrogate)     Code Status (Patient has no pulse and is not breathing):    CPR (Attempt to Resuscitate)     Medical Interventions (Patient has pulse or is breathing):    Full Support         Tayler Barriga DO  [facility] Hospitalist  03/19/23  08:46 EDT    Please note that this discharge summary required more than 30 minutes to complete.

## 2023-03-19 NOTE — NURSING NOTE
Called spouse, Kerry, with no answer for transportation home    Called son, Clark, with no answer left message to call unit.

## 2023-03-19 NOTE — PLAN OF CARE
Goal Outcome Evaluation:  Plan of Care Reviewed With: patient        Progress: improving  Outcome Evaluation: Pt resting in bed throughout shift. pt is A&Ox4 and on RA. No s/s of pain or distress noted at this time. Will continue to monitor plan of care throughout shift.

## 2023-03-20 ENCOUNTER — TELEPHONE (OUTPATIENT)
Dept: TELEMETRY | Facility: HOSPITAL | Age: 88
End: 2023-03-20
Payer: MEDICARE

## 2023-03-20 NOTE — PAYOR COMM NOTE
"[facility]  NPI:[id number]    Utilization Review  Contact: Mitra Solano RN  Phone: [phone]  Fax:[phone]    DISCHARGE NOTIFICATION           Rashmi Marroquin (89 y.o. Male)     Date of Birth   05/19/1927    Social Security Number       Address   [address] [address]    Home Phone   [phone]    MRN   [id number]       Cheondoism   None    Marital Status                               Admission Date   3/14/23    Admission Type   Emergency    Admitting Provider   Tayler Barriga DO    Attending Provider       Department, Room/Bed   [facility], 3320/1P       Discharge Date   3/19/2023    Discharge Disposition   Home or Self Care    Discharge Destination                               Attending Provider: (none)   Allergies: No Known Allergies    Isolation: None   Infection: COVID (confirmed) (03/13/23)   Code Status: Prior    Ht: 172.7 cm (68\")   Wt: 64.4 kg (141 lb 14.4 oz)    Admission Cmt: None   Principal Problem: COVID-19 [U07.1]                 Active Insurance as of 3/14/2023     Primary Coverage     Payor Plan Insurance Group Employer/Plan Group    [facility] [employer]      Payor Plan Address Payor Plan Phone Number Payor Plan Fax Number Effective Dates    [address] [phone]  1/1/2023 - None Entered    [facility] [id number]       Subscriber Name Subscriber Birth Date Member ID       RASHMI MARROQUIN 5/19/1927 [id number]                 Emergency Contacts      (Rel.) Home Phone Work Phone Mobile Phone    MAYRA MARROQUIN (Spouse) [phone] -- [phone]    Clark Marroquin (Son) -- -- [phone]    KenRustam (Son) -- -- [phone]          Tayler Barriga DO   Physician  Medicine  Discharge Summary      Incomplete  Date of Service:  03/19/23 0846  Creation Time:  03/19/23 0846     Incomplete               [facility]     Patient Identification:  Name:  Rashmi" Ken  Age:  89 y.o.  Sex:  male  :  1927  MRN:  [id number]  Visit Number:  [id number]     Date of Admission: 3/14/2023  Date of Discharge:  3/19/2023      PCP: Uday Roche DO     DISCHARGE DIAGNOSIS         CONSULTS         PROCEDURES PERFORMED     HOSPITAL COURSE  Patient is a 89 y.o. male presented to [facility] complaining of .  Please see the admitting history and physical for further details.       Constitutional:  Well-developed. Elderly and thin appearing.  No acute distress.      HENT:  Head:  Normocephalic and atraumatic.    Cardiovascular:  Normal rate, regular rhythm   Pulmonary/Chest:  No respiratory distress, no wheezes. Slightly diminished breath sounds but no obvious crackles or wheezing.  Musculoskeletal:  No deformity.    Neurological: Awake, alert, no focal deficit on gross examination. No slurred speech or facial droop.   Skin:  Skin is warm and dry. No rash noted. No pallor.   Peripheral vascular:  No cyanosis, no edema.  Psychiatric: Appropriate mood and affect  Edited by: Tayler Barriga DO at 3/18/2023 1710     VITAL SIGNS:  Temp:  [97.5 °F (36.4 °C)-98.3 °F (36.8 °C)] 98.3 °F (36.8 °C)  Heart Rate:  [60-73] 60  Resp:  [18] 18  BP: ()/(52-69) 117/56  SpO2:  [95 %-97 %] 97 %  on   ;   Device (Oxygen Therapy): room air     Body mass index is 21.58 kg/m².      Wt Readings from Last 3 Encounters:   23 64.4 kg (141 lb 14.4 oz)   23 66.2 kg (146 lb)   23 66.2 kg (146 lb)         PHYSICAL EXAM:   Constitutional:  Well-developed and well-nourished.  No acute distress.      HENT:  Head:  Normocephalic and atraumatic.  Mouth:  Moist mucous membranes.    Eyes:  Conjunctivae and EOM are normal. No scleral icterus.    Neck:  Neck supple.  No JVD present.    Cardiovascular:  Normal rate, regular rhythm, and normal heart sounds. No murmur.  Pulmonary/Chest:  Normal rate and effort. Breath sounds clear to auscultation bilaterally with good air  movement.  Abdominal:  Soft. No distension and no tenderness. Normal bowel sounds present.  Musculoskeletal:  No tenderness and no deformity.  No red or swollen joints anywhere.    Neurological:  Alert and oriented to person, place, and time.  No focal strength or sensory deficit.    Skin:  Skin is warm and dry. No rash noted. No pallor.   Peripheral vascular:  No clubbing, no cyanosis, no edema.  DISCHARGE DISPOSITION   Stable     DISCHARGE MEDICATIONS:           Discharge Medications            New Medications      Instructions Start Date   dexamethasone 6 MG tablet  Commonly known as: DECADRON    6 mg, Oral, Daily    Start Date: March 20, 2023                    Continue These Medications      Instructions Start Date   aspirin 81 MG EC tablet    81 mg, Oral, Daily        calcium carbonate-cholecalciferol 500-400 MG-UNIT tablet tablet    1 tablet, Oral, 2 Times Daily        carboxymethylcellulose 0.5 % solution  Commonly known as: REFRESH PLUS    1 drop, Both Eyes, 3 Times Daily PRN        ferrous sulfate 325 (65 FE) MG tablet    325 mg, Oral, 2 Times Daily        finasteride 5 MG tablet  Commonly known as: PROSCAR    5 mg, Oral, Daily        levothyroxine 75 MCG tablet  Commonly known as: SYNTHROID, LEVOTHROID    75 mcg, Oral, Daily        linaclotide 72 MCG capsule capsule  Commonly known as: LINZESS    72 mcg, Oral, Every Morning Before Breakfast        omeprazole 40 MG capsule  Commonly known as: priLOSEC    40 mg, Oral, Daily        polyethylene glycol 17 g packet  Commonly known as: MIRALAX    17 g, Oral, Daily        terazosin 2 MG capsule  Commonly known as: HYTRIN    2 mg, Oral, Nightly            Stop These Medications    sulfamethoxazole-trimethoprim 800-160 MG per tablet  Commonly known as: BACTRIM DS,SEPTRA DS                    Diet Instructions      Diet: Regular/House Diet; Texture: Regular Texture (IDDSI 7); Fluid Consistency: Thin (IDDSI 0)       Discharge Diet: Regular/House Diet     Texture:  Regular Texture (IDDSI 7)     Fluid Consistency: Thin (IDDSI 0)              Activity Instructions      Activity as Tolerated                 Additional Instructions for the Follow-ups that You Need to Schedule      Call MD With Problems / Concerns   As directed        Instructions: Return to the ED as needed for worsening symptoms.     Order Comments: Instructions: Return to the ED as needed for worsening symptoms.            Discharge Follow-up with PCP   As directed         Currently Documented PCP:    Uday Roche DO    PCP Phone Number:    [phone]      Follow Up Details: within 1-2 weeks                       Follow-up Information      Uday Roche DO .    Specialty: Internal Medicine  Why: within 1-2 weeks  Contact information:  [address]  [facility] [address]  [phone]                                TEST  RESULTS PENDING AT DISCHARGE     CODE STATUS      Code Status and Medical Interventions:   Ordered at: 03/14/23 1710     Level Of Support Discussed With:     Patient     Next of Kin (If No Surrogate)     Code Status (Patient has no pulse and is not breathing):     CPR (Attempt to Resuscitate)     Medical Interventions (Patient has pulse or is breathing):     Full Support         The ASCVD Risk score (Boni DK, et al., 2019) failed to calculate for the following reasons:    The 2019 ASCVD risk score is only valid for ages 40 to 79      Tayler Barriga DO  [place] [facility]  03/19/23  08:46 EDT     Please note that this discharge summary required more than 30 minutes to complete.

## 2023-03-22 ENCOUNTER — READMISSION MANAGEMENT (OUTPATIENT)
Dept: CALL CENTER | Facility: HOSPITAL | Age: 88
End: 2023-03-22
Payer: MEDICARE

## 2023-03-22 NOTE — OUTREACH NOTE
Medical Week 1 Survey    Flowsheet Row Responses   [facility] facility patient discharged from? [place]   Does the patient have one of the following disease processes/diagnoses(primary or secondary)? Other   Week 1 attempt successful? No   Unsuccessful attempts Attempt 1          Gabbie Rojas Registered Nurse

## 2023-03-29 ENCOUNTER — READMISSION MANAGEMENT (OUTPATIENT)
Dept: CALL CENTER | Facility: HOSPITAL | Age: 88
End: 2023-03-29
Payer: MEDICARE

## 2023-03-29 NOTE — OUTREACH NOTE
Medical Week 2 Survey    Flowsheet Row Responses   Orthodoxy facility patient discharged from? [place]   Does the patient have one of the following disease processes/diagnoses(primary or secondary)? Other   Week 2 attempt successful? No   Unsuccessful attempts Attempt 1          Chuck GONZALEZ - Registered Nurse

## 2023-03-30 ENCOUNTER — OFFICE VISIT (OUTPATIENT)
Dept: CARDIOLOGY | Facility: CLINIC | Age: 88
End: 2023-03-30
Payer: OTHER GOVERNMENT

## 2023-03-30 VITALS
HEIGHT: 68 IN | OXYGEN SATURATION: 96 % | DIASTOLIC BLOOD PRESSURE: 59 MMHG | BODY MASS INDEX: 21.82 KG/M2 | SYSTOLIC BLOOD PRESSURE: 89 MMHG | RESPIRATION RATE: 18 BRPM | WEIGHT: 144 LBS | HEART RATE: 75 BPM

## 2023-03-30 DIAGNOSIS — I49.5 SSS (SICK SINUS SYNDROME): Primary | ICD-10-CM

## 2023-03-30 PROCEDURE — 1159F MED LIST DOCD IN RCRD: CPT | Performed by: INTERNAL MEDICINE

## 2023-03-30 PROCEDURE — 99213 OFFICE O/P EST LOW 20 MIN: CPT | Performed by: INTERNAL MEDICINE

## 2023-03-30 PROCEDURE — 1160F RVW MEDS BY RX/DR IN RCRD: CPT | Performed by: INTERNAL MEDICINE

## 2023-03-30 NOTE — PROGRESS NOTES
Uday Roche DO  Joaquín Rogers  5/19/1927 03/30/2023    Patient Active Problem List   Diagnosis   • COVID-19   • Acute hypoxemic respiratory failure due to COVID-19 (HCC)   • Symptomatic bradycardia       Dear Uday Roche DO:    Subjective     Joaquín Rogers is a 89 y.o. male with the problems as listed above, presents    Chief complaint: Follow-up of sick sinus syndrome, status post permanent pacemaker implantation.    History of Present Illness: Mr. Rogers is a pleasant 89-year-old  male with history of sick sinus syndrome/symptomatic bradycardia for which she had a permanent pacemaker implanted in September 2021.  He is here for regular cardiology follow-up.  On today's visit he denies any complaints of palpitations, dizziness or syncope and overall feeling okay.  He is recovering from recent COVID infection.    No Known Allergies:      Current Outpatient Medications:   •  aspirin 81 MG EC tablet, Take 1 tablet by mouth Daily., Disp: , Rfl:   •  calcium carbonate-cholecalciferol 500-400 MG-UNIT tablet tablet, Take 1 tablet by mouth 2 (Two) Times a Day., Disp: , Rfl:   •  carboxymethylcellulose (REFRESH PLUS) 0.5 % solution, Administer 1 drop to both eyes 3 (Three) Times a Day As Needed for Dry Eyes., Disp: , Rfl:   •  ferrous sulfate 325 (65 FE) MG tablet, Take 1 tablet by mouth 2 (Two) Times a Day., Disp: , Rfl:   •  finasteride (PROSCAR) 5 MG tablet, Take 1 tablet by mouth Daily., Disp: , Rfl:   •  levothyroxine (SYNTHROID, LEVOTHROID) 75 MCG tablet, Take 1 tablet by mouth Daily., Disp: , Rfl:   •  linaclotide (LINZESS) 72 MCG capsule capsule, Take 1 capsule by mouth Every Morning Before Breakfast., Disp: , Rfl:   •  omeprazole (priLOSEC) 40 MG capsule, Take 1 capsule by mouth Daily., Disp: , Rfl:   •  polyethylene glycol (MIRALAX) 17 g packet, Take 17 g by mouth Daily., Disp: , Rfl:   •  terazosin (HYTRIN) 2 MG capsule, Take 1 capsule by mouth Every Night., Disp: , Rfl:       The  "following portions of the patient's history were reviewed and updated as appropriate: allergies, current medications, past family history, past medical history, past social history, past surgical history and problem list.    Social History     Tobacco Use   • Smoking status: Former     Packs/day: 1.00     Types: Cigarettes     Quit date:      Years since quittin.2     Passive exposure: Past   • Smokeless tobacco: Former     Types: Chew     Quit date:    Vaping Use   • Vaping Use: Never used   Substance Use Topics   • Alcohol use: Never   • Drug use: Never       Review of Systems   Constitutional: Negative for chills and fever.   HENT: Negative for nosebleeds and sore throat.    Respiratory: Negative for cough, hemoptysis and wheezing.    Gastrointestinal: Negative for abdominal pain, hematemesis, hematochezia, melena, nausea and vomiting.   Genitourinary: Negative for dysuria and hematuria.   Neurological: Negative for headaches.       Objective   Vitals:    23 1214   BP: (!) 89/59   BP Location: Left arm   Patient Position: Sitting   Cuff Size: Adult   Pulse: 75   Resp: 18   SpO2: 96%   Weight: 65.3 kg (144 lb)   Height: 172.7 cm (68\")     Body mass index is 21.9 kg/m².    Vitals reviewed.   Constitutional:       Appearance: Well-developed.   Eyes:      Conjunctiva/sclera: Conjunctivae normal.   HENT:      Head: Normocephalic.   Neck:      Thyroid: No thyromegaly.      Vascular: No JVD.      Trachea: No tracheal deviation.   Pulmonary:      Effort: No respiratory distress.      Breath sounds: Normal breath sounds. No wheezing. No rales.   Cardiovascular:      PMI at left midclavicular line. Normal rate. Regular rhythm. Normal S1. Normal S2.      Murmurs: There is no murmur.      No gallop. No click. No rub.   Pulses:     Intact distal pulses.   Edema:     Peripheral edema absent.   Abdominal:      General: Bowel sounds are normal.      Palpations: Abdomen is soft. There is no abdominal mass.      " Tenderness: There is no abdominal tenderness.   Musculoskeletal:      Cervical back: Normal range of motion and neck supple. Skin:     General: Skin is warm and dry.   Neurological:      Mental Status: Alert and oriented to person, place, and time.      Cranial Nerves: No cranial nerve deficit.         Lab Results   Component Value Date     03/17/2023    K 4.6 03/17/2023     03/17/2023    CO2 22.3 03/17/2023    BUN 28 (H) 03/17/2023    CREATININE 1.10 03/18/2023    GLUCOSE 109 (H) 03/17/2023    CALCIUM 9.1 03/17/2023    AST 31 03/18/2023    ALT 34 03/18/2023    ALKPHOS 87 03/18/2023    LABIL2 1.4 (L) 02/02/2015     No results found for: CKTOTAL  Lab Results   Component Value Date    WBC 8.73 03/17/2023    HGB 11.2 (L) 03/17/2023    HCT 34.4 (L) 03/17/2023     03/17/2023     Lab Results   Component Value Date    INR 1.10 03/14/2023    INR 1.00 09/12/2021    INR 1.01 09/10/2021     Lab Results   Component Value Date    MG 2.0 03/14/2023     Lab Results   Component Value Date    TSH 3.050 03/14/2023    TRIG 71 03/15/2023    HDL 42 03/15/2023    LDL 88 03/15/2023          Assessment & Plan :   Diagnosis Plan    SSS (sick sinus syndrome), s/p permanent dual-chamber pacemaker implantation with [employer] medical device on 9/13/2021, functioning well.            Recommendations:   Return in about 1 year (around 3/30/2024).    As always, Uday Roche, DO  I appreciate very much the opportunity to participate in the cardiovascular care of your patients. Please do not hesitate to call me with any questions with regards to Joaquín Rogers evaluation and management.       With Best Regards,        Kendrick Burgess MD, [facility]    Please note that portions of this note were completed with a voice recognition program.

## 2023-11-20 NOTE — PROGRESS NOTES
Home monitor.     Consent (Ear)/Introductory Paragraph: The rationale for Mohs was explained to the patient and consent was obtained. The risks, benefits and alternatives to therapy were discussed in detail. Specifically, the risks of ear deformity, infection, scarring, bleeding, prolonged wound healing, incomplete removal, allergy to anesthesia, nerve injury and recurrence were addressed. Prior to the procedure, the treatment site was clearly identified and confirmed by the patient. All components of Universal Protocol/PAUSE Rule completed.

## 2024-02-16 ENCOUNTER — TELEPHONE (OUTPATIENT)
Dept: CARDIOLOGY | Facility: CLINIC | Age: 89
End: 2024-02-16
Payer: MEDICARE

## 2024-02-29 ENCOUNTER — CLINICAL SUPPORT NO REQUIREMENTS (OUTPATIENT)
Dept: CARDIOLOGY | Facility: CLINIC | Age: 89
End: 2024-02-29
Payer: MEDICARE

## 2024-02-29 DIAGNOSIS — Z95.0 CARDIAC PACEMAKER IN SITU: Primary | ICD-10-CM

## 2024-04-01 ENCOUNTER — OFFICE VISIT (OUTPATIENT)
Dept: CARDIOLOGY | Facility: CLINIC | Age: 89
End: 2024-04-01
Payer: MEDICARE

## 2024-04-01 VITALS
SYSTOLIC BLOOD PRESSURE: 96 MMHG | WEIGHT: 148 LBS | DIASTOLIC BLOOD PRESSURE: 52 MMHG | HEIGHT: 68 IN | OXYGEN SATURATION: 96 % | HEART RATE: 64 BPM | RESPIRATION RATE: 16 BRPM | BODY MASS INDEX: 22.43 KG/M2

## 2024-04-01 DIAGNOSIS — I49.5 SSS (SICK SINUS SYNDROME): Primary | ICD-10-CM

## 2024-04-01 PROCEDURE — 93000 ELECTROCARDIOGRAM COMPLETE: CPT | Performed by: INTERNAL MEDICINE

## 2024-04-01 PROCEDURE — 99213 OFFICE O/P EST LOW 20 MIN: CPT | Performed by: INTERNAL MEDICINE

## 2024-04-01 NOTE — PROGRESS NOTES
Uday Roche DO  Joaquín Rogers  5/19/1927 04/01/2024    Patient Active Problem List   Diagnosis    COVID-19    Acute hypoxemic respiratory failure due to COVID-19    Symptomatic bradycardia       Dear Uday Roche DO:    Subjective     Joaquín Rogers is a 89 y.o. male with the problems as listed above, presents    Chief complaint: Follow-up of sick sinus syndrome/symptomatic bradycardia, s/p permanent pacemaker implantation.    History of Present Illness: Mr. Rogers is a pleasant 89-year-old  male with a history of sick sinus syndrome/symptomatic bradycardia for which she had a permanent pacemaker implanted in September 2021.  He is here for regular cardiology follow-up.  On today's visit he denies any complaints of palpitations, dizziness or syncope and overall feels pretty good.    No Known Allergies:    Current Outpatient Medications:     aspirin 81 MG EC tablet, Take 1 tablet by mouth Daily., Disp: , Rfl:     calcium carbonate-cholecalciferol 500-400 MG-UNIT tablet tablet, Take 1 tablet by mouth 2 (Two) Times a Day., Disp: , Rfl:     carboxymethylcellulose (REFRESH PLUS) 0.5 % solution, Administer 1 drop to both eyes 3 (Three) Times a Day As Needed for Dry Eyes., Disp: , Rfl:     ferrous sulfate 325 (65 FE) MG tablet, Take 1 tablet by mouth 2 (Two) Times a Day., Disp: , Rfl:     finasteride (PROSCAR) 5 MG tablet, Take 1 tablet by mouth Daily., Disp: , Rfl:     levothyroxine (SYNTHROID, LEVOTHROID) 75 MCG tablet, Take 1 tablet by mouth Daily., Disp: , Rfl:     linaclotide (LINZESS) 72 MCG capsule capsule, Take 1 capsule by mouth Every Morning Before Breakfast., Disp: , Rfl:     omeprazole (priLOSEC) 40 MG capsule, Take 1 capsule by mouth Daily., Disp: , Rfl:     polyethylene glycol (MIRALAX) 17 g packet, Take 17 g by mouth Daily., Disp: , Rfl:     terazosin (HYTRIN) 2 MG capsule, Take 1 capsule by mouth Every Night., Disp: , Rfl:     The following portions of the patient's history were  "reviewed and updated as appropriate: allergies, current medications, past family history, past medical history, past social history, past surgical history and problem list.    Social History     Tobacco Use    Smoking status: Former     Current packs/day: 0.00     Types: Cigarettes     Quit date:      Years since quittin.2     Passive exposure: Past    Smokeless tobacco: Former     Types: Chew     Quit date:    Vaping Use    Vaping status: Never Used   Substance Use Topics    Alcohol use: Never    Drug use: Never     Review of Systems   Constitutional: Negative for chills and fever.   HENT:  Negative for nosebleeds and sore throat.    Respiratory:  Negative for cough, hemoptysis and wheezing.    Gastrointestinal:  Negative for abdominal pain, hematemesis, hematochezia, melena, nausea and vomiting.   Genitourinary:  Negative for dysuria and hematuria.   Neurological:  Negative for headaches.     Objective   Vitals:    24 1008   BP: 96/52   Pulse: 64   Resp: 16   SpO2: 96%   Weight: 67.1 kg (148 lb)   Height: 172.7 cm (68\")     Body mass index is 22.5 kg/m².    Vitals reviewed.   Constitutional:       Appearance: Well-developed.   Eyes:      Conjunctiva/sclera: Conjunctivae normal.   HENT:      Head: Normocephalic.   Neck:      Thyroid: No thyromegaly.      Vascular: No JVD.      Trachea: No tracheal deviation.   Pulmonary:      Effort: No respiratory distress.      Breath sounds: Normal breath sounds. No wheezing. No rales.   Cardiovascular:      PMI at left midclavicular line. Normal rate. Regular rhythm. Normal S1. Normal S2.       Murmurs: There is no murmur.      No gallop.  No click. No rub.   Pulses:     Intact distal pulses.   Edema:     Peripheral edema absent.   Abdominal:      General: Bowel sounds are normal.      Palpations: Abdomen is soft. There is no abdominal mass.      Tenderness: There is no abdominal tenderness.   Musculoskeletal:      Cervical back: Normal range of motion and " "neck supple. Skin:     General: Skin is warm and dry.   Neurological:      Mental Status: Alert and oriented to person, place, and time.      Cranial Nerves: No cranial nerve deficit.       Lab Results   Component Value Date     03/17/2023    K 4.6 03/17/2023     03/17/2023    CO2 22.3 03/17/2023    BUN 28 (H) 03/17/2023    CREATININE 1.10 03/18/2023    GLUCOSE 109 (H) 03/17/2023    CALCIUM 9.1 03/17/2023    AST 31 03/18/2023    ALT 34 03/18/2023    ALKPHOS 87 03/18/2023    LABIL2 1.4 (L) 02/02/2015     No results found for: \"CKTOTAL\"  Lab Results   Component Value Date    WBC 8.73 03/17/2023    HGB 11.2 (L) 03/17/2023    HCT 34.4 (L) 03/17/2023     03/17/2023     Lab Results   Component Value Date    INR 1.10 03/14/2023    INR 1.00 09/12/2021    INR 1.01 09/10/2021     Lab Results   Component Value Date    MG 2.0 03/14/2023     Lab Results   Component Value Date    TSH 3.050 03/14/2023    TRIG 71 03/15/2023    HDL 42 03/15/2023    LDL 88 03/15/2023          ECG 12 Lead    Date/Time: 4/1/2024 10:08 AM  Performed by: Kendrick Burgess MD    Authorized by: Kendrick Burgess MD  Comparison: compared with previous ECG from 3/13/2023  Similar to previous ECG  Rhythm: sinus rhythm and paced  Conduction: right bundle branch block  Comments: Normal sinus rhythm with normal AV conduction at baseline and appropriate AV sequential pacing with magnet application.          Assessment & Plan    Diagnosis Plan    SSS (sick sinus syndrome), s/p permanent dual-chamber pacemaker implantation with [employer] medical device on 9/13/2021, functioning well.  ([facility])          Recommendations     Return in about 1 year (around 4/1/2025) for or sooner if needed.    As always, Uday Roche, DO  I appreciate very much the opportunity to participate in the cardiovascular care of your patients. Please do not hesitate to call me with any questions with regards to Joaquín Rogers's evaluation and management.       With " Best Regards,        Kendrick Burgess MD, [facility]    Please note that portions of this note were completed with a voice recognition program.

## 2024-04-16 ENCOUNTER — TRANSCRIBE ORDERS (OUTPATIENT)
Dept: ADMINISTRATIVE | Facility: HOSPITAL | Age: 89
End: 2024-04-16
Payer: MEDICARE

## 2024-04-16 DIAGNOSIS — R42 DIZZINESS: Primary | ICD-10-CM

## 2024-05-31 ENCOUNTER — HOSPITAL ENCOUNTER (EMERGENCY)
Facility: HOSPITAL | Age: 89
Discharge: HOME | End: 2024-05-31
Attending: EMERGENCY MEDICINE
Payer: OTHER GOVERNMENT

## 2024-05-31 ENCOUNTER — APPOINTMENT (OUTPATIENT)
Dept: CT IMAGING | Facility: HOSPITAL | Age: 89
End: 2024-05-31
Payer: OTHER GOVERNMENT

## 2024-05-31 VITALS
HEART RATE: 92 BPM | RESPIRATION RATE: 20 BRPM | BODY MASS INDEX: 22.73 KG/M2 | WEIGHT: 150 LBS | SYSTOLIC BLOOD PRESSURE: 126 MMHG | DIASTOLIC BLOOD PRESSURE: 74 MMHG | HEIGHT: 68 IN | OXYGEN SATURATION: 90 % | TEMPERATURE: 98 F

## 2024-05-31 DIAGNOSIS — R10.84 GENERALIZED ABDOMINAL PAIN: ICD-10-CM

## 2024-05-31 DIAGNOSIS — R53.81 DEBILITY: ICD-10-CM

## 2024-05-31 DIAGNOSIS — K59.00 CONSTIPATION, UNSPECIFIED CONSTIPATION TYPE: Primary | ICD-10-CM

## 2024-05-31 LAB
ALBUMIN SERPL-MCNC: 4 G/DL (ref 3.5–5.2)
ALBUMIN/GLOB SERPL: 1.3 G/DL
ALP SERPL-CCNC: 127 U/L (ref 39–117)
ALT SERPL W P-5'-P-CCNC: 11 U/L (ref 1–41)
ANION GAP SERPL CALCULATED.3IONS-SCNC: 11.8 MMOL/L (ref 5–15)
ANISOCYTOSIS BLD QL: ABNORMAL
AST SERPL-CCNC: 21 U/L (ref 1–40)
BACTERIA UR QL AUTO: ABNORMAL /HPF
BASOPHILS # BLD MANUAL: 0.07 10*3/MM3 (ref 0–0.2)
BASOPHILS NFR BLD MANUAL: 1 % (ref 0–1.5)
BILIRUB SERPL-MCNC: 0.6 MG/DL (ref 0–1.2)
BILIRUB UR QL STRIP: NEGATIVE
BUN SERPL-MCNC: 19 MG/DL (ref 8–23)
BUN/CREAT SERPL: 12.1 (ref 7–25)
CALCIUM SPEC-SCNC: 9.4 MG/DL (ref 8.2–9.6)
CHLORIDE SERPL-SCNC: 106 MMOL/L (ref 98–107)
CLARITY UR: CLEAR
CO2 SERPL-SCNC: 21.2 MMOL/L (ref 22–29)
COLOR UR: YELLOW
CREAT SERPL-MCNC: 1.57 MG/DL (ref 0.76–1.27)
CRP SERPL-MCNC: <0.3 MG/DL (ref 0–0.5)
DEPRECATED RDW RBC AUTO: ABNORMAL FL
EGFRCR SERPLBLD CKD-EPI 2021: 39.8 ML/MIN/1.73
EOSINOPHIL # BLD MANUAL: 0.13 10*3/MM3 (ref 0–0.4)
EOSINOPHIL NFR BLD MANUAL: 2 % (ref 0.3–6.2)
ERYTHROCYTE [DISTWIDTH] IN BLOOD BY AUTOMATED COUNT: ABNORMAL %
GLOBULIN UR ELPH-MCNC: 3.1 GM/DL
GLUCOSE SERPL-MCNC: 157 MG/DL (ref 65–99)
GLUCOSE UR STRIP-MCNC: NEGATIVE MG/DL
GRAN CASTS URNS QL MICRO: ABNORMAL /LPF
HCT VFR BLD AUTO: 38.6 % (ref 37.5–51)
HGB BLD-MCNC: 11.8 G/DL (ref 13–17.7)
HGB UR QL STRIP.AUTO: NEGATIVE
HOLD SPECIMEN: NORMAL
HOLD SPECIMEN: NORMAL
HYALINE CASTS UR QL AUTO: ABNORMAL /LPF
HYPOCHROMIA BLD QL: ABNORMAL
KETONES UR QL STRIP: ABNORMAL
LEUKOCYTE ESTERASE UR QL STRIP.AUTO: NEGATIVE
LIPASE SERPL-CCNC: 34 U/L (ref 13–60)
LYMPHOCYTES # BLD MANUAL: 1.82 10*3/MM3 (ref 0.7–3.1)
LYMPHOCYTES NFR BLD MANUAL: 4 % (ref 5–12)
MACROCYTES BLD QL SMEAR: ABNORMAL
MCH RBC QN AUTO: 25.7 PG (ref 26.6–33)
MCHC RBC AUTO-ENTMCNC: 30.6 G/DL (ref 31.5–35.7)
MCV RBC AUTO: 84.1 FL (ref 79–97)
MONOCYTES # BLD: 0.27 10*3/MM3 (ref 0.1–0.9)
NEUTROPHILS # BLD AUTO: 4.44 10*3/MM3 (ref 1.7–7)
NEUTROPHILS NFR BLD MANUAL: 63 % (ref 42.7–76)
NEUTS BAND NFR BLD MANUAL: 3 % (ref 0–5)
NITRITE UR QL STRIP: NEGATIVE
PH UR STRIP.AUTO: 5.5 [PH] (ref 5–8)
PLAT MORPH BLD: NORMAL
PLATELET # BLD AUTO: 181 10*3/MM3 (ref 140–450)
PMV BLD AUTO: 8.8 FL (ref 6–12)
POTASSIUM SERPL-SCNC: 4.4 MMOL/L (ref 3.5–5.2)
PROT SERPL-MCNC: 7.1 G/DL (ref 6–8.5)
PROT UR QL STRIP: ABNORMAL
RBC # BLD AUTO: 4.59 10*6/MM3 (ref 4.14–5.8)
RBC # UR STRIP: ABNORMAL /HPF
REF LAB TEST METHOD: ABNORMAL
SODIUM SERPL-SCNC: 139 MMOL/L (ref 136–145)
SP GR UR STRIP: 1.02 (ref 1–1.03)
SQUAMOUS #/AREA URNS HPF: ABNORMAL /HPF
UROBILINOGEN UR QL STRIP: ABNORMAL
VARIANT LYMPHS NFR BLD MANUAL: 27 % (ref 19.6–45.3)
WBC # UR STRIP: ABNORMAL /HPF
WBC NRBC COR # BLD AUTO: 6.73 10*3/MM3 (ref 3.4–10.8)
WHOLE BLOOD HOLD COAG: NORMAL
WHOLE BLOOD HOLD SPECIMEN: NORMAL

## 2024-05-31 PROCEDURE — 83690 ASSAY OF LIPASE: CPT | Performed by: NURSE PRACTITIONER

## 2024-05-31 PROCEDURE — 96374 THER/PROPH/DIAG INJ IV PUSH: CPT

## 2024-05-31 PROCEDURE — 85007 BL SMEAR W/DIFF WBC COUNT: CPT | Performed by: NURSE PRACTITIONER

## 2024-05-31 PROCEDURE — 74176 CT ABD & PELVIS W/O CONTRAST: CPT | Performed by: RADIOLOGY

## 2024-05-31 PROCEDURE — 74176 CT ABD & PELVIS W/O CONTRAST: CPT

## 2024-05-31 PROCEDURE — 25810000003 SODIUM CHLORIDE 0.9 % SOLUTION: Performed by: NURSE PRACTITIONER

## 2024-05-31 PROCEDURE — 99284 EMERGENCY DEPT VISIT MOD MDM: CPT

## 2024-05-31 PROCEDURE — 81001 URINALYSIS AUTO W/SCOPE: CPT | Performed by: NURSE PRACTITIONER

## 2024-05-31 PROCEDURE — 85025 COMPLETE CBC W/AUTO DIFF WBC: CPT | Performed by: NURSE PRACTITIONER

## 2024-05-31 PROCEDURE — 80053 COMPREHEN METABOLIC PANEL: CPT | Performed by: NURSE PRACTITIONER

## 2024-05-31 PROCEDURE — 25010000002 ONDANSETRON PER 1 MG: Performed by: NURSE PRACTITIONER

## 2024-05-31 PROCEDURE — 86140 C-REACTIVE PROTEIN: CPT | Performed by: NURSE PRACTITIONER

## 2024-05-31 PROCEDURE — 36415 COLL VENOUS BLD VENIPUNCTURE: CPT

## 2024-05-31 RX ORDER — LACTULOSE 10 G/15ML
30 SOLUTION ORAL ONCE
Status: COMPLETED | OUTPATIENT
Start: 2024-05-31 | End: 2024-05-31

## 2024-05-31 RX ORDER — SODIUM CHLORIDE 0.9 % (FLUSH) 0.9 %
10 SYRINGE (ML) INJECTION AS NEEDED
Status: DISCONTINUED | OUTPATIENT
Start: 2024-05-31 | End: 2024-05-31 | Stop reason: HOSPADM

## 2024-05-31 RX ORDER — ONDANSETRON 2 MG/ML
4 INJECTION INTRAMUSCULAR; INTRAVENOUS ONCE
Status: COMPLETED | OUTPATIENT
Start: 2024-05-31 | End: 2024-05-31

## 2024-05-31 RX ADMIN — LACTULOSE 30 G: 20 SOLUTION ORAL at 19:41

## 2024-05-31 RX ADMIN — ONDANSETRON 4 MG: 2 INJECTION INTRAMUSCULAR; INTRAVENOUS at 18:30

## 2024-05-31 RX ADMIN — SODIUM CHLORIDE 500 ML: 9 INJECTION, SOLUTION INTRAVENOUS at 15:46

## 2024-05-31 NOTE — ED PROVIDER NOTES
Subjective   History of Present Illness  Patient is a 89 year old male with PMH significant for anemia, GERD, constipation, and hypothyroidism. He presents to the ED with complaints of dark stools. He reports he has had chronic issues with constipation. He is followed by GI. He was started on miralax and linzess. He reports that he had some dark stools about 1 week ago. He states this started after straining. He reports he was seen for dark stools by GI several months ago and was told that they would not scope him due to his age. He reports some generalized weakness. Family would like to have case management help with getting home health at home.         Review of Systems   Constitutional:  Positive for fatigue. Negative for fever.   HENT: Negative.     Eyes: Negative.    Respiratory: Negative.     Cardiovascular: Negative.  Negative for chest pain.   Gastrointestinal:  Positive for blood in stool. Negative for abdominal pain.   Endocrine: Negative.    Genitourinary: Negative.  Negative for dysuria.   Musculoskeletal: Negative.    Skin: Negative.    Allergic/Immunologic: Negative.    Neurological: Negative.    Hematological: Negative.    Psychiatric/Behavioral: Negative.     All other systems reviewed and are negative.      Past Medical History:   Diagnosis Date    Bradycardia     Thyroid disease        No Known Allergies    Past Surgical History:   Procedure Laterality Date    CARDIAC ELECTROPHYSIOLOGY PROCEDURE N/A 2021    Procedure: Pacemaker DC new;  Surgeon: Kendrick Burgess MD;  Location: [facility] INVASIVE LOCATION;  Service: Cardiology;  Laterality: N/A;    HERNIA REPAIR      INSERT / REPLACE / REMOVE PACEMAKER  2021    [employer] device       No family history on file.    Social History     Socioeconomic History    Marital status:    Tobacco Use    Smoking status: Former     Current packs/day: 0.00     Types: Cigarettes     Quit date:      Years since quittin.4     Passive  exposure: Past    Smokeless tobacco: Former     Types: Chew     Quit date: 1990   Vaping Use    Vaping status: Never Used   Substance and Sexual Activity    Alcohol use: Never    Drug use: Never    Sexual activity: Defer           Objective   Physical Exam  Vitals and nursing note reviewed.   Constitutional:       General: He is not in acute distress.     Appearance: He is well-developed. He is not diaphoretic.      Comments: Chronically ill appearing.   HENT:      Head: Normocephalic and atraumatic.      Right Ear: External ear normal.      Left Ear: External ear normal.      Nose: Nose normal.   Eyes:      Conjunctiva/sclera: Conjunctivae normal.      Pupils: Pupils are equal, round, and reactive to light.   Neck:      Vascular: No JVD.      Trachea: No tracheal deviation.   Cardiovascular:      Rate and Rhythm: Normal rate and regular rhythm.      Heart sounds: Normal heart sounds. No murmur heard.  Pulmonary:      Effort: Pulmonary effort is normal. No respiratory distress.      Breath sounds: Normal breath sounds. No wheezing.   Abdominal:      General: Bowel sounds are normal.      Palpations: Abdomen is soft.      Tenderness: There is no abdominal tenderness.   Musculoskeletal:         General: No deformity. Normal range of motion.      Cervical back: Normal range of motion and neck supple.   Skin:     General: Skin is warm and dry.      Coloration: Skin is not pale.      Findings: No erythema or rash.   Neurological:      Mental Status: He is alert and oriented to person, place, and time.      Cranial Nerves: No cranial nerve deficit.   Psychiatric:         Behavior: Behavior normal.         Thought Content: Thought content normal.         Procedures       Results for orders placed or performed during the hospital encounter of 05/31/24   Comprehensive Metabolic Panel    Specimen: Blood   Result Value Ref Range    Glucose 157 (H) 65 - 99 mg/dL    BUN 19 8 - 23 mg/dL    Creatinine 1.57 (H) 0.76 - 1.27 mg/dL     Sodium 139 136 - 145 mmol/L    Potassium 4.4 3.5 - 5.2 mmol/L    Chloride 106 98 - 107 mmol/L    CO2 21.2 (L) 22.0 - 29.0 mmol/L    Calcium 9.4 8.2 - 9.6 mg/dL    Total Protein 7.1 6.0 - 8.5 g/dL    Albumin 4.0 3.5 - 5.2 g/dL    ALT (SGPT) 11 1 - 41 U/L    AST (SGOT) 21 1 - 40 U/L    Alkaline Phosphatase 127 (H) 39 - 117 U/L    Total Bilirubin 0.6 0.0 - 1.2 mg/dL    Globulin 3.1 gm/dL    A/G Ratio 1.3 g/dL    BUN/Creatinine Ratio 12.1 7.0 - 25.0    Anion Gap 11.8 5.0 - 15.0 mmol/L    eGFR 39.8 (L) >60.0 mL/min/1.73   Lipase    Specimen: Blood   Result Value Ref Range    Lipase 34 13 - 60 U/L   Urinalysis With Microscopic If Indicated (No Culture) - Urine, Clean Catch    Specimen: Urine, Clean Catch   Result Value Ref Range    Color, UA Yellow Yellow, Straw    Appearance, UA Clear Clear    pH, UA 5.5 5.0 - 8.0    Specific Gravity, UA 1.018 1.005 - 1.030    Glucose, UA Negative Negative    Ketones, UA 15 mg/dL (1+) (A) Negative    Bilirubin, UA Negative Negative    Blood, UA Negative Negative    Protein, UA 30 mg/dL (1+) (A) Negative    Leuk Esterase, UA Negative Negative    Nitrite, UA Negative Negative    Urobilinogen, UA 0.2 E.U./dL 0.2 - 1.0 E.U./dL   C-reactive Protein    Specimen: Blood   Result Value Ref Range    C-Reactive Protein <0.30 0.00 - 0.50 mg/dL   CBC Auto Differential    Specimen: Blood   Result Value Ref Range    WBC 6.73 3.40 - 10.80 10*3/mm3    RBC 4.59 4.14 - 5.80 10*6/mm3    Hemoglobin 11.8 (L) 13.0 - 17.7 g/dL    Hematocrit 38.6 37.5 - 51.0 %    MCV 84.1 79.0 - 97.0 fL    MCH 25.7 (L) 26.6 - 33.0 pg    MCHC 30.6 (L) 31.5 - 35.7 g/dL    RDW      RDW-SD      MPV 8.8 6.0 - 12.0 fL    Platelets 181 140 - 450 10*3/mm3   Manual Differential    Specimen: Blood   Result Value Ref Range    Neutrophil % 63.0 42.7 - 76.0 %    Lymphocyte % 27.0 19.6 - 45.3 %    Monocyte % 4.0 (L) 5.0 - 12.0 %    Eosinophil % 2.0 0.3 - 6.2 %    Basophil % 1.0 0.0 - 1.5 %    Bands %  3.0 0.0 - 5.0 %    Neutrophils Absolute  4.44 1.70 - 7.00 10*3/mm3    Lymphocytes Absolute 1.82 0.70 - 3.10 10*3/mm3    Monocytes Absolute 0.27 0.10 - 0.90 10*3/mm3    Eosinophils Absolute 0.13 0.00 - 0.40 10*3/mm3    Basophils Absolute 0.07 0.00 - 0.20 10*3/mm3    Anisocytosis Slight/1+ None Seen    Hypochromia Slight/1+ None Seen    Macrocytes Slight/1+ None Seen    Platelet Morphology Normal Normal   Urinalysis, Microscopic Only - Urine, Clean Catch    Specimen: Urine, Clean Catch   Result Value Ref Range    RBC, UA 0-2 None Seen, 0-2 /HPF    WBC, UA 0-2 None Seen, 0-2 /HPF    Bacteria, UA Trace (A) None Seen /HPF    Squamous Epithelial Cells, UA None Seen None Seen, 0-2 /HPF    Hyaline Casts, UA 0-2 None Seen /LPF    Granular Casts, UA 0-2 None Seen /LPF    Methodology Manual Light Microscopy    Green Top (Gel)   Result Value Ref Range    Extra Tube Hold for add-ons.    Lavender Top   Result Value Ref Range    Extra Tube hold for add-on    Gold Top - SST   Result Value Ref Range    Extra Tube Hold for add-ons.    Light Blue Top   Result Value Ref Range    Extra Tube Hold for add-ons.       CT Abdomen Pelvis Without Contrast   Final Result       1.  Air and fluid-filled large and small bowel segments with occasional   air-fluid levels identified throughout the bowel segments suggestive of   possible mild generalized ileus.   2.  Mild stranding noted along the margins of the sigmoid colon possibly   due to mild distal colitis.    3.  No features of acute appendicitis   4.  Enlarged prostate gland.   5.  No free fluid or free air.   6.  Mild chronic bilateral renal cortical volume loss.   7.  No free fluid or free air. No abscess or hematoma.   8.  Mild diverticulosis at the splenic flecture and proximal descending   colon segment.       This report was finalized on 5/31/2024 6:34 PM by Regan Davis MD.               ED Course  ED Course as of 05/31/24 1944   Fri May 31, 2024   1837 CT Abdomen Pelvis Without Contrast  IMPRESSION:     1.  Air and  fluid-filled large and small bowel segments with occasional  air-fluid levels identified throughout the bowel segments suggestive of  possible mild generalized ileus.  2.  Mild stranding noted along the margins of the sigmoid colon possibly  due to mild distal colitis.   3.  No features of acute appendicitis  4.  Enlarged prostate gland.  5.  No free fluid or free air.  6.  Mild chronic bilateral renal cortical volume loss.  7.  No free fluid or free air. No abscess or hematoma.  8.  Mild diverticulosis at the splenic flecture and proximal descending  colon segment.   [MB]   1915 Son at bedside would like case management consult to assist with Home Health and other care services at home due to ongoing debility.  [MB]      ED Course User Index  [MB] Cande Munoz APRN                                             Medical Decision Making  Patient is a 89 year old male with PMH significant for anemia, GERD, constipation, and hypothyroidism. He presents to the ED with complaints of dark stools. He reports he has had chronic issues with constipation. He is followed by GI. He was started on miralax and linzess. He reports that he had some dark stools about 1 week ago. He states this started after straining. He reports he was seen for dark stools by GI several months ago and was told that they would not scope him due to his age. He reports some generalized weakness. Family would like to have case management help with getting home health at home.     Problems Addressed:  Constipation, unspecified constipation type: complicated acute illness or injury  Debility: complicated acute illness or injury  Generalized abdominal pain: complicated acute illness or injury    Amount and/or Complexity of Data Reviewed  Labs: ordered.  Radiology: ordered. Decision-making details documented in ED Course.    Risk  Prescription drug management.        Final diagnoses:   Debility   Generalized abdominal pain   Constipation, unspecified  constipation type       ED Disposition  ED Disposition       ED Disposition   Discharge    Condition   Stable    Comment   --               [facility] CASE MANAGEMENT Uday Camara, DO  [address]  [place] [id number] [phone]    Call in 2 days           Medication List      No changes were made to your prescriptions during this visit.            Cande Munoz, APRN  05/31/24 1944

## 2024-05-31 NOTE — DISCHARGE INSTRUCTIONS
You are diagnosed with constipation.  - Follow the diet recommendations in the attached paperwork.  - Increase your fluid intake.  - Take Miralax 1 capful daily. Titrate using more or less as needed to maintain 1-2 soft stools of pudding-like consistency daily.  - Avoid or decrease opioid/narcotic use.    For severe constipation, perform the following for a bowel cleanout:  1. Mix Miralax with Gatoride, chill in fridge for 30 minutes  2. Drink Miralax Gatorade mixture and follow with ExLax  3. May repeat treatment in 6 hours for severe cases    Miralax: 5 capfuls  Gatorade: 32 ounces  ExLax: 2 squares    - Follow-up with your primary care provider in 2-3 days if not better  - Do not hesitate to return to the emergency department for new onset or worsening symptoms

## 2024-06-03 ENCOUNTER — REFERRAL TRIAGE (OUTPATIENT)
Dept: CASE MANAGEMENT | Facility: OTHER | Age: 89
End: 2024-06-03
Payer: MEDICARE

## 2024-06-29 ENCOUNTER — APPOINTMENT (OUTPATIENT)
Dept: GENERAL RADIOLOGY | Facility: HOSPITAL | Age: 89
End: 2024-06-29
Payer: MEDICARE

## 2024-06-29 ENCOUNTER — HOSPITAL ENCOUNTER (INPATIENT)
Facility: HOSPITAL | Age: 89
LOS: 11 days | Discharge: HOME | End: 2024-07-10
Attending: EMERGENCY MEDICINE | Admitting: INTERNAL MEDICINE
Payer: MEDICARE

## 2024-06-29 ENCOUNTER — APPOINTMENT (OUTPATIENT)
Dept: CT IMAGING | Facility: HOSPITAL | Age: 89
End: 2024-06-29
Payer: MEDICARE

## 2024-06-29 DIAGNOSIS — R53.81 DEBILITY: ICD-10-CM

## 2024-06-29 DIAGNOSIS — K52.3 INDETERMINATE COLITIS: ICD-10-CM

## 2024-06-29 DIAGNOSIS — K56.601 COMPLETE INTESTINAL OBSTRUCTION, UNSPECIFIED CAUSE: Primary | ICD-10-CM

## 2024-06-29 DIAGNOSIS — K56.609 INTESTINAL OBSTRUCTION, UNSPECIFIED CAUSE, UNSPECIFIED WHETHER PARTIAL OR COMPLETE: ICD-10-CM

## 2024-06-29 LAB
ALBUMIN SERPL-MCNC: 3.1 G/DL (ref 3.5–5.2)
ALBUMIN/GLOB SERPL: 1.2 G/DL
ALP SERPL-CCNC: 121 U/L (ref 39–117)
ALT SERPL W P-5'-P-CCNC: 11 U/L (ref 1–41)
ANION GAP SERPL CALCULATED.3IONS-SCNC: 11.3 MMOL/L (ref 5–15)
ANISOCYTOSIS BLD QL: NORMAL
AST SERPL-CCNC: 21 U/L (ref 1–40)
BACTERIA UR QL AUTO: ABNORMAL /HPF
BASOPHILS # BLD AUTO: 0.02 10*3/MM3 (ref 0–0.2)
BASOPHILS NFR BLD AUTO: 0.4 % (ref 0–1.5)
BILIRUB SERPL-MCNC: 0.5 MG/DL (ref 0–1.2)
BILIRUB UR QL STRIP: NEGATIVE
BUN SERPL-MCNC: 16 MG/DL (ref 8–23)
BUN/CREAT SERPL: 13.1 (ref 7–25)
CALCIUM SPEC-SCNC: 8.4 MG/DL (ref 8.2–9.6)
CHLORIDE SERPL-SCNC: 106 MMOL/L (ref 98–107)
CLARITY UR: ABNORMAL
CO2 SERPL-SCNC: 23.7 MMOL/L (ref 22–29)
COLOR UR: YELLOW
CREAT SERPL-MCNC: 1.22 MG/DL (ref 0.76–1.27)
DEPRECATED RDW RBC AUTO: ABNORMAL FL
DIMORPHIC RBC: PRESENT
EGFRCR SERPLBLD CKD-EPI 2021: 53.9 ML/MIN/1.73
EOSINOPHIL # BLD AUTO: 0.07 10*3/MM3 (ref 0–0.4)
EOSINOPHIL NFR BLD AUTO: 1.2 % (ref 0.3–6.2)
ERYTHROCYTE [DISTWIDTH] IN BLOOD BY AUTOMATED COUNT: ABNORMAL %
GLOBULIN UR ELPH-MCNC: 2.6 GM/DL
GLUCOSE SERPL-MCNC: 119 MG/DL (ref 65–99)
GLUCOSE UR STRIP-MCNC: NEGATIVE MG/DL
GRAN CASTS URNS QL MICRO: ABNORMAL /LPF
HCT VFR BLD AUTO: 39 % (ref 37.5–51)
HGB BLD-MCNC: 12.4 G/DL (ref 13–17.7)
HGB UR QL STRIP.AUTO: ABNORMAL
HYALINE CASTS UR QL AUTO: ABNORMAL /LPF
IMM GRANULOCYTES # BLD AUTO: 0.02 10*3/MM3 (ref 0–0.05)
IMM GRANULOCYTES NFR BLD AUTO: 0.4 % (ref 0–0.5)
KETONES UR QL STRIP: ABNORMAL
LEUKOCYTE ESTERASE UR QL STRIP.AUTO: NEGATIVE
LIPASE SERPL-CCNC: 23 U/L (ref 13–60)
LYMPHOCYTES # BLD AUTO: 1.74 10*3/MM3 (ref 0.7–3.1)
LYMPHOCYTES NFR BLD AUTO: 30.8 % (ref 19.6–45.3)
MAGNESIUM SERPL-MCNC: 2.1 MG/DL (ref 1.7–2.3)
MCH RBC QN AUTO: 27.7 PG (ref 26.6–33)
MCHC RBC AUTO-ENTMCNC: 31.8 G/DL (ref 31.5–35.7)
MCV RBC AUTO: 87.2 FL (ref 79–97)
MONOCYTES # BLD AUTO: 0.58 10*3/MM3 (ref 0.1–0.9)
MONOCYTES NFR BLD AUTO: 10.3 % (ref 5–12)
NEUTROPHILS NFR BLD AUTO: 3.22 10*3/MM3 (ref 1.7–7)
NEUTROPHILS NFR BLD AUTO: 56.9 % (ref 42.7–76)
NITRITE UR QL STRIP: NEGATIVE
NRBC BLD AUTO-RTO: 0 /100 WBC (ref 0–0.2)
PH UR STRIP.AUTO: 5.5 [PH] (ref 5–8)
PLAT MORPH BLD: NORMAL
PLATELET # BLD AUTO: 203 10*3/MM3 (ref 140–450)
PMV BLD AUTO: 9.3 FL (ref 6–12)
POIKILOCYTOSIS BLD QL SMEAR: NORMAL
POTASSIUM SERPL-SCNC: 3.3 MMOL/L (ref 3.5–5.2)
PROT SERPL-MCNC: 5.7 G/DL (ref 6–8.5)
PROT UR QL STRIP: ABNORMAL
RBC # BLD AUTO: 4.47 10*6/MM3 (ref 4.14–5.8)
RBC # UR STRIP: ABNORMAL /HPF
REF LAB TEST METHOD: ABNORMAL
SODIUM SERPL-SCNC: 141 MMOL/L (ref 136–145)
SP GR UR STRIP: 1.02 (ref 1–1.03)
SQUAMOUS #/AREA URNS HPF: ABNORMAL /HPF
TSH SERPL DL<=0.05 MIU/L-ACNC: 3.87 UIU/ML (ref 0.27–4.2)
UROBILINOGEN UR QL STRIP: ABNORMAL
WBC # UR STRIP: ABNORMAL /HPF
WBC NRBC COR # BLD AUTO: 5.65 10*3/MM3 (ref 3.4–10.8)
YEAST URNS QL MICRO: ABNORMAL /HPF

## 2024-06-29 PROCEDURE — 93010 ELECTROCARDIOGRAM REPORT: CPT | Performed by: INTERNAL MEDICINE

## 2024-06-29 PROCEDURE — 36415 COLL VENOUS BLD VENIPUNCTURE: CPT

## 2024-06-29 PROCEDURE — 71045 X-RAY EXAM CHEST 1 VIEW: CPT

## 2024-06-29 PROCEDURE — 71045 X-RAY EXAM CHEST 1 VIEW: CPT | Performed by: RADIOLOGY

## 2024-06-29 PROCEDURE — 81001 URINALYSIS AUTO W/SCOPE: CPT | Performed by: EMERGENCY MEDICINE

## 2024-06-29 PROCEDURE — 83735 ASSAY OF MAGNESIUM: CPT | Performed by: INTERNAL MEDICINE

## 2024-06-29 PROCEDURE — 80053 COMPREHEN METABOLIC PANEL: CPT | Performed by: EMERGENCY MEDICINE

## 2024-06-29 PROCEDURE — 25810000003 SODIUM CHLORIDE 0.9 % SOLUTION: Performed by: EMERGENCY MEDICINE

## 2024-06-29 PROCEDURE — 74176 CT ABD & PELVIS W/O CONTRAST: CPT

## 2024-06-29 PROCEDURE — 99222 1ST HOSP IP/OBS MODERATE 55: CPT | Performed by: SURGERY

## 2024-06-29 PROCEDURE — 74176 CT ABD & PELVIS W/O CONTRAST: CPT | Performed by: RADIOLOGY

## 2024-06-29 PROCEDURE — 25010000002 HEPARIN (PORCINE) PER 1000 UNITS: Performed by: INTERNAL MEDICINE

## 2024-06-29 PROCEDURE — 99285 EMERGENCY DEPT VISIT HI MDM: CPT

## 2024-06-29 PROCEDURE — 85007 BL SMEAR W/DIFF WBC COUNT: CPT | Performed by: EMERGENCY MEDICINE

## 2024-06-29 PROCEDURE — 85025 COMPLETE CBC W/AUTO DIFF WBC: CPT | Performed by: EMERGENCY MEDICINE

## 2024-06-29 PROCEDURE — 25010000002 POTASSIUM CHLORIDE 10 MEQ/100ML SOLUTION: Performed by: INTERNAL MEDICINE

## 2024-06-29 PROCEDURE — 83690 ASSAY OF LIPASE: CPT | Performed by: EMERGENCY MEDICINE

## 2024-06-29 PROCEDURE — 25010000002 ONDANSETRON PER 1 MG: Performed by: EMERGENCY MEDICINE

## 2024-06-29 PROCEDURE — 84443 ASSAY THYROID STIM HORMONE: CPT | Performed by: INTERNAL MEDICINE

## 2024-06-29 PROCEDURE — 25810000003 SODIUM CHLORIDE 0.9 % SOLUTION: Performed by: INTERNAL MEDICINE

## 2024-06-29 PROCEDURE — 99223 1ST HOSP IP/OBS HIGH 75: CPT | Performed by: INTERNAL MEDICINE

## 2024-06-29 PROCEDURE — 93005 ELECTROCARDIOGRAM TRACING: CPT | Performed by: INTERNAL MEDICINE

## 2024-06-29 RX ORDER — AMOXICILLIN 250 MG
2 CAPSULE ORAL 2 TIMES DAILY PRN
Status: DISCONTINUED | OUTPATIENT
Start: 2024-06-29 | End: 2024-07-10 | Stop reason: HOSPADM

## 2024-06-29 RX ORDER — HEPARIN SODIUM 5000 [USP'U]/ML
5000 INJECTION, SOLUTION INTRAVENOUS; SUBCUTANEOUS EVERY 8 HOURS SCHEDULED
Status: DISCONTINUED | OUTPATIENT
Start: 2024-06-29 | End: 2024-07-10 | Stop reason: HOSPADM

## 2024-06-29 RX ORDER — SODIUM CHLORIDE 0.9 % (FLUSH) 0.9 %
10 SYRINGE (ML) INJECTION AS NEEDED
Status: DISCONTINUED | OUTPATIENT
Start: 2024-06-29 | End: 2024-07-10 | Stop reason: HOSPADM

## 2024-06-29 RX ORDER — POTASSIUM CHLORIDE 7.45 MG/ML
10 INJECTION INTRAVENOUS
Status: DISPENSED | OUTPATIENT
Start: 2024-06-29 | End: 2024-06-29

## 2024-06-29 RX ORDER — SODIUM CHLORIDE 9 MG/ML
40 INJECTION, SOLUTION INTRAVENOUS AS NEEDED
Status: DISCONTINUED | OUTPATIENT
Start: 2024-06-29 | End: 2024-07-10 | Stop reason: HOSPADM

## 2024-06-29 RX ORDER — LIDOCAINE HYDROCHLORIDE 20 MG/ML
10 SOLUTION OROPHARYNGEAL ONCE
Status: COMPLETED | OUTPATIENT
Start: 2024-06-29 | End: 2024-06-29

## 2024-06-29 RX ORDER — POLYETHYLENE GLYCOL 3350 17 G/17G
17 POWDER, FOR SOLUTION ORAL DAILY PRN
Status: DISCONTINUED | OUTPATIENT
Start: 2024-06-29 | End: 2024-07-10 | Stop reason: HOSPADM

## 2024-06-29 RX ORDER — BISACODYL 5 MG/1
5 TABLET, DELAYED RELEASE ORAL DAILY PRN
Status: DISCONTINUED | OUTPATIENT
Start: 2024-06-29 | End: 2024-07-10 | Stop reason: HOSPADM

## 2024-06-29 RX ORDER — ONDANSETRON 2 MG/ML
4 INJECTION INTRAMUSCULAR; INTRAVENOUS EVERY 6 HOURS PRN
Status: DISCONTINUED | OUTPATIENT
Start: 2024-06-29 | End: 2024-07-10 | Stop reason: HOSPADM

## 2024-06-29 RX ORDER — SODIUM CHLORIDE 0.9 % (FLUSH) 0.9 %
10 SYRINGE (ML) INJECTION EVERY 12 HOURS SCHEDULED
Status: DISCONTINUED | OUTPATIENT
Start: 2024-06-29 | End: 2024-07-10 | Stop reason: HOSPADM

## 2024-06-29 RX ORDER — BISACODYL 10 MG
10 SUPPOSITORY, RECTAL RECTAL DAILY PRN
Status: DISCONTINUED | OUTPATIENT
Start: 2024-06-29 | End: 2024-07-10 | Stop reason: HOSPADM

## 2024-06-29 RX ORDER — SODIUM CHLORIDE 9 MG/ML
75 INJECTION, SOLUTION INTRAVENOUS CONTINUOUS
Status: DISCONTINUED | OUTPATIENT
Start: 2024-06-29 | End: 2024-07-03

## 2024-06-29 RX ADMIN — SODIUM CHLORIDE 500 ML: 9 INJECTION, SOLUTION INTRAVENOUS at 13:59

## 2024-06-29 RX ADMIN — Medication 10 ML: at 21:41

## 2024-06-29 RX ADMIN — SODIUM CHLORIDE 125 ML/HR: 9 INJECTION, SOLUTION INTRAVENOUS at 14:00

## 2024-06-29 RX ADMIN — POTASSIUM CHLORIDE 10 MEQ: 7.46 INJECTION, SOLUTION INTRAVENOUS at 19:26

## 2024-06-29 RX ADMIN — LIDOCAINE HYDROCHLORIDE 10 ML: 20 SOLUTION ORAL at 17:47

## 2024-06-29 RX ADMIN — POTASSIUM CHLORIDE 10 MEQ: 7.46 INJECTION, SOLUTION INTRAVENOUS at 16:48

## 2024-06-29 RX ADMIN — SODIUM CHLORIDE 75 ML/HR: 9 INJECTION, SOLUTION INTRAVENOUS at 23:19

## 2024-06-29 RX ADMIN — HEPARIN SODIUM 5000 UNITS: 5000 INJECTION INTRAVENOUS; SUBCUTANEOUS at 21:39

## 2024-06-29 RX ADMIN — ONDANSETRON 4 MG: 2 INJECTION INTRAMUSCULAR; INTRAVENOUS at 16:48

## 2024-06-29 NOTE — H&P
[facility]    Patient Identification:  Name:  Joaquín Rogers  Age:  89 y.o.  Sex:  male  :  1927  MRN:  [id number]   Visit Number:  [id number]  Admit Date: 2024   Room number:  3342/2S  Primary Care Physician:  Uday Roche,      Subjective     Chief complaint:    Chief Complaint   Patient presents with    Abdominal Pain       History of presenting illness:  Mr. Rogers is our 89 yo M with hx BPH, hypothyroidism, constipation on home linzess and miralax, GERD, inguinal hernias, pacemaker who presents with abdominal pain and no BM for 3 days which is abnormal for patient. In the ED patient found to have colonic obstruction. NG tube placed. Surgery consulted and recommended to admit to medicine and would follow.    Patient had just had NG tube placed before transfer upstairs. He had small amount of bleeding from nose and cough. Denies any bleeding before NG placement. NG tube clamped as we await radiology looking at plain film for placement. Patient's wife supportive bedside. Patient noted feeling about the same as admission.   ---------------------------------------------------------------------------------------------------------------------   Review of Systems   Constitutional: Negative.    HENT: Negative.     Eyes: Negative.    Respiratory: Negative.     Cardiovascular: Negative.    Gastrointestinal:  Positive for abdominal distention, abdominal pain, nausea and vomiting.   Endocrine: Negative.    Genitourinary: Negative.    Musculoskeletal: Negative.    Skin: Negative.    Allergic/Immunologic: Negative.    Neurological: Negative.    Hematological: Negative.    Psychiatric/Behavioral: Negative.       ---------------------------------------------------------------------------------------------------------------------   Past Medical History:   Diagnosis Date    Bradycardia     Thyroid disease      Past Surgical History:   Procedure Laterality Date     CARDIAC ELECTROPHYSIOLOGY PROCEDURE N/A 2021    Procedure: Pacemaker DC new;  Surgeon: Kendrick Burgess MD;  Location: [facility] CATH INVASIVE LOCATION;  Service: Cardiology;  Laterality: N/A;    HERNIA REPAIR      INSERT / REPLACE / REMOVE PACEMAKER  2021    [employer] device     No family history on file.  Social History     Socioeconomic History    Marital status:    Tobacco Use    Smoking status: Former     Current packs/day: 0.00     Types: Cigarettes     Quit date:      Years since quittin.5     Passive exposure: Past    Smokeless tobacco: Former     Types: Chew     Quit date:    Vaping Use    Vaping status: Never Used   Substance and Sexual Activity    Alcohol use: Never    Drug use: Never    Sexual activity: Defer     ---------------------------------------------------------------------------------------------------------------------   Allergies:  Patient has no known allergies.  ---------------------------------------------------------------------------------------------------------------------   Medications below are reported home medications pulling from within the system; at this time, these medications have not been reconciled unless otherwise specified and are in the verification process for further verifcation as current home medications.    Prior to Admission Medications       Prescriptions Last Dose Informant Patient Reported? Taking?    aspirin 81 MG EC tablet  Medication Bottle Yes No    Take 1 tablet by mouth Daily.    calcium carbonate-cholecalciferol 500-400 MG-UNIT tablet tablet  Medication Bottle Yes No    Take 1 tablet by mouth 2 (Two) Times a Day.    carboxymethylcellulose (REFRESH PLUS) 0.5 % solution  Medication Bottle Yes No    Administer 1 drop to both eyes 3 (Three) Times a Day As Needed for Dry Eyes.    ferrous sulfate 325 (65 FE) MG tablet  Medication Bottle Yes No    Take 1 tablet by mouth 2 (Two) Times a Day.    finasteride (PROSCAR) 5 MG tablet   Medication Bottle Yes No    Take 1 tablet by mouth Daily.    levothyroxine (SYNTHROID, LEVOTHROID) 75 MCG tablet  Medication Bottle Yes No    Take 1 tablet by mouth Daily.    linaclotide (LINZESS) 72 MCG capsule capsule  Medication Bottle Yes No    Take 1 capsule by mouth Every Morning Before Breakfast.    omeprazole (priLOSEC) 40 MG capsule  Medication Bottle Yes No    Take 1 capsule by mouth Daily.    polyethylene glycol (MIRALAX) 17 g packet  Medication Bottle Yes No    Take 17 g by mouth Daily.    terazosin (HYTRIN) 2 MG capsule  Medication Bottle Yes No    Take 1 capsule by mouth Every Night.          Objective     Vital Signs:  Temp:  [98 °F (36.7 °C)-98.2 °F (36.8 °C)] 98 °F (36.7 °C)  Heart Rate:  [] 93  Resp:  [20] 20  BP: (104-170)/(55-95) 129/73    Mean Arterial Pressure (Non-Invasive) for the past 24 hrs (Last 3 readings):   Noninvasive MAP (mmHg)   06/29/24 1730 111   06/29/24 1715 110   06/29/24 1700 118     SpO2:  [90 %-96 %] 91 %  on   ;      Body mass index is 22.46 kg/m².    Wt Readings from Last 3 Encounters:   06/29/24 67 kg (147 lb 11.3 oz)   05/31/24 68 kg (150 lb)   04/01/24 67.1 kg (148 lb)      ---------------------------------------------------------------------------------------------------------------------   Physical Exam:  Constitutional:  Well-developed and well-nourished.  No respiratory distress. Elderly male laying supine in bed with small amount of dried blood under NG that was placed at 60 cm deep.      HENT:  Head: Normocephalic and atraumatic.  Mouth:  Moist mucous membranes.    Eyes:  Conjunctivae and EOM are normal.  Pupils are equal, round, and reactive to light.  No scleral icterus.  Cardiovascular:  Normal rate, regular rhythm and normal heart sounds with no murmur.  Pulmonary/Chest:  No respiratory distress, no wheezes, no crackles, with normal breath sounds and good air movement.  Abdominal:  Distended. No rebound or guarding.   Musculoskeletal:  No edema, no  tenderness, and no deformity.  No red or swollen joints anywhere.    Neurological:  Alert and oriented to person, place, and time.  No cranial nerve deficit.  No focal neurological deficit.   Skin:  Skin is warm and dry.  No rash noted.  No pallor.   Peripheral vascular:  No edema and strong pulses on all 4 extremities.    ---------------------------------------------------------------------------------------------------------------------  EKG:  Will get preoperative EKG.   No orders to display       Telemetry:      I have personally looked at both the EKG and the telemetry strips.    Last echocardiogram:  Results for orders placed during the hospital encounter of 09/01/21    Adult Transthoracic Echo Complete W/ Cont if Necessary Per Protocol    Interpretation Summary  · Left ventricular wall thickness is consistent with mild concentric hypertrophy.  · Left ventricular ejection fraction appears to be 56 - 60%. Left ventricular systolic function is normal.  · Left ventricular diastolic function is consistent with (grade I) impaired relaxation.  · Normal cardiac chamber dimensions.  · Moderate aortic valve regurgitation is present.  · There is no evidence of pericardial effusion.    --------------------------------------------------------------------------------------------------------------------  Labs:  Results from last 7 days   Lab Units 06/29/24  1358   WBC 10*3/mm3 5.65   HEMOGLOBIN g/dL 12.4*   HEMATOCRIT % 39.0   MCV fL 87.2   MCHC g/dL 31.8   PLATELETS 10*3/mm3 203         Results from last 7 days   Lab Units 06/29/24  1513   SODIUM mmol/L 141   POTASSIUM mmol/L 3.3*   MAGNESIUM mg/dL 2.1   CHLORIDE mmol/L 106   CO2 mmol/L 23.7   BUN mg/dL 16   CREATININE mg/dL 1.22   CALCIUM mg/dL 8.4   GLUCOSE mg/dL 119*   ALBUMIN g/dL 3.1*   BILIRUBIN mg/dL 0.5   ALK PHOS U/L 121*   AST (SGOT) U/L 21   ALT (SGPT) U/L 11   Estimated Creatinine Clearance: 32.8 mL/min (by C-G formula based on SCr of 1.22 mg/dL).    No results  "found for: \"AMMONIA\"          No results found for: \"HGBA1C\", \"POCGLU\"  Lab Results   Component Value Date    TSH 3.870 06/29/2024    FREET4 1.48 03/14/2023     No results found for: \"PREGTESTUR\", \"PREGSERUM\", \"HCG\", \"HCGQUANT\"  Pain Management Panel           No data to display              Brief Urine Lab Results  (Last result in the past 365 days)        Color   Clarity   Blood   Leuk Est   Nitrite   Protein   CREAT   Urine HCG        05/31/24 1747 Yellow   Clear   Negative   Negative   Negative   30 mg/dL (1+)                 No results found for: \"BLOODCX\"  No results found for: \"URINECX\"  No results found for: \"WOUNDCX\"  No results found for: \"STOOLCX\"    I have personally looked at the labs and they are summarized above.  ----------------------------------------------------------------------------------------------------------------------  Detailed radiology reports for the last 24 hours:    Imaging Results (Last 24 Hours)       Procedure Component Value Units Date/Time    XR Chest 1 View [[id number]] Resulted: 06/29/24 1746     Updated: 06/29/24 1754    CT Abdomen Pelvis Without Contrast [[id number]] Collected: 06/29/24 1537     Updated: 06/29/24 1545    Narrative:      VERIFICATION OBSERVER NAME: Lisa Baltazar MD.     Technique: Axial images were obtained along with coronal and sagittal  reconstruction. DLP in mGycm reported in the EMR records. Dose lowering  technique: Automated exposure control with adjustment of the MA and/or  KV, use of iterative reconstruction. Data included in the medical  records.     HISTORY/COMPARISON/FINDINGS:     Comparison: 5/31/2024     HISTORY: Abdominal pain and distention     Findings:      Severe dilatation of small bowel consistent with high-grade small bowel  obstruction.  This is appears to be recurrence from prior study.  Liver and spleen pancreas appeared unremarkable.  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  No hydronephrosis.  Area of narrowing in the pelvis in the  distal sigmoid " colon.  Findings suspicious for presence of LEFT  hemicolectomy.          Impression:      Severe small bowel dilatation consistent with recurrent obstruction.   Position zone appears to be in the pelvis.s           [id number]  Zip code [id number]                 This report was finalized on 6/29/2024 3:43 PM by Dr. Lisa Baltazar MD.             Final impressions for the last 30 days of radiology reports:    CT Abdomen Pelvis Without Contrast    Result Date: 6/29/2024  Severe small bowel dilatation consistent with recurrent obstruction. Position zone appears to be in the pelvis.s    [id number] Zip code [id number]      This report was finalized on 6/29/2024 3:43 PM by Dr. Lisa Baltazar MD.      CT Abdomen Pelvis Without Contrast    Result Date: 5/31/2024   1.  Air and fluid-filled large and small bowel segments with occasional air-fluid levels identified throughout the bowel segments suggestive of possible mild generalized ileus. 2.  Mild stranding noted along the margins of the sigmoid colon possibly due to mild distal colitis. 3.  No features of acute appendicitis 4.  Enlarged prostate gland. 5.  No free fluid or free air. 6.  Mild chronic bilateral renal cortical volume loss. 7.  No free fluid or free air. No abscess or hematoma. 8.  Mild diverticulosis at the splenic flecture and proximal descending colon segment.  This report was finalized on 5/31/2024 6:34 PM by Regan Davis MD.     I have personally looked at the radiology images and read the final radiology report.    Assessment & Plan    #Large bowel obstruction   #Chronic constipation   - Likely obstruction at level of rectum with fluid stool behind   - NG tube placed in ED. Awaiting plain film read to confirm placement.   - Discussed with surgery, will admit to med/surg with plan for flex sig tomorrow which will hopefully be therapeutic   - NPO except ice chips as per surgery until then.     #Hypothyroidism   - Will get TSH. Continue home regimen.     #GERD  - PPI  once tolerating oral    #Hx of bradycardia w/ pacemaker placement   #Hx of inguinal hernias     Code status: DNR/DNI    Dispo: Admit to med/surg       Woody Lam MD  [facility]  06/29/24  18:32 EDT

## 2024-06-29 NOTE — PLAN OF CARE
Goal Outcome Evaluation:              Pt arrived from ER this shift. NG in R nare at 63. No concerns or complaints at this time

## 2024-06-29 NOTE — ED PROVIDER NOTES
Subjective   History of Present Illness  89-year-old white male complains of abdominal pain.  Patient states that he has diffuse abdominal pain and has not had a bowel movement for the past 3 days.  He has had decreased appetite has not been eating or drinking well during that time.  He takes MiraLAX and Linzess from his doctor to help with his bowel movements, but despite taking these has not had a bowel movement during this time.  He complains of pain in his upper abdomen symptoms radiating around to the left lower quadrant.  He denies any fever, chills, nausea, vomiting.  He has had 3 previous inguinal hernia repairs and pacemaker placement for bradycardia.  History of thyroid disease      Review of Systems   All other systems reviewed and are negative.      Past Medical History:   Diagnosis Date    Bradycardia     Thyroid disease        No Known Allergies    Past Surgical History:   Procedure Laterality Date    CARDIAC ELECTROPHYSIOLOGY PROCEDURE N/A 2021    Procedure: Pacemaker DC new;  Surgeon: Kendrick Burgess MD;  Location: [facility] INVASIVE LOCATION;  Service: Cardiology;  Laterality: N/A;    HERNIA REPAIR      INSERT / REPLACE / REMOVE PACEMAKER  2021    [employer] device       No family history on file.    Social History     Socioeconomic History    Marital status:    Tobacco Use    Smoking status: Former     Current packs/day: 0.00     Types: Cigarettes     Quit date:      Years since quittin.5     Passive exposure: Past    Smokeless tobacco: Former     Types: Chew     Quit date:    Vaping Use    Vaping status: Never Used   Substance and Sexual Activity    Alcohol use: Never    Drug use: Never    Sexual activity: Defer           Objective   Physical Exam  Vitals and nursing note reviewed. Exam conducted with a chaperone present.   Constitutional:       Appearance: He is well-developed and normal weight.   HENT:      Head: Normocephalic and atraumatic.   Cardiovascular:       Rate and Rhythm: Normal rate and regular rhythm.      Heart sounds: Normal heart sounds. No murmur heard.     No friction rub. No gallop.   Pulmonary:      Effort: Pulmonary effort is normal. No respiratory distress.      Breath sounds: Normal breath sounds. No wheezing, rhonchi or rales.   Chest:      Chest wall: No tenderness.   Abdominal:      General: Abdomen is flat. Bowel sounds are increased. There is no distension.      Palpations: Abdomen is soft.      Tenderness: There is no abdominal tenderness in the left lower quadrant. There is no guarding or rebound.   Musculoskeletal:         General: Normal range of motion.      Right lower leg: No edema.      Left lower leg: No edema.   Skin:     General: Skin is warm and dry.      Coloration: Skin is not jaundiced or pale.   Neurological:      General: No focal deficit present.      Mental Status: He is alert and oriented to person, place, and time.   Psychiatric:         Mood and Affect: Mood normal.         Behavior: Behavior normal.       Results for orders placed or performed during the hospital encounter of 06/29/24   Comprehensive Metabolic Panel    Specimen: Blood   Result Value Ref Range    Glucose 119 (H) 65 - 99 mg/dL    BUN 16 8 - 23 mg/dL    Creatinine 1.22 0.76 - 1.27 mg/dL    Sodium 141 136 - 145 mmol/L    Potassium 3.3 (L) 3.5 - 5.2 mmol/L    Chloride 106 98 - 107 mmol/L    CO2 23.7 22.0 - 29.0 mmol/L    Calcium 8.4 8.2 - 9.6 mg/dL    Total Protein 5.7 (L) 6.0 - 8.5 g/dL    Albumin 3.1 (L) 3.5 - 5.2 g/dL    ALT (SGPT) 11 1 - 41 U/L    AST (SGOT) 21 1 - 40 U/L    Alkaline Phosphatase 121 (H) 39 - 117 U/L    Total Bilirubin 0.5 0.0 - 1.2 mg/dL    Globulin 2.6 gm/dL    A/G Ratio 1.2 g/dL    BUN/Creatinine Ratio 13.1 7.0 - 25.0    Anion Gap 11.3 5.0 - 15.0 mmol/L    eGFR 53.9 (L) >60.0 mL/min/1.73   Lipase    Specimen: Blood   Result Value Ref Range    Lipase 23 13 - 60 U/L   CBC Auto Differential    Specimen: Blood   Result Value Ref Range    WBC 5.65  3.40 - 10.80 10*3/mm3    RBC 4.47 4.14 - 5.80 10*6/mm3    Hemoglobin 12.4 (L) 13.0 - 17.7 g/dL    Hematocrit 39.0 37.5 - 51.0 %    MCV 87.2 79.0 - 97.0 fL    MCH 27.7 26.6 - 33.0 pg    MCHC 31.8 31.5 - 35.7 g/dL    RDW      RDW-SD      MPV 9.3 6.0 - 12.0 fL    Platelets 203 140 - 450 10*3/mm3    Neutrophil % 56.9 42.7 - 76.0 %    Lymphocyte % 30.8 19.6 - 45.3 %    Monocyte % 10.3 5.0 - 12.0 %    Eosinophil % 1.2 0.3 - 6.2 %    Basophil % 0.4 0.0 - 1.5 %    Immature Grans % 0.4 0.0 - 0.5 %    Neutrophils, Absolute 3.22 1.70 - 7.00 10*3/mm3    Lymphocytes, Absolute 1.74 0.70 - 3.10 10*3/mm3    Monocytes, Absolute 0.58 0.10 - 0.90 10*3/mm3    Eosinophils, Absolute 0.07 0.00 - 0.40 10*3/mm3    Basophils, Absolute 0.02 0.00 - 0.20 10*3/mm3    Immature Grans, Absolute 0.02 0.00 - 0.05 10*3/mm3    nRBC 0.0 0.0 - 0.2 /100 WBC   Scan Slide    Specimen: Blood   Result Value Ref Range    Anisocytosis Large/3+ None Seen    Dimorphic RBC Present None Seen    Poikilocytes Slight/1+ None Seen    Platelet Morphology Normal Normal   Magnesium    Specimen: Blood   Result Value Ref Range    Magnesium 2.1 1.7 - 2.3 mg/dL     CT Abdomen Pelvis Without Contrast    Result Date: 6/29/2024  Narrative: VERIFICATION OBSERVER NAME: Lisa Baltazar MD.  Technique: Axial images were obtained along with coronal and sagittal reconstruction. DLP in mGycm reported in the EMR records. Dose lowering technique: Automated exposure control with adjustment of the MA and/or KV, use of iterative reconstruction. Data included in the medical records.  HISTORY/COMPARISON/FINDINGS:  Comparison: 5/31/2024  HISTORY: Abdominal pain and distention  Findings:  Severe dilatation of small bowel consistent with high-grade small bowel obstruction.  This is appears to be recurrence from prior study. Liver and spleen pancreas appeared unremarkable.  Granulomas in the spleen.  No hydronephrosis.  Area of narrowing in the pelvis in the distal sigmoid colon.  Findings  suspicious for presence of LEFT hemicolectomy.       Impression: Severe small bowel dilatation consistent with recurrent obstruction. Position zone appears to be in the pelvis.s    [id number] Zip code [id number]      This report was finalized on 6/29/2024 3:43 PM by Dr. Lisa Baltazar MD.      CT Abdomen Pelvis Without Contrast    Result Date: 5/31/2024  Narrative: PROCEDURE: CT of the abdomen and pelvis performed without IV contrast on May 31, 2024. The examination was performed with 5 mm axial imaging and 5 mm sagittal coronal reconstruction images. Total DLP = 295. The examination was performed according to as low as reasonably achievable dose protocol.  HISTORY: Abdominal pain. Constipation.  COMPARISON: None.  FINDINGS:  Normal heart size Pacing leads to the right atrium and right ventricle. Right lower lobe calcified granuloma. Small bands of scar versus atelectasis at the lung bases. Additional calcified granulomas near the right hilum and within the subcarinal space. Coronary arterial vascular calcifications. Calcified granulomas at the spleen.  No acute process seen in the liver, pancreas, adrenal glands, and kidneys. Small extrarenal pelvis noted on the right and left side. Dense calcified plaque along the abdominal aorta with no features of aneurysm or retroperitoneal hemorrhage. Multiple air-fluid levels identified throughout the abdomen and pelvis suggestive of possible mild generalized ileus. Air-fluid levels also identified within the small bowel segments. No conclusive features of bowel obstruction. Mild stranding noted along the margins of the sigmoid colon segment possibly due to mild distal colitis. Mild degenerative disc disease in the lower lumbar spine levels. Enlarged prostate gland. Multiple bilateral lower pelvic phleboliths. No features of acute appendicitis. No free fluid or free air. No abscess or hematoma. Mild chronic bilateral renal cortical volume loss.      Impression:  1.  Air and  fluid-filled large and small bowel segments with occasional air-fluid levels identified throughout the bowel segments suggestive of possible mild generalized ileus. 2.  Mild stranding noted along the margins of the sigmoid colon possibly due to mild distal colitis. 3.  No features of acute appendicitis 4.  Enlarged prostate gland. 5.  No free fluid or free air. 6.  Mild chronic bilateral renal cortical volume loss. 7.  No free fluid or free air. No abscess or hematoma. 8.  Mild diverticulosis at the splenic flecture and proximal descending colon segment.  This report was finalized on 5/31/2024 6:34 PM by Regan Davis MD.         Procedures           ED Course  ED Course as of 06/29/24 1643   Sat Jun 29, 2024   1542 Discussed with Dr. Yanez who reviewed the patient's CT discuss further. [BC]   1603 Discussed with Dr. Lam.  Patient admitted, see orders. [BC]      ED Course User Index  [BC] Michele Wheatley MD                                             Medical Decision Making  Problems Addressed:  Complete intestinal obstruction, unspecified cause: complicated acute illness or injury    Amount and/or Complexity of Data Reviewed  Labs: ordered.  Radiology: ordered.    Risk  Prescription drug management.  Decision regarding hospitalization.        Final diagnoses:   Complete intestinal obstruction, unspecified cause       ED Disposition  ED Disposition       ED Disposition   Decision to Admit    Condition   --    Comment   Level of Care: Med/Surg [1]   Diagnosis: Bowel obstruction [028683]   Certification: I Certify That Inpatient Hospital Services Are Medically Necessary For Greater Than 2 Midnights                 No follow-up provider specified.       Medication List      No changes were made to your prescriptions during this visit.            Michele Wheatley MD  06/29/24 5390

## 2024-06-29 NOTE — CONSULTS
Patient Name:  Joaquín Rogers  YOB: 1927  [id number]       Patient Care Team:  dUay Roche DO as PCP - General (Internal Medicine)  Ayaka Patel, RN as Ambulatory  ([facility])      General Surgery Consult Note     Date of Consultation: 06/29/24    Consulting Physician : Woody Lam MD    Reason for Consult : large bowel obstruction    Subjective     I have been asked to see  Joaquín Rogers , a 89 y.o. male in consultation for large bowel obstruction. He reports a history of inability to pass gas or stool for about one day. He has had issues with constipation and was in the emergency department at the end of May with similar complaints. CT scan at that time showed a thickening of his sigmoid colon. He presented today with continued complaints of discomfort and inability to pass gas/stool. CT scan was performed which showed a large colon with filled with fluid to the level of the rectum. He reports a history of colonoscopies. He attempted to have one recently due to issues with constipation but was denied due to his age. He denies any history of radiation to his pelvis.       Allergy: No Known Allergies    Medications:  heparin (porcine), 5,000 Units, Subcutaneous, Q8H  potassium chloride, 10 mEq, Intravenous, Q1H  sodium chloride, 10 mL, Intravenous, Q12H      sodium chloride, 75 mL/hr, Last Rate: 75 mL/hr (06/29/24 1641)      No current facility-administered medications on file prior to encounter.     Current Outpatient Medications on File Prior to Encounter   Medication Sig    aspirin 81 MG EC tablet Take 1 tablet by mouth Daily.    calcium carbonate-cholecalciferol 500-400 MG-UNIT tablet tablet Take 1 tablet by mouth 2 (Two) Times a Day.    carboxymethylcellulose (REFRESH PLUS) 0.5 % solution Administer 1 drop to both eyes 3 (Three) Times a Day As Needed for Dry Eyes.    ferrous sulfate 325 (65 FE) MG tablet Take 1 tablet by mouth 2 (Two) Times a Day.    finasteride  "(PROSCAR) 5 MG tablet Take 1 tablet by mouth Daily.    levothyroxine (SYNTHROID, LEVOTHROID) 75 MCG tablet Take 1 tablet by mouth Daily.    linaclotide (LINZESS) 72 MCG capsule capsule Take 1 capsule by mouth Every Morning Before Breakfast.    omeprazole (priLOSEC) 40 MG capsule Take 1 capsule by mouth Daily.    polyethylene glycol (MIRALAX) 17 g packet Take 17 g by mouth Daily.    terazosin (HYTRIN) 2 MG capsule Take 1 capsule by mouth Every Night.       PMHx:   Past Medical History:   Diagnosis Date    Bradycardia     Thyroid disease        Past Surgical History:  Hernia repair in left groin (x2) and right groin     Family History: Noncontributory     Social History:  Noncontributory      Review of Systems   Pertinent items are noted in HPI     Constitutional: No fevers, chills or malaise   Eyes: Denies visual changes    Cardiovascular: Denies chest pain, palpitations   Pulmonary: Denies cough or shortness of breath   Abdominal/ GI: Abdominal discomfort, inability to pass stool   Genitourinary: Denies dysuria or hematuria   Musculoskeletal: Denies any but chronic joint aches, pains or deformities   Psychiatric: No recent mood changes   Neurologic: No paresthesias or loss of function          Objective     Physical Exam:      Vital Signs  /73 (BP Location: Right arm, Patient Position: Lying)   Pulse 93   Temp 98 °F (36.7 °C) (Oral)   Resp 20   Ht 172.7 cm (67.99\")   Wt 67 kg (147 lb 11.3 oz)   SpO2 91%   BMI 22.46 kg/m²     Intake/Output Summary (Last 24 hours) at 6/29/2024 1935  Last data filed at 6/29/2024 1801  Gross per 24 hour   Intake 500 ml   Output --   Net 500 ml         Physical Exam:    Head: Normocephalic, atraumatic.   Eyes: Pupils equal, round, react to light   Mouth: No lesions noted  CV: Regular rate and rhythm   Lungs: Bilateral chest rise and fall, no use of accessory muscles   Abdomen: Distended, nontender to palpation  Extremities:  No cyanosis, clubbing or edema bilaterally "   Neurologic: No gross deficits      Results Review: I have personally reviewed all of the recent lab and imaging results available at this time- distended colon filled with fluid to the level of the rectum. No discrete mass noted.        Assessment and Plan:    Problem List Items Addressed This Visit          Gastrointestinal Abdominal     * (Principal) Bowel obstruction - Primary    Relevant Orders    Case request (Completed)     Other Visit Diagnoses       Indeterminate colitis        Relevant Orders    Case request (Completed)             Active Hospital Problems    Diagnosis  POA    **Bowel obstruction [K56.609]  Yes      Resolved Hospital Problems   No resolved problems to display.     Mr Rogers is a 89 yo M w/ a large bowel obstruction from unknown etiology. Recent CT imaging showed possibility of colitis. No discrete mass seen on imaging. Will plan for flexible sigmoidoscopy with possibly biopsy and rectal tube tomorrow. Discussed possibility of need of diverting colostomy. Also discussed possibility of stent if lesion looks amenable to stenting, although this would require a transfer of the patient and would be a temporizing measure. I willl plan to further discussions with patient/family once flexible sigmoidoscopy is complete.     I discussed the patient's findings and my recommendations with the patient and/or family, as well as the primary team     Dilma Yanez MD  06/29/24  19:35 EDT

## 2024-06-30 ENCOUNTER — ANESTHESIA EVENT (OUTPATIENT)
Dept: PERIOP | Facility: HOSPITAL | Age: 89
End: 2024-06-30
Payer: MEDICARE

## 2024-06-30 ENCOUNTER — ANESTHESIA (OUTPATIENT)
Dept: PERIOP | Facility: HOSPITAL | Age: 89
End: 2024-06-30
Payer: MEDICARE

## 2024-06-30 LAB
ANION GAP SERPL CALCULATED.3IONS-SCNC: 12.3 MMOL/L (ref 5–15)
ANISOCYTOSIS BLD QL: NORMAL
BASOPHILS # BLD AUTO: 0.02 10*3/MM3 (ref 0–0.2)
BASOPHILS NFR BLD AUTO: 0.3 % (ref 0–1.5)
BUN SERPL-MCNC: 17 MG/DL (ref 8–23)
BUN/CREAT SERPL: 15 (ref 7–25)
CALCIUM SPEC-SCNC: 8.4 MG/DL (ref 8.2–9.6)
CHLORIDE SERPL-SCNC: 107 MMOL/L (ref 98–107)
CO2 SERPL-SCNC: 19.7 MMOL/L (ref 22–29)
CREAT SERPL-MCNC: 1.13 MG/DL (ref 0.76–1.27)
DEPRECATED RDW RBC AUTO: 74.8 FL (ref 37–54)
EGFRCR SERPLBLD CKD-EPI 2021: 59.1 ML/MIN/1.73
EOSINOPHIL # BLD AUTO: 0.02 10*3/MM3 (ref 0–0.4)
EOSINOPHIL NFR BLD AUTO: 0.3 % (ref 0.3–6.2)
ERYTHROCYTE [DISTWIDTH] IN BLOOD BY AUTOMATED COUNT: 24.5 % (ref 12.3–15.4)
GLUCOSE SERPL-MCNC: 137 MG/DL (ref 65–99)
HCT VFR BLD AUTO: 40 % (ref 37.5–51)
HGB BLD-MCNC: 12.5 G/DL (ref 13–17.7)
HYPOCHROMIA BLD QL: NORMAL
IMM GRANULOCYTES # BLD AUTO: 0.02 10*3/MM3 (ref 0–0.05)
IMM GRANULOCYTES NFR BLD AUTO: 0.3 % (ref 0–0.5)
LYMPHOCYTES # BLD AUTO: 2.5 10*3/MM3 (ref 0.7–3.1)
LYMPHOCYTES NFR BLD AUTO: 34.5 % (ref 19.6–45.3)
MAGNESIUM SERPL-MCNC: 2.2 MG/DL (ref 1.7–2.3)
MCH RBC QN AUTO: 27.9 PG (ref 26.6–33)
MCHC RBC AUTO-ENTMCNC: 31.3 G/DL (ref 31.5–35.7)
MCV RBC AUTO: 89.3 FL (ref 79–97)
MONOCYTES # BLD AUTO: 0.6 10*3/MM3 (ref 0.1–0.9)
MONOCYTES NFR BLD AUTO: 8.3 % (ref 5–12)
NEUTROPHILS NFR BLD AUTO: 4.09 10*3/MM3 (ref 1.7–7)
NEUTROPHILS NFR BLD AUTO: 56.3 % (ref 42.7–76)
NRBC BLD AUTO-RTO: 0 /100 WBC (ref 0–0.2)
PLAT MORPH BLD: NORMAL
PLATELET # BLD AUTO: 216 10*3/MM3 (ref 140–450)
PMV BLD AUTO: 9.7 FL (ref 6–12)
POTASSIUM SERPL-SCNC: 3.4 MMOL/L (ref 3.5–5.2)
POTASSIUM SERPL-SCNC: 4.6 MMOL/L (ref 3.5–5.2)
QT INTERVAL: 402 MS
QTC INTERVAL: 481 MS
RBC # BLD AUTO: 4.48 10*6/MM3 (ref 4.14–5.8)
SODIUM SERPL-SCNC: 139 MMOL/L (ref 136–145)
WBC NRBC COR # BLD AUTO: 7.25 10*3/MM3 (ref 3.4–10.8)

## 2024-06-30 PROCEDURE — 0DBP8ZX EXCISION OF RECTUM, VIA NATURAL OR ARTIFICIAL OPENING ENDOSCOPIC, DIAGNOSTIC: ICD-10-PCS | Performed by: SURGERY

## 2024-06-30 PROCEDURE — 25010000002 MORPHINE PER 10 MG: Performed by: SURGERY

## 2024-06-30 PROCEDURE — 25010000002 MORPHINE PER 10 MG: Performed by: INTERNAL MEDICINE

## 2024-06-30 PROCEDURE — 25810000003 LACTATED RINGERS PER 1000 ML: Performed by: NURSE ANESTHETIST, CERTIFIED REGISTERED

## 2024-06-30 PROCEDURE — 84132 ASSAY OF SERUM POTASSIUM: CPT | Performed by: SURGERY

## 2024-06-30 PROCEDURE — 25010000002 PROPOFOL 10 MG/ML EMULSION: Performed by: NURSE ANESTHETIST, CERTIFIED REGISTERED

## 2024-06-30 PROCEDURE — 25010000002 ONDANSETRON PER 1 MG: Performed by: SURGERY

## 2024-06-30 PROCEDURE — 85025 COMPLETE CBC W/AUTO DIFF WBC: CPT | Performed by: INTERNAL MEDICINE

## 2024-06-30 PROCEDURE — 25010000002 POTASSIUM CHLORIDE 10 MEQ/100ML SOLUTION: Performed by: INTERNAL MEDICINE

## 2024-06-30 PROCEDURE — 83735 ASSAY OF MAGNESIUM: CPT | Performed by: INTERNAL MEDICINE

## 2024-06-30 PROCEDURE — 45300 PROCTOSIGMOIDOSCOPY DX: CPT | Performed by: SURGERY

## 2024-06-30 PROCEDURE — 25010000002 POTASSIUM CHLORIDE 10 MEQ/100ML SOLUTION: Performed by: SURGERY

## 2024-06-30 PROCEDURE — 25810000003 SODIUM CHLORIDE 0.9 % SOLUTION: Performed by: SURGERY

## 2024-06-30 PROCEDURE — 25010000002 HEPARIN (PORCINE) PER 1000 UNITS: Performed by: INTERNAL MEDICINE

## 2024-06-30 PROCEDURE — 25010000002 HEPARIN (PORCINE) PER 1000 UNITS: Performed by: SURGERY

## 2024-06-30 PROCEDURE — 80048 BASIC METABOLIC PNL TOTAL CA: CPT | Performed by: INTERNAL MEDICINE

## 2024-06-30 PROCEDURE — 99233 SBSQ HOSP IP/OBS HIGH 50: CPT | Performed by: INTERNAL MEDICINE

## 2024-06-30 PROCEDURE — 85007 BL SMEAR W/DIFF WBC COUNT: CPT | Performed by: INTERNAL MEDICINE

## 2024-06-30 PROCEDURE — 94761 N-INVAS EAR/PLS OXIMETRY MLT: CPT

## 2024-06-30 RX ORDER — SODIUM CHLORIDE 0.9 % (FLUSH) 0.9 %
10 SYRINGE (ML) INJECTION AS NEEDED
Status: DISCONTINUED | OUTPATIENT
Start: 2024-06-30 | End: 2024-06-30 | Stop reason: HOSPADM

## 2024-06-30 RX ORDER — POTASSIUM CHLORIDE 7.45 MG/ML
10 INJECTION INTRAVENOUS
Status: COMPLETED | OUTPATIENT
Start: 2024-06-30 | End: 2024-06-30

## 2024-06-30 RX ORDER — IPRATROPIUM BROMIDE AND ALBUTEROL SULFATE 2.5; .5 MG/3ML; MG/3ML
3 SOLUTION RESPIRATORY (INHALATION) ONCE AS NEEDED
Status: DISCONTINUED | OUTPATIENT
Start: 2024-06-30 | End: 2024-06-30 | Stop reason: HOSPADM

## 2024-06-30 RX ORDER — SODIUM CHLORIDE 0.9 % (FLUSH) 0.9 %
10 SYRINGE (ML) INJECTION EVERY 12 HOURS SCHEDULED
Status: DISCONTINUED | OUTPATIENT
Start: 2024-06-30 | End: 2024-06-30 | Stop reason: HOSPADM

## 2024-06-30 RX ORDER — POTASSIUM CHLORIDE 7.45 MG/ML
10 INJECTION INTRAVENOUS
Status: DISPENSED | OUTPATIENT
Start: 2024-06-30 | End: 2024-06-30

## 2024-06-30 RX ORDER — SODIUM CHLORIDE 9 MG/ML
40 INJECTION, SOLUTION INTRAVENOUS AS NEEDED
Status: DISCONTINUED | OUTPATIENT
Start: 2024-06-30 | End: 2024-06-30 | Stop reason: HOSPADM

## 2024-06-30 RX ORDER — OXYCODONE HYDROCHLORIDE AND ACETAMINOPHEN 5; 325 MG/1; MG/1
1 TABLET ORAL ONCE AS NEEDED
Status: DISCONTINUED | OUTPATIENT
Start: 2024-06-30 | End: 2024-06-30 | Stop reason: HOSPADM

## 2024-06-30 RX ORDER — SODIUM CHLORIDE, SODIUM LACTATE, POTASSIUM CHLORIDE, CALCIUM CHLORIDE 600; 310; 30; 20 MG/100ML; MG/100ML; MG/100ML; MG/100ML
100 INJECTION, SOLUTION INTRAVENOUS ONCE AS NEEDED
Status: COMPLETED | OUTPATIENT
Start: 2024-06-30 | End: 2024-06-30

## 2024-06-30 RX ORDER — MIDAZOLAM HYDROCHLORIDE 1 MG/ML
0.5 INJECTION INTRAMUSCULAR; INTRAVENOUS
Status: DISCONTINUED | OUTPATIENT
Start: 2024-06-30 | End: 2024-06-30 | Stop reason: HOSPADM

## 2024-06-30 RX ORDER — PROPOFOL 10 MG/ML
VIAL (ML) INTRAVENOUS AS NEEDED
Status: DISCONTINUED | OUTPATIENT
Start: 2024-06-30 | End: 2024-06-30 | Stop reason: SURG

## 2024-06-30 RX ORDER — ONDANSETRON 2 MG/ML
4 INJECTION INTRAMUSCULAR; INTRAVENOUS AS NEEDED
Status: DISCONTINUED | OUTPATIENT
Start: 2024-06-30 | End: 2024-06-30 | Stop reason: HOSPADM

## 2024-06-30 RX ORDER — SODIUM CHLORIDE, SODIUM LACTATE, POTASSIUM CHLORIDE, CALCIUM CHLORIDE 600; 310; 30; 20 MG/100ML; MG/100ML; MG/100ML; MG/100ML
125 INJECTION, SOLUTION INTRAVENOUS ONCE
Status: DISCONTINUED | OUTPATIENT
Start: 2024-06-30 | End: 2024-06-30 | Stop reason: HOSPADM

## 2024-06-30 RX ORDER — MORPHINE SULFATE 2 MG/ML
2 INJECTION, SOLUTION INTRAMUSCULAR; INTRAVENOUS
Status: DISPENSED | OUTPATIENT
Start: 2024-06-30 | End: 2024-07-05

## 2024-06-30 RX ORDER — FENTANYL CITRATE 50 UG/ML
50 INJECTION, SOLUTION INTRAMUSCULAR; INTRAVENOUS
Status: DISCONTINUED | OUTPATIENT
Start: 2024-06-30 | End: 2024-06-30 | Stop reason: HOSPADM

## 2024-06-30 RX ADMIN — PROPOFOL 50 MG: 10 INJECTION, EMULSION INTRAVENOUS at 09:37

## 2024-06-30 RX ADMIN — MORPHINE SULFATE 2 MG: 2 INJECTION, SOLUTION INTRAMUSCULAR; INTRAVENOUS at 13:23

## 2024-06-30 RX ADMIN — Medication 10 ML: at 11:04

## 2024-06-30 RX ADMIN — SODIUM CHLORIDE, POTASSIUM CHLORIDE, SODIUM LACTATE AND CALCIUM CHLORIDE: 600; 310; 30; 20 INJECTION, SOLUTION INTRAVENOUS at 09:24

## 2024-06-30 RX ADMIN — PROPOFOL 60 MG: 10 INJECTION, EMULSION INTRAVENOUS at 09:41

## 2024-06-30 RX ADMIN — HEPARIN SODIUM 5000 UNITS: 5000 INJECTION INTRAVENOUS; SUBCUTANEOUS at 05:07

## 2024-06-30 RX ADMIN — PROPOFOL 50 MG: 10 INJECTION, EMULSION INTRAVENOUS at 09:39

## 2024-06-30 RX ADMIN — POTASSIUM CHLORIDE 10 MEQ: 7.46 INJECTION, SOLUTION INTRAVENOUS at 13:19

## 2024-06-30 RX ADMIN — Medication 10 ML: at 20:31

## 2024-06-30 RX ADMIN — ONDANSETRON 4 MG: 2 INJECTION INTRAMUSCULAR; INTRAVENOUS at 11:43

## 2024-06-30 RX ADMIN — POTASSIUM CHLORIDE 10 MEQ: 7.46 INJECTION, SOLUTION INTRAVENOUS at 16:38

## 2024-06-30 RX ADMIN — PROPOFOL 50 MG: 10 INJECTION, EMULSION INTRAVENOUS at 09:34

## 2024-06-30 RX ADMIN — HEPARIN SODIUM 5000 UNITS: 5000 INJECTION INTRAVENOUS; SUBCUTANEOUS at 14:58

## 2024-06-30 RX ADMIN — HEPARIN SODIUM 5000 UNITS: 5000 INJECTION INTRAVENOUS; SUBCUTANEOUS at 21:16

## 2024-06-30 RX ADMIN — POTASSIUM CHLORIDE 10 MEQ: 7.46 INJECTION, SOLUTION INTRAVENOUS at 11:45

## 2024-06-30 RX ADMIN — SODIUM CHLORIDE 75 ML/HR: 9 INJECTION, SOLUTION INTRAVENOUS at 11:04

## 2024-06-30 RX ADMIN — POTASSIUM CHLORIDE 10 MEQ: 7.46 INJECTION, SOLUTION INTRAVENOUS at 14:59

## 2024-06-30 NOTE — PLAN OF CARE
Goal Outcome Evaluation:      Mr. Rogers arrived on unit shortly before shift with wife and son at bed side. Pt signed consent for surgery in chart, NPO since midnight, surgery bath completed. Pt is A/O, refused NG tube, I d/c with lead nurse at bedside, pt tolerated well. Pt now resting in bed. Vss, will follow POC

## 2024-06-30 NOTE — PROGRESS NOTES
[facility] HOSPITALIST PROGRESS NOTE     Patient Identification:  Name:  Joaquín Rogers  Age:  89 y.o.  Sex:  male  :  1927  MRN:  [id number]  Visit Number:  [id number]  ROOM: [address] [place] [address]     Primary Care Provider:  Uday Roche DO    Length of stay in inpatient status:  1    Subjective     Chief Compliant:    Chief Complaint   Patient presents with    Abdominal Pain       History of Presenting Illness:    Patient still not feeling well after flex sig. Still having nausea and abdominal pain.     ROS:  Otherwise 10 point ROS negative other than documented above in HPI.     Objective     Current Hospital Meds:heparin (porcine), 5,000 Units, Subcutaneous, Q8H  potassium chloride, 10 mEq, Intravenous, Q1H  sodium chloride, 10 mL, Intravenous, Q12H    sodium chloride, 75 mL/hr, Last Rate: 75 mL/hr (24 1104)        Current Antimicrobial Therapy:  Anti-Infectives (From admission, onward)      None          Current Diuretic Therapy:  Diuretics (From admission, onward)      None          ----------------------------------------------------------------------------------------------------------------------  Vital Signs:  Temp:  [97.2 °F (36.2 °C)-98.5 °F (36.9 °C)] 98.3 °F (36.8 °C)  Heart Rate:  [] 86  Resp:  [16-22] 18  BP: (101-178)/(51-97) 157/87  SpO2:  [88 %-97 %] 97 %  on  Flow (L/min):  [3] 3;   Device (Oxygen Therapy): nasal cannula  Body mass index is 22.46 kg/m².    Wt Readings from Last 3 Encounters:   24 67 kg (147 lb 11.3 oz)   24 68 kg (150 lb)   24 67.1 kg (148 lb)     Intake & Output (last 3 days)          07    P.O.   0     I.V. (mL/kg)    200 (3)    IV Piggyback   500     Total Intake(mL/kg)   500 (7.5) 200 (3)    Urine (mL/kg/hr)    50 (0.1)    Total Output    50    Net   +500 +150            Urine Unmeasured Occurrence   2 x           NPO Diet NPO Type: Ice  Chips  ----------------------------------------------------------------------------------------------------------------------  Physical exam:  Constitutional:  Well-developed and well-nourished.  No respiratory distress. Elderly male laying supine in bed with small amount of dried blood under NG that was placed at 60 cm deep.      HENT:  Head: Normocephalic and atraumatic.  Mouth:  Moist mucous membranes.    Eyes:  Conjunctivae and EOM are normal.  Pupils are equal, round, and reactive to light.  No scleral icterus.  Cardiovascular:  Normal rate, regular rhythm and normal heart sounds with no murmur.  Pulmonary/Chest:  No respiratory distress, no wheezes, no crackles, with normal breath sounds and good air movement.  Abdominal:  Distended. No rebound or guarding.   Musculoskeletal:  No edema, no tenderness, and no deformity.  No red or swollen joints anywhere.    Neurological:  Alert and oriented to person, place, and time.  No cranial nerve deficit.  No focal neurological deficit.   Skin:  Skin is warm and dry.  No rash noted.  No pallor.   Peripheral vascular:  No edema and strong pulses on all 4 extremities.  ----------------------------------------------------------------------------------------------------------------------  Tele:    ----------------------------------------------------------------------------------------------------------------------  Results from last 7 days   Lab Units 06/30/24  0059 06/29/24  1358   WBC 10*3/mm3 7.25 5.65   HEMOGLOBIN g/dL 12.5* 12.4*   HEMATOCRIT % 40.0 39.0   MCV fL 89.3 87.2   MCHC g/dL 31.3* 31.8   PLATELETS 10*3/mm3 216 203         Results from last 7 days   Lab Units 06/30/24  1526 06/30/24  0059 06/29/24  1513   SODIUM mmol/L  --  139 141   POTASSIUM mmol/L 4.6 3.4* 3.3*   MAGNESIUM mg/dL  --  2.2 2.1   CHLORIDE mmol/L  --  107 106   CO2 mmol/L  --  19.7* 23.7   BUN mg/dL  --  17 16   CREATININE mg/dL  --  1.13 1.22   CALCIUM mg/dL  --  8.4 8.4   GLUCOSE mg/dL  --   "137* 119*   ALBUMIN g/dL  --   --  3.1*   BILIRUBIN mg/dL  --   --  0.5   ALK PHOS U/L  --   --  121*   AST (SGOT) U/L  --   --  21   ALT (SGPT) U/L  --   --  11   Estimated Creatinine Clearance: 35.4 mL/min (by C-G formula based on SCr of 1.13 mg/dL).  No results found for: \"AMMONIA\"              No results found for: \"HGBA1C\", \"POCGLU\"  Lab Results   Component Value Date    TSH 3.870 06/29/2024    FREET4 1.48 03/14/2023     No results found for: \"PREGTESTUR\", \"PREGSERUM\", \"HCG\", \"HCGQUANT\"  Pain Management Panel           No data to display              Brief Urine Lab Results  (Last result in the past 365 days)        Color   Clarity   Blood   Leuk Est   Nitrite   Protein   CREAT   Urine HCG        06/29/24 2219 Yellow   Cloudy   Large (3+)   Negative   Negative   30 mg/dL (1+)                 No results found for: \"BLOODCX\"  No results found for: \"URINECX\"  No results found for: \"WOUNDCX\"  No results found for: \"STOOLCX\"  No results found for: \"RESPCX\"  No results found for: \"AFBCX\"        I have personally looked at the labs and they are summarized above.  ----------------------------------------------------------------------------------------------------------------------  Detailed radiology reports for the last 24 hours:    Imaging Results (Last 24 Hours)       Procedure Component Value Units Date/Time    XR Chest 1 View [[id number]] Collected: 06/29/24 2102     Updated: 06/29/24 2104    Narrative:      INDICATION: NG tube placement     TECHNIQUE: Frontal radiograph of the chest.     COMPARISON: None.       Impression:      FINDINGS/IMPRESSION:  Enteric tube in the stomach.        This report was finalized on 6/29/2024 9:02 PM by Alex Pallas, DO.             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 Awaiting plain film read to confirm placement.   - " Surgery following. Flex sig 6/30 revealed rectal mass. Surgical resection and ostomy planned for tomorrow.   - NPO except ice chips as per surgery, strict NPO after midnight.      #Hypothyroidism   - TSH wnl. Continue home regimen.     #Preoperative risk stratificaiton   - RCRI: 0. No chest pain or symptoms of decompensated CHF. EKG reviewed. No further cardiac risk stratification needed. Patient at increased risk due to advanced age.      #GERD  - PPI once tolerating oral     #Hx of bradycardia w/ pacemaker placement   #Hx of inguinal hernias      Code status: DNR/DNI     Dispo: Admit to med/surg     Woody Lam MD  [facility] Hospitalist  06/30/24  16:07 EDT

## 2024-06-30 NOTE — ANESTHESIA PREPROCEDURE EVALUATION
Anesthesia Evaluation     no history of anesthetic complications:   NPO Solid Status: > 8 hours  NPO Liquid Status: > 8 hours           Airway   Mallampati: II  TM distance: >3 FB  Neck ROM: full  No difficulty expected  Dental    (+) poor dentition    Pulmonary - normal exam   (+) COPD,  Cardiovascular - normal exam    (+) pacemaker, dysrhythmias Bradycardia      Neuro/Psych  GI/Hepatic/Renal/Endo    (+) thyroid problem     Musculoskeletal     Abdominal  - normal exam   Substance History      OB/GYN          Other        ROS/Med Hx Other: SBO   Pt had NG but was removed last night.              Anesthesia Plan    ASA 3 - emergent     general     (Will intubate because of SBO)  intravenous induction     Anesthetic plan, risks, benefits, and alternatives have been provided, discussed and informed consent has been obtained with: patient.    CODE STATUS:    Medical Intervention Limits: No intubation (DNI)  Code Status (Patient has no pulse and is not breathing): No CPR (Do Not Attempt to Resuscitate)  Medical Interventions (Patient has pulse or is breathing): Limited Support

## 2024-06-30 NOTE — ANESTHESIA PROCEDURE NOTES
Airway  Urgency: elective    Date/Time: 6/30/2024 9:34 AM  Airway not difficult    General Information and Staff    Patient location during procedure: OR  CRNA/CAA: Eugenia Melton CRNA    Indications and Patient Condition  Indications for airway management: airway protection    Preoxygenated: yes  MILS maintained throughout  Mask difficulty assessment: 0 - not attempted    Final Airway Details  Final airway type: endotracheal airway      Successful airway: ETT  Cuffed: yes   Successful intubation technique: direct laryngoscopy and RSI  Facilitating devices/methods: cricoid pressure  Endotracheal tube insertion site: oral  Blade: Cristy  Blade size: 3  ETT size (mm): 7.5  Cormack-Lehane Classification: grade I - full view of glottis  Placement verified by: chest auscultation   Measured from: lips  ETT/EBT  to lips (cm): 22  Number of attempts at approach: 1  Assessment: lips, teeth, and gum same as pre-op and atraumatic intubation

## 2024-06-30 NOTE — ANESTHESIA POSTPROCEDURE EVALUATION
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        Post Anesthesia Care and Evaluation    Patient location during evaluation: PHASE II  Patient participation: complete - patient participated  Level of consciousness: awake and alert  Pain score: 1  Pain management: adequate    Airway patency: patent  Anesthetic complications: No anesthetic complications  PONV Status: controlled  Cardiovascular status: acceptable  Respiratory status: acceptable  Hydration status: acceptable

## 2024-06-30 NOTE — PAYOR COMM NOTE
"Psychiatric  SAMANTHA SHEPPARD  PHONE  [phone]  FAX  [phone]  NPI:  [id number]    ADMISSION NOTIFICATION    Joaquín Marroquin (89 y.o. Male)       Date of Birth   05/19/1927    Social Security Number       Address   [address] [address]    Home Phone   [phone]    MRN   [id number]       Evangel[employer]   None    Marital Status                               Admission Date   6/29/24    Admission Type   Emergency    Admitting Provider   Woody Lam MD    Attending Provider   Woody Lam MD    Department, Room/Bed   [address], 3342/2S       Discharge Date       Discharge Disposition       Discharge Destination                                 Attending Provider: Woody Lam MD    Allergies: No Known Allergies    Isolation: None   Infection: None   Code Status: No CPR    Ht: 172.7 cm (67.99\")   Wt: 67 kg (147 lb 11.3 oz)    Admission Cmt: None   Principal Problem: Bowel obstruction [K56.609]                   Active Insurance as of 6/29/2024       Primary Coverage       Payor Plan Insurance Group Employer/Plan Group    MEDICARE MEDICARE A & B        Payor Plan Address Payor Plan Phone Number Payor Plan Fax Number Effective Dates    [address] [phone]  5/1/1992 - None Entered    [facility] [id number]         Subscriber Name Subscriber Birth Date Member ID       JOAQUÍN MARROQUIN 5/19/1927 [id number]               Secondary Coverage       Payor Plan Insurance Group Employer/Plan Group    [id number]        Payor Plan Address Payor Plan Phone Number Payor Plan Fax Number Effective Dates    [facility] OFFICE OF COMMUNITY CARE [phone]  1/1/2021 - None Entered    [address] [address]       [facility] [address]         Subscriber Name Subscriber Birth Date Member ID       JOAQUÍN MARROQUIN 5/19/1927 [id number]               Tertiary Coverage       Payor Plan Insurance Group Employer/Plan Group    [facility] CCN OPTUM        Payor " Plan Address Payor Plan Phone Number Payor Plan Fax Number Effective Dates    [address] [phone]  2023 - None Entered    [facility] [id number]         Subscriber Name Subscriber Birth Date Member ID       RASHMI MARROQUIN 1927 [id number]                     Emergency Contacts        (Rel.) Home Phone Work Phone Mobile Phone    MAYRA MARROQUIN (Spouse) [phone] -- [phone]    Clark Marroquin (Son) -- -- [phone]    Rustam Marroquin (Son) -- -- [phone]                 History & Physical        Woody Lam MD at 24 1832              [facility] HISTORY AND PHYSICAL    Patient Identification:  Name:  Rashmi Marroquin  Age:  89 y.o.  Sex:  male  :  1927  MRN:  [id number]   Visit Number:  [id number]  Admit Date: 2024   Room number:  3342/2S  Primary Care Physician:  Uday Roche DO     Subjective     Chief complaint:    Chief Complaint   Patient presents with    Abdominal Pain       History of presenting illness:  Mr. Marroquin is our 89 yo M with hx BPH, hypothyroidism, constipation on home linzess and miralax, GERD, inguinal hernias, pacemaker who presents with abdominal pain and no BM for 3 days which is abnormal for patient. In the ED patient found to have colonic obstruction. NG tube placed. Surgery consulted and recommended to admit to medicine and would follow.    Patient had just had NG tube placed before transfer upstairs. He had small amount of bleeding from nose and cough. Denies any bleeding before NG placement. NG tube clamped as we await radiology looking at plain film for placement. Patient's wife supportive bedside. Patient noted feeling about the same as admission.   ---------------------------------------------------------------------------------------------------------------------   Review of Systems   Constitutional: Negative.    HENT: Negative.     Eyes: Negative.    Respiratory: Negative.     Cardiovascular: Negative.     Gastrointestinal:  Positive for abdominal distention, abdominal pain, nausea and vomiting.   Endocrine: Negative.    Genitourinary: Negative.    Musculoskeletal: Negative.    Skin: Negative.    Allergic/Immunologic: Negative.    Neurological: Negative.    Hematological: Negative.    Psychiatric/Behavioral: Negative.       ---------------------------------------------------------------------------------------------------------------------   Past Medical History:   Diagnosis Date    Bradycardia     Thyroid disease      Past Surgical History:   Procedure Laterality Date    CARDIAC ELECTROPHYSIOLOGY PROCEDURE N/A 2021    Procedure: Pacemaker DC new;  Surgeon: Kendrick Burgess MD;  Location: [facility] INVASIVE LOCATION;  Service: Cardiology;  Laterality: N/A;    HERNIA REPAIR      INSERT / REPLACE / REMOVE PACEMAKER  2021    [employer] device     No family history on file.  Social History     Socioeconomic History    Marital status:    Tobacco Use    Smoking status: Former     Current packs/day: 0.00     Types: Cigarettes     Quit date:      Years since quittin.5     Passive exposure: Past    Smokeless tobacco: Former     Types: Chew     Quit date:    Vaping Use    Vaping status: Never Used   Substance and Sexual Activity    Alcohol use: Never    Drug use: Never    Sexual activity: Defer     ---------------------------------------------------------------------------------------------------------------------   Allergies:  Patient has no known allergies.  ---------------------------------------------------------------------------------------------------------------------   Medications below are reported home medications pulling from within the system; at this time, these medications have not been reconciled unless otherwise specified and are in the verification process for further verifcation as current home medications.    Prior to Admission Medications       Prescriptions Last Dose  Informant Patient Reported? Taking?    aspirin 81 MG EC tablet  Medication Bottle Yes No    Take 1 tablet by mouth Daily.    calcium carbonate-cholecalciferol 500-400 MG-UNIT tablet tablet  Medication Bottle Yes No    Take 1 tablet by mouth 2 (Two) Times a Day.    carboxymethylcellulose (REFRESH PLUS) 0.5 % solution  Medication Bottle Yes No    Administer 1 drop to both eyes 3 (Three) Times a Day As Needed for Dry Eyes.    ferrous sulfate 325 (65 FE) MG tablet  Medication Bottle Yes No    Take 1 tablet by mouth 2 (Two) Times a Day.    finasteride (PROSCAR) 5 MG tablet  Medication Bottle Yes No    Take 1 tablet by mouth Daily.    levothyroxine (SYNTHROID, LEVOTHROID) 75 MCG tablet  Medication Bottle Yes No    Take 1 tablet by mouth Daily.    linaclotide (LINZESS) 72 MCG capsule capsule  Medication Bottle Yes No    Take 1 capsule by mouth Every Morning Before Breakfast.    omeprazole (priLOSEC) 40 MG capsule  Medication Bottle Yes No    Take 1 capsule by mouth Daily.    polyethylene glycol (MIRALAX) 17 g packet  Medication Bottle Yes No    Take 17 g by mouth Daily.    terazosin (HYTRIN) 2 MG capsule  Medication Bottle Yes No    Take 1 capsule by mouth Every Night.          Objective     Vital Signs:  Temp:  [98 °F (36.7 °C)-98.2 °F (36.8 °C)] 98 °F (36.7 °C)  Heart Rate:  [] 93  Resp:  [20] 20  BP: (104-170)/(55-95) 129/73    Mean Arterial Pressure (Non-Invasive) for the past 24 hrs (Last 3 readings):   Noninvasive MAP (mmHg)   06/29/24 1730 111   06/29/24 1715 110   06/29/24 1700 118     SpO2:  [90 %-96 %] 91 %  on   ;      Body mass index is 22.46 kg/m².    Wt Readings from Last 3 Encounters:   06/29/24 67 kg (147 lb 11.3 oz)   05/31/24 68 kg (150 lb)   04/01/24 67.1 kg (148 lb)      ---------------------------------------------------------------------------------------------------------------------   Physical Exam:  Constitutional:  Well-developed and well-nourished.  No respiratory distress. Elderly male  laying supine in bed with small amount of dried blood under NG that was placed at 60 cm deep.      HENT:  Head: Normocephalic and atraumatic.  Mouth:  Moist mucous membranes.    Eyes:  Conjunctivae and EOM are normal.  Pupils are equal, round, and reactive to light.  No scleral icterus.  Cardiovascular:  Normal rate, regular rhythm and normal heart sounds with no murmur.  Pulmonary/Chest:  No respiratory distress, no wheezes, no crackles, with normal breath sounds and good air movement.  Abdominal:  Distended. No rebound or guarding.   Musculoskeletal:  No edema, no tenderness, and no deformity.  No red or swollen joints anywhere.    Neurological:  Alert and oriented to person, place, and time.  No cranial nerve deficit.  No focal neurological deficit.   Skin:  Skin is warm and dry.  No rash noted.  No pallor.   Peripheral vascular:  No edema and strong pulses on all 4 extremities.    ---------------------------------------------------------------------------------------------------------------------  EKG:  Will get preoperative EKG.   No orders to display       Telemetry:      I have personally looked at both the EKG and the telemetry strips.    Last echocardiogram:  Results for orders placed during the hospital encounter of 09/01/21    Adult Transthoracic Echo Complete W/ Cont if Necessary Per Protocol    Interpretation Summary  · Left ventricular wall thickness is consistent with mild concentric hypertrophy.  · Left ventricular ejection fraction appears to be 56 - 60%. Left ventricular systolic function is normal.  · Left ventricular diastolic function is consistent with (grade I) impaired relaxation.  · Normal cardiac chamber dimensions.  · Moderate aortic valve regurgitation is present.  · There is no evidence of pericardial effusion.    --------------------------------------------------------------------------------------------------------------------  Labs:  Results from last 7 days   Lab Units 06/29/24  8139  "  WBC 10*3/mm3 5.65   HEMOGLOBIN g/dL 12.4*   HEMATOCRIT % 39.0   MCV fL 87.2   MCHC g/dL 31.8   PLATELETS 10*3/mm3 203         Results from last 7 days   Lab Units 06/29/24  1513   SODIUM mmol/L 141   POTASSIUM mmol/L 3.3*   MAGNESIUM mg/dL 2.1   CHLORIDE mmol/L 106   CO2 mmol/L 23.7   BUN mg/dL 16   CREATININE mg/dL 1.22   CALCIUM mg/dL 8.4   GLUCOSE mg/dL 119*   ALBUMIN g/dL 3.1*   BILIRUBIN mg/dL 0.5   ALK PHOS U/L 121*   AST (SGOT) U/L 21   ALT (SGPT) U/L 11   Estimated Creatinine Clearance: 32.8 mL/min (by C-G formula based on SCr of 1.22 mg/dL).    No results found for: \"AMMONIA\"          No results found for: \"HGBA1C\", \"POCGLU\"  Lab Results   Component Value Date    TSH 3.870 06/29/2024    FREET4 1.48 03/14/2023     No results found for: \"PREGTESTUR\", \"PREGSERUM\", \"HCG\", \"HCGQUANT\"  Pain Management Panel           No data to display              Brief Urine Lab Results  (Last result in the past 365 days)        Color   Clarity   Blood   Leuk Est   Nitrite   Protein   CREAT   Urine HCG        05/31/24 1747 Yellow   Clear   Negative   Negative   Negative   30 mg/dL (1+)                 No results found for: \"BLOODCX\"  No results found for: \"URINECX\"  No results found for: \"WOUNDCX\"  No results found for: \"STOOLCX\"    I have personally looked at the labs and they are summarized above.  ----------------------------------------------------------------------------------------------------------------------  Detailed radiology reports for the last 24 hours:    Imaging Results (Last 24 Hours)       Procedure Component Value Units Date/Time    XR Chest 1 View [[id number]] Resulted: 06/29/24 1746     Updated: 06/29/24 1754    CT Abdomen Pelvis Without Contrast [[id number]] Collected: 06/29/24 1537     Updated: 06/29/24 1545    Narrative:      VERIFICATION OBSERVER NAME: Lisa Baltazar MD.     Technique: Axial images were obtained along with coronal and sagittal  reconstruction. DLP in mGycm reported in the EMR records. " Dose lowering  technique: Automated exposure control with adjustment of the MA and/or  KV, use of iterative reconstruction. Data included in the medical  records.     HISTORY/COMPARISON/FINDINGS:     Comparison: 5/31/2024     HISTORY: Abdominal pain and distention     Findings:      Severe dilatation of small bowel consistent with high-grade small bowel  obstruction.  This is appears to be recurrence from prior study.  Liver and spleen pancreas appeared unremarkable.  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  No hydronephrosis.  Area of narrowing in the pelvis in the  distal sigmoid colon.  Findings suspicious for presence of LEFT  hemicolectomy.          Impression:      Severe small bowel dilatation consistent with recurrent obstruction.   Position zone appears to be in the pelvis.s           [id number]  Zip code [id number]                 This report was finalized on 6/29/2024 3:43 PM by Dr. Lisa Baltazar MD.             Final impressions for the last 30 days of radiology reports:    CT Abdomen Pelvis Without Contrast    Result Date: 6/29/2024  Severe small bowel dilatation consistent with recurrent obstruction. Position zone appears to be in the pelvis.s    [id number] Zip code [id number]      This report was finalized on 6/29/2024 3:43 PM by Dr. Lisa Baltazar MD.      CT Abdomen Pelvis Without Contrast    Result Date: 5/31/2024   1.  Air and fluid-filled large and small bowel segments with occasional air-fluid levels identified throughout the bowel segments suggestive of possible mild generalized ileus. 2.  Mild stranding noted along the margins of the sigmoid colon possibly due to mild distal colitis. 3.  No features of acute appendicitis 4.  Enlarged prostate gland. 5.  No free fluid or free air. 6.  Mild chronic bilateral renal cortical volume loss. 7.  No free fluid or free air. No abscess or hematoma. 8.  Mild diverticulosis at the splenic flecture and proximal descending colon segment.  This report was finalized on 5/31/2024 6:34  PM by Regan Davis MD.     I have personally looked at the radiology images and read the final radiology report.    Assessment & Plan    #Large bowel obstruction   #Chronic constipation   - Likely obstruction at level of rectum with fluid stool behind   - NG tube placed in ED. Awaiting plain film read to confirm placement.   - Discussed with surgery, will admit to med/surg with plan for flex sig tomorrow which will hopefully be therapeutic   - NPO except ice chips as per surgery until then.     #Hypothyroidism   - Will get TSH. Continue home regimen.     #GERD  - PPI once tolerating oral    #Hx of bradycardia w/ pacemaker placement   #Hx of inguinal hernias     Code status: DNR/DNI    Dispo: Admit to med/surg       Woody Lam MD  [facility]  06/29/24  18:32 EDT    Electronically signed by Woody Lam MD at 06/29/24 1840          Emergency Department Notes        Michele Wheatley MD at 06/29/24 1348          Subjective   History of Present Illness  89-year-old white male complains of abdominal pain.  Patient states that he has diffuse abdominal pain and has not had a bowel movement for the past 3 days.  He has had decreased appetite has not been eating or drinking well during that time.  He takes MiraLAX and Linzess from his doctor to help with his bowel movements, but despite taking these has not had a bowel movement during this time.  He complains of pain in his upper abdomen symptoms radiating around to the left lower quadrant.  He denies any fever, chills, nausea, vomiting.  He has had 3 previous inguinal hernia repairs and pacemaker placement for bradycardia.  History of thyroid disease      Review of Systems   All other systems reviewed and are negative.      Past Medical History:   Diagnosis Date    Bradycardia     Thyroid disease        No Known Allergies    Past Surgical History:   Procedure Laterality Date    CARDIAC ELECTROPHYSIOLOGY PROCEDURE N/A 9/13/2021    Procedure:  Pacemaker DC new;  Surgeon: Kendrick Burgess MD;  Location: [facility] CATH INVASIVE LOCATION;  Service: Cardiology;  Laterality: N/A;    HERNIA REPAIR      INSERT / REPLACE / REMOVE PACEMAKER  2021    [employer] device       No family history on file.    Social History     Socioeconomic History    Marital status:    Tobacco Use    Smoking status: Former     Current packs/day: 0.00     Types: Cigarettes     Quit date:      Years since quittin.5     Passive exposure: Past    Smokeless tobacco: Former     Types: Chew     Quit date:    Vaping Use    Vaping status: Never Used   Substance and Sexual Activity    Alcohol use: Never    Drug use: Never    Sexual activity: Defer           Objective   Physical Exam  Vitals and nursing note reviewed. Exam conducted with a chaperone present.   Constitutional:       Appearance: He is well-developed and normal weight.   HENT:      Head: Normocephalic and atraumatic.   Cardiovascular:      Rate and Rhythm: Normal rate and regular rhythm.      Heart sounds: Normal heart sounds. No murmur heard.     No friction rub. No gallop.   Pulmonary:      Effort: Pulmonary effort is normal. No respiratory distress.      Breath sounds: Normal breath sounds. No wheezing, rhonchi or rales.   Chest:      Chest wall: No tenderness.   Abdominal:      General: Abdomen is flat. Bowel sounds are increased. There is no distension.      Palpations: Abdomen is soft.      Tenderness: There is no abdominal tenderness in the left lower quadrant. There is no guarding or rebound.   Musculoskeletal:         General: Normal range of motion.      Right lower leg: No edema.      Left lower leg: No edema.   Skin:     General: Skin is warm and dry.      Coloration: Skin is not jaundiced or pale.   Neurological:      General: No focal deficit present.      Mental Status: He is alert and oriented to person, place, and time.   Psychiatric:         Mood and Affect: Mood normal.         Behavior:  Behavior normal.       Results for orders placed or performed during the hospital encounter of 06/29/24   Comprehensive Metabolic Panel    Specimen: Blood   Result Value Ref Range    Glucose 119 (H) 65 - 99 mg/dL    BUN 16 8 - 23 mg/dL    Creatinine 1.22 0.76 - 1.27 mg/dL    Sodium 141 136 - 145 mmol/L    Potassium 3.3 (L) 3.5 - 5.2 mmol/L    Chloride 106 98 - 107 mmol/L    CO2 23.7 22.0 - 29.0 mmol/L    Calcium 8.4 8.2 - 9.6 mg/dL    Total Protein 5.7 (L) 6.0 - 8.5 g/dL    Albumin 3.1 (L) 3.5 - 5.2 g/dL    ALT (SGPT) 11 1 - 41 U/L    AST (SGOT) 21 1 - 40 U/L    Alkaline Phosphatase 121 (H) 39 - 117 U/L    Total Bilirubin 0.5 0.0 - 1.2 mg/dL    Globulin 2.6 gm/dL    A/G Ratio 1.2 g/dL    BUN/Creatinine Ratio 13.1 7.0 - 25.0    Anion Gap 11.3 5.0 - 15.0 mmol/L    eGFR 53.9 (L) >60.0 mL/min/1.73   Lipase    Specimen: Blood   Result Value Ref Range    Lipase 23 13 - 60 U/L   CBC Auto Differential    Specimen: Blood   Result Value Ref Range    WBC 5.65 3.40 - 10.80 10*3/mm3    RBC 4.47 4.14 - 5.80 10*6/mm3    Hemoglobin 12.4 (L) 13.0 - 17.7 g/dL    Hematocrit 39.0 37.5 - 51.0 %    MCV 87.2 79.0 - 97.0 fL    MCH 27.7 26.6 - 33.0 pg    MCHC 31.8 31.5 - 35.7 g/dL    RDW      RDW-SD      MPV 9.3 6.0 - 12.0 fL    Platelets 203 140 - 450 10*3/mm3    Neutrophil % 56.9 42.7 - 76.0 %    Lymphocyte % 30.8 19.6 - 45.3 %    Monocyte % 10.3 5.0 - 12.0 %    Eosinophil % 1.2 0.3 - 6.2 %    Basophil % 0.4 0.0 - 1.5 %    Immature Grans % 0.4 0.0 - 0.5 %    Neutrophils, Absolute 3.22 1.70 - 7.00 10*3/mm3    Lymphocytes, Absolute 1.74 0.70 - 3.10 10*3/mm3    Monocytes, Absolute 0.58 0.10 - 0.90 10*3/mm3    Eosinophils, Absolute 0.07 0.00 - 0.40 10*3/mm3    Basophils, Absolute 0.02 0.00 - 0.20 10*3/mm3    Immature Grans, Absolute 0.02 0.00 - 0.05 10*3/mm3    nRBC 0.0 0.0 - 0.2 /100 WBC   Scan Slide    Specimen: Blood   Result Value Ref Range    Anisocytosis Large/3+ None Seen    Dimorphic RBC Present None Seen    Poikilocytes Slight/1+  None Seen    Platelet Morphology Normal Normal   Magnesium    Specimen: Blood   Result Value Ref Range    Magnesium 2.1 1.7 - 2.3 mg/dL     CT Abdomen Pelvis Without Contrast    Result Date: 6/29/2024  Narrative: VERIFICATION OBSERVER NAME: Lisa Baltazar MD.  Technique: Axial images were obtained along with coronal and sagittal reconstruction. DLP in mGycm reported in the EMR records. Dose lowering technique: Automated exposure control with adjustment of the MA and/or KV, use of iterative reconstruction. Data included in the medical records.  HISTORY/COMPARISON/FINDINGS:  Comparison: 5/31/2024  HISTORY: Abdominal pain and distention  Findings:  Severe dilatation of small bowel consistent with high-grade small bowel obstruction.  This is appears to be recurrence from prior study. Liver and spleen pancreas appeared unremarkable.  Granulomas in the spleen.  No hydronephrosis.  Area of narrowing in the pelvis in the distal sigmoid colon.  Findings suspicious for presence of LEFT hemicolectomy.       Impression: Severe small bowel dilatation consistent with recurrent obstruction. Position zone appears to be in the pelvis.s    [id number] Zip code [id number]      This report was finalized on 6/29/2024 3:43 PM by Dr. Lisa Baltazar MD.      CT Abdomen Pelvis Without Contrast    Result Date: 5/31/2024  Narrative: PROCEDURE: CT of the abdomen and pelvis performed without IV contrast on May 31, 2024. The examination was performed with 5 mm axial imaging and 5 mm sagittal coronal reconstruction images. Total DLP = 295. The examination was performed according to as low as reasonably achievable dose protocol.  HISTORY: Abdominal pain. Constipation.  COMPARISON: None.  FINDINGS:  Normal heart size Pacing leads to the right atrium and right ventricle. Right lower lobe calcified granuloma. Small bands of scar versus atelectasis at the lung bases. Additional calcified granulomas near the right hilum and within the subcarinal space. Coronary  arterial vascular calcifications. Calcified granulomas at the spleen.  No acute process seen in the liver, pancreas, adrenal glands, and kidneys. Small extrarenal pelvis noted on the right and left side. Dense calcified plaque along the abdominal aorta with no features of aneurysm or retroperitoneal hemorrhage. Multiple air-fluid levels identified throughout the abdomen and pelvis suggestive of possible mild generalized ileus. Air-fluid levels also identified within the small bowel segments. No conclusive features of bowel obstruction. Mild stranding noted along the margins of the sigmoid colon segment possibly due to mild distal colitis. Mild degenerative disc disease in the lower lumbar spine levels. Enlarged prostate gland. Multiple bilateral lower pelvic phleboliths. No features of acute appendicitis. No free fluid or free air. No abscess or hematoma. Mild chronic bilateral renal cortical volume loss.      Impression:  1.  Air and fluid-filled large and small bowel segments with occasional air-fluid levels identified throughout the bowel segments suggestive of possible mild generalized ileus. 2.  Mild stranding noted along the margins of the sigmoid colon possibly due to mild distal colitis. 3.  No features of acute appendicitis 4.  Enlarged prostate gland. 5.  No free fluid or free air. 6.  Mild chronic bilateral renal cortical volume loss. 7.  No free fluid or free air. No abscess or hematoma. 8.  Mild diverticulosis at the splenic flecture and proximal descending colon segment.  This report was finalized on 5/31/2024 6:34 PM by Regan Davis MD.         Procedures          ED Course  ED Course as of 06/29/24 1643   Sat Jun 29, 2024   1542 Discussed with Dr. Yanez who reviewed the patient's CT discuss further. [BC]   1603 Discussed with Dr. Lam.  Patient admitted, see orders. [BC]      ED Course User Index  [BC] Michele Wheatley MD                                             Medical Decision  Making  Problems Addressed:  Complete intestinal obstruction, unspecified cause: complicated acute illness or injury    Amount and/or Complexity of Data Reviewed  Labs: ordered.  Radiology: ordered.    Risk  Prescription drug management.  Decision regarding hospitalization.        Final diagnoses:   Complete intestinal obstruction, unspecified cause       ED Disposition  ED Disposition       ED Disposition   Decision to Admit    Condition   --    Comment   Level of Care: Med/Surg [1]   Diagnosis: Bowel obstruction [198761]   Certification: I Certify That Inpatient Hospital Services Are Medically Necessary For Greater Than 2 Midnights                 No follow-up provider specified.       Medication List      No changes were made to your prescriptions during this visit.            Michele Wheatley MD  06/29/24 1643      Electronically signed by Michele Wheatley MD at 06/29/24 1643       Current Facility-Administered Medications   Medication Dose Route Frequency Provider Last Rate Last Admin    sennosides-docusate (PERICOLACE) 8.6-50 MG per tablet 2 tablet  2 tablet Oral BID PRN Woody Lam MD        And    polyethylene glycol (MIRALAX) packet 17 g  17 g Oral Daily PRN Woody Lam MD        And    bisacodyl (DULCOLAX) EC tablet 5 mg  5 mg Oral Daily PRN Woody Lam MD        And    bisacodyl (DULCOLAX) suppository 10 mg  10 mg Rectal Daily PRN Woody Lam MD        Calcium Replacement - Follow Nurse / BPA Driven Protocol   Does not apply PRN Woody Lam MD        heparin (porcine) 5000 UNIT/ML injection 5,000 Units  5,000 Units Subcutaneous Q8H Woody Lam MD   5,000 Units at 06/30/24 0507    Magnesium Standard Dose Replacement - Follow Nurse / BPA Driven Protocol   Does not apply PRWoody Awad MD        ondansetron (ZOFRAN) injection 4 mg  4 mg Intravenous Q6H PRN Michele Wheatley MD   4 mg at 06/29/24 1648    Phosphorus Replacement - Follow Nurse / BPA Driven Protocol   Does not apply PRN  Woody Lam MD        Potassium Replacement - Follow Nurse / BPA Driven Protocol   Does not apply PRN Woody Lam MD        sodium chloride 0.9 % flush 10 mL  10 mL Intravenous PRN Michele Wheatley MD        sodium chloride 0.9 % flush 10 mL  10 mL Intravenous Q12H Woody Lam MD   10 mL at 06/29/24 2141    sodium chloride 0.9 % flush 10 mL  10 mL Intravenous PRN Woody Lam MD        sodium chloride 0.9 % infusion 40 mL  40 mL Intravenous PRN Woody Lam MD        sodium chloride 0.9 % infusion  75 mL/hr Intravenous Continuous Woody Lam MD 75 mL/hr at 06/29/24 2319 75 mL/hr at 06/29/24 2319     Orders (last 24 hrs)        Start     Ordered    06/30/24 0800  Oral Care  2 Times Daily       06/29/24 1811 06/30/24 0600  Basic Metabolic Panel  Morning Draw         06/29/24 1811    06/30/24 0600  CBC Auto Differential  Morning Draw         06/29/24 1811    06/30/24 0600  Magnesium  Morning Draw         06/29/24 1811    06/30/24 0145  Scan Slide  Once         06/30/24 0144    06/30/24 0001  NPO Diet NPO Type: Ice Chips  Diet Effective Midnight         06/29/24 1731    06/29/24 2230  Urinalysis, Microscopic Only - Urine, Clean Catch  Once         06/29/24 2229    06/29/24 2200  heparin (porcine) 5000 UNIT/ML injection 5,000 Units  Every 8 Hours Scheduled         06/29/24 1811 06/29/24 2200  Incentive Spirometry  Every 4 Hours While Awake       06/29/24 1811 06/29/24 2100  sodium chloride 0.9 % flush 10 mL  Every 12 Hours Scheduled         06/29/24 1811 06/29/24 2000  Vital Signs  Every 4 Hours       06/29/24 1811 06/29/24 1915  Case request  Once         06/29/24 1915 06/29/24 1915  Obtain Informed Consent  Once         06/29/24 1915 06/29/24 1839  ECG 12 Lead Pre-Op / Pre-Procedure  Once         06/29/24 1838    06/29/24 1836  Code Status and Medical Interventions:  Continuous         06/29/24 1835    06/29/24 1821  NPO Diet NPO Type: Ice Chips  Diet Effective Now,   Status:   "Canceled         06/29/24 1820    06/29/24 1812  Intake & Output  Every Shift       06/29/24 1811 06/29/24 1812  Weigh Patient  Once         06/29/24 1811    06/29/24 1812  Insert Peripheral IV  Once         06/29/24 1811 06/29/24 1812  Saline Lock & Maintain IV Access  Continuous         06/29/24 1811 06/29/24 1812  NPO Diet NPO Type: Strict NPO  Diet Effective Now,   Status:  Canceled         06/29/24 1811    06/29/24 1812  Inpatient General Surgery Consult  Once        Specialty:  General Surgery  Provider:  (Not yet assigned)    06/29/24 1811 06/29/24 1811  sodium chloride 0.9 % flush 10 mL  As Needed         06/29/24 1811 06/29/24 1811  sodium chloride 0.9 % infusion 40 mL  As Needed         06/29/24 1811 06/29/24 1811  Potassium Replacement - Follow Nurse / BPA Driven Protocol  As Needed         06/29/24 1811 06/29/24 1811  Magnesium Standard Dose Replacement - Follow Nurse / BPA Driven Protocol  As Needed         06/29/24 1811 06/29/24 1811  Phosphorus Replacement - Follow Nurse / BPA Driven Protocol  As Needed         06/29/24 1811 06/29/24 1811  Calcium Replacement - Follow Nurse / BPA Driven Protocol  As Needed         06/29/24 1811 06/29/24 1811  sennosides-docusate (PERICOLACE) 8.6-50 MG per tablet 2 tablet  2 Times Daily PRN        Placed in \"And\" Linked Group    06/29/24 1811 06/29/24 1811  polyethylene glycol (MIRALAX) packet 17 g  Daily PRN        Placed in \"And\" Linked Group    06/29/24 1811    06/29/24 1811  bisacodyl (DULCOLAX) EC tablet 5 mg  Daily PRN        Placed in \"And\" Linked Group    06/29/24 1811    06/29/24 1811  bisacodyl (DULCOLAX) suppository 10 mg  Daily PRN        Placed in \"And\" Linked Group    06/29/24 1811    06/29/24 1752  TSH  Once         06/29/24 1751    06/29/24 1744  XR Chest 1 View  1 Time Imaging         06/29/24 1743    06/29/24 1731  Lidocaine Viscous HCl (XYLOCAINE) 2 % solution 10 mL  Once         06/29/24 1715    06/29/24 1731  " "Nasogastric Tube Maintenance  Continuous        Comments: continuous low wall suction    06/29/24 1731    06/29/24 1731  Nasogastric Tube Insertion  Once         06/29/24 1731    06/29/24 1641  ondansetron (ZOFRAN) injection 4 mg  Every 6 Hours PRN         06/29/24 1641    06/29/24 1631  potassium chloride 10 mEq in 100 mL IVPB  Every 1 Hour         06/29/24 1615    06/29/24 1615  Magnesium  Once         06/29/24 1614    06/29/24 1606  Inpatient Admission  Once         06/29/24 1610    06/29/24 1412  Comprehensive Metabolic Panel  Once         06/29/24 1348    06/29/24 1412  Lipase  Once         06/29/24 1348    06/29/24 1406  Scan Slide  Once         06/29/24 1405    06/29/24 1404  sodium chloride 0.9 % bolus 500 mL  Once         06/29/24 1348    06/29/24 1404  sodium chloride 0.9 % infusion  Continuous         06/29/24 1348    06/29/24 1349  Insert Peripheral IV  Once        Placed in \"And\" Linked Group    06/29/24 1348    06/29/24 1349  Monitor Blood Pressure  Per Hospital Policy         06/29/24 1348    06/29/24 1349  Continuous Pulse Oximetry  Continuous         06/29/24 1348    06/29/24 1349  Cardiac Monitoring  Continuous        Comments: Follow Standing Orders As Outlined in Process Instructions (Open Order Report to View Full Instructions)    06/29/24 1348    06/29/24 1348  sodium chloride 0.9 % flush 10 mL  As Needed        Placed in \"And\" Linked Group    06/29/24 1348    06/29/24 1347  Urinalysis With Microscopic If Indicated (No Culture) - Urine, Clean Catch  Once         06/29/24 1348    06/29/24 1347  CT Abdomen Pelvis Without Contrast  1 Time Imaging        Comments: NON-CONTRASTED STUDY      06/29/24 1348    06/29/24 1344  CBC & Differential  Once         06/29/24 1348    06/29/24 1344  CBC Auto Differential  PROCEDURE ONCE         06/29/24 1348                  Physician Progress Notes (last 24 hours)  Notes from 06/29/24 0651 through 06/30/24 0651   No notes of this type exist for this encounter.   "        Consult Notes (last 24 hours)        Dilma Yanez MD at 06/29/24 1916        Consult Orders    1. Inpatient General Surgery Consult [[id number]] ordered by Woody Lam MD at 06/29/24 1610                 Patient Name:  Joaquín Rogers  YOB: 1927  [id number]       Patient Care Team:  Uday Roche DO as PCP - General (Internal Medicine)  Ayaka Patel, RN as Ambulatory  ([facility])      General Surgery Consult Note     Date of Consultation: 06/29/24    Consulting Physician : Woody Lam MD    Reason for Consult : large bowel obstruction    Subjective     I have been asked to see  Joaquín Rogers , a 89 y.o. male in consultation for large bowel obstruction. He reports a history of inability to pass gas or stool for about one day. He has had issues with constipation and was in the emergency department at the end of May with similar complaints. CT scan at that time showed a thickening of his sigmoid colon. He presented today with continued complaints of discomfort and inability to pass gas/stool. CT scan was performed which showed a large colon with filled with fluid to the level of the rectum. He reports a history of colonoscopies. He attempted to have one recently due to issues with constipation but was denied due to his age. He denies any history of radiation to his pelvis.       Allergy: No Known Allergies    Medications:  heparin (porcine), 5,000 Units, Subcutaneous, Q8H  potassium chloride, 10 mEq, Intravenous, Q1H  sodium chloride, 10 mL, Intravenous, Q12H      sodium chloride, 75 mL/hr, Last Rate: 75 mL/hr (06/29/24 1641)      No current facility-administered medications on file prior to encounter.     Current Outpatient Medications on File Prior to Encounter   Medication Sig    aspirin 81 MG EC tablet Take 1 tablet by mouth Daily.    calcium carbonate-cholecalciferol 500-400 MG-UNIT tablet tablet Take 1 tablet by mouth 2 (Two) Times a Day.     "carboxymethylcellulose (REFRESH PLUS) 0.5 % solution Administer 1 drop to both eyes 3 (Three) Times a Day As Needed for Dry Eyes.    ferrous sulfate 325 (65 FE) MG tablet Take 1 tablet by mouth 2 (Two) Times a Day.    finasteride (PROSCAR) 5 MG tablet Take 1 tablet by mouth Daily.    levothyroxine (SYNTHROID, LEVOTHROID) 75 MCG tablet Take 1 tablet by mouth Daily.    linaclotide (LINZESS) 72 MCG capsule capsule Take 1 capsule by mouth Every Morning Before Breakfast.    omeprazole (priLOSEC) 40 MG capsule Take 1 capsule by mouth Daily.    polyethylene glycol (MIRALAX) 17 g packet Take 17 g by mouth Daily.    terazosin (HYTRIN) 2 MG capsule Take 1 capsule by mouth Every Night.       PMHx:   Past Medical History:   Diagnosis Date    Bradycardia     Thyroid disease        Past Surgical History:  Hernia repair in left groin (x2) and right groin     Family History: Noncontributory     Social History:  Noncontributory      Review of Systems   Pertinent items are noted in HPI     Constitutional: No fevers, chills or malaise   Eyes: Denies visual changes    Cardiovascular: Denies chest pain, palpitations   Pulmonary: Denies cough or shortness of breath   Abdominal/ GI: Abdominal discomfort, inability to pass stool   Genitourinary: Denies dysuria or hematuria   Musculoskeletal: Denies any but chronic joint aches, pains or deformities   Psychiatric: No recent mood changes   Neurologic: No paresthesias or loss of function         Objective     Physical Exam:      Vital Signs  /73 (BP Location: Right arm, Patient Position: Lying)   Pulse 93   Temp 98 °F (36.7 °C) (Oral)   Resp 20   Ht 172.7 cm (67.99\")   Wt 67 kg (147 lb 11.3 oz)   SpO2 91%   BMI 22.46 kg/m²     Intake/Output Summary (Last 24 hours) at 6/29/2024 1935  Last data filed at 6/29/2024 1801  Gross per 24 hour   Intake 500 ml   Output --   Net 500 ml         Physical Exam:    Head: Normocephalic, atraumatic.   Eyes: Pupils equal, round, react to light "   Mouth: No lesions noted  CV: Regular rate and rhythm   Lungs: Bilateral chest rise and fall, no use of accessory muscles   Abdomen: Distended, nontender to palpation  Extremities:  No cyanosis, clubbing or edema bilaterally   Neurologic: No gross deficits      Results Review: I have personally reviewed all of the recent lab and imaging results available at this time- distended colon filled with fluid to the level of the rectum. No discrete mass noted.       Assessment and Plan:    Problem List Items Addressed This Visit          Gastrointestinal Abdominal     * (Principal) Bowel obstruction - Primary    Relevant Orders    Case request (Completed)     Other Visit Diagnoses       Indeterminate colitis        Relevant Orders    Case request (Completed)             Active Hospital Problems    Diagnosis  POA    **Bowel obstruction [K56.609]  Yes      Resolved Hospital Problems   No resolved problems to display.     Mr Rogers is a 89 yo M w/ a large bowel obstruction from unknown etiology. Recent CT imaging showed possibility of colitis. No discrete mass seen on imaging. Will plan for flexible sigmoidoscopy with possibly biopsy and rectal tube tomorrow. Discussed possibility of need of diverting colostomy. Also discussed possibility of stent if lesion looks amenable to stenting, although this would require a transfer of the patient and would be a temporizing measure. I willl plan to further discussions with patient/family once flexible sigmoidoscopy is complete.     I discussed the patient's findings and my recommendations with the patient and/or family, as well as the primary team     Dilma Yanez MD  06/29/24  19:35 EDT          Electronically signed by Dilma Yanez MD at 06/29/24 1957

## 2024-06-30 NOTE — PLAN OF CARE
Goal Outcome Evaluation:  Plan of Care Reviewed With: patient        Progress: improving  Outcome Evaluation: Resting comfortably.  Tolerated procedure well.  PRN morphine given for pain.  Continue plan of care.  Ambulated to the bathroom and back to bed prior to surgery.

## 2024-06-30 NOTE — PROGRESS NOTES
Diagnosis: large bowel obstruction of unknown etiology    To endoscopy today for flexible sigmoidoscopy, possible biopsy, possible rectal tube placement.     Dilma Hermosillo MD       General Surgeon  [facility]

## 2024-07-01 ENCOUNTER — ANESTHESIA (OUTPATIENT)
Dept: PERIOP | Facility: HOSPITAL | Age: 89
End: 2024-07-01
Payer: MEDICARE

## 2024-07-01 ENCOUNTER — APPOINTMENT (OUTPATIENT)
Dept: GENERAL RADIOLOGY | Facility: HOSPITAL | Age: 89
End: 2024-07-01
Payer: MEDICARE

## 2024-07-01 ENCOUNTER — ANESTHESIA EVENT (OUTPATIENT)
Dept: PERIOP | Facility: HOSPITAL | Age: 89
End: 2024-07-01
Payer: MEDICARE

## 2024-07-01 LAB
A-A DO2: 261.8 MMHG (ref 0–300)
A-A DO2: 431.5 MMHG (ref 0–300)
A-A DO2: 568.3 MMHG (ref 0–300)
ABO GROUP BLD: NORMAL
ALBUMIN SERPL-MCNC: 3.1 G/DL (ref 3.5–5.2)
ALBUMIN SERPL-MCNC: 3.1 G/DL (ref 3.5–5.2)
ALBUMIN/GLOB SERPL: 1.2 G/DL
ALBUMIN/GLOB SERPL: 1.2 G/DL
ALP SERPL-CCNC: 111 U/L (ref 39–117)
ALP SERPL-CCNC: 119 U/L (ref 39–117)
ALT SERPL W P-5'-P-CCNC: 10 U/L (ref 1–41)
ALT SERPL W P-5'-P-CCNC: 9 U/L (ref 1–41)
ANION GAP SERPL CALCULATED.3IONS-SCNC: 11.7 MMOL/L (ref 5–15)
ANION GAP SERPL CALCULATED.3IONS-SCNC: 14.2 MMOL/L (ref 5–15)
ANISOCYTOSIS BLD QL: NORMAL
ARTERIAL PATENCY WRIST A: ABNORMAL
ARTERIAL PATENCY WRIST A: POSITIVE
ARTERIAL PATENCY WRIST A: POSITIVE
AST SERPL-CCNC: 20 U/L (ref 1–40)
AST SERPL-CCNC: 29 U/L (ref 1–40)
ATMOSPHERIC PRESS: 729 MMHG
ATMOSPHERIC PRESS: 730 MMHG
ATMOSPHERIC PRESS: 730 MMHG
BASE EXCESS BLDA CALC-SCNC: -4.4 MMOL/L (ref 0–2)
BASE EXCESS BLDA CALC-SCNC: -5.7 MMOL/L (ref 0–2)
BASE EXCESS BLDA CALC-SCNC: -5.9 MMOL/L (ref 0–2)
BASOPHILS # BLD AUTO: 0.01 10*3/MM3 (ref 0–0.2)
BASOPHILS # BLD AUTO: 0.01 10*3/MM3 (ref 0–0.2)
BASOPHILS NFR BLD AUTO: 0.1 % (ref 0–1.5)
BASOPHILS NFR BLD AUTO: 0.2 % (ref 0–1.5)
BDY SITE: ABNORMAL
BILIRUB SERPL-MCNC: 0.3 MG/DL (ref 0–1.2)
BILIRUB SERPL-MCNC: 0.4 MG/DL (ref 0–1.2)
BLD GP AB SCN SERPL QL: NEGATIVE
BUN SERPL-MCNC: 18 MG/DL (ref 8–23)
BUN SERPL-MCNC: 19 MG/DL (ref 8–23)
BUN/CREAT SERPL: 15.3 (ref 7–25)
BUN/CREAT SERPL: 17.6 (ref 7–25)
BURR CELLS BLD QL SMEAR: NORMAL
CALCIUM SPEC-SCNC: 7.9 MG/DL (ref 8.2–9.6)
CALCIUM SPEC-SCNC: 8.1 MG/DL (ref 8.2–9.6)
CHLORIDE SERPL-SCNC: 110 MMOL/L (ref 98–107)
CHLORIDE SERPL-SCNC: 111 MMOL/L (ref 98–107)
CO2 BLDA-SCNC: 20.8 MMOL/L (ref 22–33)
CO2 BLDA-SCNC: 21.2 MMOL/L (ref 22–33)
CO2 BLDA-SCNC: 23.5 MMOL/L (ref 22–33)
CO2 SERPL-SCNC: 19.8 MMOL/L (ref 22–29)
CO2 SERPL-SCNC: 20.3 MMOL/L (ref 22–29)
COHGB MFR BLD: 1.1 % (ref 0–5)
COHGB MFR BLD: 1.1 % (ref 0–5)
COHGB MFR BLD: 1.3 % (ref 0–5)
CREAT SERPL-MCNC: 1.08 MG/DL (ref 0.76–1.27)
CREAT SERPL-MCNC: 1.18 MG/DL (ref 0.76–1.27)
D-LACTATE SERPL-SCNC: 2.2 MMOL/L (ref 0.5–2)
D-LACTATE SERPL-SCNC: 2.3 MMOL/L (ref 0.5–2)
D-LACTATE SERPL-SCNC: 2.4 MMOL/L (ref 0.5–2)
DACRYOCYTES BLD QL SMEAR: NORMAL
DEPRECATED RDW RBC AUTO: 72.3 FL (ref 37–54)
DEPRECATED RDW RBC AUTO: 73.8 FL (ref 37–54)
EGFRCR SERPLBLD CKD-EPI 2021: 56.1 ML/MIN/1.73
EGFRCR SERPLBLD CKD-EPI 2021: 62.4 ML/MIN/1.73
EOSINOPHIL # BLD AUTO: 0 10*3/MM3 (ref 0–0.4)
EOSINOPHIL # BLD AUTO: 0 10*3/MM3 (ref 0–0.4)
EOSINOPHIL NFR BLD AUTO: 0 % (ref 0.3–6.2)
EOSINOPHIL NFR BLD AUTO: 0 % (ref 0.3–6.2)
ERYTHROCYTE [DISTWIDTH] IN BLOOD BY AUTOMATED COUNT: 23.9 % (ref 12.3–15.4)
ERYTHROCYTE [DISTWIDTH] IN BLOOD BY AUTOMATED COUNT: 24.1 % (ref 12.3–15.4)
GLOBULIN UR ELPH-MCNC: 2.5 GM/DL
GLOBULIN UR ELPH-MCNC: 2.6 GM/DL
GLUCOSE BLDC GLUCOMTR-MCNC: 108 MG/DL (ref 70–130)
GLUCOSE BLDC GLUCOMTR-MCNC: 128 MG/DL (ref 70–130)
GLUCOSE SERPL-MCNC: 127 MG/DL (ref 65–99)
GLUCOSE SERPL-MCNC: 151 MG/DL (ref 65–99)
HCO3 BLDA-SCNC: 19.8 MMOL/L (ref 20–26)
HCO3 BLDA-SCNC: 20 MMOL/L (ref 20–26)
HCO3 BLDA-SCNC: 21.9 MMOL/L (ref 20–26)
HCT VFR BLD AUTO: 37.2 % (ref 37.5–51)
HCT VFR BLD AUTO: 38.6 % (ref 37.5–51)
HCT VFR BLD CALC: 35.6 % (ref 38–51)
HCT VFR BLD CALC: 36 % (ref 38–51)
HCT VFR BLD CALC: 36.6 % (ref 38–51)
HGB BLD-MCNC: 11.8 G/DL (ref 13–17.7)
HGB BLD-MCNC: 12.2 G/DL (ref 13–17.7)
HGB BLDA-MCNC: 11.6 G/DL (ref 14–18)
HGB BLDA-MCNC: 11.7 G/DL (ref 14–18)
HGB BLDA-MCNC: 12 G/DL (ref 14–18)
IMM GRANULOCYTES # BLD AUTO: 0.03 10*3/MM3 (ref 0–0.05)
IMM GRANULOCYTES # BLD AUTO: 0.05 10*3/MM3 (ref 0–0.05)
IMM GRANULOCYTES NFR BLD AUTO: 0.5 % (ref 0–0.5)
IMM GRANULOCYTES NFR BLD AUTO: 0.5 % (ref 0–0.5)
INHALED O2 CONCENTRATION: 100 %
INHALED O2 CONCENTRATION: 100 %
INHALED O2 CONCENTRATION: 70 %
LARGE PLATELETS: NORMAL
LYMPHOCYTES # BLD AUTO: 0.74 10*3/MM3 (ref 0.7–3.1)
LYMPHOCYTES # BLD AUTO: 1.33 10*3/MM3 (ref 0.7–3.1)
LYMPHOCYTES NFR BLD AUTO: 22 % (ref 19.6–45.3)
LYMPHOCYTES NFR BLD AUTO: 6.9 % (ref 19.6–45.3)
Lab: ABNORMAL
MCH RBC QN AUTO: 27.8 PG (ref 26.6–33)
MCH RBC QN AUTO: 28.2 PG (ref 26.6–33)
MCHC RBC AUTO-ENTMCNC: 31.6 G/DL (ref 31.5–35.7)
MCHC RBC AUTO-ENTMCNC: 31.7 G/DL (ref 31.5–35.7)
MCV RBC AUTO: 87.9 FL (ref 79–97)
MCV RBC AUTO: 89 FL (ref 79–97)
METHGB BLD QL: 0 % (ref 0–3)
METHGB BLD QL: 0.2 % (ref 0–3)
METHGB BLD QL: 0.4 % (ref 0–3)
MODALITY: ABNORMAL
MONOCYTES # BLD AUTO: 0.55 10*3/MM3 (ref 0.1–0.9)
MONOCYTES # BLD AUTO: 0.81 10*3/MM3 (ref 0.1–0.9)
MONOCYTES NFR BLD AUTO: 7.6 % (ref 5–12)
MONOCYTES NFR BLD AUTO: 9.1 % (ref 5–12)
NEUTROPHILS NFR BLD AUTO: 4.13 10*3/MM3 (ref 1.7–7)
NEUTROPHILS NFR BLD AUTO: 68.2 % (ref 42.7–76)
NEUTROPHILS NFR BLD AUTO: 84.9 % (ref 42.7–76)
NEUTROPHILS NFR BLD AUTO: 9.11 10*3/MM3 (ref 1.7–7)
NOTIFIED BY: ABNORMAL
NOTIFIED WHO: ABNORMAL
NRBC BLD AUTO-RTO: 0 /100 WBC (ref 0–0.2)
NRBC BLD AUTO-RTO: 0 /100 WBC (ref 0–0.2)
OXYHGB MFR BLDV: 88 % (ref 94–99)
OXYHGB MFR BLDV: 98.5 % (ref 94–99)
OXYHGB MFR BLDV: 98.7 % (ref 94–99)
PCO2 BLDA: 32.8 MM HG (ref 35–45)
PCO2 BLDA: 39.8 MM HG (ref 35–45)
PCO2 BLDA: 51 MM HG (ref 35–45)
PCO2 TEMP ADJ BLD: ABNORMAL MM[HG]
PEEP RESPIRATORY: 8 CM[H2O]
PEEP RESPIRATORY: 8 CM[H2O]
PH BLDA: 7.24 PH UNITS (ref 7.35–7.45)
PH BLDA: 7.31 PH UNITS (ref 7.35–7.45)
PH BLDA: 7.39 PH UNITS (ref 7.35–7.45)
PH, TEMP CORRECTED: ABNORMAL
PLATELET # BLD AUTO: 239 10*3/MM3 (ref 140–450)
PLATELET # BLD AUTO: 242 10*3/MM3 (ref 140–450)
PMV BLD AUTO: 9.2 FL (ref 6–12)
PMV BLD AUTO: 9.6 FL (ref 6–12)
PO2 BLDA: 181 MM HG (ref 83–108)
PO2 BLDA: 212 MM HG (ref 83–108)
PO2 BLDA: 63.7 MM HG (ref 83–108)
PO2 TEMP ADJ BLD: ABNORMAL MM[HG]
POTASSIUM SERPL-SCNC: 3.4 MMOL/L (ref 3.5–5.2)
POTASSIUM SERPL-SCNC: 4.3 MMOL/L (ref 3.5–5.2)
PROT SERPL-MCNC: 5.6 G/DL (ref 6–8.5)
PROT SERPL-MCNC: 5.7 G/DL (ref 6–8.5)
RBC # BLD AUTO: 4.18 10*6/MM3 (ref 4.14–5.8)
RBC # BLD AUTO: 4.39 10*6/MM3 (ref 4.14–5.8)
RH BLD: NEGATIVE
SAO2 % BLDCOA: 89.2 % (ref 94–99)
SAO2 % BLDCOA: >99.2 % (ref 94–99)
SAO2 % BLDCOA: >99.2 % (ref 94–99)
SET MECH RESP RATE: 24
SET MECH RESP RATE: 24
SODIUM SERPL-SCNC: 143 MMOL/L (ref 136–145)
SODIUM SERPL-SCNC: 144 MMOL/L (ref 136–145)
T&S EXPIRATION DATE: NORMAL
VENTILATOR MODE: ABNORMAL
VT ON VENT VENT: 420 ML
VT ON VENT VENT: 450 ML
WBC NRBC COR # BLD AUTO: 10.72 10*3/MM3 (ref 3.4–10.8)
WBC NRBC COR # BLD AUTO: 6.05 10*3/MM3 (ref 3.4–10.8)

## 2024-07-01 PROCEDURE — 82805 BLOOD GASES W/O2 SATURATION: CPT

## 2024-07-01 PROCEDURE — 71045 X-RAY EXAM CHEST 1 VIEW: CPT

## 2024-07-01 PROCEDURE — 86901 BLOOD TYPING SEROLOGIC RH(D): CPT | Performed by: ANESTHESIOLOGY

## 2024-07-01 PROCEDURE — 25810000003 SODIUM CHLORIDE 0.9 % SOLUTION: Performed by: INTERNAL MEDICINE

## 2024-07-01 PROCEDURE — 71045 X-RAY EXAM CHEST 1 VIEW: CPT | Performed by: RADIOLOGY

## 2024-07-01 PROCEDURE — 83605 ASSAY OF LACTIC ACID: CPT | Performed by: INTERNAL MEDICINE

## 2024-07-01 PROCEDURE — 25810000003 LACTATED RINGERS PER 1000 ML: Performed by: NURSE ANESTHETIST, CERTIFIED REGISTERED

## 2024-07-01 PROCEDURE — 25010000002 PIPERACILLIN SOD-TAZOBACTAM PER 1 G: Performed by: NURSE ANESTHETIST, CERTIFIED REGISTERED

## 2024-07-01 PROCEDURE — 0D1M0Z4 BYPASS DESCENDING COLON TO CUTANEOUS, OPEN APPROACH: ICD-10-PCS | Performed by: SURGERY

## 2024-07-01 PROCEDURE — 36600 WITHDRAWAL OF ARTERIAL BLOOD: CPT

## 2024-07-01 PROCEDURE — 85025 COMPLETE CBC W/AUTO DIFF WBC: CPT | Performed by: INTERNAL MEDICINE

## 2024-07-01 PROCEDURE — 25010000002 NEOSTIGMINE 10 MG/10ML SOLUTION: Performed by: NURSE ANESTHETIST, CERTIFIED REGISTERED

## 2024-07-01 PROCEDURE — 80053 COMPREHEN METABOLIC PANEL: CPT | Performed by: INTERNAL MEDICINE

## 2024-07-01 PROCEDURE — 83050 HGB METHEMOGLOBIN QUAN: CPT

## 2024-07-01 PROCEDURE — 94799 UNLISTED PULMONARY SVC/PX: CPT

## 2024-07-01 PROCEDURE — 94002 VENT MGMT INPAT INIT DAY: CPT

## 2024-07-01 PROCEDURE — 82375 ASSAY CARBOXYHB QUANT: CPT

## 2024-07-01 PROCEDURE — 82948 REAGENT STRIP/BLOOD GLUCOSE: CPT

## 2024-07-01 PROCEDURE — 99233 SBSQ HOSP IP/OBS HIGH 50: CPT | Performed by: INTERNAL MEDICINE

## 2024-07-01 PROCEDURE — 85007 BL SMEAR W/DIFF WBC COUNT: CPT | Performed by: INTERNAL MEDICINE

## 2024-07-01 PROCEDURE — 25010000002 ONDANSETRON PER 1 MG: Performed by: NURSE ANESTHETIST, CERTIFIED REGISTERED

## 2024-07-01 PROCEDURE — 25010000002 ROPIVACAINE PER 1 MG: Performed by: ANESTHESIOLOGY

## 2024-07-01 PROCEDURE — 25010000002 SUCCINYLCHOLINE PER 20 MG: Performed by: ANESTHESIOLOGY

## 2024-07-01 PROCEDURE — 25010000002 HEPARIN (PORCINE) PER 1000 UNITS: Performed by: SURGERY

## 2024-07-01 PROCEDURE — 25010000002 POTASSIUM CHLORIDE 10 MEQ/100ML SOLUTION: Performed by: INTERNAL MEDICINE

## 2024-07-01 PROCEDURE — 25810000003 SODIUM CHLORIDE 0.9 % SOLUTION: Performed by: SURGERY

## 2024-07-01 PROCEDURE — 44320 COLOSTOMY: CPT | Performed by: SURGERY

## 2024-07-01 PROCEDURE — 25010000002 PROPOFOL 200 MG/20ML EMULSION: Performed by: NURSE ANESTHETIST, CERTIFIED REGISTERED

## 2024-07-01 PROCEDURE — 86850 RBC ANTIBODY SCREEN: CPT | Performed by: ANESTHESIOLOGY

## 2024-07-01 PROCEDURE — 25010000002 PROPOFOL 10 MG/ML EMULSION: Performed by: ANESTHESIOLOGY

## 2024-07-01 PROCEDURE — 86900 BLOOD TYPING SEROLOGIC ABO: CPT | Performed by: ANESTHESIOLOGY

## 2024-07-01 PROCEDURE — 25010000002 GLYCOPYRROLATE 0.4 MG/2ML SOLUTION: Performed by: NURSE ANESTHETIST, CERTIFIED REGISTERED

## 2024-07-01 RX ORDER — LEVOTHYROXINE SODIUM 0.1 MG/1
100 TABLET ORAL DAILY
COMMUNITY

## 2024-07-01 RX ORDER — ASPIRIN 81 MG/1
81 TABLET ORAL DAILY
Status: CANCELLED | OUTPATIENT
Start: 2024-07-01

## 2024-07-01 RX ORDER — SODIUM CHLORIDE, SODIUM LACTATE, POTASSIUM CHLORIDE, CALCIUM CHLORIDE 600; 310; 30; 20 MG/100ML; MG/100ML; MG/100ML; MG/100ML
100 INJECTION, SOLUTION INTRAVENOUS ONCE AS NEEDED
Status: DISCONTINUED | OUTPATIENT
Start: 2024-07-01 | End: 2024-07-01 | Stop reason: HOSPADM

## 2024-07-01 RX ORDER — TAMSULOSIN HYDROCHLORIDE 0.4 MG/1
1 CAPSULE ORAL DAILY
COMMUNITY

## 2024-07-01 RX ORDER — ASPIRIN 81 MG/1
81 TABLET, CHEWABLE ORAL DAILY
Status: DISCONTINUED | OUTPATIENT
Start: 2024-07-01 | End: 2024-07-10 | Stop reason: HOSPADM

## 2024-07-01 RX ORDER — ONDANSETRON 2 MG/ML
4 INJECTION INTRAMUSCULAR; INTRAVENOUS AS NEEDED
Status: DISCONTINUED | OUTPATIENT
Start: 2024-07-01 | End: 2024-07-01 | Stop reason: HOSPADM

## 2024-07-01 RX ORDER — PANTOPRAZOLE SODIUM 40 MG/1
40 TABLET, DELAYED RELEASE ORAL
Status: CANCELLED | OUTPATIENT
Start: 2024-07-02

## 2024-07-01 RX ORDER — PROPOFOL 10 MG/ML
50 VIAL (ML) INTRAVENOUS ONCE
Status: COMPLETED | OUTPATIENT
Start: 2024-07-01 | End: 2024-07-01

## 2024-07-01 RX ORDER — ONDANSETRON 2 MG/ML
INJECTION INTRAMUSCULAR; INTRAVENOUS AS NEEDED
Status: DISCONTINUED | OUTPATIENT
Start: 2024-07-01 | End: 2024-07-01 | Stop reason: SURG

## 2024-07-01 RX ORDER — SUCCINYLCHOLINE CHLORIDE 20 MG/ML
1.5 INJECTION INTRAMUSCULAR; INTRAVENOUS ONCE
Status: COMPLETED | OUTPATIENT
Start: 2024-07-01 | End: 2024-07-01

## 2024-07-01 RX ORDER — DIPHENOXYLATE HYDROCHLORIDE AND ATROPINE SULFATE 2.5; .025 MG/1; MG/1
1 TABLET ORAL DAILY
Status: DISCONTINUED | OUTPATIENT
Start: 2024-07-01 | End: 2024-07-10 | Stop reason: HOSPADM

## 2024-07-01 RX ORDER — FAMOTIDINE 10 MG/ML
INJECTION, SOLUTION INTRAVENOUS AS NEEDED
Status: DISCONTINUED | OUTPATIENT
Start: 2024-07-01 | End: 2024-07-01 | Stop reason: SURG

## 2024-07-01 RX ORDER — CHLORHEXIDINE GLUCONATE 0.12 MG/ML
15 RINSE ORAL EVERY 12 HOURS SCHEDULED
Status: DISCONTINUED | OUTPATIENT
Start: 2024-07-01 | End: 2024-07-02

## 2024-07-01 RX ORDER — IPRATROPIUM BROMIDE AND ALBUTEROL SULFATE 2.5; .5 MG/3ML; MG/3ML
3 SOLUTION RESPIRATORY (INHALATION) ONCE AS NEEDED
Status: DISCONTINUED | OUTPATIENT
Start: 2024-07-01 | End: 2024-07-01 | Stop reason: HOSPADM

## 2024-07-01 RX ORDER — GLYCOPYRROLATE 0.2 MG/ML
INJECTION INTRAMUSCULAR; INTRAVENOUS AS NEEDED
Status: DISCONTINUED | OUTPATIENT
Start: 2024-07-01 | End: 2024-07-01 | Stop reason: SURG

## 2024-07-01 RX ORDER — LANSOPRAZOLE 30 MG/1
30 TABLET, ORALLY DISINTEGRATING, DELAYED RELEASE ORAL
Status: DISCONTINUED | OUTPATIENT
Start: 2024-07-02 | End: 2024-07-03

## 2024-07-01 RX ORDER — PIPERACILLIN SODIUM, TAZOBACTAM SODIUM 3; .375 G/15ML; G/15ML
3.38 INJECTION, POWDER, LYOPHILIZED, FOR SOLUTION INTRAVENOUS
Status: DISCONTINUED | OUTPATIENT
Start: 2024-07-02 | End: 2024-07-01 | Stop reason: SDUPTHER

## 2024-07-01 RX ORDER — TAMSULOSIN HYDROCHLORIDE 0.4 MG/1
0.4 CAPSULE ORAL DAILY
Status: CANCELLED | OUTPATIENT
Start: 2024-07-01

## 2024-07-01 RX ORDER — FINASTERIDE 5 MG/1
5 TABLET, FILM COATED ORAL DAILY
Status: DISCONTINUED | OUTPATIENT
Start: 2024-07-01 | End: 2024-07-10 | Stop reason: HOSPADM

## 2024-07-01 RX ORDER — ROPIVACAINE HYDROCHLORIDE 5 MG/ML
INJECTION, SOLUTION EPIDURAL; INFILTRATION; PERINEURAL
Status: COMPLETED | OUTPATIENT
Start: 2024-07-01 | End: 2024-07-01

## 2024-07-01 RX ORDER — NEOSTIGMINE METHYLSULFATE 1 MG/ML
INJECTION, SOLUTION INTRAVENOUS AS NEEDED
Status: DISCONTINUED | OUTPATIENT
Start: 2024-07-01 | End: 2024-07-01 | Stop reason: SURG

## 2024-07-01 RX ORDER — FENTANYL CITRATE-0.9 % NACL/PF 10 MCG/ML
50-300 PLASTIC BAG, INJECTION (ML) INTRAVENOUS
Status: DISCONTINUED | OUTPATIENT
Start: 2024-07-01 | End: 2024-07-03 | Stop reason: ALTCHOICE

## 2024-07-01 RX ORDER — FERROUS SULFATE 325(65) MG
325 TABLET ORAL 2 TIMES DAILY WITH MEALS
Status: CANCELLED | OUTPATIENT
Start: 2024-07-01

## 2024-07-01 RX ORDER — LUBIPROSTONE 8 UG/1
8 CAPSULE ORAL 2 TIMES DAILY
Status: DISCONTINUED | OUTPATIENT
Start: 2024-07-01 | End: 2024-07-10 | Stop reason: HOSPADM

## 2024-07-01 RX ORDER — MAGNESIUM HYDROXIDE 1200 MG/15ML
LIQUID ORAL AS NEEDED
Status: DISCONTINUED | OUTPATIENT
Start: 2024-07-01 | End: 2024-07-01 | Stop reason: HOSPADM

## 2024-07-01 RX ORDER — GINGER ROOT/GINGER ROOT EXT 262.5 MG
1 CAPSULE ORAL 2 TIMES DAILY
Status: DISCONTINUED | OUTPATIENT
Start: 2024-07-01 | End: 2024-07-10 | Stop reason: HOSPADM

## 2024-07-01 RX ORDER — ROCURONIUM BROMIDE 10 MG/ML
INJECTION, SOLUTION INTRAVENOUS AS NEEDED
Status: DISCONTINUED | OUTPATIENT
Start: 2024-07-01 | End: 2024-07-01 | Stop reason: SURG

## 2024-07-01 RX ORDER — SODIUM CHLORIDE, SODIUM LACTATE, POTASSIUM CHLORIDE, CALCIUM CHLORIDE 600; 310; 30; 20 MG/100ML; MG/100ML; MG/100ML; MG/100ML
INJECTION, SOLUTION INTRAVENOUS CONTINUOUS PRN
Status: DISCONTINUED | OUTPATIENT
Start: 2024-07-01 | End: 2024-07-01 | Stop reason: SURG

## 2024-07-01 RX ORDER — PROPOFOL 10 MG/ML
INJECTION, EMULSION INTRAVENOUS AS NEEDED
Status: DISCONTINUED | OUTPATIENT
Start: 2024-07-01 | End: 2024-07-01 | Stop reason: SURG

## 2024-07-01 RX ORDER — FERROUS SULFATE 300 MG/5ML
300 LIQUID (ML) ORAL 2 TIMES DAILY
Status: DISCONTINUED | OUTPATIENT
Start: 2024-07-01 | End: 2024-07-10 | Stop reason: HOSPADM

## 2024-07-01 RX ORDER — LEVOTHYROXINE SODIUM 0.1 MG/1
100 TABLET ORAL
Status: DISCONTINUED | OUTPATIENT
Start: 2024-07-02 | End: 2024-07-10 | Stop reason: HOSPADM

## 2024-07-01 RX ORDER — POTASSIUM CHLORIDE 7.45 MG/ML
10 INJECTION INTRAVENOUS
Status: COMPLETED | OUTPATIENT
Start: 2024-07-01 | End: 2024-07-01

## 2024-07-01 RX ORDER — POLYETHYLENE GLYCOL 3350 17 G/17G
17 POWDER, FOR SOLUTION ORAL DAILY
Status: DISCONTINUED | OUTPATIENT
Start: 2024-07-01 | End: 2024-07-10 | Stop reason: HOSPADM

## 2024-07-01 RX ORDER — FENTANYL CITRATE 50 UG/ML
50 INJECTION, SOLUTION INTRAMUSCULAR; INTRAVENOUS
Status: DISCONTINUED | OUTPATIENT
Start: 2024-07-01 | End: 2024-07-01 | Stop reason: HOSPADM

## 2024-07-01 RX ORDER — POLYETHYLENE GLYCOL 3350 17 G/17G
17 POWDER, FOR SOLUTION ORAL DAILY
Status: CANCELLED | OUTPATIENT
Start: 2024-07-01

## 2024-07-01 RX ORDER — OXYCODONE HYDROCHLORIDE AND ACETAMINOPHEN 5; 325 MG/1; MG/1
1 TABLET ORAL ONCE AS NEEDED
Status: DISCONTINUED | OUTPATIENT
Start: 2024-07-01 | End: 2024-07-01 | Stop reason: HOSPADM

## 2024-07-01 RX ORDER — LIDOCAINE HYDROCHLORIDE 20 MG/ML
INJECTION, SOLUTION EPIDURAL; INFILTRATION; INTRACAUDAL; PERINEURAL AS NEEDED
Status: DISCONTINUED | OUTPATIENT
Start: 2024-07-01 | End: 2024-07-01 | Stop reason: SURG

## 2024-07-01 RX ADMIN — SODIUM CHLORIDE 75 ML/HR: 9 INJECTION, SOLUTION INTRAVENOUS at 06:02

## 2024-07-01 RX ADMIN — ROCURONIUM BROMIDE 30 MG: 10 SOLUTION INTRAVENOUS at 12:47

## 2024-07-01 RX ADMIN — POTASSIUM CHLORIDE 10 MEQ: 7.46 INJECTION, SOLUTION INTRAVENOUS at 06:01

## 2024-07-01 RX ADMIN — Medication 10 ML: at 20:36

## 2024-07-01 RX ADMIN — ONDANSETRON 4 MG: 2 INJECTION INTRAMUSCULAR; INTRAVENOUS at 12:41

## 2024-07-01 RX ADMIN — CHLORHEXIDINE GLUCONATE 15 ML: 1.2 RINSE ORAL at 20:36

## 2024-07-01 RX ADMIN — HEPARIN SODIUM 5000 UNITS: 5000 INJECTION INTRAVENOUS; SUBCUTANEOUS at 05:54

## 2024-07-01 RX ADMIN — SODIUM CHLORIDE, POTASSIUM CHLORIDE, SODIUM LACTATE AND CALCIUM CHLORIDE: 600; 310; 30; 20 INJECTION, SOLUTION INTRAVENOUS at 12:41

## 2024-07-01 RX ADMIN — PROPOFOL 5 MCG/KG/MIN: 10 INJECTION, EMULSION INTRAVENOUS at 15:03

## 2024-07-01 RX ADMIN — POTASSIUM CHLORIDE 10 MEQ: 7.46 INJECTION, SOLUTION INTRAVENOUS at 02:58

## 2024-07-01 RX ADMIN — NEOSTIGMINE METHYLSULFATE 5 MG: 0.5 INJECTION INTRAVENOUS at 13:38

## 2024-07-01 RX ADMIN — SODIUM CHLORIDE, POTASSIUM CHLORIDE, SODIUM LACTATE AND CALCIUM CHLORIDE: 600; 310; 30; 20 INJECTION, SOLUTION INTRAVENOUS at 13:50

## 2024-07-01 RX ADMIN — FAMOTIDINE 20 MG: 10 INJECTION, SOLUTION INTRAVENOUS at 12:41

## 2024-07-01 RX ADMIN — PROPOFOL 50 MG: 10 INJECTION, EMULSION INTRAVENOUS at 13:45

## 2024-07-01 RX ADMIN — SUCCINYLCHOLINE CHLORIDE 100 MG: 20 INJECTION, SOLUTION INTRAMUSCULAR; INTRAVENOUS at 14:29

## 2024-07-01 RX ADMIN — POTASSIUM CHLORIDE 10 MEQ: 7.46 INJECTION, SOLUTION INTRAVENOUS at 05:00

## 2024-07-01 RX ADMIN — HEPARIN SODIUM 5000 UNITS: 5000 INJECTION INTRAVENOUS; SUBCUTANEOUS at 22:50

## 2024-07-01 RX ADMIN — PIPERACILLIN SODIUM AND TAZOBACTAM SODIUM 3.38 G: 3; .375 INJECTION, POWDER, LYOPHILIZED, FOR SOLUTION INTRAVENOUS at 12:50

## 2024-07-01 RX ADMIN — POTASSIUM CHLORIDE 10 MEQ: 7.46 INJECTION, SOLUTION INTRAVENOUS at 04:01

## 2024-07-01 RX ADMIN — PROPOFOL 100 MG: 10 INJECTION, EMULSION INTRAVENOUS at 12:47

## 2024-07-01 RX ADMIN — Medication 50 MCG/HR: at 15:04

## 2024-07-01 RX ADMIN — ROPIVACAINE HYDROCHLORIDE 200 MG: 5 INJECTION, SOLUTION EPIDURAL; INFILTRATION; PERINEURAL at 12:58

## 2024-07-01 RX ADMIN — LIDOCAINE HYDROCHLORIDE 20 MG: 20 INJECTION, SOLUTION EPIDURAL; INFILTRATION; INTRACAUDAL; PERINEURAL at 12:47

## 2024-07-01 RX ADMIN — SODIUM CHLORIDE 75 ML/HR: 9 INJECTION, SOLUTION INTRAVENOUS at 18:02

## 2024-07-01 RX ADMIN — GLYCOPYRROLATE 0.8 MG: 0.4 INJECTION INTRAMUSCULAR; INTRAVENOUS at 13:38

## 2024-07-01 RX ADMIN — PROPOFOL 50 MG: 10 INJECTION, EMULSION INTRAVENOUS at 14:29

## 2024-07-01 NOTE — ANESTHESIA PROCEDURE NOTES
"Peripheral Block      Patient reassessed immediately prior to procedure    Patient location during procedure: OR  Start time: 7/1/2024 12:56 PM  Stop time: 7/1/2024 12:58 PM  Reason for block: at surgeon's request and post-op pain management  Performed by  CRNA/CAA: Radha Lui CRNA  Preanesthetic Checklist  Completed: patient identified, IV checked, site marked, risks and benefits discussed, surgical consent, monitors and equipment checked, pre-op evaluation and timeout performed  Prep:  Pt Position: supine  Sterile barriers:cap, gloves, sterile barriers and mask  Prep: ChloraPrep  Patient monitoring: blood pressure monitoring, continuous pulse oximetry and EKG  Procedure    Nursing cardiac assessment comments yes: Sedation, GA, Spinal,Epidural   Performed under: general  Guidance:ultrasound guided    ULTRASOUND INTERPRETATION.  Using ultrasound guidance a 20 G (20g 4\" Stimuplex) gauge needle was placed in close proximity to the nerve, at which point, under ultrasound guidance anesthetic was injected in the area of the nerve and spread of the anesthesia was seen on ultrasound in close proximity thereto.  There were no abnormalities seen on ultrasound; a digital image was taken; and the patient tolerated the procedure with no complications. Images:still images obtained    Laterality:Bilateral  Block Type:TAP  Injection Technique:single-shot  Needle Type:short-bevel  Needle Gauge:20 G  Resistance on Injection: none    Medications Used: ropivacaine (NAROPIN) injection 0.5 % - Peripheral Nerve, Transversus Abdominus Plane   200 mg - 7/1/2024 12:58:00 PM      Medications  Comment:Block Injection:  Total volume divided equally between all 4 injection sites      Post Assessment  Injection Assessment: negative aspiration for heme, incremental injection and no paresthesia on injection  Patient Tolerance:comfortable throughout block  Complications:no  Additional Notes  The pt was in the supine position under general " anesthesia    Under Ultrasound guidance, a BBraun 4inch 360 degree needle was advanced with Normal Saline hydro dissection of tissue.  The Internal Oblique and Transversus Abdominus muscles where visualized.  At or before the aponeurosis of Internal Oblique, local anesthetic spread was visualized in the Transversus Abdominus Plane. Injection was made incrementally with aspiration every 5 mls.  There was no  intravascular injection,  injection pressure was normal, there was no neural injection, and the procedure was completed without difficulty. The same procedure was completed for left and right sided lateral tap blocks.    Under Ultrasound guidance, a Mendoza 4inch 360 degree needle was advanced with Normal Saline hydro dissection of tissue.  The Rectus and Transversus Abdominus muscles where visualized.  The needle tip was placed between the Transversus Abdominus and rectus abdominus, local anesthetic spread was visualized in the Transversus Abdominus Plane. Injection was made incrementally with aspiration every 5 mls.  There was no  intravascular injection,  injection pressure was normal, there was no neural injection, and the procedure was completed without difficulty. The same procedure was completed for left and right sided subcostal tap blocks. Thank You.      Performed by: Radha Lui CRNA

## 2024-07-01 NOTE — PERIOPERATIVE NURSING NOTE
SSI Colon protocol followed.  Alcohol based skin prep used, bowel isolation technique used. There was no gross contamination. S

## 2024-07-01 NOTE — PAYOR COMM NOTE
"[facility]  SAMANTHA SHEPPARD  PHONE  [phone]  FAX  [phone]  NPI:  [id number]    CLINICAL UPDATE    Joaquín Marroquin (89 y.o. Male)       Date of Birth   05/19/1927    Social Security Number       Address   [address] [address]    Home Phone   [phone]    MRN   [id number]       Catholic   None    Marital Status                               Admission Date   6/29/24    Admission Type   Emergency    Admitting Provider   Woody Lam MD    Attending Provider   Woody Lam MD    Department, Room/Bed   [address], 3342/2S       Discharge Date       Discharge Disposition       Discharge Destination                                 Attending Provider: Woody Lam MD    Allergies: No Known Allergies    Isolation: None   Infection: None   Code Status: No CPR    Ht: 172.7 cm (67.99\")   Wt: 67 kg (147 lb 11.3 oz)    Admission Cmt: None   Principal Problem: Bowel obstruction [K56.609]                   Active Insurance as of 6/29/2024       Primary Coverage       Payor Plan Insurance Group Employer/Plan Group    MEDICARE MEDICARE A & B        Payor Plan Address Payor Plan Phone Number Payor Plan Fax Number Effective Dates    [address] [phone]  5/1/1992 - None Entered    [facility] [id number]         Subscriber Name Subscriber Birth Date Member ID       JOAQUÍN MARROQUIN 5/19/1927 [id number]               Secondary Coverage       Payor Plan Insurance Group Employer/Plan Group    [employer] [facility] DEPT [id number]        Payor Plan Address Payor Plan Phone Number Payor Plan Fax Number Effective Dates    [facility] OFFICE OF [facility] CARE [phone]  1/1/2021 - None Entered    [address] [address]       [facility] [address]         Subscriber Name Subscriber Birth Date Member ID       JOAQUÍN MARROQUIN 5/19/1927 [id number]               Tertiary Coverage       Payor Plan Insurance Group Employer/Plan Group    [facility] CCN OPTUM        Payor Plan " Address Payor Plan Phone Number Payor Plan Fax Number Effective Dates    [address] [phone]  1/1/2023 - None Entered    [facility] [id number]         Subscriber Name Subscriber Birth Date Member ID       RASHMI MARROQUIN 5/19/1927 [id number]                     Emergency Contacts        (Rel.) Home Phone Work Phone Mobile Phone    MAYRA MARROQUIN (Spouse) [phone] -- [phone]    Clark Marroquin (Son) -- -- [phone]    Rustam Marroquin (Son) -- -- [phone]              Current Facility-Administered Medications   Medication Dose Route Frequency Provider Last Rate Last Admin    sennosides-docusate (PERICOLACE) 8.6-50 MG per tablet 2 tablet  2 tablet Oral BID PRN Dilma Yanez MD        And    polyethylene glycol (MIRALAX) packet 17 g  17 g Oral Daily PRN Dilma Yanez MD        And    bisacodyl (DULCOLAX) EC tablet 5 mg  5 mg Oral Daily PRN Dilma Yanez MD        And    bisacodyl (DULCOLAX) suppository 10 mg  10 mg Rectal Daily PRN Dilma Yanez MD        Calcium Replacement - Follow Nurse / BPA Driven Protocol   Does not apply Dilma Martell MD        heparin (porcine) 5000 UNIT/ML injection 5,000 Units  5,000 Units Subcutaneous Q8H Dilma Yanez MD   5,000 Units at 07/01/24 0554    Magnesium Standard Dose Replacement - Follow Nurse / BPA Driven Protocol   Does not apply Dilma Martell MD        morphine injection 2 mg  2 mg Intravenous Q3H Dilma Martell MD   2 mg at 06/30/24 1323    ondansetron (ZOFRAN) injection 4 mg  4 mg Intravenous Q6H PRDilma Keen MD   4 mg at 06/30/24 1143    Phosphorus Replacement - Follow Nurse / BPA Driven Protocol   Does not apply Dilma Martell MD        Potassium Replacement - Follow Nurse / BPA Driven Protocol   Does not apply Dilma Martell MD        sodium chloride 0.9 % flush 10 mL  10 mL Intravenous PRN Dilma Yanez MD        sodium chloride 0.9 % flush 10 mL  10 mL  Intravenous Q12H Dilma Yanez MD   10 mL at 06/30/24 2031    sodium chloride 0.9 % flush 10 mL  10 mL Intravenous PRN Dilma Yanez MD        sodium chloride 0.9 % infusion 40 mL  40 mL Intravenous PRN Dilma Yanez MD        sodium chloride 0.9 % infusion  75 mL/hr Intravenous Continuous Dilma Yanez MD 75 mL/hr at 07/01/24 0602 75 mL/hr at 07/01/24 0602     Orders (last 24 hrs)        Start     Ordered    07/01/24 1236  Potassium  Timed         07/01/24 0236    07/01/24 0622  Case Management  Consult  Once        Provider:  (Not yet assigned)    07/01/24 0623    07/01/24 0600  CBC & Differential  Morning Draw         06/30/24 1611    07/01/24 0600  Comprehensive Metabolic Panel  Morning Draw         06/30/24 1611    07/01/24 0600  CBC Auto Differential  PROCEDURE ONCE         06/30/24 2202    07/01/24 0330  potassium chloride 10 mEq in 100 mL IVPB  Every 1 Hour         07/01/24 0236    07/01/24 0203  Scan Slide  Once         07/01/24 0202    07/01/24 0001  NPO Diet NPO Type: Strict NPO  Diet Effective Midnight,   Status:  Canceled         06/30/24 1609    07/01/24 0001  NPO Diet NPO Type: Strict NPO  Diet Effective Midnight         06/30/24 1827    06/30/24 1827  Obtain Informed Consent  Once        Comments: Any indicated and/or related procedure    06/30/24 1827    06/30/24 1826  Case request  Once         06/30/24 1827    06/30/24 1722  Potassium  Timed         06/30/24 0721    06/30/24 1545  potassium chloride 10 mEq in 100 mL IVPB  Every 1 Hour         06/30/24 1456    06/30/24 1312  morphine injection 2 mg  Every 3 Hours PRN         06/30/24 1312    06/30/24 1044  Continuous Pulse Oximetry  Continuous         06/30/24 1043    06/30/24 0953  CPR - Full Support in OR  Once,   Status:  Canceled         06/30/24 0952    06/30/24 0939  TISSUE EXAM, [facility] LABS ([facility])  RELEASE UPON ORDERING         06/30/24 0939    06/30/24 0930  sodium chloride 0.9 % flush 10 mL  Every  12 Hours Scheduled,   Status:  Discontinued         06/30/24 0832    06/30/24 0930  lactated ringers infusion  Once,   Status:  Discontinued         06/30/24 0832    06/30/24 0839  Flexible Sigmoidoscopy  Once         06/30/24 0839    06/30/24 0832  lactated ringers infusion  Once As Needed         06/30/24 0832    06/30/24 0832  oxyCODONE-acetaminophen (PERCOCET) 5-325 MG per tablet 1 tablet  Once As Needed,   Status:  Discontinued         06/30/24 0832    06/30/24 0832  fentaNYL citrate (PF) (SUBLIMAZE) injection 50 mcg  Every 5 Minutes PRN,   Status:  Discontinued         06/30/24 0832    06/30/24 0832  ondansetron (ZOFRAN) injection 4 mg  As Needed,   Status:  Discontinued         06/30/24 0832    06/30/24 0832  ipratropium-albuterol (DUO-NEB) nebulizer solution 3 mL  Once As Needed,   Status:  Discontinued         06/30/24 0832    06/30/24 0832  Vital Signs - Per Anesthesia Protocol  As Needed,   Status:  Canceled       06/30/24 0832    06/30/24 0832  sodium chloride 0.9 % flush 10 mL  As Needed,   Status:  Discontinued         06/30/24 0832    06/30/24 0832  sodium chloride 0.9 % infusion 40 mL  As Needed,   Status:  Discontinued         06/30/24 0832    06/30/24 0832  midazolam (VERSED) injection 0.5 mg  Every 10 Minutes PRN,   Status:  Discontinued         06/30/24 0832    06/30/24 0815  potassium chloride 10 mEq in 100 mL IVPB  Every 1 Hour         06/30/24 0721    06/30/24 0800  Oral Care  2 Times Daily       06/29/24 1811 06/29/24 2200  heparin (porcine) 5000 UNIT/ML injection 5,000 Units  Every 8 Hours Scheduled         06/29/24 1811 06/29/24 2200  Incentive Spirometry  Every 4 Hours While Awake       06/29/24 1811 06/29/24 2100  sodium chloride 0.9 % flush 10 mL  Every 12 Hours Scheduled         06/29/24 1811 06/29/24 2000  Vital Signs  Every 4 Hours       06/29/24 1811 06/29/24 1812  Intake & Output  Every Shift       06/29/24 1811 06/29/24 1811  sodium chloride 0.9 % flush 10 mL  As  "Needed         06/29/24 1811    06/29/24 1811  sodium chloride 0.9 % infusion 40 mL  As Needed         06/29/24 1811    06/29/24 1811  Potassium Replacement - Follow Nurse / BPA Driven Protocol  As Needed         06/29/24 1811    06/29/24 1811  Magnesium Standard Dose Replacement - Follow Nurse / BPA Driven Protocol  As Needed         06/29/24 1811    06/29/24 1811  Phosphorus Replacement - Follow Nurse / BPA Driven Protocol  As Needed         06/29/24 1811    06/29/24 1811  Calcium Replacement - Follow Nurse / BPA Driven Protocol  As Needed         06/29/24 1811    06/29/24 1811  sennosides-docusate (PERICOLACE) 8.6-50 MG per tablet 2 tablet  2 Times Daily PRN        Placed in \"And\" Linked Group    06/29/24 1811    06/29/24 1811  polyethylene glycol (MIRALAX) packet 17 g  Daily PRN        Placed in \"And\" Linked Group    06/29/24 1811    06/29/24 1811  bisacodyl (DULCOLAX) EC tablet 5 mg  Daily PRN        Placed in \"And\" Linked Group    06/29/24 1811    06/29/24 1811  bisacodyl (DULCOLAX) suppository 10 mg  Daily PRN        Placed in \"And\" Linked Group    06/29/24 1811    06/29/24 1641  ondansetron (ZOFRAN) injection 4 mg  Every 6 Hours PRN         06/29/24 1641    06/29/24 1404  sodium chloride 0.9 % infusion  Continuous         06/29/24 1348    06/29/24 1348  sodium chloride 0.9 % flush 10 mL  As Needed        Placed in \"And\" Linked Group    06/29/24 1348                     Operative/Procedure Notes (last 48 hours)        Procedures signed by Dilma Yanez MD at 06/30/24 1826         Procedure Orders    1. Flexible Sigmoidoscopy [[id number]] ordered by Dilma Yanez MD at 06/30/24 0839               [Media Unavailable] Scan on 6/30/2024 1825 by Dilma Yanez MD: [email or web address]          Electronically signed by Dilma Yanez MD at 06/30/24 1826          Physician Progress Notes (last 48 hours)        Woody Lam MD at 06/30/24 1607              [facility]   "   Patient Identification:  Name:  Joaquín Rogers  Age:  89 y.o.  Sex:  male  :  1927  MRN:  [id number]  Visit Number:  [id number]  ROOM: [address] [place] [address]     Primary Care Provider:  Uday Roche DO    Length of stay in inpatient status:  1    Subjective     Chief Compliant:    Chief Complaint   Patient presents with    Abdominal Pain       History of Presenting Illness:    Patient still not feeling well after flex sig. Still having nausea and abdominal pain.     ROS:  Otherwise 10 point ROS negative other than documented above in HPI.     Objective     Current Hospital Meds:heparin (porcine), 5,000 Units, Subcutaneous, Q8H  potassium chloride, 10 mEq, Intravenous, Q1H  sodium chloride, 10 mL, Intravenous, Q12H    sodium chloride, 75 mL/hr, Last Rate: 75 mL/hr (24 1104)        Current Antimicrobial Therapy:  Anti-Infectives (From admission, onward)      None          Current Diuretic Therapy:  Diuretics (From admission, onward)      None          ----------------------------------------------------------------------------------------------------------------------  Vital Signs:  Temp:  [97.2 °F (36.2 °C)-98.5 °F (36.9 °C)] 98.3 °F (36.8 °C)  Heart Rate:  [] 86  Resp:  [16-22] 18  BP: (101-178)/(51-97) 157/87  SpO2:  [88 %-97 %] 97 %  on  Flow (L/min):  [3] 3;   Device (Oxygen Therapy): nasal cannula  Body mass index is 22.46 kg/m².    Wt Readings from Last 3 Encounters:   24 67 kg (147 lb 11.3 oz)   24 68 kg (150 lb)   24 67.1 kg (148 lb)     Intake & Output (last 3 days)          07    P.O.   0     I.V. (mL/kg)    200 (3)    IV Piggyback   500     Total Intake(mL/kg)   500 (7.5) 200 (3)    Urine (mL/kg/hr)    50 (0.1)    Total Output    50    Net   +500 +150            Urine Unmeasured Occurrence   2 x           NPO Diet NPO Type: Ice  Chips  ----------------------------------------------------------------------------------------------------------------------  Physical exam:  Constitutional:  Well-developed and well-nourished.  No respiratory distress. Elderly male laying supine in bed with small amount of dried blood under NG that was placed at 60 cm deep.      HENT:  Head: Normocephalic and atraumatic.  Mouth:  Moist mucous membranes.    Eyes:  Conjunctivae and EOM are normal.  Pupils are equal, round, and reactive to light.  No scleral icterus.  Cardiovascular:  Normal rate, regular rhythm and normal heart sounds with no murmur.  Pulmonary/Chest:  No respiratory distress, no wheezes, no crackles, with normal breath sounds and good air movement.  Abdominal:  Distended. No rebound or guarding.   Musculoskeletal:  No edema, no tenderness, and no deformity.  No red or swollen joints anywhere.    Neurological:  Alert and oriented to person, place, and time.  No cranial nerve deficit.  No focal neurological deficit.   Skin:  Skin is warm and dry.  No rash noted.  No pallor.   Peripheral vascular:  No edema and strong pulses on all 4 extremities.  ----------------------------------------------------------------------------------------------------------------------  Tele:    ----------------------------------------------------------------------------------------------------------------------  Results from last 7 days   Lab Units 06/30/24  0059 06/29/24  1358   WBC 10*3/mm3 7.25 5.65   HEMOGLOBIN g/dL 12.5* 12.4*   HEMATOCRIT % 40.0 39.0   MCV fL 89.3 87.2   MCHC g/dL 31.3* 31.8   PLATELETS 10*3/mm3 216 203         Results from last 7 days   Lab Units 06/30/24  1526 06/30/24  0059 06/29/24  1513   SODIUM mmol/L  --  139 141   POTASSIUM mmol/L 4.6 3.4* 3.3*   MAGNESIUM mg/dL  --  2.2 2.1   CHLORIDE mmol/L  --  107 106   CO2 mmol/L  --  19.7* 23.7   BUN mg/dL  --  17 16   CREATININE mg/dL  --  1.13 1.22   CALCIUM mg/dL  --  8.4 8.4   GLUCOSE mg/dL  --   "137* 119*   ALBUMIN g/dL  --   --  3.1*   BILIRUBIN mg/dL  --   --  0.5   ALK PHOS U/L  --   --  121*   AST (SGOT) U/L  --   --  21   ALT (SGPT) U/L  --   --  11   Estimated Creatinine Clearance: 35.4 mL/min (by C-G formula based on SCr of 1.13 mg/dL).  No results found for: \"AMMONIA\"              No results found for: \"HGBA1C\", \"POCGLU\"  Lab Results   Component Value Date    TSH 3.870 06/29/2024    FREET4 1.48 03/14/2023     No results found for: \"PREGTESTUR\", \"PREGSERUM\", \"HCG\", \"HCGQUANT\"  Pain Management Panel           No data to display              Brief Urine Lab Results  (Last result in the past 365 days)        Color   Clarity   Blood   Leuk Est   Nitrite   Protein   CREAT   Urine HCG        06/29/24 2219 Yellow   Cloudy   Large (3+)   Negative   Negative   30 mg/dL (1+)                 No results found for: \"BLOODCX\"  No results found for: \"URINECX\"  No results found for: \"WOUNDCX\"  No results found for: \"STOOLCX\"  No results found for: \"RESPCX\"  No results found for: \"AFBCX\"        I have personally looked at the labs and they are summarized above.  ----------------------------------------------------------------------------------------------------------------------  Detailed radiology reports for the last 24 hours:    Imaging Results (Last 24 Hours)       Procedure Component Value Units Date/Time    XR Chest 1 View [[id number]] Collected: 06/29/24 2102     Updated: 06/29/24 2104    Narrative:      INDICATION: NG tube placement     TECHNIQUE: Frontal radiograph of the chest.     COMPARISON: None.       Impression:      FINDINGS/IMPRESSION:  Enteric tube in the stomach.        This report was finalized on 6/29/2024 9:02 PM by Alex Pallas, DO.             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 Awaiting plain film read to confirm placement.   - " Surgery following. Flex sig 6/30 revealed rectal mass. Surgical resection and ostomy planned for tomorrow.   - NPO except ice chips as per surgery, strict NPO after midnight.      #Hypothyroidism   - TSH wnl. Continue home regimen.     #Preoperative risk stratificaiton   - RCRI: 0. No chest pain or symptoms of decompensated CHF. EKG reviewed. No further cardiac risk stratification needed. Patient at increased risk due to advanced age.      #GERD  - PPI once tolerating oral     #Hx of bradycardia w/ pacemaker placement   #Hx of inguinal hernias      Code status: DNR/DNI     Dispo: Admit to med/surg     Woody Lam MD  [facility] Hospitalist  06/30/24  16:07 EDT    Electronically signed by Woody Lam MD at 06/30/24 1612       Dilma Yanez MD at 06/30/24 0908          Diagnosis: large bowel obstruction of unknown etiology    To endoscopy today for flexible sigmoidoscopy, possible biopsy, possible rectal tube placement.     Dilma Hermosillo MD       General Surgeon  [facility]      Electronically signed by Dilma Yanez MD at 06/30/24 0909          Consult Notes (last 48 hours)        Dilma Yanez MD at 06/29/24 1916        Consult Orders    1. Inpatient General Surgery Consult [[id number]] ordered by Woody Lam MD at 06/29/24 1610                 Patient Name:  Joaquín Rogers  YOB: 1927  [id number]       Patient Care Team:  Uday Roche DO as PCP - General (Internal Medicine)  Ayaka Patel RN as Ambulatory  ([facility])      General Surgery Consult Note     Date of Consultation: 06/29/24    Consulting Physician : Woody Lam MD    Reason for Consult : large bowel obstruction    Subjective     I have been asked to see  Joaquín Rogers , a 89 y.o. male in consultation for large bowel obstruction. He reports a history of inability to pass gas or stool for about one day. He has had issues with constipation and was in the  emergency department at the end of May with similar complaints. CT scan at that time showed a thickening of his sigmoid colon. He presented today with continued complaints of discomfort and inability to pass gas/stool. CT scan was performed which showed a large colon with filled with fluid to the level of the rectum. He reports a history of colonoscopies. He attempted to have one recently due to issues with constipation but was denied due to his age. He denies any history of radiation to his pelvis.       Allergy: No Known Allergies    Medications:  heparin (porcine), 5,000 Units, Subcutaneous, Q8H  potassium chloride, 10 mEq, Intravenous, Q1H  sodium chloride, 10 mL, Intravenous, Q12H      sodium chloride, 75 mL/hr, Last Rate: 75 mL/hr (06/29/24 1641)      No current facility-administered medications on file prior to encounter.     Current Outpatient Medications on File Prior to Encounter   Medication Sig    aspirin 81 MG EC tablet Take 1 tablet by mouth Daily.    calcium carbonate-cholecalciferol 500-400 MG-UNIT tablet tablet Take 1 tablet by mouth 2 (Two) Times a Day.    carboxymethylcellulose (REFRESH PLUS) 0.5 % solution Administer 1 drop to both eyes 3 (Three) Times a Day As Needed for Dry Eyes.    ferrous sulfate 325 (65 FE) MG tablet Take 1 tablet by mouth 2 (Two) Times a Day.    finasteride (PROSCAR) 5 MG tablet Take 1 tablet by mouth Daily.    levothyroxine (SYNTHROID, LEVOTHROID) 75 MCG tablet Take 1 tablet by mouth Daily.    linaclotide (LINZESS) 72 MCG capsule capsule Take 1 capsule by mouth Every Morning Before Breakfast.    omeprazole (priLOSEC) 40 MG capsule Take 1 capsule by mouth Daily.    polyethylene glycol (MIRALAX) 17 g packet Take 17 g by mouth Daily.    terazosin (HYTRIN) 2 MG capsule Take 1 capsule by mouth Every Night.       PMHx:   Past Medical History:   Diagnosis Date    Bradycardia     Thyroid disease        Past Surgical History:  Hernia repair in left groin (x2) and right groin  "    Family History: Noncontributory     Social History:  Noncontributory      Review of Systems   Pertinent items are noted in HPI     Constitutional: No fevers, chills or malaise   Eyes: Denies visual changes    Cardiovascular: Denies chest pain, palpitations   Pulmonary: Denies cough or shortness of breath   Abdominal/ GI: Abdominal discomfort, inability to pass stool   Genitourinary: Denies dysuria or hematuria   Musculoskeletal: Denies any but chronic joint aches, pains or deformities   Psychiatric: No recent mood changes   Neurologic: No paresthesias or loss of function         Objective     Physical Exam:      Vital Signs  /73 (BP Location: Right arm, Patient Position: Lying)   Pulse 93   Temp 98 °F (36.7 °C) (Oral)   Resp 20   Ht 172.7 cm (67.99\")   Wt 67 kg (147 lb 11.3 oz)   SpO2 91%   BMI 22.46 kg/m²     Intake/Output Summary (Last 24 hours) at 6/29/2024 1935  Last data filed at 6/29/2024 1801  Gross per 24 hour   Intake 500 ml   Output --   Net 500 ml         Physical Exam:    Head: Normocephalic, atraumatic.   Eyes: Pupils equal, round, react to light   Mouth: No lesions noted  CV: Regular rate and rhythm   Lungs: Bilateral chest rise and fall, no use of accessory muscles   Abdomen: Distended, nontender to palpation  Extremities:  No cyanosis, clubbing or edema bilaterally   Neurologic: No gross deficits      Results Review: I have personally reviewed all of the recent lab and imaging results available at this time- distended colon filled with fluid to the level of the rectum. No discrete mass noted.       Assessment and Plan:    Problem List Items Addressed This Visit          Gastrointestinal Abdominal     * (Principal) Bowel obstruction - Primary    Relevant Orders    Case request (Completed)     Other Visit Diagnoses       Indeterminate colitis        Relevant Orders    Case request (Completed)             Active Hospital Problems    Diagnosis  POA    **Bowel obstruction [K56.609]  Yes "      Resolved Hospital Problems   No resolved problems to display.     Mr Rogers is a 89 yo M w/ a large bowel obstruction from unknown etiology. Recent CT imaging showed possibility of colitis. No discrete mass seen on imaging. Will plan for flexible sigmoidoscopy with possibly biopsy and rectal tube tomorrow. Discussed possibility of need of diverting colostomy. Also discussed possibility of stent if lesion looks amenable to stenting, although this would require a transfer of the patient and would be a temporizing measure. I willl plan to further discussions with patient/family once flexible sigmoidoscopy is complete.     I discussed the patient's findings and my recommendations with the patient and/or family, as well as the primary team     Dilma Yanez MD  06/29/24  19:35 EDT          Electronically signed by Dilma Yanez MD at 06/29/24 1957        done

## 2024-07-01 NOTE — ANESTHESIA PREPROCEDURE EVALUATION
Anesthesia Evaluation     Patient summary reviewed and Nursing notes reviewed   no history of anesthetic complications:   NPO Solid Status: > 8 hours  NPO Liquid Status: > 8 hours           Airway   Mallampati: II  TM distance: >3 FB  Neck ROM: full  No difficulty expected  Dental    (+) edentulous    Pulmonary    (+) a smoker Former, COPD,decreased breath sounds  Cardiovascular - normal exam    ECG reviewed  Rhythm: regular  Rate: normal    (+) pacemaker pacemaker, valvular problems/murmurs MR, dysrhythmias Bradycardia      Neuro/Psych- negative ROS  GI/Hepatic/Renal/Endo    (+) thyroid problem     Musculoskeletal (-) negative ROS    Abdominal     Abdomen: rigid and tender.   Substance History - negative use     OB/GYN negative ob/gyn ROS         Other   blood dyscrasia anemia,     ROS/Med Hx Other: SBO   Pt had NG but was removed last night.    Echo 9/2/2021:  ·Left ventricular wall thickness is consistent with mild concentric hypertrophy.  ·Left ventricular ejection fraction appears to be 56 - 60%. Left ventricular systolic function is normal.  ·Left ventricular diastolic function is consistent with (grade I) impaired relaxation.  ·Normal cardiac chamber dimensions.  ·Moderate aortic valve regurgitation is present.  ·There is no evidence of pericardial effusion.                Anesthesia Plan    ASA 4     general and general with block     (Mr. Rogers may remain intubated after the procedure.  This is dependent upon the extent of the SBO and Exp. Lap.  We will do TAP blocks in an effort to reduce post op pain per the surgeon request and facilitate extubation.  )  intravenous induction     Anesthetic plan, risks, benefits, and alternatives have been provided, discussed and informed consent has been obtained with: patient.  Pre-procedure education provided  Use of blood products discussed with patient  Consented to blood products.    Plan discussed with CRNA.    CODE STATUS:    Medical Intervention Limits: No  intubation (DNI)  Code Status (Patient has no pulse and is not breathing): No CPR (Do Not Attempt to Resuscitate)  Medical Interventions (Patient has pulse or is breathing): Limited Support

## 2024-07-01 NOTE — PROGRESS NOTES
[facility] HOSPITALIST PROGRESS NOTE     Patient Identification:  Name:  Joaquín Rogers  Age:  89 y.o.  Sex:  male  :  1927  MRN:  [id number]  Visit Number:  [id number]  ROOM: [address]     Primary Care Provider:  Uday Roche DO    Length of stay in inpatient status:  2    Subjective     Chief Compliant:    Chief Complaint   Patient presents with    Abdominal Pain       History of Presenting Illness:    Patient seen preoperatively. Still with significant discomfort in his abdomen. Reported feeling about the same as he awaited surgery.     Patient seen postoperatively as he was being re-intubated. Patient with worsening lethargy and ABG revealed hypercapnia.     Discussed postoperative respiratory failure with patient's family. They were agreeable with plan. Patient's son noted he was trying to get medical guardianship of patient and had already started the process.     ROS:  Otherwise 10 point ROS negative other than documented above in HPI.     Objective     Current Hospital Meds:chlorhexidine, 15 mL, Mouth/Throat, Q12H  [Transfer Hold] heparin (porcine), 5,000 Units, Subcutaneous, Q8H  [Transfer Hold] sodium chloride, 10 mL, Intravenous, Q12H    fentanyl 10 mcg/mL,  mcg/hr, Last Rate: 50 mcg/hr (24 1504)  propofol, 5-50 mcg/kg/min, Last Rate: 5 mcg/kg/min (24 1503)  sodium chloride, 75 mL/hr, Last Rate: 75 mL/hr (24 0602)        Current Antimicrobial Therapy:  Anti-Infectives (From admission, onward)      None          Current Diuretic Therapy:  Diuretics (From admission, onward)      None          ----------------------------------------------------------------------------------------------------------------------  Vital Signs:  Temp:  [97 °F (36.1 °C)-98.6 °F (37 °C)] 97 °F (36.1 °C)  Heart Rate:  [77-96] 80  Resp:  [13-24] 24  BP: ()/(57-88) 134/78  FiO2 (%):  [40 %-100 %] 100 %  SpO2:  [86 %-97 %] 97 %  on  Flow (L/min):  [2-15] 15;   Device (Oxygen  Therapy): ventilator  Body mass index is 22.46 kg/m².    Wt Readings from Last 3 Encounters:   06/29/24 67 kg (147 lb 11.3 oz)   05/31/24 68 kg (150 lb)   04/01/24 67.1 kg (148 lb)     Intake & Output (last 3 days)         06/28 0701  06/29 0700 06/29 0701  06/30 0700 06/30 0701  07/01 0700 07/01 0701 07/02 0700    P.O.  0 0 0    I.V. (mL/kg)   747.8 (11.2) 1100 (16.4)    IV Piggyback  500      Total Intake(mL/kg)  500 (7.5) 747.8 (11.2) 1100 (16.4)    Urine (mL/kg/hr)   650 (0.4)     Emesis/NG output   300     Total Output   950     Net  +500 -202.2 +1100            Urine Unmeasured Occurrence  2 x  1 x    Stool Unmeasured Occurrence    1 x          NPO Diet NPO Type: Strict NPO  ----------------------------------------------------------------------------------------------------------------------  Physical exam:  GENERAL:  Patient intubated and sedated.   SKIN:  Warm, dry without rashes, purpura or petechiae.  EYES:  Pupils equal, round and reactive to light.  Conjunctivae normal.  HEAD:  Normocephalic. Atraumatic.   EARS/NOSE/MOUTH/THROAT:  Septum midline.  No excoriations or nasal discharge. No stomatitis or ulcers.  Lips normal. Oropharynx without lesions or exudates.  NECK:  Supple with good range of motion; no thyromegaly or masses  LYMPHATICS:  No cervical, supraclavicular, axillary adenopathy.  RESP:  Lungs clear to auscultation. Good airflow. Intubated receiving mechanical ventilation.   CARDIAC:  Regular rate and rhythm without murmurs, rubs or gallops. Normal S1,S2. No edema  GI:  Soft, nontender, no hepatosplenomegaly, normal bowel sounds  MSK:  No clubbing or cyanosis, No joint swelling noted in hands  NEUROLOGICAL:  No focal deficit. Pupils equal and reactive.   ----------------------------------------------------------------------------------------------------------------------  Tele:   "  ----------------------------------------------------------------------------------------------------------------------  Results from last 7 days   Lab Units 07/01/24 0116 06/30/24 0059 06/29/24  1358   WBC 10*3/mm3 6.05 7.25 5.65   HEMOGLOBIN g/dL 11.8* 12.5* 12.4*   HEMATOCRIT % 37.2* 40.0 39.0   MCV fL 89.0 89.3 87.2   MCHC g/dL 31.7 31.3* 31.8   PLATELETS 10*3/mm3 242 216 203     Results from last 7 days   Lab Units 07/01/24  1425   PH, ARTERIAL pH units 7.241*   PO2 ART mm Hg 63.7*   PCO2, ARTERIAL mm Hg 51.0*   HCO3 ART mmol/L 21.9     Results from last 7 days   Lab Units 07/01/24 0116 06/30/24  1526 06/30/24 0059 06/29/24  1513   SODIUM mmol/L 144  --  139 141   POTASSIUM mmol/L 3.4* 4.6 3.4* 3.3*   MAGNESIUM mg/dL  --   --  2.2 2.1   CHLORIDE mmol/L 110*  --  107 106   CO2 mmol/L 19.8*  --  19.7* 23.7   BUN mg/dL 18  --  17 16   CREATININE mg/dL 1.18  --  1.13 1.22   CALCIUM mg/dL 8.1*  --  8.4 8.4   GLUCOSE mg/dL 127*  --  137* 119*   ALBUMIN g/dL 3.1*  --   --  3.1*   BILIRUBIN mg/dL 0.3  --   --  0.5   ALK PHOS U/L 119*  --   --  121*   AST (SGOT) U/L 20  --   --  21   ALT (SGPT) U/L 10  --   --  11   Estimated Creatinine Clearance: 33.9 mL/min (by C-G formula based on SCr of 1.18 mg/dL).  No results found for: \"AMMONIA\"              Glucose   Date/Time Value Ref Range Status   07/01/2024 1503 128 70 - 130 mg/dL Final   07/01/2024 1137 108 70 - 130 mg/dL Final     Lab Results   Component Value Date    TSH 3.870 06/29/2024    FREET4 1.48 03/14/2023     No results found for: \"PREGTESTUR\", \"PREGSERUM\", \"HCG\", \"HCGQUANT\"  Pain Management Panel           No data to display              Brief Urine Lab Results  (Last result in the past 365 days)        Color   Clarity   Blood   Leuk Est   Nitrite   Protein   CREAT   Urine HCG        06/29/24 2219 Yellow   Cloudy   Large (3+)   Negative   Negative   30 mg/dL (1+)                 No results found for: \"BLOODCX\"  No results found for: \"URINECX\"  No results " "found for: \"WOUNDCX\"  No results found for: \"STOOLCX\"  No results found for: \"RESPCX\"  No results found for: \"AFBCX\"        I have personally looked at the labs and they are summarized above.  ----------------------------------------------------------------------------------------------------------------------  Detailed radiology reports for the last 24 hours:    Imaging Results (Last 24 Hours)       Procedure Component Value Units Date/Time    XR Chest 1 View [[id number]] Collected: 07/01/24 1527     Updated: 07/01/24 1529    Narrative:      XR CHEST 1 VW-     CLINICAL INDICATION: ET placement verification; K56.601-Complete  intestinal obstruction, unspecified as to cause; K56.609-Unspecified  intestinal obstruction, unspecified as to partial versus complete  obstruction; K52.3-Indeterminate colitis        COMPARISON: 6/29/2024     TECHNIQUE: Single frontal view of the chest.     FINDINGS:     LUNGS: Endotracheal tube overlies the tracheal air column well above the  ishaan.  Nasogastric tube is in the stomach     HEART AND MEDIASTINUM: Heart and mediastinal contours are unremarkable        SKELETON: Bony and soft tissue structures are unremarkable.             Impression:      Endotracheal tube placement as above           This report was finalized on 7/1/2024 3:27 PM by Dr. Shilo Leary MD.             Assessment & Plan    #Postoperative hypoxic hypercapnic respiratory failure   - ABG in PACU  - Patient's discomfort likely limiting tidal volumes contributing to hypercapnia and need for reintubation. No new findings on plain film of chest.   - Sedation with popofol and fentanyl gtts  - Will start on FiO2 40, PEEP 8, rate 24,  and repeat ABG.   - Consult pulmonology.   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      #Hypothyroidism   - TSH wnl. Continue home regimen.    "   #GERD  - PPI      #Hx of bradycardia w/ pacemaker placement   #Hx of inguinal hernias      Code status: DNR/DNI (Reversed for surgery. Will continue to have goals of care conversations with family. Son currently in process of obtaining emergent medical guardianship for patient)     Dispo: Transfer to CCU after reintubation in PACU.     Woody Lam MD  [facility]  07/01/24  15:30 EDT

## 2024-07-01 NOTE — PROGRESS NOTES
Diagnosis: Large bowel obstruction    To OR for exploratory laparotomy, bowel resection and ostomy creation.     Dilma Hermosillo MD       General Surgeon  [facility]

## 2024-07-01 NOTE — ANESTHESIA POSTPROCEDURE EVALUATION
Patient: Joaquín Rogers    Procedure Summary       Date: 07/01/24 Room / Location: [facility] OR 02 / [facility] OR    Anesthesia Start: 1241 Anesthesia Stop: 1408    Procedures:       LAPAROTOMY EXPLORATORY (Abdomen)      COLOSTOMY LOOP (Abdomen) Diagnosis:       Intestinal obstruction, unspecified cause, unspecified whether partial or complete      (Intestinal obstruction, unspecified cause, unspecified whether partial or complete [K56.609])    Surgeons: Dilma Yanez MD Provider: Aubrey Arvizu DO    Anesthesia Type: general, general with block ASA Status: 4            Anesthesia Type: general, general with block    Vitals  Vitals Value Taken Time   BP 97/57 07/01/24 1435   Temp 97.3 °F (36.3 °C) 07/01/24 1411   Pulse 83 07/01/24 1439   Resp 14 07/01/24 1416   SpO2 91 % 07/01/24 1439   Vitals shown include unfiled device data.        Post Anesthesia Care and Evaluation    Patient location during evaluation: PACU  Patient participation: complete - patient participated  Level of consciousness: lethargic and obtunded/minimal responses  Pain score: 0  Pain management: adequate    Airway patency: patent  Anesthetic complications: No anesthetic complications  PONV Status: none  Cardiovascular status: acceptable and stable  Respiratory status: acceptable, ETT, intubated, nonlabored ventilation and ventilator  Hydration status: euvolemic    Comments: Mr. Rogers met extubation criteria in the OR.  However, in the PACU he began to breath very shallow and was becoming less responsive.  I spoke with Dr. Velasquez and Dr. Lam.  We agreed to re-intubate Mr. Rogers.  I administered Propofol 50mg plus succinylcholine 100mg to facilitate intubation.  He was successfully intubated with a 7.5 ID ETT.  + CO2 was noted.  The breath sounds were equal bilaterally.  CXR confirmed proper placement of the ETT about 3.5cm above the ishaan.  In pre-op, I spoke with Mr. Rogers about the possibililty of remaining intubated after the  surgery or having to re-intubate him if was not doing well in PACU.  He understood and wished to proceed as planned.

## 2024-07-01 NOTE — ANESTHESIA PROCEDURE NOTES
Airway  Urgency: elective    Date/Time: 7/1/2024 12:48 PM  Airway not difficult    General Information and Staff    Patient location during procedure: OR  CRNA/CAA: Radha Lui CRNA    Indications and Patient Condition  Indications for airway management: airway protection    Preoxygenated: yes  MILS maintained throughout  Mask difficulty assessment: 1 - vent by mask    Final Airway Details  Final airway type: endotracheal airway      Successful airway: ETT  Cuffed: yes   Successful intubation technique: direct laryngoscopy  Facilitating devices/methods: intubating stylet  Endotracheal tube insertion site: oral  Blade: Cristy  Blade size: 4  ETT size (mm): 7.5  Cormack-Lehane Classification: grade I - full view of glottis  Placement verified by: chest auscultation and capnometry   Measured from: lips  ETT/EBT  to lips (cm): 23  Number of attempts at approach: 1  Assessment: lips, teeth, and gum same as pre-op and atraumatic intubation

## 2024-07-01 NOTE — PLAN OF CARE
Goal Outcome Evaluation:           Progress: no change  Outcome Evaluation: Rec'd from OR intubated/sedated colostomy dark red drainage, OG LWS, VSS                               Problem: Hypertension Comorbidity  Goal: Blood Pressure in Desired Range  Outcome: Ongoing, Progressing     Problem: Skin Injury Risk Increased  Goal: Skin Health and Integrity  Outcome: Ongoing, Progressing     Problem: Pain Acute  Goal: Acceptable Pain Control and Functional Ability  Outcome: Ongoing, Progressing     Problem: Fluid Deficit (Intestinal Obstruction)  Goal: Fluid Balance  Outcome: Ongoing, Progressing     Problem: Infection (Intestinal Obstruction)  Goal: Absence of Infection Signs and Symptoms  Outcome: Ongoing, Progressing     Problem: Nausea and Vomiting (Intestinal Obstruction)  Goal: Nausea and Vomiting Relief  Outcome: Ongoing, Progressing     Problem: Pain (Intestinal Obstruction)  Goal: Acceptable Pain Control  Outcome: Ongoing, Progressing     Problem: Fall Injury Risk  Goal: Absence of Fall and Fall-Related Injury  Outcome: Ongoing, Progressing     Problem: Communication Impairment (Mechanical Ventilation, Invasive)  Goal: Effective Communication  Outcome: Ongoing, Progressing     Problem: Device-Related Complication Risk (Mechanical Ventilation, Invasive)  Goal: Optimal Device Function  Outcome: Ongoing, Progressing     Problem: Inability to Wean (Mechanical Ventilation, Invasive)  Goal: Mechanical Ventilation Liberation  Outcome: Ongoing, Progressing     Problem: Nutrition Impairment (Mechanical Ventilation, Invasive)  Goal: Optimal Nutrition Delivery  Outcome: Ongoing, Progressing     Problem: Skin and Tissue Injury (Mechanical Ventilation, Invasive)  Goal: Absence of Device-Related Skin and Tissue Injury  Outcome: Ongoing, Progressing     Problem: Ventilator-Induced Lung Injury (Mechanical Ventilation, Invasive)  Goal: Absence of Ventilator-Induced Lung Injury  Outcome: Ongoing, Progressing     Problem: Skin  and Tissue Injury (Mechanical Ventilation, Invasive)  Goal: Absence of Device-Related Skin and Tissue Injury  Outcome: Ongoing, Progressing

## 2024-07-01 NOTE — CASE MANAGEMENT/SOCIAL WORK
Discharge Planning Assessment  [facility]     Patient Name: Joaquín Rogers  MRN: [id number]  Today's Date: 7/1/2024    Admit Date: 6/29/2024    Plan: SS received consult for D/C planning per protocol r/t age. SS was notified by provider that pt would benefit from Emergency Gaurdianship. SS spoke with pt son at beside and provided son with emergency guardianship statement. Pt son voiced understaning. Son to go to [facility] tomorrow 7/2/24. Pt lives at home with spouse, adult son and adult grandson. Pt son and grandson is pts caregivers. Pt does not utilize  services at this time. Pt has (s) cane, walker and BSC via unknown provider. Pt does not have POA or living will. SS to follow up with discharge planning once pt has been extubated and is stable. SS to follow.   Discharge Needs Assessment       Row Name 07/01/24 1614       Living Environment    People in Home child(raquel), adult;spouse;grandchild(raquel)    Name(s) of People in Home Spouse  Bambi and adult son Clark    Current Living Arrangements home    Primary Care Provided by self;child(raquel)    Provides Primary Care For no one    Family Caregiver if Needed child(raquel), adult;grandchild(raquel), adult    Family Caregiver Names Son Clark and adult grandson    Quality of Family Relationships helpful;involved;supportive    Able to Return to Prior Arrangements yes       Transition Planning    Patient/Family Anticipates Transition to home with family       Discharge Needs Assessment    Equipment Currently Used at Home cane, straight;commode;walker, standard    Concerns to be Addressed --  D/C planning per protocol r/t age                   Discharge Plan       Row Name 07/01/24 1607       Plan    Plan SS received consult for D/C planning per protocol r/t age. SS was notified by provider that pt would benefit from Emergency Gaurdianship. SS spoke with pt son at beside and provided son with emergency guardianship statement. Pt son voiced understaning. Son to go  to [facility] office tomorrow 7/2/24. Pt lives at home with spouse, adult son and adult grandson. Pt son and grandson is pts caregivers. Pt does not utilize  services at this time. Pt has (s) cane, walker and BSC via unknown provider. Pt does not have POA or living will. SS to follow up with discharge planning once pt has been extubated and is stable. SS to follow.               Expected Discharge Date and Time       Expected Discharge Date Expected Discharge Time    Jul 5, 2024         Demographic Summary       Row Name 07/01/24 1613       General Information    Admission Type inpatient    Arrived From home    Referral Source nursing    Reason for Consult --  D/C planning per protocol r/t age    Preferred Language English             MARIAMA Steward

## 2024-07-01 NOTE — PROGRESS NOTES
Mr. Rogers has been admitted at [facility] from 6/29-7/1. He is currently critically ill and unable to make his own decisions. Patient would benefit from emergency guardianship.     Woody Lam MD   9 (severe pain)

## 2024-07-01 NOTE — PLAN OF CARE
Goal Outcome Evaluation:         Pt is resting in bed at this time, no complaints of pain, no complaints of nausea or vomiting. NG at low wall suction, no issues. Strict NPO, A/O, Potassium being replaced at this time. Will follow poc.

## 2024-07-02 PROBLEM — E43 SEVERE MALNUTRITION: Status: ACTIVE | Noted: 2024-07-02

## 2024-07-02 LAB
A-A DO2: 108.5 MMHG (ref 0–300)
ALBUMIN SERPL-MCNC: 2.4 G/DL (ref 3.5–5.2)
ALBUMIN/GLOB SERPL: 1 G/DL
ALP SERPL-CCNC: 91 U/L (ref 39–117)
ALT SERPL W P-5'-P-CCNC: 9 U/L (ref 1–41)
ANION GAP SERPL CALCULATED.3IONS-SCNC: 11.5 MMOL/L (ref 5–15)
ANISOCYTOSIS BLD QL: NORMAL
ARTERIAL PATENCY WRIST A: POSITIVE
AST SERPL-CCNC: 21 U/L (ref 1–40)
ATMOSPHERIC PRESS: 730 MMHG
BASE EXCESS BLDA CALC-SCNC: -3.6 MMOL/L (ref 0–2)
BASOPHILS # BLD AUTO: 0.02 10*3/MM3 (ref 0–0.2)
BASOPHILS NFR BLD AUTO: 0.2 % (ref 0–1.5)
BDY SITE: ABNORMAL
BILIRUB SERPL-MCNC: 0.3 MG/DL (ref 0–1.2)
BUN SERPL-MCNC: 20 MG/DL (ref 8–23)
BUN/CREAT SERPL: 16.8 (ref 7–25)
CALCIUM SPEC-SCNC: 7.5 MG/DL (ref 8.2–9.6)
CHLORIDE SERPL-SCNC: 112 MMOL/L (ref 98–107)
CO2 BLDA-SCNC: 22.3 MMOL/L (ref 22–33)
CO2 SERPL-SCNC: 17.5 MMOL/L (ref 22–29)
COHGB MFR BLD: 1.2 % (ref 0–5)
CREAT SERPL-MCNC: 1.19 MG/DL (ref 0.76–1.27)
D-LACTATE SERPL-SCNC: 1 MMOL/L (ref 0.5–2)
D-LACTATE SERPL-SCNC: 2.4 MMOL/L (ref 0.5–2)
DEPRECATED RDW RBC AUTO: 76 FL (ref 37–54)
EGFRCR SERPLBLD CKD-EPI 2021: 55.6 ML/MIN/1.73
EOSINOPHIL # BLD AUTO: 0.02 10*3/MM3 (ref 0–0.4)
EOSINOPHIL NFR BLD AUTO: 0.2 % (ref 0.3–6.2)
ERYTHROCYTE [DISTWIDTH] IN BLOOD BY AUTOMATED COUNT: 24.3 % (ref 12.3–15.4)
GLOBULIN UR ELPH-MCNC: 2.3 GM/DL
GLUCOSE SERPL-MCNC: 129 MG/DL (ref 65–99)
HCO3 BLDA-SCNC: 21.2 MMOL/L (ref 20–26)
HCT VFR BLD AUTO: 34.9 % (ref 37.5–51)
HCT VFR BLD CALC: 35 % (ref 38–51)
HGB BLD-MCNC: 10.8 G/DL (ref 13–17.7)
HGB BLDA-MCNC: 11.4 G/DL (ref 14–18)
HYPOCHROMIA BLD QL: NORMAL
IMM GRANULOCYTES # BLD AUTO: 0.05 10*3/MM3 (ref 0–0.05)
IMM GRANULOCYTES NFR BLD AUTO: 0.5 % (ref 0–0.5)
INHALED O2 CONCENTRATION: 35 %
LYMPHOCYTES # BLD AUTO: 1.57 10*3/MM3 (ref 0.7–3.1)
LYMPHOCYTES NFR BLD AUTO: 16.9 % (ref 19.6–45.3)
Lab: ABNORMAL
MAGNESIUM SERPL-MCNC: 2 MG/DL (ref 1.7–2.3)
MCH RBC QN AUTO: 28.1 PG (ref 26.6–33)
MCHC RBC AUTO-ENTMCNC: 30.9 G/DL (ref 31.5–35.7)
MCV RBC AUTO: 90.9 FL (ref 79–97)
METHGB BLD QL: 0.4 % (ref 0–3)
MODALITY: ABNORMAL
MONOCYTES # BLD AUTO: 0.79 10*3/MM3 (ref 0.1–0.9)
MONOCYTES NFR BLD AUTO: 8.5 % (ref 5–12)
NEUTROPHILS NFR BLD AUTO: 6.86 10*3/MM3 (ref 1.7–7)
NEUTROPHILS NFR BLD AUTO: 73.7 % (ref 42.7–76)
NRBC BLD AUTO-RTO: 0 /100 WBC (ref 0–0.2)
OXYHGB MFR BLDV: 96.1 % (ref 94–99)
PCO2 BLDA: 36.5 MM HG (ref 35–45)
PCO2 TEMP ADJ BLD: ABNORMAL MM[HG]
PEEP RESPIRATORY: 5 CM[H2O]
PH BLDA: 7.37 PH UNITS (ref 7.35–7.45)
PH, TEMP CORRECTED: ABNORMAL
PLAT MORPH BLD: NORMAL
PLATELET # BLD AUTO: 214 10*3/MM3 (ref 140–450)
PMV BLD AUTO: 9.5 FL (ref 6–12)
PO2 BLDA: 90.1 MM HG (ref 83–108)
PO2 TEMP ADJ BLD: ABNORMAL MM[HG]
POTASSIUM SERPL-SCNC: 4 MMOL/L (ref 3.5–5.2)
PROT SERPL-MCNC: 4.7 G/DL (ref 6–8.5)
PSV: 10 CMH2O
RBC # BLD AUTO: 3.84 10*6/MM3 (ref 4.14–5.8)
SAO2 % BLDCOA: 97.7 % (ref 94–99)
SODIUM SERPL-SCNC: 141 MMOL/L (ref 136–145)
VENTILATOR MODE: ABNORMAL
WBC NRBC COR # BLD AUTO: 9.31 10*3/MM3 (ref 3.4–10.8)

## 2024-07-02 PROCEDURE — 99024 POSTOP FOLLOW-UP VISIT: CPT | Performed by: SURGERY

## 2024-07-02 PROCEDURE — 25010000002 HEPARIN (PORCINE) PER 1000 UNITS: Performed by: INTERNAL MEDICINE

## 2024-07-02 PROCEDURE — 83605 ASSAY OF LACTIC ACID: CPT | Performed by: INTERNAL MEDICINE

## 2024-07-02 PROCEDURE — 36600 WITHDRAWAL OF ARTERIAL BLOOD: CPT

## 2024-07-02 PROCEDURE — 82805 BLOOD GASES W/O2 SATURATION: CPT

## 2024-07-02 PROCEDURE — 99233 SBSQ HOSP IP/OBS HIGH 50: CPT | Performed by: INTERNAL MEDICINE

## 2024-07-02 PROCEDURE — 80053 COMPREHEN METABOLIC PANEL: CPT | Performed by: INTERNAL MEDICINE

## 2024-07-02 PROCEDURE — 94799 UNLISTED PULMONARY SVC/PX: CPT

## 2024-07-02 PROCEDURE — 83050 HGB METHEMOGLOBIN QUAN: CPT

## 2024-07-02 PROCEDURE — 85007 BL SMEAR W/DIFF WBC COUNT: CPT | Performed by: INTERNAL MEDICINE

## 2024-07-02 PROCEDURE — 25810000003 SODIUM CHLORIDE 0.9 % SOLUTION: Performed by: INTERNAL MEDICINE

## 2024-07-02 PROCEDURE — 83735 ASSAY OF MAGNESIUM: CPT | Performed by: HOSPITALIST

## 2024-07-02 PROCEDURE — 85025 COMPLETE CBC W/AUTO DIFF WBC: CPT | Performed by: INTERNAL MEDICINE

## 2024-07-02 PROCEDURE — 25010000002 HEPARIN (PORCINE) PER 1000 UNITS: Performed by: SURGERY

## 2024-07-02 PROCEDURE — 25010000002 MORPHINE PER 10 MG: Performed by: INTERNAL MEDICINE

## 2024-07-02 PROCEDURE — 93005 ELECTROCARDIOGRAM TRACING: CPT | Performed by: INTERNAL MEDICINE

## 2024-07-02 PROCEDURE — 25010000002 PROPOFOL 10 MG/ML EMULSION: Performed by: ANESTHESIOLOGY

## 2024-07-02 PROCEDURE — 92610 EVALUATE SWALLOWING FUNCTION: CPT

## 2024-07-02 PROCEDURE — 99221 1ST HOSP IP/OBS SF/LOW 40: CPT | Performed by: INTERNAL MEDICINE

## 2024-07-02 PROCEDURE — 93010 ELECTROCARDIOGRAM REPORT: CPT | Performed by: INTERNAL MEDICINE

## 2024-07-02 PROCEDURE — 82375 ASSAY CARBOXYHB QUANT: CPT

## 2024-07-02 PROCEDURE — 94003 VENT MGMT INPAT SUBQ DAY: CPT

## 2024-07-02 RX ADMIN — FINASTERIDE 5 MG: 5 TABLET, FILM COATED ORAL at 08:06

## 2024-07-02 RX ADMIN — LANSOPRAZOLE 30 MG: 30 TABLET, ORALLY DISINTEGRATING ORAL at 07:26

## 2024-07-02 RX ADMIN — SODIUM CHLORIDE 1000 ML: 9 INJECTION, SOLUTION INTRAVENOUS at 00:00

## 2024-07-02 RX ADMIN — SODIUM CHLORIDE 75 ML/HR: 9 INJECTION, SOLUTION INTRAVENOUS at 07:36

## 2024-07-02 RX ADMIN — LEVOTHYROXINE SODIUM 100 MCG: 0.1 TABLET ORAL at 07:25

## 2024-07-02 RX ADMIN — PROPOFOL 30 MCG/KG/MIN: 10 INJECTION, EMULSION INTRAVENOUS at 00:59

## 2024-07-02 RX ADMIN — Medication 1 TABLET: at 21:01

## 2024-07-02 RX ADMIN — LUBIPROSTONE 8 MCG: 8 CAPSULE, GELATIN COATED ORAL at 21:01

## 2024-07-02 RX ADMIN — Medication 10 ML: at 21:02

## 2024-07-02 RX ADMIN — POLYETHYLENE GLYCOL (3350) 17 G: 17 POWDER, FOR SOLUTION ORAL at 08:06

## 2024-07-02 RX ADMIN — ASPIRIN 81 MG: 81 TABLET, CHEWABLE ORAL at 08:06

## 2024-07-02 RX ADMIN — CHLORHEXIDINE GLUCONATE 15 ML: 1.2 RINSE ORAL at 08:06

## 2024-07-02 RX ADMIN — MORPHINE SULFATE 2 MG: 2 INJECTION, SOLUTION INTRAMUSCULAR; INTRAVENOUS at 21:01

## 2024-07-02 RX ADMIN — MINERAL SUPPLEMENT IRON 300 MG / 5 ML STRENGTH LIQUID 100 PER BOX UNFLAVORED 300 MG: at 21:01

## 2024-07-02 RX ADMIN — Medication 1 TABLET: at 08:06

## 2024-07-02 RX ADMIN — HEPARIN SODIUM 5000 UNITS: 5000 INJECTION INTRAVENOUS; SUBCUTANEOUS at 06:26

## 2024-07-02 RX ADMIN — Medication 10 ML: at 08:06

## 2024-07-02 RX ADMIN — MINERAL SUPPLEMENT IRON 300 MG / 5 ML STRENGTH LIQUID 100 PER BOX UNFLAVORED 300 MG: at 08:06

## 2024-07-02 RX ADMIN — HEPARIN SODIUM 5000 UNITS: 5000 INJECTION INTRAVENOUS; SUBCUTANEOUS at 14:24

## 2024-07-02 RX ADMIN — SODIUM CHLORIDE 75 ML/HR: 9 INJECTION, SOLUTION INTRAVENOUS at 21:02

## 2024-07-02 RX ADMIN — HEPARIN SODIUM 5000 UNITS: 5000 INJECTION INTRAVENOUS; SUBCUTANEOUS at 21:01

## 2024-07-02 NOTE — OP NOTE
OPERATIVE NOTE    Patient Name:  Joaquín Rogers  YOB: 1927  [id number]    Date of Surgery:  7/2/2024    PREOPERATIVE DIAGNOSIS: Large bowel obstruction secondary to obstructing rectal mass    POSTOPERATIVE DIAGNOSIS: Same      PROCEDURE PERFORMED:   Exploratory laparotomy  Creation of the loop descending colon colostomy    SURGEON: Dilma Yanez MD     Circulator: Birgit Velasquez RN  Scrub Person: Beth Barriga  Assistant: Terri La     Assistant: Terri La    SPECIMENS: None     EBL: 15     ANESTHESIA: General.      FINDINGS:   1.  Significantly dilated colon, no evidence of ischemia noted     INDICATIONS: The patient is a 89 y.o. male who presented to the emergency department with complaints of abdominal pain and obstipation.  A flex sig was done which showed a circumferential rectal mass with near obstruction, only liquid stool was able to pass through.  Risks and benefits were discussed and he elected to proceed with a diverting loop colostomy.    DESCRIPTION OF PROCEDURE:    After obtaining informed consent, the patient was taken to the operating room and placed in supine position. After appropriate DVT and antibiotic prophylaxis, general anesthesia was induced. TAP blocks were placed by the anesthesia staff bilaterally to aid in post-op pain control. The abdomen was prepped and draped in standard sterile fashion and timeout was performed.  I began with a midline laparotomy incision.  Upon entry into the abdomen, the small bowel was significantly dilated and self eviscerated.  I then located the descending colon which was also significantly dilated.  I felt the best option in the setting of his near completely obstructing rectal mass would be creation of a loop colostomy.  A circular skin incision was made over his left rectus muscle and an aperture was created through the left rectus.  A vessel loop was placed through the descending colon mesentery in order to bring  the colon out through the aperture.  A colostomy bar was then placed through this defect.  The abdomen was closed with interrupted 0 PDS sutures in a figure-of-eight fashion.  The skin was closed with staples.  I then transversely opened the descending colon and matured the colon to the abdominal wall using 3-0  Vicryl sutures.  An ostomy appliance was applied over the stoma.    This concluded the operation. At the end of the case, the instrument count was correct. The patient was awoken from anesthesia and transported to PACU for further monitoring.       COMPLICATIONS: Upon arrival to PACU, the patient required reintubation.    Dilma Yanez MD  7/2/2024  10:34 EDT

## 2024-07-02 NOTE — THERAPY EVALUATION
Acute Care - Speech Language Pathology   Swallow Initial Evaluation [facility]  CLINICAL DYSPHAGIA ASSESSMENT     Patient Name: Joaquín Rogers  : 1927  MRN: [id number]  Today's Date: 2024               Admit Date: 2024  Joaquín Rogers  was seen at bedside this pm on CCU-6 to assess safety/efficacy of swallowing fnx, determine safest/least restrictive diet tolerance. He has a PMH significant for BPH, hypothyroidism, GERD, inguinal hernias, pacemaker placement, and bradycardia.    He is unfamiliar to SLP department of [facility] prior to this consultation.     Mr Rogers presented to [facility] ED w/ abdominal pain and no BM for 3 days. In ED he was found to have colonic obstruction, NG tube was placed, and surgery was consulted. He underwent endoscopy for sigmoidoscopy on  and on  had surgery for bowel resection and ostomy creation. He ws intubated for procedure and remained intubated post procedure. He was extubated this am to nasal cannula.     He is currently NPO awaiting this evaluation.     Social History     Socioeconomic History    Marital status:    Tobacco Use    Smoking status: Former     Current packs/day: 0.00     Types: Cigarettes     Quit date:      Years since quittin.5     Passive exposure: Past    Smokeless tobacco: Former     Types: Chew     Quit date:    Vaping Use    Vaping status: Never Used   Substance and Sexual Activity    Alcohol use: Never    Drug use: Never    Sexual activity: Defer   Imaging:  XR CHEST 1 VW-     CLINICAL INDICATION: ET placement verification; K56.601-Complete  intestinal obstruction, unspecified as to cause; K56.609-Unspecified  intestinal obstruction, unspecified as to partial versus complete  obstruction; 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     FINDINGS:     LUNGS: Endotracheal tube overlies the tracheal air column well above the  ishaan.  Nasogastric tube is in the  stomach     HEART AND MEDIASTINUM: Heart and mediastinal contours are unremarkable     SKELETON: Bony and soft tissue structures are unremarkable.     IMPRESSION:  Endotracheal tube placement as above    This report was finalized on 7/1/2024 3:27 PM by Dr. Shilo Leary MD.  Labs:   Latest Reference Range & Units 06/30/24 00:59 07/01/24 01:16 07/01/24 15:47 07/02/24 00:21   WBC 3.40 - 10.80 10*3/mm3 7.25 6.05 10.72 9.31   RBC 4.14 - 5.80 10*6/mm3 4.48 4.18 4.39 3.84 (L)   Hemoglobin 13.0 - 17.7 g/dL 12.5 (L) 11.8 (L) 12.2 (L) 10.8 (L)   Hematocrit 37.5 - 51.0 % 40.0 37.2 (L) 38.6 34.9 (L)   Platelets 140 - 450 10*3/mm3 216 242 239 214   RDW 12.3 - 15.4 % 24.5 (H) 24.1 (H) 23.9 (H) 24.3 (H)   MCV 79.0 - 97.0 fL 89.3 89.0 87.9 90.9   MCH 26.6 - 33.0 pg 27.9 28.2 27.8 28.1   MCHC 31.5 - 35.7 g/dL 31.3 (L) 31.7 31.6 30.9 (L)   MPV 6.0 - 12.0 fL 9.7 9.6 9.2 9.5   RDW-SD 37.0 - 54.0 fl 74.8 (H) 73.8 (H) 72.3 (H) 76.0 (H)   (L): Data is abnormally low  (H): Data is abnormally high  Diet Orders (active) (From admission, onward)       Start     Ordered    07/02/24 1036  NPO Diet NPO Type: Strict NPO  Diet Effective Now        Comments: Should Remain in Effect Until a Complete SLP Evaluation Occurs & a Superceding Order is Received    07/02/24 1035                  Mr Rogers was observed on 2L nasal cannula w/o complications across this assessment. NG tube is present in place and set to suction.     Per brief period of time intubated (<24 hrs) he is felt to most benefit from subjective dysphagia assessment at this time.     He was positioned upright and centered in bed to accept multiple po presentations of ice chips, puree, and thin liquids via spoon, cup, and straw.  He was able to self provide po trials w/ noted generalized weakness for lifting cup.     Facial/oral structures were symmetrical upon observation. Lingual protrusion revealed no deviation. Oral mucosa were moist, pink, and clean. Secretions were clear, thin,  and well controlled. OROM/QUINTIN was generally weak overall but wfl to imitate oral postures. He is edentulous and reports no use of dentures at baseline. Gag is not assessed. Volitional cough was intact w/ adequate intensity, clear in quality, non-productive. Voice was slightly weak in intensity, mildly hoarse in quality w/ intelligible speech. He reports odynophagia at this time and says he keeps feeling like he needs to cough. He is evidenced w/ cough and use of suction to remove secretions prior to any po presentations.     Upon po presentations, adequate bolus anticipation and acceptance w/ good labial seal for bolus clearance via spoon bowl, cup rim stability and suction via straw. Bolus formation, manipulation and control were wfl for presented consistencies. A-p transit was timely w/o significant oral residue appreciated. No overt s/s aspiration before the swallow.      Pharyngeal swallow was slightly delayed w/ adequate hyolaryngeal elevation per palpation. Spontaneous double swallow is performed w/ all presentations. No overt s/s aspiration evidenced across this evaluation. No silent aspiration suspected.     Visit Dx:     ICD-10-CM ICD-9-CM   1. Complete intestinal obstruction, unspecified cause  K56.601 560.9   2. Intestinal obstruction, unspecified cause, unspecified whether partial or complete  K56.609 560.9   3. 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     Patient Active Problem List   Diagnosis    COVID-19    Acute hypoxemic respiratory failure due to COVID-19    Symptomatic bradycardia    Debility    Bowel obstruction    Indeterminate colitis     Past Medical History:   Diagnosis Date    Arthritis     Bradycardia     Enlarged prostate     Pacemaker     Thyroid disease      Past Surgical History:   Procedure Laterality Date    CARDIAC ELECTROPHYSIOLOGY PROCEDURE N/A 09/13/2021    Procedure: Pacemaker DC new;  Surgeon: Kendrick Burgess MD;  Location: [facility] INVASIVE LOCATION;  Service: Cardiology;   Laterality: N/A;    HERNIA REPAIR Right     left inguinal/right    INSERT / REPLACE / REMOVE PACEMAKER  09/13/2021    [facility] device    SIGMOIDOSCOPY N/A 6/30/2024    Procedure: FLEXIBLE SIGMOIDOSCOPY WITH BIOPSY;  Surgeon: Dilma Yanez MD;  Location: [facility];  Service: General;  Laterality: N/A;     Impression:     Mr Rogers presented w/ an overall generally weak but wfl oropharyngeal swallow w/o s/s concerning for aspiration evidenced across these po presentations. At this time, Mr Rogers is recommended for a po diet including thin liquids w/ diet to be initiated and advanced per MD.     SLP Recommendation and Plan     1. Diet to include thin liquids.    2. Medications whole/crushed in puree/thins per MD  3. Upright and centered for all po intake  4. SUNDAR precautions.  5. Oral care protocol.  6. Diet to be initiated/advanced per MD.    No further formal SLP f/u warranted/recommended at this time.    D/w patient results and recommendations w/ verbal agreement.    D/w RN results and recommendations w/ verbal agreement.    Thank you for allowing me to participate in the care of your patient-  Rubi Shea M.S., CCC-SLP        EDUCATION  The patient has been educated in the following areas:   Dysphagia (Swallowing Impairment) Oral Care/Hydration.          Time Calculation:          Rubi Shea MS CCC-SLP  7/2/2024

## 2024-07-02 NOTE — PLAN OF CARE
Problem: Communication Impairment (Mechanical Ventilation, Invasive)  Goal: Effective Communication  Outcome: Ongoing, Progressing     Problem: Inability to Wean (Mechanical Ventilation, Invasive)  Goal: Mechanical Ventilation Liberation  Outcome: Ongoing, Progressing   Goal Outcome Evaluation:            Pt is in a weaning trial now

## 2024-07-02 NOTE — PROGRESS NOTES
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   Incision clean and dry with dressing in place, dressing to be removed tomorrow.     Ostomy with stool production, quite edematous.     Appreciate medicine's assistance for management. Ostomy xiao in place and will to be removed after 3 days (Thursday 7/4).    Dilma Hermosillo MD       General Surgeon  [facility]

## 2024-07-02 NOTE — PLAN OF CARE
Problem: Hypertension Comorbidity  Goal: Blood Pressure in Desired Range  Outcome: Ongoing, Progressing  Intervention: Maintain Blood Pressure Management  Recent Flowsheet Documentation  Taken 7/2/2024 0200 by Patsy Newton RN  Medication Review/Management: medications reviewed  Taken 7/2/2024 0000 by Patsy Newton RN  Medication Review/Management: medications reviewed  Taken 7/1/2024 2200 by Patsy Newton RN  Medication Review/Management: medications reviewed  Taken 7/1/2024 2000 by Patsy Newton RN  Medication Review/Management: medications reviewed     Problem: Skin Injury Risk Increased  Goal: Skin Health and Integrity  Outcome: Ongoing, Progressing  Intervention: Optimize Skin Protection  Recent Flowsheet Documentation  Taken 7/2/2024 0200 by Patsy Newton RN  Pressure Reduction Techniques:   positioned off wounds   heels elevated off bed   pressure points protected  Head of Bed (HOB) Positioning: HOB at 30-45 degrees  Pressure Reduction Devices:   positioning supports utilized   heel offloading device utilized  Skin Protection: adhesive use limited  Taken 7/2/2024 0000 by Patsy Newton RN  Head of Bed (HOB) Positioning: HOB at 30-45 degrees  Taken 7/1/2024 2200 by Patsy Newton RN  Head of Bed (HOB) Positioning: HOB at 30-45 degrees  Taken 7/1/2024 2000 by Patsy Newton RN  Head of Bed (HOB) Positioning: HOB at 30-45 degrees     Problem: Adult Inpatient Plan of Care  Goal: Plan of Care Review  Outcome: Ongoing, Progressing  Goal: Patient-Specific Goal (Individualized)  Outcome: Ongoing, Progressing  Goal: Absence of Hospital-Acquired Illness or Injury  Outcome: Ongoing, Progressing  Intervention: Identify and Manage Fall Risk  Recent Flowsheet Documentation  Taken 7/2/2024 0200 by Patsy Newton RN  Safety Promotion/Fall Prevention: safety round/check completed  Taken 7/2/2024 0100 by Patsy Newton RN  Safety Promotion/Fall Prevention:  safety round/check completed  Taken 7/2/2024 0000 by Rosenbalm, Patsy, RN  Safety Promotion/Fall Prevention: safety round/check completed  Taken 7/1/2024 2300 by Patsy Newton RN  Safety Promotion/Fall Prevention: safety round/check completed  Taken 7/1/2024 2200 by Patsy Newton RN  Safety Promotion/Fall Prevention: safety round/check completed  Taken 7/1/2024 2100 by Patsy Newton RN  Safety Promotion/Fall Prevention: safety round/check completed  Taken 7/1/2024 2000 by Rosenbalm, Patsy, RN  Safety Promotion/Fall Prevention: safety round/check completed  Intervention: Prevent Skin Injury  Recent Flowsheet Documentation  Taken 7/2/2024 0200 by Patsy Newton RN  Body Position:   left   turned  Skin Protection: adhesive use limited  Taken 7/2/2024 0000 by Patsy Newton RN  Body Position:   right   turned  Taken 7/1/2024 2200 by Patsy Newton RN  Body Position:   left   turned  Taken 7/1/2024 2000 by Patsy Newton RN  Body Position:   right   turned  Intervention: Prevent and Manage VTE (Venous Thromboembolism) Risk  Recent Flowsheet Documentation  Taken 7/2/2024 0200 by Patsy Newton RN  Activity Management: bedrest  VTE Prevention/Management:   bilateral   sequential compression devices on  Taken 7/1/2024 2000 by Patsy Newton RN  Activity Management: bedrest  VTE Prevention/Management:   bilateral   sequential compression devices on  Goal: Optimal Comfort and Wellbeing  Outcome: Ongoing, Progressing  Intervention: Provide Person-Centered Care  Recent Flowsheet Documentation  Taken 7/2/2024 0200 by Patsy Newton RN  Trust Relationship/Rapport: care explained  Taken 7/1/2024 2000 by Patsy Newton RN  Trust Relationship/Rapport: care explained  Goal: Readiness for Transition of Care  Outcome: Ongoing, Progressing     Problem: Pain Acute  Goal: Acceptable Pain Control and Functional Ability  Outcome: Ongoing, Progressing  Intervention:  Prevent or Manage Pain  Recent Flowsheet Documentation  Taken 7/2/2024 0200 by Patsy Newton RN  Medication Review/Management: medications reviewed  Taken 7/2/2024 0000 by Patsy Newton RN  Medication Review/Management: medications reviewed  Taken 7/1/2024 2200 by Patsy Newton RN  Medication Review/Management: medications reviewed  Taken 7/1/2024 2000 by Patsy Newton RN  Medication Review/Management: medications reviewed  Intervention: Optimize Psychosocial Wellbeing  Recent Flowsheet Documentation  Taken 7/1/2024 2000 by Patsy Newton RN  Diversional Activities: television     Problem: Fluid Deficit (Intestinal Obstruction)  Goal: Fluid Balance  Outcome: Ongoing, Progressing     Problem: Infection (Intestinal Obstruction)  Goal: Absence of Infection Signs and Symptoms  Outcome: Ongoing, Progressing     Problem: Nausea and Vomiting (Intestinal Obstruction)  Goal: Nausea and Vomiting Relief  Outcome: Ongoing, Progressing  Intervention: Prevent and Manage Nausea and Vomiting  Recent Flowsheet Documentation  Taken 7/2/2024 0000 by Patsy Newton RN  Oral Care:   lip/mouth moisturizer applied   swabbed with antiseptic solution  Taken 7/1/2024 2000 by Patsy Newton RN  Oral Care:   lip/mouth moisturizer applied   swabbed with antiseptic solution     Problem: Pain (Intestinal Obstruction)  Goal: Acceptable Pain Control  Outcome: Ongoing, Progressing  Intervention: Monitor and Manage Pain  Recent Flowsheet Documentation  Taken 7/1/2024 2000 by Patsy Newton RN  Diversional Activities: television     Problem: Fall Injury Risk  Goal: Absence of Fall and Fall-Related Injury  Outcome: Ongoing, Progressing  Intervention: Identify and Manage Contributors  Recent Flowsheet Documentation  Taken 7/2/2024 0200 by Patsy Newton RN  Medication Review/Management: medications reviewed  Taken 7/2/2024 0000 by Patsy Newton RN  Medication Review/Management: medications  reviewed  Taken 7/1/2024 2200 by Patsy Newton RN  Medication Review/Management: medications reviewed  Taken 7/1/2024 2000 by Patsy Newton RN  Medication Review/Management: medications reviewed  Intervention: Promote Injury-Free Environment  Recent Flowsheet Documentation  Taken 7/2/2024 0200 by Patsy Newton RN  Safety Promotion/Fall Prevention: safety round/check completed  Taken 7/2/2024 0100 by Patsy Newton RN  Safety Promotion/Fall Prevention: safety round/check completed  Taken 7/2/2024 0000 by Rosenbalm, Patsy, RN  Safety Promotion/Fall Prevention: safety round/check completed  Taken 7/1/2024 2300 by Patsy Newton RN  Safety Promotion/Fall Prevention: safety round/check completed  Taken 7/1/2024 2200 by Patsy Newton RN  Safety Promotion/Fall Prevention: safety round/check completed  Taken 7/1/2024 2100 by Patsy Newton RN  Safety Promotion/Fall Prevention: safety round/check completed  Taken 7/1/2024 2000 by Rosenbalm, Patsy, RN  Safety Promotion/Fall Prevention: safety round/check completed     Problem: Communication Impairment (Mechanical Ventilation, Invasive)  Goal: Effective Communication  Outcome: Ongoing, Progressing     Problem: Device-Related Complication Risk (Mechanical Ventilation, Invasive)  Goal: Optimal Device Function  Outcome: Ongoing, Progressing     Problem: Inability to Wean (Mechanical Ventilation, Invasive)  Goal: Mechanical Ventilation Liberation  Outcome: Ongoing, Progressing  Intervention: Promote Extubation and Mechanical Ventilation Liberation  Recent Flowsheet Documentation  Taken 7/2/2024 0200 by Patsy Newton RN  Medication Review/Management: medications reviewed  Taken 7/2/2024 0000 by Patsy Newton RN  Medication Review/Management: medications reviewed  Taken 7/1/2024 2200 by Patsy Newton RN  Medication Review/Management: medications reviewed  Taken 7/1/2024 2000 by Patsy Newton  RN  Medication Review/Management: medications reviewed     Problem: Nutrition Impairment (Mechanical Ventilation, Invasive)  Goal: Optimal Nutrition Delivery  Outcome: Ongoing, Progressing     Problem: Skin and Tissue Injury (Mechanical Ventilation, Invasive)  Goal: Absence of Device-Related Skin and Tissue Injury  Outcome: Ongoing, Progressing  Intervention: Maintain Skin and Tissue Health  Recent Flowsheet Documentation  Taken 7/2/2024 0200 by Patsy Newton RN  Device Skin Pressure Protection:   skin-to-device areas padded   positioning supports utilized     Problem: Ventilator-Induced Lung Injury (Mechanical Ventilation, Invasive)  Goal: Absence of Ventilator-Induced Lung Injury  Outcome: Ongoing, Progressing  Intervention: Prevent Ventilator-Associated Pneumonia  Recent Flowsheet Documentation  Taken 7/2/2024 0200 by Patsy Newton RN  Head of Bed (HOB) Positioning: HOB at 30-45 degrees  VAP Prevention Bundle:   HOB elevation maintained   oral care regularly provided  VAP Prevention Contraindications: per provider order  VAP Prevention Measures: completed  Taken 7/2/2024 0000 by Patsy Newton RN  Head of Bed (HOB) Positioning: HOB at 30-45 degrees  Oral Care:   lip/mouth moisturizer applied   swabbed with antiseptic solution  Taken 7/1/2024 2200 by Patsy Newton RN  Head of Bed (HOB) Positioning: HOB at 30-45 degrees  Taken 7/1/2024 2000 by Patsy Newton RN  Head of Bed ([facility]) Positioning: HOB at 30-45 degrees  VAP Prevention Bundle:   HOB elevation maintained   oral care regularly provided  VAP Prevention Contraindications: per provider order  VAP Prevention Measures: completed  Oral Care:   lip/mouth moisturizer applied   swabbed with antiseptic solution   Goal Outcome Evaluation:

## 2024-07-02 NOTE — CONSULTS
Palliative Care Initial Consult     Attending Physician: Woody Lam MD  Referring Provider: Woody Lam MD    assistance with advance directives, assistance with clarification of goals of care, and psychosocial support  Code Status:   Code Status and Medical Interventions:   Ordered at: 06/29/24 9907     Medical Intervention Limits:    No intubation (DNI)     Code Status (Patient has no pulse and is not breathing):    No CPR (Do Not Attempt to Resuscitate)     Medical Interventions (Patient has pulse or is breathing):    Limited Support      Advanced Directives: Advance Directive Status: Patient does not have advance directive   Healthcare surrogate: NOK son Clark Rogers  Goals of Care: I was able to speak with Joaqíun after he was extubated this morning, as he was alert and oriented to person, place, time and why he was hospitalized. Joaquín confirmed what he had told Dr Lam prior to surgery, that he does not want to be resuscitated or to be intubated and placed on a ventilator. I was also able to speak with his son Clark to let him know the conversation I had with his father and he states he will respect his wish to be a Dnr/DNI.    HPI:  Joaquín Rogers is a 89 y.o. male admitted on 6/29/24 due to abdominal pain and was found to have a colonic obstruction in the ER. Joaquín has a medical history of BPH, hypothyroidism, constipation on home linzess and miralax, GERD, inguinal hernias, pacemaker who presents with abdominal pain and no BM for 3 days which is abnormal for patient. Surgery was consulted. On 6/30 Joaquín underwent endoscopy for sigmoidoscopy and on 7/1 went to surgery for bowel resection and ostomy creation. Patient was intubated for procedure and after procedure remained intubated. This am the plan is to attempt extubation. Palliative care was consulted to discuss GOC/ACP and support for pt and family.        ROS: Negative except as above in HPI.     Past Medical History:   Diagnosis Date     Arthritis     Bradycardia     Enlarged prostate     Pacemaker     Thyroid disease      Past Surgical History:   Procedure Laterality Date    CARDIAC ELECTROPHYSIOLOGY PROCEDURE N/A 2021    Procedure: Pacemaker DC new;  Surgeon: Kendrick Bugress MD;  Location: [facility] INVASIVE LOCATION;  Service: Cardiology;  Laterality: N/A;    HERNIA REPAIR Right     left inguinal/right    INSERT / REPLACE / REMOVE PACEMAKER  2021    [employer] device    SIGMOIDOSCOPY N/A 2024    Procedure: FLEXIBLE SIGMOIDOSCOPY WITH BIOPSY;  Surgeon: Dilma Yanez MD;  Location: [facility] OR;  Service: General;  Laterality: N/A;     Social History     Socioeconomic History    Marital status:    Tobacco Use    Smoking status: Former     Current packs/day: 0.00     Types: Cigarettes     Quit date:      Years since quittin.5     Passive exposure: Past    Smokeless tobacco: Former     Types: Chew     Quit date:    Vaping Use    Vaping status: Never Used   Substance and Sexual Activity    Alcohol use: Never    Drug use: Never    Sexual activity: Defer     History reviewed. No pertinent family history.    No Known Allergies    Current Facility-Administered Medications   Medication Dose Route Frequency Provider Last Rate Last Admin    artificial tears ophthalmic ointment   Both Eyes Q1H PRN Woody Lam MD        aspirin chewable tablet 81 mg  81 mg Per G Tube Daily Woody Lam MD   81 mg at 24 0806    sennosides-docusate (PERICOLACE) 8.6-50 MG per tablet 2 tablet  2 tablet Oral BID PRN Dilma Yanez MD        And    polyethylene glycol (MIRALAX) packet 17 g  17 g Oral Daily PRN Dilma Yanez MD        And    bisacodyl (DULCOLAX) EC tablet 5 mg  5 mg Oral Daily PRN Dilma Yanez MD        And    bisacodyl (DULCOLAX) suppository 10 mg  10 mg Rectal Daily PRN Dilma Yanez MD        Calcium Carb-Cholecalciferol 600-20 MG-MCG tablet 1 tablet  1 tablet Oral BID Woody Lam MD    1 tablet at 07/02/24 0806    Calcium Replacement - Follow Nurse / BPA Driven Protocol   Does not apply PRN Dilma Yanez MD        chlorhexidine (PERIDEX) 0.12 % solution 15 mL  15 mL Mouth/Throat Q12H Woody Lam MD   15 mL at 07/02/24 0806    fentaNYL 2500 mcg in 250 mL NS infusion   mcg/hr Intravenous Titrated DepaAubrey, DO   Stopped at 07/02/24 0739    Ferrous Sulfate 300 (60 Fe) MG/5ML solution 300 mg  300 mg Nasogastric BID Woody Lam MD   300 mg at 07/02/24 0806    finasteride (PROSCAR) tablet 5 mg  5 mg Oral Daily Woody Lam MD   5 mg at 07/02/24 0806    heparin (porcine) 5000 UNIT/ML injection 5,000 Units  5,000 Units Subcutaneous Q8H Dilma Yanez MD   5,000 Units at 07/02/24 0626    lansoprazole (PREVACID SOLUTAB) disintegrating tablet Tablet Delayed Release Dispersible 30 mg  30 mg Nasogastric QAM AC Woody Lam MD   30 mg at 07/02/24 0726    levothyroxine (SYNTHROID, LEVOTHROID) tablet 100 mcg  100 mcg Oral QAM AC Woody Lam MD   100 mcg at 07/02/24 0725    lubiprostone (AMITIZA) capsule 8 mcg  8 mcg Oral BID Woody Lam MD        Magnesium Standard Dose Replacement - Follow Nurse / BPA Driven Protocol   Does not apply PRN Dilma Yanez MD        morphine injection 2 mg  2 mg Intravenous Q3H PRN Dilma Yanez MD   2 mg at 06/30/24 1323    multivitamin (THERAGRAN) tablet 1 tablet  1 tablet Oral Daily Woody Lam MD   1 tablet at 07/02/24 0806    ondansetron (ZOFRAN) injection 4 mg  4 mg Intravenous Q6H PRN Woody Lam MD   4 mg at 06/30/24 1143    Phosphorus Replacement - Follow Nurse / BPA Driven Protocol   Does not apply PRN Dilma Yanez MD        polyethylene glycol (MIRALAX) packet 17 g  17 g Oral Daily Woody Lam MD   17 g at 07/02/24 0806    Potassium Replacement - Follow Nurse / BPA Driven Protocol   Does not apply PRN Dilma Yanez MD        propofol (DIPRIVAN) infusion 10 mg/mL 100 mL  5-50 mcg/kg/min  "Intravenous Titrated Aubrey Arvizu,    Stopped at 07/02/24 0738    sodium chloride 0.9 % flush 10 mL  10 mL Intravenous PRN Woody Lam MD        sodium chloride 0.9 % flush 10 mL  10 mL Intravenous Q12H Dilma Yanez MD   10 mL at 07/02/24 0806    sodium chloride 0.9 % flush 10 mL  10 mL Intravenous PRN Dilma Yanez MD        sodium chloride 0.9 % infusion 40 mL  40 mL Intravenous PRN Dilma Yanez MD        sodium chloride 0.9 % infusion  75 mL/hr Intravenous Continuous Woody Lam MD 75 mL/hr at 07/02/24 0736 75 mL/hr at 07/02/24 0736     fentanyl 10 mcg/mL,  mcg/hr, Last Rate: Stopped (07/02/24 0739)  propofol, 5-50 mcg/kg/min, Last Rate: Stopped (07/02/24 0738)  sodium chloride, 75 mL/hr, Last Rate: 75 mL/hr (07/02/24 0736)        artificial tears    senna-docusate sodium **AND** polyethylene glycol **AND** bisacodyl **AND** bisacodyl    Calcium Replacement - Follow Nurse / BPA Driven Protocol    Magnesium Standard Dose Replacement - Follow Nurse / BPA Driven Protocol    Morphine    ondansetron    Phosphorus Replacement - Follow Nurse / BPA Driven Protocol    Potassium Replacement - Follow Nurse / BPA Driven Protocol    [COMPLETED] Insert Peripheral IV **AND** sodium chloride    sodium chloride    sodium chloride    Current medication reviewed for route, type, dose and frequency and are current per MAR.    Palliative Performance Scale Score:     /67   Pulse 83   Temp 99.1 °F (37.3 °C) (Oral)   Resp 15   Ht 172.7 cm (67.99\")   Wt 50.3 kg (111 lb)   SpO2 94%   BMI 16.88 kg/m²     Intake/Output Summary (Last 24 hours) at 7/2/2024 1228  Last data filed at 7/2/2024 1200  Gross per 24 hour   Intake 3536.48 ml   Output 635 ml   Net 2901.48 ml       PE:  General Appearance:    Chronically ill appearing, elderly alert, cooperative, NAD   HEENT:    NC/AT, without obvious abnormality, EOMI, anicteric    Neck:   supple, trachea midline, no JVD   Lungs:     Unlabored " respirations, no wheezing rhonchi or rales noted    Heart:    RRR, normal S1 and S2, no M/R/G   Abdomen:     Soft, NT, ND, NABS    Extremities:   Moves all extremities, no edema   Pulses:   Pulses palpable and equal bilaterally   Skin:   Warm, dry   Neurologic:   A/Ox3, cooperative   Psych:   Calm, appropriate       Labs:   Results from last 7 days   Lab Units 07/02/24  0021   WBC 10*3/mm3 9.31   HEMOGLOBIN g/dL 10.8*   HEMATOCRIT % 34.9*   PLATELETS 10*3/mm3 214     Results from last 7 days   Lab Units 07/02/24  0021   SODIUM mmol/L 141   POTASSIUM mmol/L 4.0   CHLORIDE mmol/L 112*   CO2 mmol/L 17.5*   BUN mg/dL 20   CREATININE mg/dL 1.19   GLUCOSE mg/dL 129*   CALCIUM mg/dL 7.5*     Results from last 7 days   Lab Units 07/02/24  0021   SODIUM mmol/L 141   POTASSIUM mmol/L 4.0   CHLORIDE mmol/L 112*   CO2 mmol/L 17.5*   BUN mg/dL 20   CREATININE mg/dL 1.19   CALCIUM mg/dL 7.5*   BILIRUBIN mg/dL 0.3   ALK PHOS U/L 91   ALT (SGPT) U/L 9   AST (SGOT) U/L 21   GLUCOSE mg/dL 129*     Imaging Results (Last 72 Hours)       Procedure Component Value Units Date/Time    XR Chest 1 View [[id number]] Collected: 07/01/24 1527     Updated: 07/01/24 1529    Narrative:      XR CHEST 1 VW-     CLINICAL INDICATION: ET placement verification; K56.601-Complete  intestinal obstruction, unspecified as to cause; K56.609-Unspecified  intestinal obstruction, unspecified as to partial versus complete  obstruction; K52.3-Indeterminate colitis        COMPARISON: 6/29/2024     TECHNIQUE: Single frontal view of the chest.     FINDINGS:     LUNGS: Endotracheal tube overlies the tracheal air column well above the  ishaan.  Nasogastric tube is in the stomach     HEART AND MEDIASTINUM: Heart and mediastinal contours are unremarkable        SKELETON: Bony and soft tissue structures are unremarkable.             Impression:      Endotracheal tube placement as above           This report was finalized on 7/1/2024 3:27 PM by Dr. Shilo Leary MD.        XR Chest 1 View [[id number]] Collected: 06/29/24 2102     Updated: 06/29/24 2104    Narrative:      INDICATION: NG tube placement     TECHNIQUE: Frontal radiograph of the chest.     COMPARISON: None.       Impression:      FINDINGS/IMPRESSION:  Enteric tube in the stomach.        This report was finalized on 6/29/2024 9:02 PM by Alex Pallas, DO.       CT Abdomen Pelvis Without Contrast [[id number]] Collected: 06/29/24 1537     Updated: 06/29/24 1545    Narrative:      VERIFICATION OBSERVER NAME: Lisa Baltazar MD.     Technique: Axial images were obtained along with coronal and sagittal  reconstruction. DLP in mGycm reported in the EMR records. Dose lowering  technique: Automated exposure control with adjustment of the MA and/or  KV, use of iterative reconstruction. Data included in the medical  records.     HISTORY/COMPARISON/FINDINGS:     Comparison: 5/31/2024     HISTORY: Abdominal pain and distention     Findings:      Severe dilatation of small bowel consistent with high-grade small bowel  obstruction.  This is appears to be recurrence from prior study.  Liver and spleen pancreas appeared unremarkable.  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  No hydronephrosis.  Area of narrowing in the pelvis in the  distal sigmoid colon.  Findings suspicious for presence of LEFT  hemicolectomy.          Impression:      Severe small bowel dilatation consistent with recurrent obstruction.   Position zone appears to be in the pelvis.s           [id number]  Zip code [id number]                 This report was finalized on 6/29/2024 3:43 PM by Dr. Lisa Baltazar MD.               Diagnostics: Reviewed    A: Joaquín Rogers is a 89 y.o. male admitted on 6/29/24 due to abdominal pain and was found to have a colonic obstruction in the ER. Joaquín has a medical history of BPH, hypothyroidism, constipation on home linzess and miralax, GERD, inguinal hernias, pacemaker who presents with abdominal pain and no BM for 3 days which is abnormal for patient.  Surgery was consulted. On 6/30 Joaquín underwent endoscopy for sigmoidoscopy and on 7/1 went to surgery for bowel resection and ostomy creation. Patient was intubated for procedure and after procedure remained intubated. This am the plan is to attempt extubation. Palliative care was consulted to discuss GOC/ACP and support for pt and family.         P:  I was able to speak with Joaquín after he was extubated this morning, as he was alert and oriented to person, place, time and why he was hospitalized. Joaquín confirmed what he had told Dr Lam prior to surgery, that he does not want to be resuscitated or to be intubated and placed on a ventilator. I was also able to speak with his son Clark to let him know the conversation I had with his father and he states he will respect his wish to be a Dnr/DNI. I let Dr Lam know of this conversation.    We appreciate the consult and the opportunity to participate in Joaquín Rogers's care. We will continue to follow along. Please do not hesitate to contact us regarding further symptom management or goals of care needs, including after hours or on weekends via our on call provider at [phone].     Time: 55 minutes spent reviewing medical and medication records, assessing and examining patient, discussing with family, answering questions, providing some guidance about a plan and documentation of care, and coordinating care with other healthcare members, with > 50% time spent face to face.     Marija Wetzel, APRN    7/2/2024

## 2024-07-02 NOTE — PROGRESS NOTES
[facility] HOSPITALIST PROGRESS NOTE     Patient Identification:  Name:  Joaquín Rogers  Age:  89 y.o.  Sex:  male  :  1927  MRN:  [id number]  Visit Number:  [id number]  ROOM: [address]     Primary Care Provider:  Uday Roche DO    Length of stay in inpatient status:  3    Subjective     Chief Compliant:    Chief Complaint   Patient presents with    Abdominal Pain       History of Presenting Illness:    Patient remains intubated. He was off sedation following directions. I placed on pressure support while I was in the room and patient seemed to tolerate well.       ROS:  Otherwise 10 point ROS negative other than documented above in HPI.     Objective     Current Hospital Meds:aspirin, 81 mg, Per G Tube, Daily  Calcium Carb-Cholecalciferol, 1 tablet, Oral, BID  chlorhexidine, 15 mL, Mouth/Throat, Q12H  Ferrous Sulfate, 300 mg, Nasogastric, BID  finasteride, 5 mg, Oral, Daily  heparin (porcine), 5,000 Units, Subcutaneous, Q8H  lansoprazole, 30 mg, Nasogastric, QAM AC  levothyroxine, 100 mcg, Oral, QAM AC  lubiprostone, 8 mcg, Oral, BID  multivitamin, 1 tablet, Oral, Daily  polyethylene glycol, 17 g, Oral, Daily  sodium chloride, 10 mL, Intravenous, Q12H    fentanyl 10 mcg/mL,  mcg/hr, Last Rate: Stopped (24 0739)  propofol, 5-50 mcg/kg/min, Last Rate: Stopped (24 0738)  sodium chloride, 75 mL/hr, Last Rate: 75 mL/hr (24 0736)        Current Antimicrobial Therapy:  Anti-Infectives (From admission, onward)      None          Current Diuretic Therapy:  Diuretics (From admission, onward)      None          ----------------------------------------------------------------------------------------------------------------------  Vital Signs:  Temp:  [97 °F (36.1 °C)-99.1 °F (37.3 °C)] 99.1 °F (37.3 °C)  Heart Rate:  [67-96] 78  Resp:  [13-28] 15  BP: ()/(47-88) 128/65  FiO2 (%):  [35 %-100 %] 35 %  SpO2:  [86 %-100 %] 96 %  on  Flow (L/min):  [2-15] 15;   Device  (Oxygen Therapy): ventilator  Body mass index is 16.88 kg/m².    Wt Readings from Last 3 Encounters:   07/01/24 50.3 kg (111 lb)   05/31/24 68 kg (150 lb)   04/01/24 67.1 kg (148 lb)     Intake & Output (last 3 days)         06/29 0701  06/30 0700 06/30 0701  07/01 0700 07/01 0701 07/02 0700 07/02 0701 07/03 0700    P.O. 0 0 0     I.V. (mL/kg)  747.8 (11.2) 1150.9 (22.9) 1325.6 (26.4)    IV Piggyback 500  1000     Total Intake(mL/kg) 500 (7.5) 747.8 (11.2) 2150.9 (42.8) 1325.6 (26.4)    Urine (mL/kg/hr)  650 (0.4) 325 (0.3)     Emesis/NG output  300 10     Stool   200     Total Output  950 535     Net +500 -202.2 +1615.9 +1325.6            Urine Unmeasured Occurrence 2 x  1 x     Stool Unmeasured Occurrence   1 x           NPO Diet NPO Type: Strict NPO  ----------------------------------------------------------------------------------------------------------------------  Physical exam:  Constitutional:  Elderly appearing male on mechanical ventilation.    HENT:  Head:  Normocephalic and atraumatic.  Mouth:  Moist mucous membranes.    Eyes:  Conjunctivae and EOM are normal. No scleral icterus.    Neck:  Neck supple.  No JVD present.    Cardiovascular:  Normal rate, regular rhythm and normal heart sounds with no murmur.  Pulmonary/Chest:  No respiratory distress, no wheezes, no crackles, with normal breath sounds and good air movement.  Abdominal:  Soft.  Bowel sounds are normal.  No distension and no tenderness.   Musculoskeletal:  No edema, no tenderness, and no deformity.  No red or swollen joints anywhere.    Neurological:  Alert and oriented following directions. No focal deficits   Skin:  Skin is warm and dry. No rash noted. No pallor.   Peripheral vascular:  Pulses in all 4 extremities with no clubbing, no cyanosis, no edema.  ----------------------------------------------------------------------------------------------------------------------  Tele:   "  ----------------------------------------------------------------------------------------------------------------------  Results from last 7 days   Lab Units 07/02/24  0637 07/02/24  0021 07/01/24  2105 07/01/24  1834 07/01/24  1547 07/01/24  0116   LACTATE mmol/L 1.0 2.4* 2.4*   < > 2.3*  --    WBC 10*3/mm3  --  9.31  --   --  10.72 6.05   HEMOGLOBIN g/dL  --  10.8*  --   --  12.2* 11.8*   HEMATOCRIT %  --  34.9*  --   --  38.6 37.2*   MCV fL  --  90.9  --   --  87.9 89.0   MCHC g/dL  --  30.9*  --   --  31.6 31.7   PLATELETS 10*3/mm3  --  214  --   --  239 242    < > = values in this interval not displayed.     Results from last 7 days   Lab Units 07/02/24  0930   PH, ARTERIAL pH units 7.372   PO2 ART mm Hg 90.1   PCO2, ARTERIAL mm Hg 36.5   HCO3 ART mmol/L 21.2     Results from last 7 days   Lab Units 07/02/24  0021 07/01/24  1547 07/01/24  0116 06/30/24  1526 06/30/24  0059 06/29/24  1513   SODIUM mmol/L 141 143 144  --  139 141   POTASSIUM mmol/L 4.0 4.3 3.4*   < > 3.4* 3.3*   MAGNESIUM mg/dL 2.0  --   --   --  2.2 2.1   CHLORIDE mmol/L 112* 111* 110*  --  107 106   CO2 mmol/L 17.5* 20.3* 19.8*  --  19.7* 23.7   BUN mg/dL 20 19 18  --  17 16   CREATININE mg/dL 1.19 1.08 1.18  --  1.13 1.22   CALCIUM mg/dL 7.5* 7.9* 8.1*  --  8.4 8.4   GLUCOSE mg/dL 129* 151* 127*  --  137* 119*   ALBUMIN g/dL 2.4* 3.1* 3.1*  --   --  3.1*   BILIRUBIN mg/dL 0.3 0.4 0.3  --   --  0.5   ALK PHOS U/L 91 111 119*  --   --  121*   AST (SGOT) U/L 21 29 20  --   --  21   ALT (SGPT) U/L 9 9 10  --   --  11    < > = values in this interval not displayed.   Estimated Creatinine Clearance: 25.2 mL/min (by C-G formula based on SCr of 1.19 mg/dL).  No results found for: \"AMMONIA\"              Glucose   Date/Time Value Ref Range Status   07/01/2024 1503 128 70 - 130 mg/dL Final   07/01/2024 1137 108 70 - 130 mg/dL Final     Lab Results   Component Value Date    TSH 3.870 06/29/2024    FREET4 1.48 03/14/2023     No results found for: " "\"PREGTESTUR\", \"PREGSERUM\", \"HCG\", \"HCGQUANT\"  Pain Management Panel           No data to display              Brief Urine Lab Results  (Last result in the past 365 days)        Color   Clarity   Blood   Leuk Est   Nitrite   Protein   CREAT   Urine HCG        06/29/24 2219 Yellow   Cloudy   Large (3+)   Negative   Negative   30 mg/dL (1+)                 No results found for: \"BLOODCX\"  No results found for: \"URINECX\"  No results found for: \"WOUNDCX\"  No results found for: \"STOOLCX\"  No results found for: \"RESPCX\"  No results found for: \"AFBCX\"  Results from last 7 days   Lab Units 07/02/24  0637 07/02/24  0021 07/01/24  2105 07/01/24  1834 07/01/24  1547   LACTATE mmol/L 1.0 2.4* 2.4* 2.2* 2.3*       I have personally looked at the labs and they are summarized above.  ----------------------------------------------------------------------------------------------------------------------  Detailed radiology reports for the last 24 hours:    Imaging Results (Last 24 Hours)       Procedure Component Value Units Date/Time    XR Chest 1 View [[id number]] Collected: 07/01/24 1527     Updated: 07/01/24 1529    Narrative:      XR CHEST 1 VW-     CLINICAL INDICATION: ET placement verification; K56.601-Complete  intestinal obstruction, unspecified as to cause; K56.609-Unspecified  intestinal obstruction, unspecified as to partial versus complete  obstruction; K52.3-Indeterminate colitis        COMPARISON: 6/29/2024     TECHNIQUE: Single frontal view of the chest.     FINDINGS:     LUNGS: Endotracheal tube overlies the tracheal air column well above the  ishaan.  Nasogastric tube is in the stomach     HEART AND MEDIASTINUM: Heart and mediastinal contours are unremarkable        SKELETON: Bony and soft tissue structures are unremarkable.             Impression:      Endotracheal tube placement as above           This report was finalized on 7/1/2024 3:27 PM by Dr. Shilo Leary MD.             Assessment & Plan    #Postoperative " hypoxic hypercapnic respiratory failure   - ABG in PACU  - Patient's discomfort likely limiting tidal volumes contributing to hypercapnia and need for reintubation. No new findings on plain film of chest.   - Sedation with popofol and fentanyl gtts stopped this AM.   - Repeat ABG on pressure support 7.372/36/90. Patient extubated to NC.      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   - Pathology pending.   - Will monitor for refeeding syndrome as diet is advanced.      #Hypothyroidism   - TSH wnl. Continue home regimen.      #GERD  - PPI      #Hx of bradycardia w/ pacemaker placement   #Hx of inguinal hernias      Code status: DNR/DNI, confirmed post extubation by palliative care.      Dispo: Transfer to med/surg.     Woody Lam MD  [facility] Hospitalist  07/02/24  09:56 EDT

## 2024-07-02 NOTE — PLAN OF CARE
Goal Outcome Evaluation:                 NIV contraindicated due to recent abdominal surgery. PT not wearing BIPAP

## 2024-07-02 NOTE — CASE MANAGEMENT/SOCIAL WORK
Continued Stay Note  SANGEETA Blanton     Patient Name: Joaquín Rogers  MRN: [id number]  Today's Date: 7/2/2024    Admit Date: 6/29/2024     Discharge Plan       Row Name 07/02/24 1205       Plan    Plan Pt was extubated on this date. SS spoke with pt son at bedside who states he filed for emergency guardianship on this date. Son provide pt with guardianship documents and faxed STAT to [phone]. Son requested SS faxed [facility] to [facility] [place]. SS faxed paperwork to [phone]. SS to follow.               MARIAMA Steward

## 2024-07-02 NOTE — PLAN OF CARE
Goal Outcome Evaluation:           Progress: improving  Outcome Evaluation: Extubated, A&O, colostomy dark brown liquid stool, NG removed, tolerating clears, VSS.                               Problem: Hypertension Comorbidity  Goal: Blood Pressure in Desired Range  Outcome: Ongoing, Progressing     Problem: Skin Injury Risk Increased  Goal: Skin Health and Integrity  Outcome: Ongoing, Progressing     Problem: Adult Inpatient Plan of Care  Goal: Patient-Specific Goal (Individualized)  Outcome: Ongoing, Progressing     Problem: Adult Inpatient Plan of Care  Goal: Absence of Hospital-Acquired Illness or Injury  Outcome: Ongoing, Progressing     Problem: Adult Inpatient Plan of Care  Goal: Optimal Comfort and Wellbeing  Outcome: Ongoing, Progressing     Problem: Pain Acute  Goal: Acceptable Pain Control and Functional Ability  Outcome: Ongoing, Progressing     Problem: Infection (Intestinal Obstruction)  Goal: Absence of Infection Signs and Symptoms  Outcome: Ongoing, Progressing     Problem: Fluid Deficit (Intestinal Obstruction)  Goal: Fluid Balance  Outcome: Ongoing, Progressing     Problem: Nausea and Vomiting (Intestinal Obstruction)  Goal: Nausea and Vomiting Relief  Outcome: Ongoing, Progressing     Problem: Pain (Intestinal Obstruction)  Goal: Acceptable Pain Control  Outcome: Ongoing, Progressing     Problem: Fall Injury Risk  Goal: Absence of Fall and Fall-Related Injury  Outcome: Ongoing, Progressing     Problem: Communication Impairment (Mechanical Ventilation, Invasive)  Goal: Effective Communication  Outcome: Met     Problem: Device-Related Complication Risk (Mechanical Ventilation, Invasive)  Goal: Optimal Device Function  Outcome: Met     Problem: Inability to Wean (Mechanical Ventilation, Invasive)  Goal: Mechanical Ventilation Liberation  Outcome: Met     Problem: Nutrition Impairment (Mechanical Ventilation, Invasive)  Goal: Optimal Nutrition Delivery  Outcome: Met

## 2024-07-02 NOTE — PROGRESS NOTES
THC Physician - Brief Progress Note  PERMANENT  07/01/2024 23:53    [facility] CCU - [facility] - [place], [place] ([facility])    RASHMI MARROQUIN    Date of Service 07/01/2024 23:53    HPI/Events of Note Patient with elevated lactic acid of 2.4, prior was 2.2, ordered a fluid bolus, continue to trend  lactate      Interventions Intermediate-Other: mild lactic acidosis  Minor-Communication with other healthcare providers and/or family        Electronically Signed by: Lázaro Kern) on 07/01/2024 23:54

## 2024-07-02 NOTE — CONSULTS
Consults: Postoperative respiratory failure, status post exploratory laparotomy for bowel obstruction.  Required reintubation during immediate postoperative.    Patient Identification:  Name:  Joaquín Rogers  Age:  89 y.o.  Sex:  male  :  1927  MRN:  [id number]  Visit Number:  [id number]  Room number:  CC06/1C  Primary care provider:  Uday Roche DO    Chief Complaint:    Chief Complaint   Patient presents with    Abdominal Pain       History of Presenting Illness:  Patient is intubated and sedated.  Information obtained through nursing and physician communication and chart review.  Family members not at the bedside.    Patient is a 89-year-old male with past medical history significant for bradycardia arrhythmia, hypothyroidism, BPH, status post pacemaker placement.  He was admitted with 3 days of abdominal pain and no bowel movement.    Initial evaluation consistent with colonic obstruction.  NG tube placed.  Surgery consulted.  Patient ultimately required laparotomy.  Post laparotomy patient was extubated but because of worsening respiratory distress and CO2 retention, patient was reintubated.    Labs suggestive of lactic acidosis with respiratory acidosis.  Follow-up labs revealed normalization of lactate.  Blood gases trended favorably.    No ET tube secretion overnight.  Patient remains hemodynamically stable.  Oxygen titrated diet improved 50% with PEEP of 8.    Patient has been sedated.        Review of Systems:  Unable to be obtained.  Patient is on ventilator.      Past History:  History reviewed. No pertinent family history.  Past Medical History:   Diagnosis Date    Arthritis     Bradycardia     Enlarged prostate     Pacemaker     Thyroid disease      Past Surgical History:   Procedure Laterality Date    CARDIAC ELECTROPHYSIOLOGY PROCEDURE N/A 2021    Procedure: Pacemaker DC new;  Surgeon: Kendrick Burgess MD;  Location: [facility] INVASIVE LOCATION;  Service: Cardiology;   Laterality: N/A;    HERNIA REPAIR Right     left inguinal/right    INSERT / REPLACE / REMOVE PACEMAKER  2021    [employer] device    SIGMOIDOSCOPY N/A 2024    Procedure: FLEXIBLE SIGMOIDOSCOPY WITH BIOPSY;  Surgeon: Dilma Yanez MD;  Location: [facility];  Service: General;  Laterality: N/A;     Social History     Socioeconomic History    Marital status:    Tobacco Use    Smoking status: Former     Current packs/day: 0.00     Types: Cigarettes     Quit date:      Years since quittin.5     Passive exposure: Past    Smokeless tobacco: Former     Types: Chew     Quit date:    Vaping Use    Vaping status: Never Used   Substance and Sexual Activity    Alcohol use: Never    Drug use: Never    Sexual activity: Defer       Allergies:  Patient has no known allergies.    Prior to Admission Medications       Prescriptions Last Dose Informant Patient Reported? Taking?    aspirin 81 MG EC tablet Past Week Medication Bottle Yes Yes    Take 1 tablet by mouth Daily.    calcium carbonate-cholecalciferol 500-400 MG-UNIT tablet tablet Past Week Medication Bottle Yes Yes    Take 1 tablet by mouth 2 (Two) Times a Day.    carboxymethylcellulose (REFRESH PLUS) 0.5 % solution Past Week Medication Bottle Yes Yes    Administer 1 drop to both eyes 3 (Three) Times a Day As Needed for Dry Eyes.    ferrous sulfate 325 (65 FE) MG tablet Past Week Medication Bottle Yes Yes    Take 1 tablet by mouth 2 (Two) Times a Day.    finasteride (PROSCAR) 5 MG tablet Past Week Medication Bottle Yes Yes    Take 1 tablet by mouth Daily.    levothyroxine (SYNTHROID, LEVOTHROID) 100 MCG tablet Past Week Medication Bottle Yes Yes    Take 1 tablet by mouth Daily.    linaclotide (LINZESS) 72 MCG capsule capsule Past Week Medication Bottle Yes Yes    Take 1 capsule by mouth Every Morning Before Breakfast.    Multiple Vitamins-Minerals (ICAPS AREDS 2 PO) Past Week Medication Bottle Yes Yes    Take 1 tablet by mouth Daily.     omeprazole (priLOSEC) 40 MG capsule Past Week Medication Bottle Yes Yes    Take 1 capsule by mouth Daily.    polyethylene glycol (MIRALAX) 17 g packet Past Week Medication Bottle Yes Yes    Take 17 g by mouth Daily.    tamsulosin (FLOMAX) 0.4 MG capsule 24 hr capsule Past Week Medication Bottle Yes Yes    Take 1 capsule by mouth Daily.          Hospital Meds:  aspirin, 81 mg, Per G Tube, Daily  Calcium Carb-Cholecalciferol, 1 tablet, Oral, BID  chlorhexidine, 15 mL, Mouth/Throat, Q12H  Ferrous Sulfate, 300 mg, Nasogastric, BID  finasteride, 5 mg, Oral, Daily  heparin (porcine), 5,000 Units, Subcutaneous, Q8H  lansoprazole, 30 mg, Nasogastric, QAM AC  levothyroxine, 100 mcg, Oral, QAM AC  lubiprostone, 8 mcg, Oral, BID  multivitamin, 1 tablet, Oral, Daily  polyethylene glycol, 17 g, Oral, Daily  sodium chloride, 10 mL, Intravenous, Q12H      fentanyl 10 mcg/mL,  mcg/hr, Last Rate: Stopped (07/02/24 0739)  propofol, 5-50 mcg/kg/min, Last Rate: Stopped (07/02/24 0738)  sodium chloride, 75 mL/hr, Last Rate: 75 mL/hr (07/02/24 0736)      Current listed hospital scheduled medications may not yet reflect those currently placed in orders that are signed and held awaiting patient's arrival to floor.   ---------------------------------------------------------------------------------------------------------------------   Objective     Vital Signs:  Temp:  [97 °F (36.1 °C)-99.1 °F (37.3 °C)] 99.1 °F (37.3 °C)  Heart Rate:  [67-96] 69  Resp:  [13-28] 24  BP: ()/(47-88) 99/55  FiO2 (%):  [40 %-100 %] 50 %    Vent Settings          Resp Rate (Set): 24     FiO2 (%): 50 %  PEEP/CPAP (cm H2O): 8 cm H20    Minute Ventilation (L/min) (Obs): 11.3 L/min  Resp Rate (Observed) Vent: 24     I:E Ratio (Obs): 1:2    PIP Observed (cm H2O): 27 cm H2O           Peak airway pressure 24, plateau pressure 20.      Mean Arterial Pressure (Non-Invasive) for the past 24 hrs (Last 3 readings):   Noninvasive MAP (mmHg)   07/02/24 0700 73    07/02/24 0632 71   07/02/24 0615 68     SpO2:  [86 %-100 %] 98 %  on  Flow (L/min):  [2-15] 15;   Device (Oxygen Therapy): ventilator  Body mass index is 16.88 kg/m².    Wt Readings from Last 1 Encounters:   07/01/24 1600 50.3 kg (111 lb)   06/29/24 1801 67 kg (147 lb 11.3 oz)   06/29/24 1324 68 kg (150 lb)     Wt Readings from Last 3 Encounters:   07/01/24 50.3 kg (111 lb)   05/31/24 68 kg (150 lb)   04/01/24 67.1 kg (148 lb)           ---------------------------------------------------------------------------------------------------------------------   Physical Exam:   GENERAL APPEARANCE: Intubated and sedated.  Appears to be resting comfortably in bed.        HEAD: normocephalic.    EYES: PERRLA.    THROAT:     Neck: Supple.     CARDIAC: Normal S1 and S2. No S3, S4 or murmurs. Rhythm is regular.     LUNGS: Clear to auscultation and percussion without rales, rhonchi, wheezing or diminished breath sounds.    ABDOMEN: Sluggish bowel sound,, nondistended, nontender.  Surgical site looks dry.    EXTREMITIES: No significant deformity or joint abnormality. No edema. Peripheral pulses intact.    NEUROLOGICAL: Unable to assess due to sedation status.     PSYCHIATRIC: Unable to assess due to sedation status     ---------------------------------------------------------------------------------------------------------------------   EKG:    Please note that I have personally looked at the EKG from this admission, the comparison EKGs in the medical records, and the above is my interpretation of this admission's EKG; I await the final cardiology read.  ECG 12 Lead Pre-Op / Pre-Procedure   Final Result   Test Reason : Pre-Op / Pre-Procedure   Blood Pressure :   */*   mmHG   Vent. Rate :  86 BPM     Atrial Rate :  86 BPM      P-R Int : 158 ms          QRS Dur : 136 ms       QT Int : 402 ms       P-R-T Axes :  47 -77  49 degrees      QTc Int : 481 ms      Normal sinus rhythm   Left axis deviation   Nonspecific intraventricular  block   Abnormal ECG   When compared with ECG of 14-MAR-2023 10:37,   Nonspecific intraventricular block has replaced Right bundle branch block   Criteria for Septal infarct are no longer present   Confirmed by Amanda Rae ([id number]) on 6/30/2024 2:39:26 PM      Referred By:            Confirmed By: Amanda Rae      Telemetry Scan   Final Result      Telemetry Scan   Final Result      Telemetry Scan   Final Result          Telemetry:  Please note that I personally looked at the telemetry strips.  ---------------------------------------------------------------------------------------------------------------------   LABS:    CBC and coagulation:  Results from last 7 days   Lab Units 07/02/24  0637 07/02/24  0021 07/01/24  2105 07/01/24  1834 07/01/24  1547 07/01/24  0116   LACTATE mmol/L 1.0 2.4* 2.4* 2.2* 2.3*  --    WBC 10*3/mm3  --  9.31  --   --  10.72 6.05   HEMOGLOBIN g/dL  --  10.8*  --   --  12.2* 11.8*   HEMATOCRIT %  --  34.9*  --   --  38.6 37.2*   MCV fL  --  90.9  --   --  87.9 89.0   MCHC g/dL  --  30.9*  --   --  31.6 31.7   PLATELETS 10*3/mm3  --  214  --   --  239 242     Acid/base balance:  Results from last 7 days   Lab Units 07/01/24  1847 07/01/24  1540 07/01/24  1425   PH, ARTERIAL pH units 7.389 7.309* 7.241*   PO2 ART mm Hg 181.0* 212.0* 63.7*   PCO2, ARTERIAL mm Hg 32.8* 39.8 51.0*   HCO3 ART mmol/L 19.8* 20.0 21.9     Renal and electrolytes:  Results from last 7 days   Lab Units 07/02/24  0021 07/01/24  1547 07/01/24  0116 06/30/24  1526 06/30/24  0059 06/29/24  1513   SODIUM mmol/L 141 143 144  --  139 141   POTASSIUM mmol/L 4.0 4.3 3.4* 4.6 3.4* 3.3*   MAGNESIUM mg/dL 2.0  --   --   --  2.2 2.1   CHLORIDE mmol/L 112* 111* 110*  --  107 106   CO2 mmol/L 17.5* 20.3* 19.8*  --  19.7* 23.7   BUN mg/dL 20 19 18  --  17 16   CREATININE mg/dL 1.19 1.08 1.18  --  1.13 1.22   CALCIUM mg/dL 7.5* 7.9* 8.1*  --  8.4 8.4   GLUCOSE mg/dL 129* 151* 127*  --  137* 119*     Estimated  "Creatinine Clearance: 25.2 mL/min (by C-G formula based on SCr of 1.19 mg/dL).    Liver and pancreatic function:  Results from last 7 days   Lab Units 07/02/24  0021 07/01/24  1547 07/01/24  0116 06/29/24  1513   ALBUMIN g/dL 2.4* 3.1* 3.1* 3.1*   BILIRUBIN mg/dL 0.3 0.4 0.3 0.5   ALK PHOS U/L 91 111 119* 121*   AST (SGOT) U/L 21 29 20 21   ALT (SGPT) U/L 9 9 10 11   LIPASE U/L  --   --   --  23     Endocrine function:  Lab Results   Component Value Date    HGBA1C 5.80 (H) 03/14/2023     Point of care bedside glucose levels:  Results from last 7 days   Lab Units 07/01/24  1503 07/01/24  1137   GLUCOSE mg/dL 128 108     Lab Results   Component Value Date    TSH 3.870 06/29/2024    FREET4 1.48 03/14/2023     Cardiac:        Cultures:  Lab Results   Component Value Date    COLORU Yellow 06/29/2024    CLARITYU Cloudy (A) 06/29/2024    PHUR 5.5 06/29/2024    GLUCOSEU Negative 06/29/2024    KETONESU Trace (A) 06/29/2024    BLOODU Large (3+) (A) 06/29/2024    NITRITEU Negative 06/29/2024    LEUKOCYTESUR Negative 06/29/2024    BILIRUBINUR Negative 06/29/2024    UROBILINOGEN 0.2 E.U./dL 06/29/2024    RBCUA 21-50 (A) 06/29/2024    WBCUA None Seen 06/29/2024    BACTERIA Trace (A) 06/29/2024     Microbiology Results (last 10 days)       ** No results found for the last 240 hours. **            No results found for: \"PREGTESTUR\", \"PREGSERUM\", \"HCG\", \"HCGQUANT\"  Pain Management Panel           No data to display                I have personally looked at the labs and they are summarized above.  ---------------------------------------------------------------------------------------------------------------------   Detailed radiology reports for the last 24 hours:    Imaging Results (Last 24 Hours)       Procedure Component Value Units Date/Time    XR Chest 1 View [[id number]] Collected: 07/01/24 1527     Updated: 07/01/24 1529    Narrative:      XR CHEST 1 VW-     CLINICAL INDICATION: ET placement verification; " K56.601-Complete  intestinal obstruction, unspecified as to cause; K56.609-Unspecified  intestinal obstruction, unspecified as to partial versus complete  obstruction; K52.3-Indeterminate colitis        COMPARISON: 6/29/2024     TECHNIQUE: Single frontal view of the chest.     FINDINGS:     LUNGS: Endotracheal tube overlies the tracheal air column well above the  ishaan.  Nasogastric tube is in the stomach     HEART AND MEDIASTINUM: Heart and mediastinal contours are unremarkable        SKELETON: Bony and soft tissue structures are unremarkable.             Impression:      Endotracheal tube placement as above           This report was finalized on 7/1/2024 3:27 PM by Dr. Shilo Leary MD.             Final impressions for the last 30 days of radiology reports:    XR Chest 1 View    Result Date: 7/1/2024  Endotracheal tube placement as above    This report was finalized on 7/1/2024 3:27 PM by Dr. Shilo Leary MD.      XR Chest 1 View    Result Date: 6/29/2024  FINDINGS/IMPRESSION:  Enteric tube in the stomach.   This report was finalized on 6/29/2024 9:02 PM by Alex Pallas, DO.      CT Abdomen Pelvis Without Contrast    Result Date: 6/29/2024  Severe small bowel dilatation consistent with recurrent obstruction. Position zone appears to be in the pelvis.s    [id number] Zip code [id number]      This report was finalized on 6/29/2024 3:43 PM by Dr. Lisa Baltazar MD.     I have personally looked at the radiology images and read the final radiology report.  ----------------------------------------------------------------------------------------------------------------------  Assessment & Plan    #1 acute hypercapnic hypoxic respiratory failure, post laparotomy, required reintubation.  Postintubation x-ray chest showed no airspace disease, distended abdomen.  Also noted lactic acidosis.    I believe increased work of breathing due to lactic acidosis, hypothyroidism, debility, all contributed to postoperative respiratory  failure.  Gas exchange has improved overnight.  No significant ET tube secretion.    I have adjusted the ventilator and decrease the PEEP to 6 and FiO2 to 35%.  Will do SAT/SBT today.        Vent Settings          Resp Rate (Set): 24     FiO2 (%): 50 %  PEEP/CPAP (cm H2O): 8 cm H20    Minute Ventilation (L/min) (Obs): 11.3 L/min  Resp Rate (Observed) Vent: 24     I:E Ratio (Obs): 1:2    PIP Observed (cm H2O): 27 cm H2O           Vent setting adjusted as described above.        2.  Colonic obstruction, status post laparotomy.  Surgery on board.  Colostomy site looks dry.    3.  Hypothyroidism.    4.  Debility.    5: Keep head end elevated at 30 to 45 degree to avoid aspiration.  Usual precautions for   avoiding ventilator associated pneumonia.    Maintain hemoglobin above 7    Continue thyroxine, DVT and GI prophylaxis.    I have personally reviewed x-rays, labs, medication list.  Patient is critically ill with high risk of decompensation.    Critical care time spent 35 minutes.    Really thankful to Dr. Woody dolan for having me participate in the care of this patient        Андрей Reyes MD  [facility]ist  07/02/24  07:56 EDT

## 2024-07-03 LAB
ALBUMIN SERPL-MCNC: 2.7 G/DL (ref 3.5–5.2)
ALBUMIN/GLOB SERPL: 1.1 G/DL
ALP SERPL-CCNC: 112 U/L (ref 39–117)
ALT SERPL W P-5'-P-CCNC: 11 U/L (ref 1–41)
ANION GAP SERPL CALCULATED.3IONS-SCNC: 8.4 MMOL/L (ref 5–15)
ANISOCYTOSIS BLD QL: NORMAL
AST SERPL-CCNC: 22 U/L (ref 1–40)
BASOPHILS # BLD AUTO: 0.01 10*3/MM3 (ref 0–0.2)
BASOPHILS NFR BLD AUTO: 0.1 % (ref 0–1.5)
BILIRUB SERPL-MCNC: 0.5 MG/DL (ref 0–1.2)
BUN SERPL-MCNC: 16 MG/DL (ref 8–23)
BUN/CREAT SERPL: 14.5 (ref 7–25)
CALCIUM SPEC-SCNC: 7.9 MG/DL (ref 8.2–9.6)
CHLORIDE SERPL-SCNC: 110 MMOL/L (ref 98–107)
CO2 SERPL-SCNC: 21.6 MMOL/L (ref 22–29)
CREAT SERPL-MCNC: 1.1 MG/DL (ref 0.76–1.27)
DEPRECATED RDW RBC AUTO: 76 FL (ref 37–54)
EGFRCR SERPLBLD CKD-EPI 2021: 61.1 ML/MIN/1.73
EOSINOPHIL # BLD AUTO: 0.13 10*3/MM3 (ref 0–0.4)
EOSINOPHIL NFR BLD AUTO: 1.5 % (ref 0.3–6.2)
ERYTHROCYTE [DISTWIDTH] IN BLOOD BY AUTOMATED COUNT: 24.5 % (ref 12.3–15.4)
GLOBULIN UR ELPH-MCNC: 2.5 GM/DL
GLUCOSE SERPL-MCNC: 104 MG/DL (ref 65–99)
HCT VFR BLD AUTO: 36.8 % (ref 37.5–51)
HGB BLD-MCNC: 11.5 G/DL (ref 13–17.7)
HYPOCHROMIA BLD QL: NORMAL
IMM GRANULOCYTES # BLD AUTO: 0.06 10*3/MM3 (ref 0–0.05)
IMM GRANULOCYTES NFR BLD AUTO: 0.7 % (ref 0–0.5)
LYMPHOCYTES # BLD AUTO: 1.71 10*3/MM3 (ref 0.7–3.1)
LYMPHOCYTES NFR BLD AUTO: 19.3 % (ref 19.6–45.3)
MAGNESIUM SERPL-MCNC: 2 MG/DL (ref 1.7–2.3)
MCH RBC QN AUTO: 28 PG (ref 26.6–33)
MCHC RBC AUTO-ENTMCNC: 31.3 G/DL (ref 31.5–35.7)
MCV RBC AUTO: 89.8 FL (ref 79–97)
MONOCYTES # BLD AUTO: 0.79 10*3/MM3 (ref 0.1–0.9)
MONOCYTES NFR BLD AUTO: 8.9 % (ref 5–12)
NEUTROPHILS NFR BLD AUTO: 6.18 10*3/MM3 (ref 1.7–7)
NEUTROPHILS NFR BLD AUTO: 69.5 % (ref 42.7–76)
NRBC BLD AUTO-RTO: 0 /100 WBC (ref 0–0.2)
PHOSPHATE SERPL-MCNC: 1 MG/DL (ref 2.5–4.5)
PHOSPHATE SERPL-MCNC: 3 MG/DL (ref 2.5–4.5)
PLAT MORPH BLD: NORMAL
PLATELET # BLD AUTO: 228 10*3/MM3 (ref 140–450)
PMV BLD AUTO: 9.5 FL (ref 6–12)
POTASSIUM SERPL-SCNC: 3.8 MMOL/L (ref 3.5–5.2)
PROT SERPL-MCNC: 5.2 G/DL (ref 6–8.5)
QT INTERVAL: 392 MS
QTC INTERVAL: 471 MS
RBC # BLD AUTO: 4.1 10*6/MM3 (ref 4.14–5.8)
REF LAB TEST METHOD: NORMAL
SODIUM SERPL-SCNC: 140 MMOL/L (ref 136–145)
WBC NRBC COR # BLD AUTO: 8.88 10*3/MM3 (ref 3.4–10.8)

## 2024-07-03 PROCEDURE — 99024 POSTOP FOLLOW-UP VISIT: CPT | Performed by: SURGERY

## 2024-07-03 PROCEDURE — 25010000002 THIAMINE PER 100 MG: Performed by: INTERNAL MEDICINE

## 2024-07-03 PROCEDURE — 25810000003 SODIUM CHLORIDE 0.9 % SOLUTION

## 2024-07-03 PROCEDURE — 25810000003 SODIUM CHLORIDE 0.9 % SOLUTION: Performed by: INTERNAL MEDICINE

## 2024-07-03 PROCEDURE — 83735 ASSAY OF MAGNESIUM: CPT | Performed by: INTERNAL MEDICINE

## 2024-07-03 PROCEDURE — 85025 COMPLETE CBC W/AUTO DIFF WBC: CPT | Performed by: INTERNAL MEDICINE

## 2024-07-03 PROCEDURE — 94660 CPAP INITIATION&MGMT: CPT

## 2024-07-03 PROCEDURE — 94761 N-INVAS EAR/PLS OXIMETRY MLT: CPT

## 2024-07-03 PROCEDURE — 84100 ASSAY OF PHOSPHORUS: CPT | Performed by: INTERNAL MEDICINE

## 2024-07-03 PROCEDURE — 84100 ASSAY OF PHOSPHORUS: CPT

## 2024-07-03 PROCEDURE — 25010000002 HEPARIN (PORCINE) PER 1000 UNITS: Performed by: INTERNAL MEDICINE

## 2024-07-03 PROCEDURE — 94799 UNLISTED PULMONARY SVC/PX: CPT

## 2024-07-03 PROCEDURE — 80053 COMPREHEN METABOLIC PANEL: CPT | Performed by: INTERNAL MEDICINE

## 2024-07-03 PROCEDURE — 99232 SBSQ HOSP IP/OBS MODERATE 35: CPT | Performed by: INTERNAL MEDICINE

## 2024-07-03 PROCEDURE — 85007 BL SMEAR W/DIFF WBC COUNT: CPT | Performed by: INTERNAL MEDICINE

## 2024-07-03 PROCEDURE — 99233 SBSQ HOSP IP/OBS HIGH 50: CPT | Performed by: INTERNAL MEDICINE

## 2024-07-03 RX ORDER — LANSOPRAZOLE 30 MG/1
30 TABLET, ORALLY DISINTEGRATING, DELAYED RELEASE ORAL
Status: DISCONTINUED | OUTPATIENT
Start: 2024-07-04 | End: 2024-07-10 | Stop reason: HOSPADM

## 2024-07-03 RX ADMIN — Medication 1 TABLET: at 08:30

## 2024-07-03 RX ADMIN — SODIUM PHOSPHATE, MONOBASIC, MONOHYDRATE AND SODIUM PHOSPHATE, DIBASIC, ANHYDROUS 15 MMOL: 142; 276 INJECTION, SOLUTION INTRAVENOUS at 04:23

## 2024-07-03 RX ADMIN — LEVOTHYROXINE SODIUM 100 MCG: 0.1 TABLET ORAL at 08:30

## 2024-07-03 RX ADMIN — LANSOPRAZOLE 30 MG: 30 TABLET, ORALLY DISINTEGRATING ORAL at 08:30

## 2024-07-03 RX ADMIN — MINERAL SUPPLEMENT IRON 300 MG / 5 ML STRENGTH LIQUID 100 PER BOX UNFLAVORED 300 MG: at 21:24

## 2024-07-03 RX ADMIN — LUBIPROSTONE 8 MCG: 8 CAPSULE, GELATIN COATED ORAL at 08:30

## 2024-07-03 RX ADMIN — SODIUM PHOSPHATE, MONOBASIC, MONOHYDRATE AND SODIUM PHOSPHATE, DIBASIC, ANHYDROUS 15 MMOL: 142; 276 INJECTION, SOLUTION INTRAVENOUS at 14:12

## 2024-07-03 RX ADMIN — SODIUM PHOSPHATE, MONOBASIC, MONOHYDRATE AND SODIUM PHOSPHATE, DIBASIC, ANHYDROUS 15 MMOL: 142; 276 INJECTION, SOLUTION INTRAVENOUS at 08:27

## 2024-07-03 RX ADMIN — SODIUM CHLORIDE 75 ML/HR: 9 INJECTION, SOLUTION INTRAVENOUS at 14:24

## 2024-07-03 RX ADMIN — HEPARIN SODIUM 5000 UNITS: 5000 INJECTION INTRAVENOUS; SUBCUTANEOUS at 21:24

## 2024-07-03 RX ADMIN — LUBIPROSTONE 8 MCG: 8 CAPSULE, GELATIN COATED ORAL at 21:24

## 2024-07-03 RX ADMIN — Medication 10 ML: at 21:25

## 2024-07-03 RX ADMIN — HEPARIN SODIUM 5000 UNITS: 5000 INJECTION INTRAVENOUS; SUBCUTANEOUS at 05:17

## 2024-07-03 RX ADMIN — THIAMINE HYDROCHLORIDE 500 MG: 100 INJECTION, SOLUTION INTRAMUSCULAR; INTRAVENOUS at 22:15

## 2024-07-03 RX ADMIN — MINERAL SUPPLEMENT IRON 300 MG / 5 ML STRENGTH LIQUID 100 PER BOX UNFLAVORED 300 MG: at 08:30

## 2024-07-03 RX ADMIN — HEPARIN SODIUM 5000 UNITS: 5000 INJECTION INTRAVENOUS; SUBCUTANEOUS at 14:12

## 2024-07-03 RX ADMIN — Medication 1 TABLET: at 21:24

## 2024-07-03 RX ADMIN — POLYETHYLENE GLYCOL (3350) 17 G: 17 POWDER, FOR SOLUTION ORAL at 08:28

## 2024-07-03 RX ADMIN — ASPIRIN 81 MG: 81 TABLET, CHEWABLE ORAL at 08:30

## 2024-07-03 RX ADMIN — FINASTERIDE 5 MG: 5 TABLET, FILM COATED ORAL at 08:30

## 2024-07-03 NOTE — PLAN OF CARE
Goal Outcome Evaluation:  Plan of Care Reviewed With: patient        Progress: no change  Outcome Evaluation: Patient arrived from CCU. Pt has been resting in bed during shift. Pt colostomy leaking several times during shift, dark brown liquid stool. Midline incision dry and intact. VSS on 2L NC. Phosphorous replacement protocol. No concerns or complaints at this time. Will continue with plan of care.

## 2024-07-03 NOTE — PROGRESS NOTES
[facility] HOSPITALIST PROGRESS NOTE     Patient Identification:  Name:  Joaquín Rogers  Age:  89 y.o.  Sex:  male  :  1927  MRN:  [id number]  Visit Number:  [id number]  ROOM: [address] [place] [address]     Primary Care Provider:  Uday Roche DO     Date of Admission: 2024    Length of stay in inpatient status:  4    Subjective     Chief Compliant:    Chief Complaint   Patient presents with    Abdominal Pain     History of Presenting Illness:  Patient was laying in bed when I entered the room; the  had just left the room.  The patient states that he feels better; he does not have abdominal pain, nausea, vomiting.  He also denies chest pain and trouble breathing.    Objective     Current Hospital Meds:  aspirin, 81 mg, Per G Tube, Daily  Calcium Carb-Cholecalciferol, 1 tablet, Oral, BID  Ferrous Sulfate, 300 mg, Nasogastric, BID  finasteride, 5 mg, Oral, Daily  heparin (porcine), 5,000 Units, Subcutaneous, Q8H  [START ON 2024] lansoprazole, 30 mg, Oral, QAM AC  levothyroxine, 100 mcg, Oral, QAM AC  lubiprostone, 8 mcg, Oral, BID  multivitamin, 1 tablet, Oral, Daily  polyethylene glycol, 17 g, Oral, Daily  sodium chloride, 10 mL, Intravenous, Q12H  sodium phosphate, 15 mmol, Intravenous, Q3H    sodium chloride, 75 mL/hr, Last Rate: 75 mL/hr (24)      Current Antimicrobial Therapy:  Anti-Infectives (From admission, onward)      None          Current Diuretic Therapy:  Diuretics (From admission, onward)      None          ----------------------------------------------------------------------------------------------------------------------  Vital Signs:  Temp:  [97.7 °F (36.5 °C)-99 °F (37.2 °C)] 97.7 °F (36.5 °C)  Heart Rate:  [74-89] 74  Resp:  [15-18] 18  BP: (109-150)/(62-80) 124/62  SpO2:  [91 %-98 %] 93 %  on  Flow (L/min):  [2-3.5] 3.5;   Device (Oxygen Therapy): nasal cannula  Body mass index is 16.88 kg/m².    Wt Readings from Last 3 Encounters:   24 50.3 kg (111 lb)    05/31/24 68 kg (150 lb)   04/01/24 67.1 kg (148 lb)     Intake & Output (last 3 days)         06/30 0701 07/01 0700 07/01 0701 07/02 0700 07/02 0701 07/03 0700 07/03 0701 07/04 0700    P.O. 0 0 420     I.V. (mL/kg) 747.8 (11.2) 1150.9 (22.9) 2001.8 (39.8)     Other   180     IV Piggyback  1000      Total Intake(mL/kg) 747.8 (11.2) 2150.9 (42.8) 2601.8 (51.7)     Urine (mL/kg/hr) 650 (0.4) 325 (0.3) 1025 (0.8)     Emesis/NG output 300 10 350     Stool  200 1250     Total Output      Net -202.2 +1615.9 -23.2             Urine Unmeasured Occurrence  1 x      Stool Unmeasured Occurrence  1 x 1 x           Diet: Liquid; Clear Liquid; Fluid Consistency: Thin (IDDSI 0)  ----------------------------------------------------------------------------------------------------------------------  Physical Exam  Vitals and nursing note reviewed.   Constitutional:       General: He is awake. He is not in acute distress.     Appearance: He is well-developed. He is not ill-appearing, toxic-appearing or diaphoretic.      Comments: Appears chronically ill.   HENT:      Head: Normocephalic and atraumatic.   Cardiovascular:      Rate and Rhythm: Normal rate and regular rhythm.      Pulses: Normal pulses.      Heart sounds: No murmur heard.  Pulmonary:      Effort: Pulmonary effort is normal. No respiratory distress.      Breath sounds: No wheezing or rales.   Musculoskeletal:         General: No swelling or deformity.   Skin:     Capillary Refill: Capillary refill takes less than 2 seconds.      Coloration: Skin is not jaundiced or pale.   Neurological:      Mental Status: He is alert and oriented to person, place, and time. Mental status is at baseline.      Cranial Nerves: No cranial nerve deficit.   Psychiatric:         Mood and Affect: Mood normal.         Behavior: Behavior normal. Behavior is cooperative.         Thought Content: Thought content normal.         Judgment: Judgment normal.       ----------------------------------------------------------------------------------------------------------------------  Tele:  None  ----------------------------------------------------------------------------------------------------------------------  LABS:    Pending test results:    CBC and coagulation:  Results from last 7 days   Lab Units 07/03/24  0034 07/02/24  0637 07/02/24  0021 07/01/24  2105 07/01/24  1834 07/01/24  1547 07/01/24  0116 06/30/24  0059 06/29/24  1358   LACTATE mmol/L  --  1.0 2.4* 2.4* 2.2* 2.3*  --   --   --    WBC 10*3/mm3 8.88  --  9.31  --   --  10.72 6.05 7.25 5.65   HEMOGLOBIN g/dL 11.5*  --  10.8*  --   --  12.2* 11.8* 12.5* 12.4*   HEMATOCRIT % 36.8*  --  34.9*  --   --  38.6 37.2* 40.0 39.0   MCV fL 89.8  --  90.9  --   --  87.9 89.0 89.3 87.2   MCHC g/dL 31.3*  --  30.9*  --   --  31.6 31.7 31.3* 31.8   PLATELETS 10*3/mm3 228  --  214  --   --  239 242 216 203     Acid/base balance:  Results from last 7 days   Lab Units 07/02/24  0930 07/01/24  1847 07/01/24  1540 07/01/24  1425   PH, ARTERIAL pH units 7.372 7.389 7.309* 7.241*   PCO2, ARTERIAL mm Hg 36.5 32.8* 39.8 51.0*   PO2 ART mm Hg 90.1 181.0* 212.0* 63.7*   HCO3 ART mmol/L 21.2 19.8* 20.0 21.9     Renal and electrolytes:  Results from last 7 days   Lab Units 07/03/24  0034 07/02/24  0021 07/01/24  1547 07/01/24  0116 06/30/24  1526 06/30/24  0059 06/29/24  1513   SODIUM mmol/L 140 141 143 144  --  139 141   POTASSIUM mmol/L 3.8 4.0 4.3 3.4* 4.6 3.4* 3.3*   MAGNESIUM mg/dL 2.0 2.0  --   --   --  2.2 2.1   CHLORIDE mmol/L 110* 112* 111* 110*  --  107 106   CO2 mmol/L 21.6* 17.5* 20.3* 19.8*  --  19.7* 23.7   BUN mg/dL 16 20 19 18  --  17 16   CREATININE mg/dL 1.10 1.19 1.08 1.18  --  1.13 1.22   CALCIUM mg/dL 7.9* 7.5* 7.9* 8.1*  --  8.4 8.4   PHOSPHORUS mg/dL 1.0*  --   --   --   --   --   --    GLUCOSE mg/dL 104* 129* 151* 127*  --  137* 119*   ANION GAP mmol/L 8.4 11.5 11.7 14.2  --  12.3 11.3     Estimated Creatinine  Clearance: 27.3 mL/min (by C-G formula based on SCr of 1.1 mg/dL).    Liver and pancreatic function:  Results from last 7 days   Lab Units 07/03/24  0034 07/02/24  0021 07/01/24  1547 07/01/24  0116 06/29/24  1513   ALBUMIN g/dL 2.7* 2.4* 3.1* 3.1* 3.1*   BILIRUBIN mg/dL 0.5 0.3 0.4 0.3 0.5   ALK PHOS U/L 112 91 111 119* 121*   AST (SGOT) U/L 22 21 29 20 21   ALT (SGPT) U/L 11 9 9 10 11   LIPASE U/L  --   --   --   --  23     Endocrine function:  Lab Results   Component Value Date    HGBA1C 5.80 (H) 03/14/2023     Point of care bedside glucose levels:  Results from last 7 days   Lab Units 07/01/24  1503 07/01/24  1137   GLUCOSE mg/dL 128 108     Glucose levels from the CMP:  Results from last 7 days   Lab Units 07/03/24  0034 07/02/24  0021 07/01/24  1547 07/01/24  0116 06/30/24  0059 06/29/24  1513   GLUCOSE mg/dL 104* 129* 151* 127* 137* 119*     Lab Results   Component Value Date    TSH 3.870 06/29/2024    FREET4 1.48 03/14/2023     Cultures:  Lab Results   Component Value Date    COLORU Yellow 06/29/2024    CLARITYU Cloudy (A) 06/29/2024    PHUR 5.5 06/29/2024    GLUCOSEU Negative 06/29/2024    KETONESU Trace (A) 06/29/2024    BLOODU Large (3+) (A) 06/29/2024    NITRITEU Negative 06/29/2024    LEUKOCYTESUR Negative 06/29/2024    BILIRUBINUR Negative 06/29/2024    UROBILINOGEN 0.2 E.U./dL 06/29/2024    RBCUA 21-50 (A) 06/29/2024    WBCUA None Seen 06/29/2024    BACTERIA Trace (A) 06/29/2024       I have personally looked at the labs and they are summarized above.    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  Surgery has allowed the patient to advance his diet as tolerated but the patient prefers to be on clear liquid still.  If the patient desires, his diet can be advanced to a regular diet with thin liquids.  We will continue monitoring his electrolytes closely; please note that the phosphorus level did not improve today after supplementation.  Will give empiric high dose IV thiamine and obtain vitamin B12 and folate levels.  A multivitamin has already been ordered.  I have consulted PT and OT.  The [facility] has contacted the [facility] for [facility], as 100% service connected veterans can receive 8 hours of in home care per day everyday.  Please note that the blood pressures and glucose levels are at goal and stable at this time.  I have discontinued the IVF in an attempt to not decrease his thirst response.    VTE Prophylaxis:  Subcutaneous heparin      The patient is high risk due to the following diagnoses/reasons:  Rectal cancer causing a bowel blockage, advanced age    Disposition: Son desires to take the patient home    Neymar Crowe MD  [facility] ER[facility]  07/03/24  10:08 EDT

## 2024-07-03 NOTE — PLAN OF CARE
Goal Outcome Evaluation:  Plan of Care Reviewed With: patient        Progress: no change  Outcome Evaluation: Patient resting in bed at this time. A&O. VSS. No acute changes noted. Patient remains on clear liquid diet, tolerating well. Colostomy LLQ chnged x2 this shift for leakage. Wound care completed per order, midline incision left [place]. No other requests or complaints at this time. Will continue with POC.

## 2024-07-03 NOTE — PROGRESS NOTES
Diagnosis: large bowel obstruction secondary to rectal adenocarcinoma    POD 2 from loop descending colostomy creation    Tolerating clear liquid diet. Discussed advancement of diet appropriate but patient preferred to stick with a clear liquid diet for now.     OK to advance diet as tolerated.     Ostomy bar to be removed tomorrow.     Dilma Hermosillo MD       General Surgeon  [facility]

## 2024-07-03 NOTE — CASE MANAGEMENT/SOCIAL WORK
Discharge Planning Assessment   [place]     Patient Name: Joaquín Rogers  MRN: [id number]  Today's Date: 7/3/2024    Admit Date: 6/29/2024       Discharge Plan       Row Name 07/03/24 1610       Plan    Plan SS spoke with Pt's son Clark who has been appointed emergency guardian, paperwork has been placed in chartlet to be scanned into the medical record. Pt lives at home with spouse, adult son and adult grandson. Pt's son and grandson are pt and spouse's caregivers. Pt does not utilize  services at this time. Pt has (s) cane, walker and BSC via unknown provider. Pt is a . Pt's guardian has contacted [facility] to start process for [facility] home[facility]. Pt's Guardian has declined SNF placement, prefers for Pt to return home at discharge. SS to follow.                    HELGA MadridW

## 2024-07-03 NOTE — PROGRESS NOTES
Progress Note Pulmonary      Subjective patient reported mild cough, abdominal discomfort but feels better.  Patient was successfully extubated yesterday after weaning trial.        Interval History: As described above        Review of Systems:    Patient denies chest pain, orthopnea, PND, leg edema, nausea, vomiting, diarrhea, hemoptysis, hematemesis, melena, bright red blood per rectum.      Vital Signs  Temp:  [97.7 °F (36.5 °C)-99 °F (37.2 °C)] 98.7 °F (37.1 °C)  Heart Rate:  [71-89] 71  Resp:  [15-18] 16  BP: (109-150)/(62-80) 126/63  Body mass index is 16.88 kg/m².    Intake/Output Summary (Last 24 hours) at 7/3/2024 1036  Last data filed at 7/3/2024 0514  Gross per 24 hour   Intake 1216.25 ml   Output 2625 ml   Net -1408.75 ml     No intake/output data recorded.    Physical Exam:  General- normal in appearance, not in any acute distress    HEENT- pupils equally reactive to light, normal in size, no scleral icterus    Neck- supple    No JVD, no carotid bruit    Respiratory-bilateral air entry, shallow breathing, few right basal rales  Cardiovascular-  Normal S1 and S2. No S3, S4 or murmurs.    GI-surgical site looks clean.  Bowel sounds positive    CNS-alert oriented x2  grossly nonfocal    Extremities- pulses normal bilaterally , no clubbing and edema        Results Review:      Results from last 7 days   Lab Units 07/03/24  0034 07/02/24  0021 07/01/24  1547   WBC 10*3/mm3 8.88 9.31 10.72   HEMOGLOBIN g/dL 11.5* 10.8* 12.2*   PLATELETS 10*3/mm3 228 214 239     Results from last 7 days   Lab Units 07/03/24  0034 07/02/24  0021 07/01/24  1547 06/30/24  1526 06/30/24  0059   SODIUM mmol/L 140 141 143   < > 139   POTASSIUM mmol/L 3.8 4.0 4.3   < > 3.4*   CHLORIDE mmol/L 110* 112* 111*   < > 107   CO2 mmol/L 21.6* 17.5* 20.3*   < > 19.7*   BUN mg/dL 16 20 19   < > 17   CREATININE mg/dL 1.10 1.19 1.08   < > 1.13   CALCIUM mg/dL 7.9* 7.5* 7.9*   < > 8.4   GLUCOSE mg/dL 104* 129* 151*   < > 137*   MAGNESIUM mg/dL  2.0 2.0  --   --  2.2    < > = values in this interval not displayed.     Lab Results   Component Value Date    INR 1.10 03/14/2023    INR 1.00 09/12/2021    INR 1.01 09/10/2021    PROTIME 14.5 03/14/2023    PROTIME 13.6 09/12/2021    PROTIME 13.7 09/10/2021     Results from last 7 days   Lab Units 07/03/24  0034 07/02/24  0021 07/01/24  1547   ALK PHOS U/L 112 91 111   BILIRUBIN mg/dL 0.5 0.3 0.4   ALT (SGPT) U/L 11 9 9   AST (SGOT) U/L 22 21 29     Results from last 7 days   Lab Units 07/02/24  0930   PH, ARTERIAL pH units 7.372   PO2 ART mm Hg 90.1   PCO2, ARTERIAL mm Hg 36.5   HCO3 ART mmol/L 21.2     Imaging Results (Last 24 Hours)       ** No results found for the last 24 hours. **                 aspirin, 81 mg, Per G Tube, Daily  Calcium Carb-Cholecalciferol, 1 tablet, Oral, BID  Ferrous Sulfate, 300 mg, Nasogastric, BID  finasteride, 5 mg, Oral, Daily  heparin (porcine), 5,000 Units, Subcutaneous, Q8H  [START ON 7/4/2024] lansoprazole, 30 mg, Oral, QAM AC  levothyroxine, 100 mcg, Oral, QAM AC  lubiprostone, 8 mcg, Oral, BID  multivitamin, 1 tablet, Oral, Daily  polyethylene glycol, 17 g, Oral, Daily  sodium chloride, 10 mL, Intravenous, Q12H  sodium phosphate, 15 mmol, Intravenous, Q3H      sodium chloride, 75 mL/hr, Last Rate: 75 mL/hr (07/02/24 2102)        Medication Review:     Assessment & Plan     #1  Resolved acute hypercapnic hypoxic respiratory failure, post laparotomy, required reintubation.  Did exceptionally well after extubation.  Incentive spirometry every 6 hours.  Titrate oxygen to a saturation of 92%.    2.  Colonic obstruction, status post laparotomy.  Surgery on board.  Colostomy site looks dry.     3.  Hypothyroidism.     4.  Debility.         Maintain hemoglobin above 7     Continue thyroxine, DVT and GI prophylaxis.     I have personally reviewed x-rays, labs, medication list.    Total time spent 30 minutes      Андрей Reyes MD  07/03/24  10:36 EDT

## 2024-07-03 NOTE — PAYOR COMM NOTE
"CONTACT: MELISSA REY RN  UTILIZATION MANAGEMENT DEPT.  [facility]  [address] [id number]  PHONE: [phone]  FAX: [phone]        CLINICAL UPDATE     REF# [id number]       Rashmi Marroquin (89 y.o. Male)       Date of Birth   05/19/1927    Social Security Number       Address   [address] [address]    Home Phone   [phone]    N   [phone]       Mandaen   None    Marital Status                               Admission Date   6/29/24    Admission Type   Emergency    Admitting Provider   Woody Lam MD    Attending Provider   Neymar Crowe MD    Department, Room/Bed   [address]       Discharge Date       Discharge Disposition       Discharge Destination                                 Attending Provider: Neymar Crowe MD    Allergies: No Known Allergies    Isolation: None   Infection: None   Code Status: No CPR    Ht: 172.7 cm (67.99\")   Wt: 50.3 kg (111 lb)    Admission Cmt: None   Principal Problem: Bowel obstruction [K56.609]                   Active Insurance as of 6/29/2024       Primary Coverage       Payor Plan Insurance Group Employer/Plan Group    MEDICARE MEDICARE A & B        Payor Plan Address Payor Plan Phone Number Payor Plan Fax Number Effective Dates    [address] [phone]  5/1/1992 - None Entered    [facility] [id number]         Subscriber Name Subscriber Birth Date Member ID       RASHMI MARROQUIN 5/19/1927 [id number]               Secondary Coverage       Payor Plan Insurance Group Employer/Plan Group    [facility] DE[id number]        Payor Plan Address Payor Plan Phone Number Payor Plan Fax Number Effective Dates    [facility] OFFICE OF COMMUNITY CARE [phone]  1/1/2021 - None Entered    [address] [address]       [facility] [id number]         Subscriber Name Subscriber Birth Date Member ID       RASHMI MARROQUIN 5/19/1927 [id number]               Tertiary Coverage       Payor " Plan Insurance Group Employer/Plan Group    [facility] CCN OPTUM        Payor Plan Address Payor Plan Phone Number Payor Plan Fax Number Effective Dates    [address] [phone]  1/1/2023 - None Entered    [facility] [id number]         Subscriber Name Subscriber Birth Date Member ID       RASHMI MARROQUIN 5/19/1927 [id number]                     Emergency Contacts        (Rel.) Home Phone Work Phone Mobile Phone    MAYRA MARROQUIN (Spouse) [phone] -- [phone]    Clark Marroquin (Son) -- -- [phone]    Rustam Marroquin (Son) -- -- [phone]              Current Facility-Administered Medications   Medication Dose Route Frequency Provider Last Rate Last Admin    artificial tears ophthalmic ointment   Both Eyes Q1H PRN Woody Lam MD        aspirin chewable tablet 81 mg  81 mg Per G Tube Daily Woody Lam MD   81 mg at 07/03/24 0830    sennosides-docusate (PERICOLACE) 8.6-50 MG per tablet 2 tablet  2 tablet Oral BID PRN Woody Lam MD        And    polyethylene glycol (MIRALAX) packet 17 g  17 g Oral Daily PRN Woody Lam MD        And    bisacodyl (DULCOLAX) EC tablet 5 mg  5 mg Oral Daily PRN Woody Lam MD        And    bisacodyl (DULCOLAX) suppository 10 mg  10 mg Rectal Daily PRN Woody Lam MD        Calcium Carb-Cholecalciferol 600-20 MG-MCG tablet 1 tablet  1 tablet Oral BID Woody Lam MD   1 tablet at 07/03/24 0830    Calcium Replacement - Follow Nurse / BPA Driven Protocol   Does not apply PRN Woody Lam MD        Ferrous Sulfate 300 (60 Fe) MG/5ML solution 300 mg  300 mg Nasogastric BID Woody Lam MD   300 mg at 07/03/24 0830    finasteride (PROSCAR) tablet 5 mg  5 mg Oral Daily Woody Lam MD   5 mg at 07/03/24 0830    heparin (porcine) 5000 UNIT/ML injection 5,000 Units  5,000 Units Subcutaneous Q8H Woody Lam MD   5,000 Units at 07/03/24 0517    lansoprazole (PREVACID SOLUTAB) disintegrating tablet Tablet Delayed Release  Dispersible 30 mg  30 mg Nasogastric QAM Woody Khan MD   30 mg at 07/03/24 0830    levothyroxine (SYNTHROID, LEVOTHROID) tablet 100 mcg  100 mcg Oral QAM AC Woody Lam MD   100 mcg at 07/03/24 0830    lubiprostone (AMITIZA) capsule 8 mcg  8 mcg Oral BID Woody Lam MD   8 mcg at 07/03/24 0830    Magnesium Standard Dose Replacement - Follow Nurse / BPA Driven Protocol   Does not apply PRN Woody Lam MD        morphine injection 2 mg  2 mg Intravenous Q3H PRN Woody Lam MD   2 mg at 07/02/24 2101    multivitamin (THERAGRAN) tablet 1 tablet  1 tablet Oral Daily Woody Lam MD   1 tablet at 07/03/24 0830    ondansetron (ZOFRAN) injection 4 mg  4 mg Intravenous Q6H PRN Woody Lam MD   4 mg at 06/30/24 1143    Phosphorus Replacement - Follow Nurse / BPA Driven Protocol   Does not apply PRWoody Awad MD        polyethylene glycol (MIRALAX) packet 17 g  17 g Oral Daily Woody Lam MD   17 g at 07/03/24 0828    Potassium Replacement - Follow Nurse / BPA Driven Protocol   Does not apply PRWoody Awad MD        sodium chloride 0.9 % flush 10 mL  10 mL Intravenous PRN Woody Lam MD        sodium chloride 0.9 % flush 10 mL  10 mL Intravenous Q12H Woody Lam MD   10 mL at 07/02/24 2102    sodium chloride 0.9 % flush 10 mL  10 mL Intravenous PRN Woody Lam MD        sodium chloride 0.9 % infusion 40 mL  40 mL Intravenous PRN Woody Lam MD        sodium chloride 0.9 % infusion  75 mL/hr Intravenous Continuous Woody Lam MD 75 mL/hr at 07/02/24 2102 75 mL/hr at 07/02/24 2102    sodium phosphates 15 mmol in 250 mL 0.9% sodium chloride IVPB  15 mmol Intravenous Q3H Hiram Hou APRN   15 mmol at 07/03/24 0827     Orders (last 48 hrs)        Start     Ordered    07/03/24 1810  Phosphorus  Timed         07/03/24 0209    07/03/24 0800  Remove midline incision dressing, leave open to air.  Nursing Communication  Once        Comments: Remove midline incision  dressing, leave open to air.    07/02/24 1440    07/03/24 0600  CBC & Differential  Morning Draw         07/02/24 1012    07/03/24 0600  Comprehensive Metabolic Panel  Morning Draw         07/02/24 1012    07/03/24 0600  Magnesium  Morning Draw         07/02/24 1012    07/03/24 0600  Phosphorus  Morning Draw         07/02/24 1012    07/03/24 0600  CBC Auto Differential  PROCEDURE ONCE         07/02/24 2202    07/03/24 0300  sodium phosphates 15 mmol in 250 mL 0.9% sodium chloride IVPB  Every 3 Hours         07/03/24 0209    07/03/24 0115  Scan Slide  Once         07/03/24 0114    07/02/24 2216  ECG 12 Lead Rhythm Change  STAT         07/02/24 2215    07/02/24 1858  Transfer Patient  Once         07/02/24 1858    07/02/24 1537  Nasograstric Tube Discontinue  Once         07/02/24 1536    07/02/24 1536  Diet: Liquid; Clear Liquid; Fluid Consistency: Thin (IDDSI 0)  Diet Effective Now         07/02/24 1536    07/02/24 1428  Assess Stoma  Every Shift       07/02/24 1428    07/02/24 1428  Stoma Care - Change Appliance  Weekly         07/02/24 1428    07/02/24 1428  Empty Pouch When 1/3 Full  Per Order Details        Comments: When 1/3 Full    07/02/24 1428    07/02/24 1036  NPO Diet NPO Type: Strict NPO  Diet Effective Now,   Status:  Canceled        Comments: Should Remain in Effect Until a Complete SLP Evaluation Occurs & a Superceding Order is Received    07/02/24 1035    07/02/24 1036  SLP Consult: Eval & Treat RN Dysphagia Screen Failed  Once         07/02/24 1035    07/02/24 1015  SLP Consult: Eval & Treat Other; post extubation; pt intubated less than 24 hours  Once         07/02/24 1014    07/02/24 1010  Extubation  Once         07/02/24 1009    07/02/24 0934  Blood Gas, Arterial With Co-Ox  PROCEDURE ONCE         07/02/24 0930    07/02/24 0930  Blood Gas, Arterial -With Co-Ox Panel: Yes  Timed         07/02/24 0900    07/02/24 0859  Respiratory communication  Once        Comments: Get ABG 1 hour after SBT     "07/02/24 0859    07/02/24 0800  Insert Indwelling Urinary Catheter  Once        Comments: Follow Protocol As Outlined in Process Instructions (Open Order Report to View Full Instructions)   Placed in \"And\" Linked Group    07/02/24 0759    07/02/24 0800  Assess Need for Indwelling Urinary Catheter - Follow Removal Protocol  Continuous        Comments: Follow Protocol As Outlined in Process Instructions (Open Order Report to View Full Instructions)   Placed in \"And\" Linked Group    07/02/24 0759    07/02/24 0800  Urinary Catheter Care  Every Shift      Placed in \"And\" Linked Group    07/02/24 0759    07/02/24 0730  levothyroxine (SYNTHROID, LEVOTHROID) tablet 100 mcg  Every Morning Before Breakfast         07/01/24 1548    07/02/24 0730  lansoprazole (PREVACID SOLUTAB) disintegrating tablet Tablet Delayed Release Dispersible 30 mg  Every Morning Before Breakfast         07/01/24 1548    07/02/24 0729  Inpatient Palliative Care MD Consult  Once        Specialty:  Hospice and Palliative Medicine  Provider:  Marija Wetzel APRN    07/02/24 0729    07/02/24 0621  STAT Lactic Acid, Reflex  PROCEDURE ONCE         07/02/24 0115    07/02/24 0600  piperacillin-tazobactam (ZOSYN) injection 3.375 g  On Call to O.R.,   Status:  Discontinued         07/01/24 1141    07/02/24 0600  CBC & Differential  Morning Draw         07/01/24 1550    07/02/24 0600  Comprehensive Metabolic Panel  Morning Draw         07/01/24 1550    07/02/24 0600  CBC Auto Differential  PROCEDURE ONCE         07/01/24 2202    07/02/24 0313  Magnesium  STAT         07/02/24 0312    07/02/24 0200  Spontaneous Awakening Trial  Daily - SAT,   Status:  Canceled       07/01/24 1520    07/02/24 0200  Spontaneous Breathing Trial  Daily - SBT,   Status:  Canceled       07/01/24 1520    07/02/24 0104  Scan Slide  Once         07/02/24 0103    07/02/24 0030  sodium chloride 0.9 % bolus 1,000 mL  Once         07/01/24 2340    07/02/24 0005  STAT Lactic Acid, Reflex  " PROCEDURE ONCE         07/01/24 2155    07/01/24 2134  STAT Lactic Acid, Reflex  PROCEDURE ONCE         07/01/24 1915    07/01/24 2100  Calcium Carb-Cholecalciferol 600-20 MG-MCG tablet 1 tablet  2 Times Daily         07/01/24 1548    07/01/24 2100  lubiprostone (AMITIZA) capsule 8 mcg  2 Times Daily         07/01/24 1548    07/01/24 2100  chlorhexidine (PERIDEX) 0.12 % solution 15 mL  Every 12 Hours Scheduled,   Status:  Discontinued         07/01/24 1520    07/01/24 2100  Ferrous Sulfate 300 (60 Fe) MG/5ML solution 300 mg  2 Times Daily         07/01/24 1548    07/01/24 1851  Blood Gas, Arterial With Co-Ox  PROCEDURE ONCE         07/01/24 1847    07/01/24 1847  STAT Lactic Acid, Reflex  PROCEDURE ONCE         07/01/24 1640    07/01/24 1800  Blood Gas, Arterial -With Co-Ox Panel: Yes  Once         07/01/24 1550    07/01/24 1645  finasteride (PROSCAR) tablet 5 mg  Daily         07/01/24 1548    07/01/24 1645  multivitamin (THERAGRAN) tablet 1 tablet  Daily         07/01/24 1548    07/01/24 1645  polyethylene glycol (MIRALAX) packet 17 g  Daily         07/01/24 1548    07/01/24 1645  aspirin chewable tablet 81 mg  Daily         07/01/24 1548    07/01/24 1616  Catheter Care  Once         07/01/24 1615    07/01/24 1616  Nasogastric Tube Maintenance  Continuous,   Status:  Canceled        Comments: Continuous low wall suction    07/01/24 1615    07/01/24 1616  Wound Ostomy Eval & Treat  Once         07/01/24 1615    07/01/24 1600  Oral Care & Teeth Brushing - Intubated Patient  Every 4 Hours,   Status:  Canceled      Comments: [place] Teeth at Least 2x/day    07/01/24 1520    07/01/24 1548  artificial tears ophthalmic ointment  Every 1 Hour PRN         07/01/24 1548    07/01/24 1545  Lactic Acid, Plasma  STAT         07/01/24 1544    07/01/24 1545  CBC & Differential  STAT         07/01/24 1544    07/01/24 1545  Comprehensive Metabolic Panel  STAT         07/01/24 1544    07/01/24 1545  CBC Auto Differential  PROCEDURE  "ONCE         07/01/24 1544    07/01/24 1542  Blood Gas, Arterial With Co-Ox  PROCEDURE ONCE         07/01/24 1540    07/01/24 1524  Inpatient Pulmonology Consult  Once        Specialty:  Pulmonary Disease  Provider:  Андрей Reyes MD    07/01/24 1524    07/01/24 1522  Place Sequential Compression Device  Once         07/01/24 1521    07/01/24 1522  Maintain Sequential Compression Device  Continuous         07/01/24 1521    07/01/24 1519  Elevate HOB  Continuous         07/01/24 1520    07/01/24 1519  Subglottic Suctioning Must Be Done Every 6 Hours  Per Order Details,   Status:  Canceled        Comments: At Least Every 6 Hours    07/01/24 1520    07/01/24 1519  Target Arousal Level RASS 0 to -2  Continuous,   Status:  Canceled         07/01/24 1520    07/01/24 1519  NPO Diet NPO Type: Strict NPO  Diet Effective Now,   Status:  Canceled         07/01/24 1520    07/01/24 1519  Blood Gas, Arterial -Obtain on: Current Settings; With Co-Ox Panel: Yes  Once,   Status:  Canceled        Comments: One Hour After Intubation      07/01/24 1520    07/01/24 1519  RN May Place Order For ABG As Needed for Respiratory Distress  Continuous         07/01/24 1520    07/01/24 1510  POC Glucose Once  PROCEDURE ONCE        Comments: Complete no more than 45 minutes prior to patient eating      07/01/24 1503    07/01/24 1454  Ventilator - Vent Mode: AC/VC; Rate: Other; Rate: 24; FiO2: 100%; PEEP: 8; Tidal Volume: mL; TV: 420  Continuous         07/01/24 1453    07/01/24 1452  Ventilator - Vent Mode: AC/VC; Rate: Other; Rate: 24; FiO2: 40%; PEEP: 8; Tidal Volume: mL; TV: 420  Continuous,   Status:  Canceled         07/01/24 1452    07/01/24 1448  Propofol (DIPRIVAN) injection 50 mg  Once        Placed in \"And\" Linked Group    07/01/24 1446    07/01/24 1448  succinylcholine (ANECTINE) injection 100 mg  Once        Placed in \"And\" Linked Group    07/01/24 1446    07/01/24 1443  XR Chest 1 View  1 Time Imaging         07/01/24 1426    " 07/01/24 1430  fentaNYL 2500 mcg in 250 mL NS infusion  Titrated,   Status:  Discontinued         07/01/24 1428    07/01/24 1429  propofol (DIPRIVAN) infusion 10 mg/mL 100 mL  Titrated,   Status:  Discontinued         07/01/24 1428    07/01/24 1426  Blood Gas, Arterial With Co-Ox  PROCEDURE ONCE         07/01/24 1425    07/01/24 1426  XR Chest 2 View  1 Time Imaging,   Status:  Canceled         07/01/24 1426    07/01/24 1420  Blood Gas, Arterial -With Co-Ox Panel: Yes  STAT         07/01/24 1420    07/01/24 1417  Oxygen Therapy- Nasal Cannula; Titrate 1-6 LPM Per SpO2; 90 - 95%  Continuous,   Status:  Canceled         07/01/24 1416    07/01/24 1417  Continuous Pulse Oximetry  Continuous         07/01/24 1416    07/01/24 1417  Vital signs every 5 minutes for 15 minutes, every 15 minutes thereafter.  Once,   Status:  Canceled         07/01/24 1416    07/01/24 1417  Call Anesthesiologist for additional IV Fluid bolus for Hypotension/Tachycardia  Until Discontinued,   Status:  Canceled         07/01/24 1416    07/01/24 1417  Notify Anesthesia of Any Acute Changes in Patient Condition  Continuous,   Status:  Canceled        Comments: Open Order Report to View Parameters Requiring Provider Notification    07/01/24 1416    07/01/24 1417  Notify Anesthesia for Unrelieved Pain  Continuous,   Status:  Canceled        Comments: Open Order Report to View Parameters Requiring Provider Notification    07/01/24 1416    07/01/24 1417  Once DC criteria to floor met, follow surgeon's orders.  Until Discontinued,   Status:  Canceled         07/01/24 1416    07/01/24 1417  Discharge patient from PACU when discharge criteria is met.  Until Discontinued,   Status:  Canceled         07/01/24 1416    07/01/24 1416  lactated ringers infusion  Once As Needed,   Status:  Discontinued         07/01/24 1416    07/01/24 1416  oxyCODONE-acetaminophen (PERCOCET) 5-325 MG per tablet 1 tablet  Once As Needed,   Status:  Discontinued         07/01/24  1416    07/01/24 1416  fentaNYL citrate (PF) (SUBLIMAZE) injection 50 mcg  Every 5 Minutes PRN,   Status:  Discontinued         07/01/24 1416    07/01/24 1416  ondansetron (ZOFRAN) injection 4 mg  As Needed,   Status:  Discontinued         07/01/24 1416    07/01/24 1416  ipratropium-albuterol (DUO-NEB) nebulizer solution 3 mL  Once As Needed,   Status:  Discontinued         07/01/24 1416    07/01/24 1337  sodium chloride (NS) irrigation solution  As Needed,   Status:  Discontinued         07/01/24 1338    07/01/24 1245  piperacillin-tazobactam (ZOSYN) IVPB 3.375 g IVPB in 100 mL NS (VTB)  Once,   Status:  Discontinued         07/01/24 1148    07/01/24 1236  Potassium  Timed,   Status:  Canceled         07/01/24 0236    07/01/24 1204  Type & Screen  Once         07/01/24 1203    07/01/24 1145  POC Glucose Once  PROCEDURE ONCE        Comments: Complete no more than 45 minutes prior to patient eating      07/01/24 1137    06/30/24 1312  morphine injection 2 mg  Every 3 Hours PRN         06/30/24 1312    06/30/24 0800  Oral Care  2 Times Daily       06/29/24 1811 06/29/24 2200  heparin (porcine) 5000 UNIT/ML injection 5,000 Units  Every 8 Hours Scheduled         06/29/24 1811 06/29/24 2200  Incentive Spirometry  Every 4 Hours While Awake       06/29/24 1811 06/29/24 2100  sodium chloride 0.9 % flush 10 mL  Every 12 Hours Scheduled         06/29/24 1811 06/29/24 2000  Vital Signs  Every 4 Hours       06/29/24 1811 06/29/24 1812  Intake & Output  Every Shift       06/29/24 1811 06/29/24 1811  sodium chloride 0.9 % flush 10 mL  As Needed         06/29/24 1811 06/29/24 1811  sodium chloride 0.9 % infusion 40 mL  As Needed         06/29/24 1811 06/29/24 1811  Potassium Replacement - Follow Nurse / BPA Driven Protocol  As Needed         06/29/24 1811 06/29/24 1811  Magnesium Standard Dose Replacement - Follow Nurse / BPA Driven Protocol  As Needed         06/29/24 1811 06/29/24 1811  Phosphorus  "Replacement - Follow Nurse / BPA Driven Protocol  As Needed         06/29/24 1811 06/29/24 1811  Calcium Replacement - Follow Nurse / BPA Driven Protocol  As Needed         06/29/24 1811 06/29/24 1811  sennosides-docusate (PERICOLACE) 8.6-50 MG per tablet 2 tablet  2 Times Daily PRN        Placed in \"And\" Linked Group    06/29/24 1811    06/29/24 1811  polyethylene glycol (MIRALAX) packet 17 g  Daily PRN        Placed in \"And\" Linked Group    06/29/24 1811    06/29/24 1811  bisacodyl (DULCOLAX) EC tablet 5 mg  Daily PRN        Placed in \"And\" Linked Group    06/29/24 1811    06/29/24 1811  bisacodyl (DULCOLAX) suppository 10 mg  Daily PRN        Placed in \"And\" Linked Group    06/29/24 1811    06/29/24 1641  ondansetron (ZOFRAN) injection 4 mg  Every 6 Hours PRN         06/29/24 1641    06/29/24 1404  sodium chloride 0.9 % infusion  Continuous         06/29/24 1348    06/29/24 1348  sodium chloride 0.9 % flush 10 mL  As Needed        Placed in \"And\" Linked Group    06/29/24 1348    Unscheduled  Empty Pouch As Needed  As Needed       07/02/24 1428    Unscheduled  Change Pouch Immediately if Leaking  As Needed       07/02/24 1428    --  tamsulosin (FLOMAX) 0.4 MG capsule 24 hr capsule  Daily         07/01/24 1413    --  levothyroxine (SYNTHROID, LEVOTHROID) 100 MCG tablet  Daily         07/01/24 1415    --  Multiple Vitamins-Minerals (ICAPS AREDS 2 PO)  Daily         07/01/24 1415                     Physician Progress Notes (last 48 hours)        Dilma Yanez MD at 07/02/24 1110          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   Incision clean and dry with dressing in place, dressing to be removed tomorrow.     Ostomy with stool production, quite edematous.     Appreciate medicine's assistance for management. Ostomy xiao in place and will to be removed after 3 days (Thursday 7/4).    Dilma Hermosillo MD       General " Surgeon  [facility]      Electronically signed by Dilma Yanez MD at 24 1111       Woody Lam MD at 24 0955              [facility] HOSPITALIST PROGRESS NOTE     Patient Identification:  Name:  Joaquín Rogers  Age:  89 y.o.  Sex:  male  :  1927  MRN:  [id number]  Visit Number:  [id number]  ROOM: [address]     Primary Care Provider:  Uday Roche DO    Length of stay in inpatient status:  3    Subjective     Chief Compliant:    Chief Complaint   Patient presents with    Abdominal Pain       History of Presenting Illness:    Patient remains intubated. He was off sedation following directions. I placed on pressure support while I was in the room and patient seemed to tolerate well.       ROS:  Otherwise 10 point ROS negative other than documented above in HPI.     Objective     Current Hospital Meds:aspirin, 81 mg, Per G Tube, Daily  Calcium Carb-Cholecalciferol, 1 tablet, Oral, BID  chlorhexidine, 15 mL, Mouth/Throat, Q12H  Ferrous Sulfate, 300 mg, Nasogastric, BID  finasteride, 5 mg, Oral, Daily  heparin (porcine), 5,000 Units, Subcutaneous, Q8H  lansoprazole, 30 mg, Nasogastric, QAM AC  levothyroxine, 100 mcg, Oral, QAM AC  lubiprostone, 8 mcg, Oral, BID  multivitamin, 1 tablet, Oral, Daily  polyethylene glycol, 17 g, Oral, Daily  sodium chloride, 10 mL, Intravenous, Q12H    fentanyl 10 mcg/mL,  mcg/hr, Last Rate: Stopped (24)  propofol, 5-50 mcg/kg/min, Last Rate: Stopped (2438)  sodium chloride, 75 mL/hr, Last Rate: 75 mL/hr (2436)        Current Antimicrobial Therapy:  Anti-Infectives (From admission, onward)      None          Current Diuretic Therapy:  Diuretics (From admission, onward)      None          ----------------------------------------------------------------------------------------------------------------------  Vital Signs:  Temp:  [97 °F (36.1 °C)-99.1 °F (37.3 °C)] 99.1 °F (37.3 °C)  Heart Rate:  [67-96]  78  Resp:  [13-28] 15  BP: ()/(47-88) 128/65  FiO2 (%):  [35 %-100 %] 35 %  SpO2:  [86 %-100 %] 96 %  on  Flow (L/min):  [2-15] 15;   Device (Oxygen Therapy): ventilator  Body mass index is 16.88 kg/m².    Wt Readings from Last 3 Encounters:   07/01/24 50.3 kg (111 lb)   05/31/24 68 kg (150 lb)   04/01/24 67.1 kg (148 lb)     Intake & Output (last 3 days)         06/29 0701  06/30 0700 06/30 0701 07/01 0700 07/01 0701 07/02 0700 07/02 0701 07/03 0700    P.O. 0 0 0     I.V. (mL/kg)  747.8 (11.2) 1150.9 (22.9) 1325.6 (26.4)    IV Piggyback 500  1000     Total Intake(mL/kg) 500 (7.5) 747.8 (11.2) 2150.9 (42.8) 1325.6 (26.4)    Urine (mL/kg/hr)  650 (0.4) 325 (0.3)     Emesis/NG output  300 10     Stool   200     Total Output  950 535     Net +500 -202.2 +1615.9 +1325.6            Urine Unmeasured Occurrence 2 x  1 x     Stool Unmeasured Occurrence   1 x           NPO Diet NPO Type: Strict NPO  ----------------------------------------------------------------------------------------------------------------------  Physical exam:  Constitutional:  Elderly appearing male on mechanical ventilation.    HENT:  Head:  Normocephalic and atraumatic.  Mouth:  Moist mucous membranes.    Eyes:  Conjunctivae and EOM are normal. No scleral icterus.    Neck:  Neck supple.  No JVD present.    Cardiovascular:  Normal rate, regular rhythm and normal heart sounds with no murmur.  Pulmonary/Chest:  No respiratory distress, no wheezes, no crackles, with normal breath sounds and good air movement.  Abdominal:  Soft.  Bowel sounds are normal.  No distension and no tenderness.   Musculoskeletal:  No edema, no tenderness, and no deformity.  No red or swollen joints anywhere.    Neurological:  Alert and oriented following directions. No focal deficits   Skin:  Skin is warm and dry. No rash noted. No pallor.   Peripheral vascular:  Pulses in all 4 extremities with no clubbing, no cyanosis, no  "edema.  ----------------------------------------------------------------------------------------------------------------------  Tele:    ----------------------------------------------------------------------------------------------------------------------  Results from last 7 days   Lab Units 07/02/24  0637 07/02/24 0021 07/01/24 2105 07/01/24 1834 07/01/24 1547 07/01/24 0116   LACTATE mmol/L 1.0 2.4* 2.4*   < > 2.3*  --    WBC 10*3/mm3  --  9.31  --   --  10.72 6.05   HEMOGLOBIN g/dL  --  10.8*  --   --  12.2* 11.8*   HEMATOCRIT %  --  34.9*  --   --  38.6 37.2*   MCV fL  --  90.9  --   --  87.9 89.0   MCHC g/dL  --  30.9*  --   --  31.6 31.7   PLATELETS 10*3/mm3  --  214  --   --  239 242    < > = values in this interval not displayed.     Results from last 7 days   Lab Units 07/02/24  0930   PH, ARTERIAL pH units 7.372   PO2 ART mm Hg 90.1   PCO2, ARTERIAL mm Hg 36.5   HCO3 ART mmol/L 21.2     Results from last 7 days   Lab Units 07/02/24 0021 07/01/24 1547 07/01/24  0116 06/30/24  1526 06/30/24  0059 06/29/24  1513   SODIUM mmol/L 141 143 144  --  139 141   POTASSIUM mmol/L 4.0 4.3 3.4*   < > 3.4* 3.3*   MAGNESIUM mg/dL 2.0  --   --   --  2.2 2.1   CHLORIDE mmol/L 112* 111* 110*  --  107 106   CO2 mmol/L 17.5* 20.3* 19.8*  --  19.7* 23.7   BUN mg/dL 20 19 18  --  17 16   CREATININE mg/dL 1.19 1.08 1.18  --  1.13 1.22   CALCIUM mg/dL 7.5* 7.9* 8.1*  --  8.4 8.4   GLUCOSE mg/dL 129* 151* 127*  --  137* 119*   ALBUMIN g/dL 2.4* 3.1* 3.1*  --   --  3.1*   BILIRUBIN mg/dL 0.3 0.4 0.3  --   --  0.5   ALK PHOS U/L 91 111 119*  --   --  121*   AST (SGOT) U/L 21 29 20  --   --  21   ALT (SGPT) U/L 9 9 10  --   --  11    < > = values in this interval not displayed.   Estimated Creatinine Clearance: 25.2 mL/min (by C-G formula based on SCr of 1.19 mg/dL).  No results found for: \"AMMONIA\"              Glucose   Date/Time Value Ref Range Status   07/01/2024 1503 128 70 - 130 mg/dL Final   07/01/2024 1137 108 70 - 130 " "mg/dL Final     Lab Results   Component Value Date    TSH 3.870 06/29/2024    FREET4 1.48 03/14/2023     No results found for: \"PREGTESTUR\", \"PREGSERUM\", \"HCG\", \"HCGQUANT\"  Pain Management Panel           No data to display              Brief Urine Lab Results  (Last result in the past 365 days)        Color   Clarity   Blood   Leuk Est   Nitrite   Protein   CREAT   Urine HCG        06/29/24 2219 Yellow   Cloudy   Large (3+)   Negative   Negative   30 mg/dL (1+)                 No results found for: \"BLOODCX\"  No results found for: \"URINECX\"  No results found for: \"WOUNDCX\"  No results found for: \"STOOLCX\"  No results found for: \"RESPCX\"  No results found for: \"AFBCX\"  Results from last 7 days   Lab Units 07/02/24  0637 07/02/24  0021 07/01/24  2105 07/01/24  1834 07/01/24  1547   LACTATE mmol/L 1.0 2.4* 2.4* 2.2* 2.3*       I have personally looked at the labs and they are summarized above.  ----------------------------------------------------------------------------------------------------------------------  Detailed radiology reports for the last 24 hours:    Imaging Results (Last 24 Hours)       Procedure Component Value Units Date/Time    XR Chest 1 View [[id number]] Collected: 07/01/24 1527     Updated: 07/01/24 1529    Narrative:      XR CHEST 1 VW-     CLINICAL INDICATION: ET placement verification; K56.601-Complete  intestinal obstruction, unspecified as to cause; K56.609-Unspecified  intestinal obstruction, unspecified as to partial versus complete  obstruction; K52.3-Indeterminate colitis        COMPARISON: 6/29/2024     TECHNIQUE: Single frontal view of the chest.     FINDINGS:     LUNGS: Endotracheal tube overlies the tracheal air column well above the  ishaan.  Nasogastric tube is in the stomach     HEART AND MEDIASTINUM: Heart and mediastinal contours are unremarkable        SKELETON: Bony and soft tissue structures are unremarkable.             Impression:      Endotracheal tube placement as above   "         This report was finalized on 7/1/2024 3:27 PM by Dr. Shilo Leary MD.             Assessment & Plan    #Postoperative hypoxic hypercapnic respiratory failure   - ABG in PACU  - Patient's discomfort likely limiting tidal volumes contributing to hypercapnia and need for reintubation. No new findings on plain film of chest.   - Sedation with popofol and fentanyl gtts stopped this AM.   - Repeat ABG on pressure support 7.372/36/90. Patient extubated to NC.      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   - Pathology pending.   - Will monitor for refeeding syndrome as diet is advanced.      #Hypothyroidism   - TSH wnl. Continue home regimen.      #GERD  - PPI      #Hx of bradycardia w/ pacemaker placement   #Hx of inguinal hernias      Code status: DNR/DNI, confirmed post extubation by palliative care.      Dispo: Transfer to med/surg.     Woody Lam MD  [facility]  07/02/24  09:56 EDT    Electronically signed by Woody Lam MD at 07/02/24 1011       Progress Notes signed by Lázaro Kern MD at 07/01/24 2354           [facility] Physician - Brief Progress Note  PERMANENT  07/01/2024 23:53    [facility] - U - 10 - [place], [place] ([facility])    RASHMI MARROQUIN    Date of Service 07/01/2024 23:53    HPI/Events of Note Patient with elevated lactic acid of 2.4, prior was 2.2, ordered a fluid bolus, continue to trend  lactate      Interventions Intermediate-Other: mild lactic acidosis  Minor-Communication with other healthcare providers and/or family        Electronically Signed by: Lázaro Kern) on 07/01/2024 23:54    Electronically signed by Lázaro Kern MD at 07/01/24 3703       Woody Lam MD at 07/01/24 1530              [facility]     Patient Identification:  Name:  Rashmi Marroquin  Age:  89  y.o.  Sex:  male  :  1927  MRN:  [id number]  Visit Number:  [id number]  ROOM: [address]     Primary Care Provider:  Uday Roche DO    Length of stay in inpatient status:  2    Subjective     Chief Compliant:    Chief Complaint   Patient presents with    Abdominal Pain       History of Presenting Illness:    Patient seen preoperatively. Still with significant discomfort in his abdomen. Reported feeling about the same as he awaited surgery.     Patient seen postoperatively as he was being re-intubated. Patient with worsening lethargy and ABG revealed hypercapnia.     Discussed postoperative respiratory failure with patient's family. They were agreeable with plan. Patient's son noted he was trying to get medical guardianship of patient and had already started the process.     ROS:  Otherwise 10 point ROS negative other than documented above in HPI.     Objective     Current Hospital Meds:chlorhexidine, 15 mL, Mouth/Throat, Q12H  [Transfer Hold] heparin (porcine), 5,000 Units, Subcutaneous, Q8H  [Transfer Hold] sodium chloride, 10 mL, Intravenous, Q12H    fentanyl 10 mcg/mL,  mcg/hr, Last Rate: 50 mcg/hr (24 1504)  propofol, 5-50 mcg/kg/min, Last Rate: 5 mcg/kg/min (24 1503)  sodium chloride, 75 mL/hr, Last Rate: 75 mL/hr (24 0602)        Current Antimicrobial Therapy:  Anti-Infectives (From admission, onward)      None          Current Diuretic Therapy:  Diuretics (From admission, onward)      None          ----------------------------------------------------------------------------------------------------------------------  Vital Signs:  Temp:  [97 °F (36.1 °C)-98.6 °F (37 °C)] 97 °F (36.1 °C)  Heart Rate:  [77-96] 80  Resp:  [-24] 24  BP: ()/(57-88) 134/78  FiO2 (%):  [40 %-100 %] 100 %  SpO2:  [86 %-97 %] 97 %  on  Flow (L/min):  [2-15] 15;   Device (Oxygen Therapy): ventilator  Body mass index is 22.46 kg/m².    Wt Readings from Last 3 Encounters:   24 67 kg (147 lb  11.3 oz)   05/31/24 68 kg (150 lb)   04/01/24 67.1 kg (148 lb)     Intake & Output (last 3 days)         06/28 0701 06/29 0700 06/29 0701 06/30 0700 06/30 0701 07/01 0700 07/01 0701 07/02 0700    P.O.  0 0 0    I.V. (mL/kg)   747.8 (11.2) 1100 (16.4)    IV Piggyback  500      Total Intake(mL/kg)  500 (7.5) 747.8 (11.2) 1100 (16.4)    Urine (mL/kg/hr)   650 (0.4)     Emesis/NG output   300     Total Output   950     Net  +500 -202.2 +1100            Urine Unmeasured Occurrence  2 x  1 x    Stool Unmeasured Occurrence    1 x          NPO Diet NPO Type: Strict NPO  ----------------------------------------------------------------------------------------------------------------------  Physical exam:  GENERAL:  Patient intubated and sedated.   SKIN:  Warm, dry without rashes, purpura or petechiae.  EYES:  Pupils equal, round and reactive to light.  Conjunctivae normal.  HEAD:  Normocephalic. Atraumatic.   EARS/NOSE/MOUTH/THROAT:  Septum midline.  No excoriations or nasal discharge. No stomatitis or ulcers.  Lips normal. Oropharynx without lesions or exudates.  NECK:  Supple with good range of motion; no thyromegaly or masses  LYMPHATICS:  No cervical, supraclavicular, axillary adenopathy.  RESP:  Lungs clear to auscultation. Good airflow. Intubated receiving mechanical ventilation.   CARDIAC:  Regular rate and rhythm without murmurs, rubs or gallops. Normal S1,S2. No edema  GI:  Soft, nontender, no hepatosplenomegaly, normal bowel sounds  MSK:  No clubbing or cyanosis, No joint swelling noted in hands  NEUROLOGICAL:  No focal deficit. Pupils equal and reactive.   ----------------------------------------------------------------------------------------------------------------------  Tele:    ----------------------------------------------------------------------------------------------------------------------  Results from last 7 days   Lab Units 07/01/24  0116 06/30/24  0059 06/29/24  1358   WBC 10*3/mm3 6.05 7.25 5.65  "  HEMOGLOBIN g/dL 11.8* 12.5* 12.4*   HEMATOCRIT % 37.2* 40.0 39.0   MCV fL 89.0 89.3 87.2   MCHC g/dL 31.7 31.3* 31.8   PLATELETS 10*3/mm3 242 216 203     Results from last 7 days   Lab Units 07/01/24  1425   PH, ARTERIAL pH units 7.241*   PO2 ART mm Hg 63.7*   PCO2, ARTERIAL mm Hg 51.0*   HCO3 ART mmol/L 21.9     Results from last 7 days   Lab Units 07/01/24  0116 06/30/24  1526 06/30/24  0059 06/29/24  1513   SODIUM mmol/L 144  --  139 141   POTASSIUM mmol/L 3.4* 4.6 3.4* 3.3*   MAGNESIUM mg/dL  --   --  2.2 2.1   CHLORIDE mmol/L 110*  --  107 106   CO2 mmol/L 19.8*  --  19.7* 23.7   BUN mg/dL 18  --  17 16   CREATININE mg/dL 1.18  --  1.13 1.22   CALCIUM mg/dL 8.1*  --  8.4 8.4   GLUCOSE mg/dL 127*  --  137* 119*   ALBUMIN g/dL 3.1*  --   --  3.1*   BILIRUBIN mg/dL 0.3  --   --  0.5   ALK PHOS U/L 119*  --   --  121*   AST (SGOT) U/L 20  --   --  21   ALT (SGPT) U/L 10  --   --  11   Estimated Creatinine Clearance: 33.9 mL/min (by C-G formula based on SCr of 1.18 mg/dL).  No results found for: \"AMMONIA\"              Glucose   Date/Time Value Ref Range Status   07/01/2024 1503 128 70 - 130 mg/dL Final   07/01/2024 1137 108 70 - 130 mg/dL Final     Lab Results   Component Value Date    TSH 3.870 06/29/2024    FREET4 1.48 03/14/2023     No results found for: \"PREGTESTUR\", \"PREGSERUM\", \"HCG\", \"HCGQUANT\"  Pain Management Panel           No data to display              Brief Urine Lab Results  (Last result in the past 365 days)        Color   Clarity   Blood   Leuk Est   Nitrite   Protein   CREAT   Urine HCG        06/29/24 2216 Yellow   Cloudy   Large (3+)   Negative   Negative   30 mg/dL (1+)                 No results found for: \"BLOODCX\"  No results found for: \"URINECX\"  No results found for: \"WOUNDCX\"  No results found for: \"STOOLCX\"  No results found for: \"RESPCX\"  No results found for: \"AFBCX\"        I have personally looked at the labs and they are summarized " above.  ----------------------------------------------------------------------------------------------------------------------  Detailed radiology reports for the last 24 hours:    Imaging Results (Last 24 Hours)       Procedure Component Value Units Date/Time    XR Chest 1 View [[id number]] Collected: 07/01/24 1527     Updated: 07/01/24 1529    Narrative:      XR CHEST 1 VW-     CLINICAL INDICATION: ET placement verification; K56.601-Complete  intestinal obstruction, unspecified as to cause; K56.609-Unspecified  intestinal obstruction, unspecified as to partial versus complete  obstruction; K52.3-Indeterminate colitis        COMPARISON: 6/29/2024     TECHNIQUE: Single frontal view of the chest.     FINDINGS:     LUNGS: Endotracheal tube overlies the tracheal air column well above the  ishaan.  Nasogastric tube is in the stomach     HEART AND MEDIASTINUM: Heart and mediastinal contours are unremarkable        SKELETON: Bony and soft tissue structures are unremarkable.             Impression:      Endotracheal tube placement as above           This report was finalized on 7/1/2024 3:27 PM by Dr. Shilo Leary MD.             Assessment & Plan    #Postoperative hypoxic hypercapnic respiratory failure   - ABG in PACU  - Patient's discomfort likely limiting tidal volumes contributing to hypercapnia and need for reintubation. No new findings on plain film of chest.   - Sedation with popofol and fentanyl gtts  - Will start on FiO2 40, PEEP 8, rate 24,  and repeat ABG.   - Consult pulmonology.   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      #Hypothyroidism   - TSH wnl. Continue home regimen.      #GERD  - PPI      #Hx of bradycardia w/ pacemaker placement   #Hx of inguinal hernias      Code status: DNR/DNI (Reversed for surgery. Will continue to have goals of care  conversations with family. Son currently in process of obtaining emergent medical guardianship for patient)     Dispo: Transfer to CCU after reintubation in PACU.     Woody Lam MD  [facility] Hospitalist  07/01/24  15:30 EDT    Electronically signed by Woody Lam MD at 07/01/24 1547       Woody Lam MD at 07/01/24 1525          Mr. Rogers has been admitted at [facility] from 6/29-7/1. He is currently critically ill and unable to make his own decisions. Patient would benefit from emergency guardianship.     Woody Lam MD    Electronically signed by Woody Lam MD at 07/01/24 1553       Dilma Yanez MD at 07/01/24 1216          Diagnosis: Large bowel obstruction    To OR for exploratory laparotomy, bowel resection and ostomy creation.     Dilma Hermosillo MD       General Surgeon  [facility]      Electronically signed by Dilma Yanez MD at 07/01/24 1217          Consult Notes (last 48 hours)        Marija Wetzel APRN at 07/02/24 0930        Consult Orders    1. Inpatient Palliative Care MD Consult [[id number]] ordered by Woody Lam MD at 07/02/24 0729                 Palliative Care Initial Consult     Attending Physician: Woody Lam MD  Referring Provider: Woody Lam MD    assistance with advance directives, assistance with clarification of goals of care, and psychosocial support  Code Status:   Code Status and Medical Interventions:   Ordered at: 06/29/24 1835     Medical Intervention Limits:    No intubation (DNI)     Code Status (Patient has no pulse and is not breathing):    No CPR (Do Not Attempt to Resuscitate)     Medical Interventions (Patient has pulse or is breathing):    Limited Support      Advanced Directives: Advance Directive Status: Patient does not have advance directive   Healthcare surrogate: NOK son Clark Rogers  Goals of Care: I was able to speak with Joaquín after he was extubated this morning, as he was alert and  oriented to person, place, time and why he was hospitalized. Joaquín confirmed what he had told Dr Lam prior to surgery, that he does not want to be resuscitated or to be intubated and placed on a ventilator. I was also able to speak with his son Clark to let him know the conversation I had with his father and he states he will respect his wish to be a Dnr/DNI.    HPI:  Joaquín Rogers is a 89 y.o. male admitted on 6/29/24 due to abdominal pain and was found to have a colonic obstruction in the ER. Joaquín has a medical history of BPH, hypothyroidism, constipation on home linzess and miralax, GERD, inguinal hernias, pacemaker who presents with abdominal pain and no BM for 3 days which is abnormal for patient. Surgery was consulted. On 6/30 Joaquín underwent endoscopy for sigmoidoscopy and on 7/1 went to surgery for bowel resection and ostomy creation. Patient was intubated for procedure and after procedure remained intubated. This am the plan is to attempt extubation. Palliative care was consulted to discuss GOC/ACP and support for pt and family.        ROS: Negative except as above in HPI.     Past Medical History:   Diagnosis Date    Arthritis     Bradycardia     Enlarged prostate     Pacemaker     Thyroid disease      Past Surgical History:   Procedure Laterality Date    CARDIAC ELECTROPHYSIOLOGY PROCEDURE N/A 09/13/2021    Procedure: Pacemaker DC new;  Surgeon: Kendrick Burgess MD;  Location: [facility] CATH INVASIVE LOCATION;  Service: Cardiology;  Laterality: N/A;    HERNIA REPAIR Right     left inguinal/right    INSERT / REPLACE / REMOVE PACEMAKER  09/13/2021    [employer] device    SIGMOIDOSCOPY N/A 6/30/2024    Procedure: FLEXIBLE SIGMOIDOSCOPY WITH BIOPSY;  Surgeon: Dilma Yanez MD;  Location: [facility] OR;  Service: General;  Laterality: N/A;     Social History     Socioeconomic History    Marital status:    Tobacco Use    Smoking status: Former     Current packs/day: 0.00     Types: Cigarettes      Quit date:      Years since quittin.5     Passive exposure: Past    Smokeless tobacco: Former     Types: Chew     Quit date:    Vaping Use    Vaping status: Never Used   Substance and Sexual Activity    Alcohol use: Never    Drug use: Never    Sexual activity: Defer     History reviewed. No pertinent family history.    No Known Allergies    Current Facility-Administered Medications   Medication Dose Route Frequency Provider Last Rate Last Admin    artificial tears ophthalmic ointment   Both Eyes Q1H PRN Woody Lam MD        aspirin chewable tablet 81 mg  81 mg Per G Tube Daily Woody Lam MD   81 mg at 24 0806    sennosides-docusate (PERICOLACE) 8.6-50 MG per tablet 2 tablet  2 tablet Oral BID PRN Dilma Yanez MD        And    polyethylene glycol (MIRALAX) packet 17 g  17 g Oral Daily PRN Dilma Yanez MD        And    bisacodyl (DULCOLAX) EC tablet 5 mg  5 mg Oral Daily PRN Dilma Yanez MD        And    bisacodyl (DULCOLAX) suppository 10 mg  10 mg Rectal Daily PRN Dilma Yanez MD        Calcium Carb-Cholecalciferol 600-20 MG-MCG tablet 1 tablet  1 tablet Oral BID Woody Lam MD   1 tablet at 24 0806    Calcium Replacement - Follow Nurse / BPA Driven Protocol   Does not apply Dilma Martell MD        chlorhexidine (PERIDEX) 0.12 % solution 15 mL  15 mL Mouth/Throat Q12H Woody Lam MD   15 mL at 24 0806    fentaNYL 2500 mcg in 250 mL NS infusion   mcg/hr Intravenous Titrated Aubrey Arvizu,    Stopped at 24 0739    Ferrous Sulfate 300 (60 Fe) MG/5ML solution 300 mg  300 mg Nasogastric BID Woody Lam MD   300 mg at 24 0806    finasteride (PROSCAR) tablet 5 mg  5 mg Oral Daily Woody Lam MD   5 mg at 24 0806    heparin (porcine) 5000 UNIT/ML injection 5,000 Units  5,000 Units Subcutaneous Q8H Dilma Yanez MD   5,000 Units at 24 0626    lansoprazole (PREVACID SOLUTAB) disintegrating  tablet Tablet Delayed Release Dispersible 30 mg  30 mg Nasogastric QAM AC Woody Lam MD   30 mg at 07/02/24 0726    levothyroxine (SYNTHROID, LEVOTHROID) tablet 100 mcg  100 mcg Oral QAM  Woody Lam MD   100 mcg at 07/02/24 0725    lubiprostone (AMITIZA) capsule 8 mcg  8 mcg Oral BID Woody Lam MD        Magnesium Standard Dose Replacement - Follow Nurse / BPA Driven Protocol   Does not apply Dilma Martell MD        morphine injection 2 mg  2 mg Intravenous Q3H PRN Dilma Yanez MD   2 mg at 06/30/24 1323    multivitamin (THERAGRAN) tablet 1 tablet  1 tablet Oral Daily Woody Lam MD   1 tablet at 07/02/24 0806    ondansetron (ZOFRAN) injection 4 mg  4 mg Intravenous Q6H PRN Woody Lam MD   4 mg at 06/30/24 1143    Phosphorus Replacement - Follow Nurse / BPA Driven Protocol   Does not apply Dilma Martell MD        polyethylene glycol (MIRALAX) packet 17 g  17 g Oral Daily Woody Lam MD   17 g at 07/02/24 0806    Potassium Replacement - Follow Nurse / BPA Driven Protocol   Does not apply Dilma Martell MD        propofol (DIPRIVAN) infusion 10 mg/mL 100 mL  5-50 mcg/kg/min Intravenous Titrated Aubrey Arvizu,    Stopped at 07/02/24 0738    sodium chloride 0.9 % flush 10 mL  10 mL Intravenous PRN Woody Lam MD        sodium chloride 0.9 % flush 10 mL  10 mL Intravenous Q12H Dilma Yanez MD   10 mL at 07/02/24 0806    sodium chloride 0.9 % flush 10 mL  10 mL Intravenous PRN Dilma Yanez MD        sodium chloride 0.9 % infusion 40 mL  40 mL Intravenous PRN Dilma Yanez MD        sodium chloride 0.9 % infusion  75 mL/hr Intravenous Continuous Woody Lam MD 75 mL/hr at 07/02/24 0736 75 mL/hr at 07/02/24 0736     fentanyl 10 mcg/mL,  mcg/hr, Last Rate: Stopped (07/02/24 0739)  propofol, 5-50 mcg/kg/min, Last Rate: Stopped (07/02/24 0738)  sodium chloride, 75 mL/hr, Last Rate: 75 mL/hr (07/02/24 0736)        artificial  "tears    senna-docusate sodium **AND** polyethylene glycol **AND** bisacodyl **AND** bisacodyl    Calcium Replacement - Follow Nurse / BPA Driven Protocol    Magnesium Standard Dose Replacement - Follow Nurse / BPA Driven Protocol    Morphine    ondansetron    Phosphorus Replacement - Follow Nurse / BPA Driven Protocol    Potassium Replacement - Follow Nurse / BPA Driven Protocol    [COMPLETED] Insert Peripheral IV **AND** sodium chloride    sodium chloride    sodium chloride    Current medication reviewed for route, type, dose and frequency and are current per MAR.    Palliative Performance Scale Score:     /67   Pulse 83   Temp 99.1 °F (37.3 °C) (Oral)   Resp 15   Ht 172.7 cm (67.99\")   Wt 50.3 kg (111 lb)   SpO2 94%   BMI 16.88 kg/m²     Intake/Output Summary (Last 24 hours) at 7/2/2024 1228  Last data filed at 7/2/2024 1200  Gross per 24 hour   Intake 3536.48 ml   Output 635 ml   Net 2901.48 ml       PE:  General Appearance:    Chronically ill appearing, elderly alert, cooperative, NAD   HEENT:    NC/AT, without obvious abnormality, EOMI, anicteric    Neck:   supple, trachea midline, no JVD   Lungs:     Unlabored respirations, no wheezing rhonchi or rales noted    Heart:    RRR, normal S1 and S2, no M/R/G   Abdomen:     Soft, NT, ND, NABS    Extremities:   Moves all extremities, no edema   Pulses:   Pulses palpable and equal bilaterally   Skin:   Warm, dry   Neurologic:   A/Ox3, cooperative   Psych:   Calm, appropriate       Labs:   Results from last 7 days   Lab Units 07/02/24  0021   WBC 10*3/mm3 9.31   HEMOGLOBIN g/dL 10.8*   HEMATOCRIT % 34.9*   PLATELETS 10*3/mm3 214     Results from last 7 days   Lab Units 07/02/24  0021   SODIUM mmol/L 141   POTASSIUM mmol/L 4.0   CHLORIDE mmol/L 112*   CO2 mmol/L 17.5*   BUN mg/dL 20   CREATININE mg/dL 1.19   GLUCOSE mg/dL 129*   CALCIUM mg/dL 7.5*     Results from last 7 days   Lab Units 07/02/24  0021   SODIUM mmol/L 141   POTASSIUM mmol/L 4.0   CHLORIDE " mmol/L 112*   CO2 mmol/L 17.5*   BUN mg/dL 20   CREATININE mg/dL 1.19   CALCIUM mg/dL 7.5*   BILIRUBIN mg/dL 0.3   ALK PHOS U/L 91   ALT (SGPT) U/L 9   AST (SGOT) U/L 21   GLUCOSE mg/dL 129*     Imaging Results (Last 72 Hours)       Procedure Component Value Units Date/Time    XR Chest 1 View [[id number]] Collected: 07/01/24 1527     Updated: 07/01/24 1529    Narrative:      XR CHEST 1 VW-     CLINICAL INDICATION: ET placement verification; K56.601-Complete  intestinal obstruction, unspecified as to cause; K56.609-Unspecified  intestinal obstruction, unspecified as to partial versus complete  obstruction; K52.3-Indeterminate colitis        COMPARISON: 6/29/2024     TECHNIQUE: Single frontal view of the chest.     FINDINGS:     LUNGS: Endotracheal tube overlies the tracheal air column well above the  ishaan.  Nasogastric tube is in the stomach     HEART AND MEDIASTINUM: Heart and mediastinal contours are unremarkable        SKELETON: Bony and soft tissue structures are unremarkable.             Impression:      Endotracheal tube placement as above           This report was finalized on 7/1/2024 3:27 PM by Dr. Shilo Leary MD.       XR Chest 1 View [[id number]] Collected: 06/29/24 2102     Updated: 06/29/24 2104    Narrative:      INDICATION: NG tube placement     TECHNIQUE: Frontal radiograph of the chest.     COMPARISON: None.       Impression:      FINDINGS/IMPRESSION:  Enteric tube in the stomach.        This report was finalized on 6/29/2024 9:02 PM by Alex Pallas, DO.       CT Abdomen Pelvis Without Contrast [[id number]] Collected: 06/29/24 1537     Updated: 06/29/24 1545    Narrative:      VERIFICATION OBSERVER NAME: Lisa Baltazar MD.     Technique: Axial images were obtained along with coronal and sagittal  reconstruction. DLP in mGycm reported in the EMR records. Dose lowering  technique: Automated exposure control with adjustment of the MA and/or  KV, use of iterative reconstruction. Data included in the  medical  records.     HISTORY/COMPARISON/FINDINGS:     Comparison: 5/31/2024     HISTORY: Abdominal pain and distention     Findings:      Severe dilatation of small bowel consistent with high-grade small bowel  obstruction.  This is appears to be recurrence from prior study.  Liver and spleen pancreas appeared unremarkable.  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  No hydronephrosis.  Area of narrowing in the pelvis in the  distal sigmoid colon.  Findings suspicious for presence of LEFT  hemicolectomy.          Impression:      Severe small bowel dilatation consistent with recurrent obstruction.   Position zone appears to be in the pelvis.s           [id number]  Zip code [id number]                 This report was finalized on 6/29/2024 3:43 PM by Dr. Lisa Baltazar MD.               Diagnostics: Reviewed    A: Joaquín Rogers is a 89 y.o. male admitted on 6/29/24 due to abdominal pain and was found to have a colonic obstruction in the ER. Joaquín has a medical history of BPH, hypothyroidism, constipation on home linzess and miralax, GERD, inguinal hernias, pacemaker who presents with abdominal pain and no BM for 3 days which is abnormal for patient. Surgery was consulted. On 6/30 Joaquín underwent endoscopy for sigmoidoscopy and on 7/1 went to surgery for bowel resection and ostomy creation. Patient was intubated for procedure and after procedure remained intubated. This am the plan is to attempt extubation. Palliative care was consulted to discuss GOC/ACP and support for pt and family.         P:  I was able to speak with Joaquín after he was extubated this morning, as he was alert and oriented to person, place, time and why he was hospitalized. Joaquín confirmed what he had told Dr Lam prior to surgery, that he does not want to be resuscitated or to be intubated and placed on a ventilator. I was also able to speak with his son Clark to let him know the conversation I had with his father and he states he will respect his wish to be  a Dnr/DNI. I let Dr Lam know of this conversation.    We appreciate the consult and the opportunity to participate in Joaquín Rogers's care. We will continue to follow along. Please do not hesitate to contact us regarding further symptom management or goals of care needs, including after hours or on weekends via our on call provider at [phone].     Time: 55 minutes spent reviewing medical and medication records, assessing and examining patient, discussing with family, answering questions, providing some guidance about a plan and documentation of care, and coordinating care with other healthcare members, with > 50% time spent face to face.     ALFREDO Calderon    2024      Electronically signed by Marija Wetzel APRN at [phone]       Андрей Reyes MD at 24 2021          Consults: Postoperative respiratory failure, status post exploratory laparotomy for bowel obstruction.  Required reintubation during immediate postoperative.    Patient Identification:  Name:  Joaquín Rogers  Age:  89 y.o.  Sex:  male  :  1927  MRN:  [id number]  Visit Number:  [id number]  Room number:  CC06/1C  Primary care provider:  Uday Roche DO    Chief Complaint:    Chief Complaint   Patient presents with    Abdominal Pain       History of Presenting Illness:  Patient is intubated and sedated.  Information obtained through nursing and physician communication and chart review.  Family members not at the bedside.    Patient is a 89-year-old male with past medical history significant for bradycardia arrhythmia, hypothyroidism, BPH, status post pacemaker placement.  He was admitted with 3 days of abdominal pain and no bowel movement.    Initial evaluation consistent with colonic obstruction.  NG tube placed.  Surgery consulted.  Patient ultimately required laparotomy.  Post laparotomy patient was extubated but because of worsening respiratory distress and CO2 retention, patient was  reintubated.    Labs suggestive of lactic acidosis with respiratory acidosis.  Follow-up labs revealed normalization of lactate.  Blood gases trended favorably.    No ET tube secretion overnight.  Patient remains hemodynamically stable.  Oxygen titrated diet improved 50% with PEEP of 8.    Patient has been sedated.        Review of Systems:  Unable to be obtained.  Patient is on ventilator.      Past History:  History reviewed. No pertinent family history.  Past Medical History:   Diagnosis Date    Arthritis     Bradycardia     Enlarged prostate     Pacemaker     Thyroid disease      Past Surgical History:   Procedure Laterality Date    CARDIAC ELECTROPHYSIOLOGY PROCEDURE N/A 2021    Procedure: Pacemaker DC new;  Surgeon: Kendrick Burgess MD;  Location: [facility] CATH INVASIVE LOCATION;  Service: Cardiology;  Laterality: N/A;    HERNIA REPAIR Right     left inguinal/right    INSERT / REPLACE / REMOVE PACEMAKER  2021    [employer] device    SIGMOIDOSCOPY N/A 2024    Procedure: FLEXIBLE SIGMOIDOSCOPY WITH BIOPSY;  Surgeon: Dilma Yanez MD;  Location: [facility] OR;  Service: General;  Laterality: N/A;     Social History     Socioeconomic History    Marital status:    Tobacco Use    Smoking status: Former     Current packs/day: 0.00     Types: Cigarettes     Quit date:      Years since quittin.5     Passive exposure: Past    Smokeless tobacco: Former     Types: Chew     Quit date:    Vaping Use    Vaping status: Never Used   Substance and Sexual Activity    Alcohol use: Never    Drug use: Never    Sexual activity: Defer       Allergies:  Patient has no known allergies.    Prior to Admission Medications       Prescriptions Last Dose Informant Patient Reported? Taking?    aspirin 81 MG EC tablet Past Week Medication Bottle Yes Yes    Take 1 tablet by mouth Daily.    calcium carbonate-cholecalciferol 500-400 MG-UNIT tablet tablet Past Week Medication Bottle Yes Yes    Take 1 tablet by  mouth 2 (Two) Times a Day.    carboxymethylcellulose (REFRESH PLUS) 0.5 % solution Past Week Medication Bottle Yes Yes    Administer 1 drop to both eyes 3 (Three) Times a Day As Needed for Dry Eyes.    ferrous sulfate 325 (65 FE) MG tablet Past Week Medication Bottle Yes Yes    Take 1 tablet by mouth 2 (Two) Times a Day.    finasteride (PROSCAR) 5 MG tablet Past Week Medication Bottle Yes Yes    Take 1 tablet by mouth Daily.    levothyroxine (SYNTHROID, LEVOTHROID) 100 MCG tablet Past Week Medication Bottle Yes Yes    Take 1 tablet by mouth Daily.    linaclotide (LINZESS) 72 MCG capsule capsule Past Week Medication Bottle Yes Yes    Take 1 capsule by mouth Every Morning Before Breakfast.    Multiple Vitamins-Minerals (ICAPS AREDS 2 PO) Past Week Medication Bottle Yes Yes    Take 1 tablet by mouth Daily.    omeprazole (priLOSEC) 40 MG capsule Past Week Medication Bottle Yes Yes    Take 1 capsule by mouth Daily.    polyethylene glycol (MIRALAX) 17 g packet Past Week Medication Bottle Yes Yes    Take 17 g by mouth Daily.    tamsulosin (FLOMAX) 0.4 MG capsule 24 hr capsule Past Week Medication Bottle Yes Yes    Take 1 capsule by mouth Daily.          Hospital Meds:  aspirin, 81 mg, Per G Tube, Daily  Calcium Carb-Cholecalciferol, 1 tablet, Oral, BID  chlorhexidine, 15 mL, Mouth/Throat, Q12H  Ferrous Sulfate, 300 mg, Nasogastric, BID  finasteride, 5 mg, Oral, Daily  heparin (porcine), 5,000 Units, Subcutaneous, Q8H  lansoprazole, 30 mg, Nasogastric, QAM AC  levothyroxine, 100 mcg, Oral, QAM AC  lubiprostone, 8 mcg, Oral, BID  multivitamin, 1 tablet, Oral, Daily  polyethylene glycol, 17 g, Oral, Daily  sodium chloride, 10 mL, Intravenous, Q12H      fentanyl 10 mcg/mL,  mcg/hr, Last Rate: Stopped (07/02/24 0739)  propofol, 5-50 mcg/kg/min, Last Rate: Stopped (07/02/24 0738)  sodium chloride, 75 mL/hr, Last Rate: 75 mL/hr (07/02/24 0736)      Current listed hospital scheduled medications may not yet reflect those  currently placed in orders that are signed and held awaiting patient's arrival to floor.   ---------------------------------------------------------------------------------------------------------------------   Objective     Vital Signs:  Temp:  [97 °F (36.1 °C)-99.1 °F (37.3 °C)] 99.1 °F (37.3 °C)  Heart Rate:  [67-96] 69  Resp:  [13-28] 24  BP: ()/(47-88) 99/55  FiO2 (%):  [40 %-100 %] 50 %    Vent Settings          Resp Rate (Set): 24     FiO2 (%): 50 %  PEEP/CPAP (cm H2O): 8 cm H20    Minute Ventilation (L/min) (Obs): 11.3 L/min  Resp Rate (Observed) Vent: 24     I:E Ratio (Obs): 1:2    PIP Observed (cm H2O): 27 cm H2O           Peak airway pressure 24, plateau pressure 20.      Mean Arterial Pressure (Non-Invasive) for the past 24 hrs (Last 3 readings):   Noninvasive MAP (mmHg)   07/02/24 0700 73   07/02/24 0632 71   07/02/24 0615 68     SpO2:  [86 %-100 %] 98 %  on  Flow (L/min):  [2-15] 15;   Device (Oxygen Therapy): ventilator  Body mass index is 16.88 kg/m².    Wt Readings from Last 1 Encounters:   07/01/24 1600 50.3 kg (111 lb)   06/29/24 1801 67 kg (147 lb 11.3 oz)   06/29/24 1324 68 kg (150 lb)     Wt Readings from Last 3 Encounters:   07/01/24 50.3 kg (111 lb)   05/31/24 68 kg (150 lb)   04/01/24 67.1 kg (148 lb)           ---------------------------------------------------------------------------------------------------------------------   Physical Exam:   GENERAL APPEARANCE: Intubated and sedated.  Appears to be resting comfortably in bed.        HEAD: normocephalic.    EYES: PERRLA.    THROAT:     Neck: Supple.     CARDIAC: Normal S1 and S2. No S3, S4 or murmurs. Rhythm is regular.     LUNGS: Clear to auscultation and percussion without rales, rhonchi, wheezing or diminished breath sounds.    ABDOMEN: Sluggish bowel sound,, nondistended, nontender.  Surgical site looks dry.    EXTREMITIES: No significant deformity or joint abnormality. No edema. Peripheral pulses intact.    NEUROLOGICAL: Unable  to assess due to sedation status.     PSYCHIATRIC: Unable to assess due to sedation status     ---------------------------------------------------------------------------------------------------------------------   EKG:    Please note that I have personally looked at the EKG from this admission, the comparison EKGs in the medical records, and the above is my interpretation of this admission's EKG; I await the final cardiology read.  ECG 12 Lead Pre-Op / Pre-Procedure   Final Result   Test Reason : Pre-Op / Pre-Procedure   Blood Pressure :   */*   mmHG   Vent. Rate :  86 BPM     Atrial Rate :  86 BPM      P-R Int : 158 ms          QRS Dur : 136 ms       QT Int : 402 ms       P-R-T Axes :  47 -77  49 degrees      QTc Int : 481 ms      Normal sinus rhythm   Left axis deviation   Nonspecific intraventricular block   Abnormal ECG   When compared with ECG of 14-MAR-2023 10:37,   Nonspecific intraventricular block has replaced Right bundle branch block   Criteria for Septal infarct are no longer present   Confirmed by Amanda Rae ([id number]) on 6/30/2024 2:39:26 PM      Referred By:            Confirmed By: Amanda Rae      Telemetry Scan   Final Result      Telemetry Scan   Final Result      Telemetry Scan   Final Result          Telemetry:  Please note that I personally looked at the telemetry strips.  ---------------------------------------------------------------------------------------------------------------------   LABS:    CBC and coagulation:  Results from last 7 days   Lab Units 07/02/24  0637 07/02/24  0021 07/01/24  2105 07/01/24  1834 07/01/24  1547 07/01/24  0116   LACTATE mmol/L 1.0 2.4* 2.4* 2.2* 2.3*  --    WBC 10*3/mm3  --  9.31  --   --  10.72 6.05   HEMOGLOBIN g/dL  --  10.8*  --   --  12.2* 11.8*   HEMATOCRIT %  --  34.9*  --   --  38.6 37.2*   MCV fL  --  90.9  --   --  87.9 89.0   MCHC g/dL  --  30.9*  --   --  31.6 31.7   PLATELETS 10*3/mm3  --  214  --   --  239 242     Acid/base  balance:  Results from last 7 days   Lab Units 07/01/24  1847 07/01/24  1540 07/01/24  1425   PH, ARTERIAL pH units 7.389 7.309* 7.241*   PO2 ART mm Hg 181.0* 212.0* 63.7*   PCO2, ARTERIAL mm Hg 32.8* 39.8 51.0*   HCO3 ART mmol/L 19.8* 20.0 21.9     Renal and electrolytes:  Results from last 7 days   Lab Units 07/02/24  0021 07/01/24  1547 07/01/24  0116 06/30/24  1526 06/30/24  0059 06/29/24  1513   SODIUM mmol/L 141 143 144  --  139 141   POTASSIUM mmol/L 4.0 4.3 3.4* 4.6 3.4* 3.3*   MAGNESIUM mg/dL 2.0  --   --   --  2.2 2.1   CHLORIDE mmol/L 112* 111* 110*  --  107 106   CO2 mmol/L 17.5* 20.3* 19.8*  --  19.7* 23.7   BUN mg/dL 20 19 18  --  17 16   CREATININE mg/dL 1.19 1.08 1.18  --  1.13 1.22   CALCIUM mg/dL 7.5* 7.9* 8.1*  --  8.4 8.4   GLUCOSE mg/dL 129* 151* 127*  --  137* 119*     Estimated Creatinine Clearance: 25.2 mL/min (by C-G formula based on SCr of 1.19 mg/dL).    Liver and pancreatic function:  Results from last 7 days   Lab Units 07/02/24  0021 07/01/24  1547 07/01/24  0116 06/29/24  1513   ALBUMIN g/dL 2.4* 3.1* 3.1* 3.1*   BILIRUBIN mg/dL 0.3 0.4 0.3 0.5   ALK PHOS U/L 91 111 119* 121*   AST (SGOT) U/L 21 29 20 21   ALT (SGPT) U/L 9 9 10 11   LIPASE U/L  --   --   --  23     Endocrine function:  Lab Results   Component Value Date    HGBA1C 5.80 (H) 03/14/2023     Point of care bedside glucose levels:  Results from last 7 days   Lab Units 07/01/24  1503 07/01/24  1137   GLUCOSE mg/dL 128 108     Lab Results   Component Value Date    TSH 3.870 06/29/2024    FREET4 1.48 03/14/2023     Cardiac:        Cultures:  Lab Results   Component Value Date    COLORU Yellow 06/29/2024    CLARITYU Cloudy (A) 06/29/2024    PHUR 5.5 06/29/2024    GLUCOSEU Negative 06/29/2024    KETONESU Trace (A) 06/29/2024    BLOODU Large (3+) (A) 06/29/2024    NITRITEU Negative 06/29/2024    LEUKOCYTESUR Negative 06/29/2024    BILIRUBINUR Negative 06/29/2024    UROBILINOGEN 0.2 E.U./dL 06/29/2024    RBCUA 21-50 (A) 06/29/2024  "   WBCUA None Seen 06/29/2024    BACTERIA Trace (A) 06/29/2024     Microbiology Results (last 10 days)       ** No results found for the last 240 hours. **            No results found for: \"PREGTESTUR\", \"PREGSERUM\", \"HCG\", \"HCGQUANT\"  Pain Management Panel           No data to display                I have personally looked at the labs and they are summarized above.  ---------------------------------------------------------------------------------------------------------------------   Detailed radiology reports for the last 24 hours:    Imaging Results (Last 24 Hours)       Procedure Component Value Units Date/Time    XR Chest 1 View [[id number]] Collected: 07/01/24 1527     Updated: 07/01/24 1529    Narrative:      XR CHEST 1 VW-     CLINICAL INDICATION: ET placement verification; K56.601-Complete  intestinal obstruction, unspecified as to cause; K56.609-Unspecified  intestinal obstruction, unspecified as to partial versus complete  obstruction; K52.3-Indeterminate colitis        COMPARISON: 6/29/2024     TECHNIQUE: Single frontal view of the chest.     FINDINGS:     LUNGS: Endotracheal tube overlies the tracheal air column well above the  ishaan.  Nasogastric tube is in the stomach     HEART AND MEDIASTINUM: Heart and mediastinal contours are unremarkable        SKELETON: Bony and soft tissue structures are unremarkable.             Impression:      Endotracheal tube placement as above           This report was finalized on 7/1/2024 3:27 PM by Dr. Shilo Leary MD.             Final impressions for the last 30 days of radiology reports:    XR Chest 1 View    Result Date: 7/1/2024  Endotracheal tube placement as above    This report was finalized on 7/1/2024 3:27 PM by Dr. Shilo Leary MD.      XR Chest 1 View    Result Date: 6/29/2024  FINDINGS/IMPRESSION:  Enteric tube in the stomach.   This report was finalized on 6/29/2024 9:02 PM by Alex Pallas, DO.      CT Abdomen Pelvis Without Contrast    Result Date: " 6/29/2024  Severe small bowel dilatation consistent with recurrent obstruction. Position zone appears to be in the pelvis.s    [id number] Zip code [id number]      This report was finalized on 6/29/2024 3:43 PM by Dr. Lisa Baltazar MD.     I have personally looked at the radiology images and read the final radiology report.  ----------------------------------------------------------------------------------------------------------------------  Assessment & Plan    #1 acute hypercapnic hypoxic respiratory failure, post laparotomy, required reintubation.  Postintubation x-ray chest showed no airspace disease, distended abdomen.  Also noted lactic acidosis.    I believe increased work of breathing due to lactic acidosis, hypothyroidism, debility, all contributed to postoperative respiratory failure.  Gas exchange has improved overnight.  No significant ET tube secretion.    I have adjusted the ventilator and decrease the PEEP to 6 and FiO2 to 35%.  Will do SAT/SBT today.        Vent Settings          Resp Rate (Set): 24     FiO2 (%): 50 %  PEEP/CPAP (cm H2O): 8 cm H20    Minute Ventilation (L/min) (Obs): 11.3 L/min  Resp Rate (Observed) Vent: 24     I:E Ratio (Obs): 1:2    PIP Observed (cm H2O): 27 cm H2O           Vent setting adjusted as described above.        2.  Colonic obstruction, status post laparotomy.  Surgery on board.  Colostomy site looks dry.    3.  Hypothyroidism.    4.  Debility.    5: Keep head end elevated at 30 to 45 degree to avoid aspiration.  Usual precautions for   avoiding ventilator associated pneumonia.    Maintain hemoglobin above 7    Continue thyroxine, DVT and GI prophylaxis.    I have personally reviewed x-rays, labs, medication list.  Patient is critically ill with high risk of decompensation.    Critical care time spent 35 minutes.    Really thankful to Dr. Woody dolan for having me participate in the care of this patient        Андрей Reyes MD  [facility]  Hospitalist  07/02/24  07:56 EDT    Electronically signed by Андрей Reyes MD at 07/02/24 0807

## 2024-07-03 NOTE — PLAN OF CARE
Problem: Hypertension Comorbidity  Goal: Blood Pressure in Desired Range  Outcome: Ongoing, Progressing  Intervention: Maintain Blood Pressure Management  Recent Flowsheet Documentation  Taken 7/2/2024 2200 by Patsy Newton RN  Medication Review/Management: medications reviewed  Taken 7/2/2024 2000 by Patsy Newton RN  Medication Review/Management: medications reviewed     Problem: Skin Injury Risk Increased  Goal: Skin Health and Integrity  Outcome: Ongoing, Progressing  Intervention: Optimize Skin Protection  Recent Flowsheet Documentation  Taken 7/2/2024 2200 by Patsy Newton RN  Head of Bed (HOB) Positioning: HOB at 30-45 degrees  Taken 7/2/2024 2000 by Patsy Newton RN  Pressure Reduction Techniques:   heels elevated off bed   positioned off wounds   pressure points protected  Head of Bed (HOB) Positioning: HOB at 30-45 degrees  Pressure Reduction Devices:   heel offloading device utilized   positioning supports utilized   pressure-redistributing mattress utilized  Skin Protection: adhesive use limited     Problem: Adult Inpatient Plan of Care  Goal: Plan of Care Review  Outcome: Ongoing, Progressing  Goal: Patient-Specific Goal (Individualized)  Outcome: Ongoing, Progressing  Goal: Absence of Hospital-Acquired Illness or Injury  Outcome: Ongoing, Progressing  Intervention: Identify and Manage Fall Risk  Recent Flowsheet Documentation  Taken 7/2/2024 2300 by Patsy Newton RN  Safety Promotion/Fall Prevention: safety round/check completed  Taken 7/2/2024 2200 by Patsy Newton RN  Safety Promotion/Fall Prevention: safety round/check completed  Taken 7/2/2024 2100 by Patsy Newton RN  Safety Promotion/Fall Prevention: safety round/check completed  Taken 7/2/2024 2000 by Patsy Newton RN  Safety Promotion/Fall Prevention: safety round/check completed  Taken 7/2/2024 1900 by Patsy Newton RN  Safety Promotion/Fall Prevention: safety round/check  completed  Intervention: Prevent Skin Injury  Recent Flowsheet Documentation  Taken 7/2/2024 2200 by Patsy Newton RN  Body Position:   turned   right  Taken 7/2/2024 2000 by Patsy Newton RN  Body Position:   left   turned  Skin Protection: adhesive use limited  Intervention: Prevent and Manage VTE (Venous Thromboembolism) Risk  Recent Flowsheet Documentation  Taken 7/2/2024 2000 by Patsy Newton RN  Activity Management: bedrest  VTE Prevention/Management:   bilateral   sequential compression devices on  Goal: Optimal Comfort and Wellbeing  Outcome: Ongoing, Progressing  Intervention: Provide Person-Centered Care  Recent Flowsheet Documentation  Taken 7/2/2024 2000 by Patsy Newton RN  Trust Relationship/Rapport:   care explained   choices provided  Goal: Readiness for Transition of Care  Outcome: Ongoing, Progressing     Problem: Pain Acute  Goal: Acceptable Pain Control and Functional Ability  Outcome: Ongoing, Progressing  Intervention: Prevent or Manage Pain  Recent Flowsheet Documentation  Taken 7/2/2024 2200 by Patsy Newton RN  Medication Review/Management: medications reviewed  Taken 7/2/2024 2000 by Patsy Newton RN  Medication Review/Management: medications reviewed     Problem: Fluid Deficit (Intestinal Obstruction)  Goal: Fluid Balance  Outcome: Ongoing, Progressing     Problem: Infection (Intestinal Obstruction)  Goal: Absence of Infection Signs and Symptoms  Outcome: Ongoing, Progressing     Problem: Nausea and Vomiting (Intestinal Obstruction)  Goal: Nausea and Vomiting Relief  Outcome: Ongoing, Progressing  Intervention: Prevent and Manage Nausea and Vomiting  Recent Flowsheet Documentation  Taken 7/2/2024 2200 by Patsy Newton RN  Oral Care: oral rinse provided     Problem: Pain (Intestinal Obstruction)  Goal: Acceptable Pain Control  Outcome: Ongoing, Progressing     Problem: Fall Injury Risk  Goal: Absence of Fall and Fall-Related Injury  Outcome:  Ongoing, Progressing  Intervention: Identify and Manage Contributors  Recent Flowsheet Documentation  Taken 7/2/2024 2200 by Patsy Newton RN  Medication Review/Management: medications reviewed  Taken 7/2/2024 2000 by Patsy Newton RN  Medication Review/Management: medications reviewed  Intervention: Promote Injury-Free Environment  Recent Flowsheet Documentation  Taken 7/2/2024 2300 by Patsy Newton RN  Safety Promotion/Fall Prevention: safety round/check completed  Taken 7/2/2024 2200 by Patsy Newton RN  Safety Promotion/Fall Prevention: safety round/check completed  Taken 7/2/2024 2100 by Patsy Newton RN  Safety Promotion/Fall Prevention: safety round/check completed  Taken 7/2/2024 2000 by Rosenbalm, Patsy, RN  Safety Promotion/Fall Prevention: safety round/check completed  Taken 7/2/2024 1900 by Rosenbalm, Patsy, RN  Safety Promotion/Fall Prevention: safety round/check completed     Problem: Communication Impairment (Mechanical Ventilation, Invasive)  Goal: Effective Communication  Outcome: Ongoing, Progressing     Problem: Device-Related Complication Risk (Mechanical Ventilation, Invasive)  Goal: Optimal Device Function  Outcome: Ongoing, Progressing     Problem: Inability to Wean (Mechanical Ventilation, Invasive)  Goal: Mechanical Ventilation Liberation  Outcome: Ongoing, Progressing  Intervention: Promote Extubation and Mechanical Ventilation Liberation  Recent Flowsheet Documentation  Taken 7/2/2024 2200 by Patsy Newton RN  Medication Review/Management: medications reviewed  Taken 7/2/2024 2000 by Patsy Newton RN  Medication Review/Management: medications reviewed     Problem: Nutrition Impairment (Mechanical Ventilation, Invasive)  Goal: Optimal Nutrition Delivery  Outcome: Ongoing, Progressing     Problem: Skin and Tissue Injury (Mechanical Ventilation, Invasive)  Goal: Absence of Device-Related Skin and Tissue Injury  Outcome: Ongoing,  Progressing  Intervention: Maintain Skin and Tissue Health  Recent Flowsheet Documentation  Taken 7/2/2024 2000 by Patsy Newton, RN  Device Skin Pressure Protection:   positioning supports utilized   pressure points protected   skin-to-device areas padded     Problem: Ventilator-Induced Lung Injury (Mechanical Ventilation, Invasive)  Goal: Absence of Ventilator-Induced Lung Injury  Outcome: Ongoing, Progressing  Intervention: Prevent Ventilator-Associated Pneumonia  Recent Flowsheet Documentation  Taken 7/2/2024 2200 by Patsy Newton, RN  Head of Bed (HOB) Positioning: HOB at 30-45 degrees  Oral Care: oral rinse provided  Taken 7/2/2024 2000 by Patsy Newton, RN  Head of Bed (HOB) Positioning: HOB at 30-45 degrees     Problem: Noninvasive Ventilation Acute  Goal: Effective Unassisted Ventilation and Oxygenation  Outcome: Ongoing, Progressing   Goal Outcome Evaluation:

## 2024-07-04 LAB
ALBUMIN SERPL-MCNC: 2.2 G/DL (ref 3.5–5.2)
ALBUMIN/GLOB SERPL: 1 G/DL
ALP SERPL-CCNC: 100 U/L (ref 39–117)
ALT SERPL W P-5'-P-CCNC: 10 U/L (ref 1–41)
ANION GAP SERPL CALCULATED.3IONS-SCNC: 9.1 MMOL/L (ref 5–15)
ANISOCYTOSIS BLD QL: NORMAL
AST SERPL-CCNC: 20 U/L (ref 1–40)
BASOPHILS # BLD AUTO: 0.02 10*3/MM3 (ref 0–0.2)
BASOPHILS NFR BLD AUTO: 0.3 % (ref 0–1.5)
BILIRUB SERPL-MCNC: 0.3 MG/DL (ref 0–1.2)
BUN SERPL-MCNC: 9 MG/DL (ref 8–23)
BUN/CREAT SERPL: 12 (ref 7–25)
CA-I SERPL ISE-MCNC: 1.01 MMOL/L (ref 1.12–1.32)
CALCIUM SPEC-SCNC: 7.3 MG/DL (ref 8.2–9.6)
CHLORIDE SERPL-SCNC: 111 MMOL/L (ref 98–107)
CO2 SERPL-SCNC: 21.9 MMOL/L (ref 22–29)
CREAT SERPL-MCNC: 0.75 MG/DL (ref 0.76–1.27)
DEPRECATED RDW RBC AUTO: 72.1 FL (ref 37–54)
EGFRCR SERPLBLD CKD-EPI 2021: 82.1 ML/MIN/1.73
EOSINOPHIL # BLD AUTO: 0.35 10*3/MM3 (ref 0–0.4)
EOSINOPHIL NFR BLD AUTO: 4.7 % (ref 0.3–6.2)
ERYTHROCYTE [DISTWIDTH] IN BLOOD BY AUTOMATED COUNT: 23.7 % (ref 12.3–15.4)
FOLATE SERPL-MCNC: 4.85 NG/ML (ref 4.78–24.2)
GLOBULIN UR ELPH-MCNC: 2.2 GM/DL
GLUCOSE SERPL-MCNC: 104 MG/DL (ref 65–99)
HCT VFR BLD AUTO: 31.5 % (ref 37.5–51)
HGB BLD-MCNC: 10.2 G/DL (ref 13–17.7)
HYPOCHROMIA BLD QL: NORMAL
IMM GRANULOCYTES # BLD AUTO: 0.06 10*3/MM3 (ref 0–0.05)
IMM GRANULOCYTES NFR BLD AUTO: 0.8 % (ref 0–0.5)
LYMPHOCYTES # BLD AUTO: 1.74 10*3/MM3 (ref 0.7–3.1)
LYMPHOCYTES NFR BLD AUTO: 23.4 % (ref 19.6–45.3)
MAGNESIUM SERPL-MCNC: 1.7 MG/DL (ref 1.7–2.3)
MCH RBC QN AUTO: 28.5 PG (ref 26.6–33)
MCHC RBC AUTO-ENTMCNC: 32.4 G/DL (ref 31.5–35.7)
MCV RBC AUTO: 88 FL (ref 79–97)
MONOCYTES # BLD AUTO: 0.59 10*3/MM3 (ref 0.1–0.9)
MONOCYTES NFR BLD AUTO: 7.9 % (ref 5–12)
NEUTROPHILS NFR BLD AUTO: 4.69 10*3/MM3 (ref 1.7–7)
NEUTROPHILS NFR BLD AUTO: 62.9 % (ref 42.7–76)
NRBC BLD AUTO-RTO: 0 /100 WBC (ref 0–0.2)
OVALOCYTES BLD QL SMEAR: NORMAL
PHOSPHATE SERPL-MCNC: 2.4 MG/DL (ref 2.5–4.5)
PLAT MORPH BLD: NORMAL
PLATELET # BLD AUTO: 200 10*3/MM3 (ref 140–450)
PMV BLD AUTO: 9.6 FL (ref 6–12)
POTASSIUM SERPL-SCNC: 2.9 MMOL/L (ref 3.5–5.2)
POTASSIUM SERPL-SCNC: 3.1 MMOL/L (ref 3.5–5.2)
PROT SERPL-MCNC: 4.4 G/DL (ref 6–8.5)
PTH-INTACT SERPL-MCNC: 60.6 PG/ML (ref 15–65)
RBC # BLD AUTO: 3.58 10*6/MM3 (ref 4.14–5.8)
SODIUM SERPL-SCNC: 142 MMOL/L (ref 136–145)
VIT B12 BLD-MCNC: 204 PG/ML (ref 211–946)
WBC NRBC COR # BLD AUTO: 7.45 10*3/MM3 (ref 3.4–10.8)

## 2024-07-04 PROCEDURE — 99232 SBSQ HOSP IP/OBS MODERATE 35: CPT | Performed by: FAMILY MEDICINE

## 2024-07-04 PROCEDURE — 25010000002 THIAMINE HCL 200 MG/2ML SOLUTION 2 ML VIAL: Performed by: INTERNAL MEDICINE

## 2024-07-04 PROCEDURE — 83970 ASSAY OF PARATHORMONE: CPT | Performed by: INTERNAL MEDICINE

## 2024-07-04 PROCEDURE — 85025 COMPLETE CBC W/AUTO DIFF WBC: CPT | Performed by: INTERNAL MEDICINE

## 2024-07-04 PROCEDURE — 80053 COMPREHEN METABOLIC PANEL: CPT | Performed by: INTERNAL MEDICINE

## 2024-07-04 PROCEDURE — 84100 ASSAY OF PHOSPHORUS: CPT | Performed by: INTERNAL MEDICINE

## 2024-07-04 PROCEDURE — 99024 POSTOP FOLLOW-UP VISIT: CPT | Performed by: SURGERY

## 2024-07-04 PROCEDURE — 82330 ASSAY OF CALCIUM: CPT | Performed by: INTERNAL MEDICINE

## 2024-07-04 PROCEDURE — 84132 ASSAY OF SERUM POTASSIUM: CPT | Performed by: INTERNAL MEDICINE

## 2024-07-04 PROCEDURE — 85007 BL SMEAR W/DIFF WBC COUNT: CPT | Performed by: INTERNAL MEDICINE

## 2024-07-04 PROCEDURE — 99232 SBSQ HOSP IP/OBS MODERATE 35: CPT | Performed by: INTERNAL MEDICINE

## 2024-07-04 PROCEDURE — 83735 ASSAY OF MAGNESIUM: CPT | Performed by: INTERNAL MEDICINE

## 2024-07-04 PROCEDURE — 25010000002 HEPARIN (PORCINE) PER 1000 UNITS: Performed by: INTERNAL MEDICINE

## 2024-07-04 PROCEDURE — 82607 VITAMIN B-12: CPT | Performed by: INTERNAL MEDICINE

## 2024-07-04 PROCEDURE — 97161 PT EVAL LOW COMPLEX 20 MIN: CPT

## 2024-07-04 PROCEDURE — 97166 OT EVAL MOD COMPLEX 45 MIN: CPT

## 2024-07-04 PROCEDURE — 82746 ASSAY OF FOLIC ACID SERUM: CPT | Performed by: INTERNAL MEDICINE

## 2024-07-04 PROCEDURE — 25010000002 THIAMINE PER 100 MG: Performed by: INTERNAL MEDICINE

## 2024-07-04 RX ORDER — POTASSIUM CHLORIDE 20 MEQ/1
40 TABLET, EXTENDED RELEASE ORAL EVERY 4 HOURS
Status: DISCONTINUED | OUTPATIENT
Start: 2024-07-04 | End: 2024-07-04 | Stop reason: SDDI

## 2024-07-04 RX ORDER — POTASSIUM CHLORIDE 1.5 G/1.58G
40 POWDER, FOR SOLUTION ORAL EVERY 4 HOURS
Status: DISPENSED | OUTPATIENT
Start: 2024-07-05 | End: 2024-07-05

## 2024-07-04 RX ORDER — POTASSIUM CHLORIDE 20 MEQ/1
40 TABLET, EXTENDED RELEASE ORAL EVERY 4 HOURS
Qty: 6 TABLET | Refills: 0 | Status: COMPLETED | OUTPATIENT
Start: 2024-07-04 | End: 2024-07-04

## 2024-07-04 RX ADMIN — THIAMINE HYDROCHLORIDE 500 MG: 100 INJECTION, SOLUTION INTRAMUSCULAR; INTRAVENOUS at 09:35

## 2024-07-04 RX ADMIN — HEPARIN SODIUM 5000 UNITS: 5000 INJECTION INTRAVENOUS; SUBCUTANEOUS at 05:49

## 2024-07-04 RX ADMIN — Medication 10 ML: at 21:04

## 2024-07-04 RX ADMIN — Medication 1 TABLET: at 08:16

## 2024-07-04 RX ADMIN — THIAMINE HYDROCHLORIDE 500 MG: 100 INJECTION, SOLUTION INTRAMUSCULAR; INTRAVENOUS at 21:04

## 2024-07-04 RX ADMIN — LUBIPROSTONE 8 MCG: 8 CAPSULE, GELATIN COATED ORAL at 21:12

## 2024-07-04 RX ADMIN — HEPARIN SODIUM 5000 UNITS: 5000 INJECTION INTRAVENOUS; SUBCUTANEOUS at 13:36

## 2024-07-04 RX ADMIN — LEVOTHYROXINE SODIUM 100 MCG: 0.1 TABLET ORAL at 08:16

## 2024-07-04 RX ADMIN — HEPARIN SODIUM 5000 UNITS: 5000 INJECTION INTRAVENOUS; SUBCUTANEOUS at 21:04

## 2024-07-04 RX ADMIN — POTASSIUM CHLORIDE 40 MEQ: 1500 TABLET, EXTENDED RELEASE ORAL at 21:04

## 2024-07-04 RX ADMIN — POTASSIUM CHLORIDE 40 MEQ: 1500 TABLET, EXTENDED RELEASE ORAL at 09:35

## 2024-07-04 RX ADMIN — MINERAL SUPPLEMENT IRON 300 MG / 5 ML STRENGTH LIQUID 100 PER BOX UNFLAVORED 300 MG: at 08:16

## 2024-07-04 RX ADMIN — THIAMINE HYDROCHLORIDE 500 MG: 100 INJECTION, SOLUTION INTRAMUSCULAR; INTRAVENOUS at 17:31

## 2024-07-04 RX ADMIN — POTASSIUM CHLORIDE 40 MEQ: 1500 TABLET, EXTENDED RELEASE ORAL at 05:49

## 2024-07-04 RX ADMIN — ASPIRIN 81 MG: 81 TABLET, CHEWABLE ORAL at 08:16

## 2024-07-04 RX ADMIN — Medication 10 ML: at 08:18

## 2024-07-04 RX ADMIN — LANSOPRAZOLE 30 MG: 30 TABLET, ORALLY DISINTEGRATING ORAL at 08:17

## 2024-07-04 RX ADMIN — MINERAL SUPPLEMENT IRON 300 MG / 5 ML STRENGTH LIQUID 100 PER BOX UNFLAVORED 300 MG: at 21:04

## 2024-07-04 RX ADMIN — POTASSIUM CHLORIDE 40 MEQ: 1500 TABLET, EXTENDED RELEASE ORAL at 13:36

## 2024-07-04 RX ADMIN — FINASTERIDE 5 MG: 5 TABLET, FILM COATED ORAL at 08:16

## 2024-07-04 RX ADMIN — LUBIPROSTONE 8 MCG: 8 CAPSULE, GELATIN COATED ORAL at 08:16

## 2024-07-04 RX ADMIN — POLYETHYLENE GLYCOL (3350) 17 G: 17 POWDER, FOR SOLUTION ORAL at 08:17

## 2024-07-04 RX ADMIN — Medication 1 TABLET: at 21:04

## 2024-07-04 NOTE — PROGRESS NOTES
[facility]  PROGRESS NOTE     Patient Identification:  Name:  Joaquín Rogers  Age:  89 y.o.  Sex:  male  :  1927  MRN:  [id number]  Visit Number:  [id number]  ROOM: [address] [place] [address]     Primary Care Provider:  Uday Roche DO    Length of stay in inpatient status:  5    Subjective     Chief Compliant:    Chief Complaint   Patient presents with    Abdominal Pain       History of Presenting Illness: 89-year-old gentleman who is status post distal large bowel obstruction secondary to adenocarcinoma with circumferential rectal mass with bowel resection and creation of loop descending colon colostomy.  Patient states he is doing reasonably well and feels that he is breathing reasonably well is to.  Has no new complaints at this time    Objective     Current Hospital Meds:aspirin, 81 mg, Per G Tube, Daily  Calcium Carb-Cholecalciferol, 1 tablet, Oral, BID  Ferrous Sulfate, 300 mg, Nasogastric, BID  finasteride, 5 mg, Oral, Daily  heparin (porcine), 5,000 Units, Subcutaneous, Q8H  lansoprazole, 30 mg, Oral, QAM AC  levothyroxine, 100 mcg, Oral, QAM AC  lubiprostone, 8 mcg, Oral, BID  multivitamin, 1 tablet, Oral, Daily  polyethylene glycol, 17 g, Oral, Daily  potassium chloride ER, 40 mEq, Oral, Q4H  sodium chloride, 10 mL, Intravenous, Q12H  thiamine (B-1) IV, 500 mg, Intravenous, TID       ----------------------------------------------------------------------------------------------------------------------  Vital Signs:  Temp:  [97.8 °F (36.6 °C)-98.7 °F (37.1 °C)] 97.8 °F (36.6 °C)  Heart Rate:  [66-74] 67  Resp:  [16-18] 18  BP: (131-166)/(63-83) 166/83  SpO2:  [91 %-97 %] 96 %  on  Flow (L/min):  [3.5] 3.5;   Device (Oxygen Therapy): nasal cannula  Body mass index is 16.88 kg/m².    Wt Readings from Last 3 Encounters:   24 50.3 kg (111 lb)   24 68 kg (150 lb)   24 67.1 kg (148 lb)     Intake & Output (last 3 days)          07 07 07 07  0701 07/04 0700 07/04 0701 07/05 0700    P.O. 0 420 720 240    I.V. (mL/kg) 1150.9 (22.9) 2001.8 (39.8) 2157.1 (42.9)     Other  180      IV Piggyback 1000       Total Intake(mL/kg) 2150.9 (42.8) 2601.8 (51.7) 2877.1 (57.2) 240 (4.8)    Urine (mL/kg/hr) 325 (0.3) 1025 (0.8) 1250 (1)     Emesis/NG output 10 350      Stool 200 1250 600 250    Total Output 535 2625 1850 250    Net +1615.9 -23.2 +1027.1 -10            Urine Unmeasured Occurrence 1 x       Stool Unmeasured Occurrence 1 x 1 x            Diet: Liquid; Clear Liquid; Fluid Consistency: Thin (IDDSI 0)  ----------------------------------------------------------------------------------------------------------------------  Physical exam:  Constitutional: Elderly gentleman in no distress  HEENT: Normocephalic atraumatic  Neck:   Supple  Cardiovascular: Regular rate and rhythm  Pulmonary/Chest: Clear  Abdominal:  .  Positive bowel sounds soft; colostomy bag is intact and is draining at this time.  Colostomy bar in place.  Musculoskeletal: No arthropathy  Neurological: No focal deficits  Skin: No rash  Peripheral vascular: No edema  Genitourinary:  ----------------------------------------------------------------------------------------------------------------------    Last echocardiogram:  Results for orders placed during the hospital encounter of 09/01/21    Adult Transthoracic Echo Complete W/ Cont if Necessary Per Protocol    Interpretation Summary  · Left ventricular wall thickness is consistent with mild concentric hypertrophy.  · Left ventricular ejection fraction appears to be 56 - 60%. Left ventricular systolic function is normal.  · Left ventricular diastolic function is consistent with (grade I) impaired relaxation.  · Normal cardiac chamber dimensions.  · Moderate aortic valve regurgitation is present.  · There is no evidence of pericardial  "effusion.    ----------------------------------------------------------------------------------------------------------------------  Results from last 7 days   Lab Units 07/04/24  0142 07/03/24  0034 07/02/24  0637 07/02/24  0021 07/01/24  2105   LACTATE mmol/L  --   --  1.0 2.4* 2.4*   WBC 10*3/mm3 7.45 8.88  --  9.31  --    HEMOGLOBIN g/dL 10.2* 11.5*  --  10.8*  --    HEMATOCRIT % 31.5* 36.8*  --  34.9*  --    MCV fL 88.0 89.8  --  90.9  --    MCHC g/dL 32.4 31.3*  --  30.9*  --    PLATELETS 10*3/mm3 200 228  --  214  --      Results from last 7 days   Lab Units 07/02/24  0930   PH, ARTERIAL pH units 7.372   PO2 ART mm Hg 90.1   PCO2, ARTERIAL mm Hg 36.5   HCO3 ART mmol/L 21.2     Results from last 7 days   Lab Units 07/04/24  0431 07/04/24  0142 07/03/24  1821 07/03/24  0034 07/02/24  0021   SODIUM mmol/L 142  --   --  140 141   POTASSIUM mmol/L 2.9*  --   --  3.8 4.0   MAGNESIUM mg/dL 1.7  --   --  2.0 2.0   CHLORIDE mmol/L 111*  --   --  110* 112*   CO2 mmol/L 21.9*  --   --  21.6* 17.5*   BUN mg/dL 9  --   --  16 20   CREATININE mg/dL 0.75*  --   --  1.10 1.19   CALCIUM mg/dL 7.3*  --   --  7.9* 7.5*   IONIZED CALCIUM mmol/L  --  1.01*  --   --   --    PHOSPHORUS mg/dL  --  2.4* 3.0 1.0*  --    GLUCOSE mg/dL 104*  --   --  104* 129*   ALBUMIN g/dL 2.2*  --   --  2.7* 2.4*   BILIRUBIN mg/dL 0.3  --   --  0.5 0.3   ALK PHOS U/L 100  --   --  112 91   AST (SGOT) U/L 20  --   --  22 21   ALT (SGPT) U/L 10  --   --  11 9   Estimated Creatinine Clearance: 40.1 mL/min (A) (by C-G formula based on SCr of 0.75 mg/dL (L)).  No results found for: \"AMMONIA\"              Glucose   Date/Time Value Ref Range Status   07/01/2024 1503 128 70 - 130 mg/dL Final     Lab Results   Component Value Date    TSH 3.870 06/29/2024    FREET4 1.48 03/14/2023     No results found for: \"PREGTESTUR\", \"PREGSERUM\", \"HCG\", \"HCGQUANT\"  Pain Management Panel           No data to display              Brief Urine Lab Results  (Last result in the " "past 365 days)        Color   Clarity   Blood   Leuk Est   Nitrite   Protein   CREAT   Urine HCG        06/29/24 2219 Yellow   Cloudy   Large (3+)   Negative   Negative   30 mg/dL (1+)                 No results found for: \"BLOODCX\"  Results from last 7 days   Lab Units 06/29/24  2219   NITRITE UA  Negative   WBC UA /HPF None Seen   BACTERIA UA /HPF Trace*   SQUAM EPITHEL UA /HPF 3-6*     No results found for: \"URINECX\"  No results found for: \"WOUNDCX\"  No results found for: \"STOOLCX\"  Results from last 7 days   Lab Units 07/02/24  0637 07/02/24  0021 07/01/24  2105 07/01/24  1834 07/01/24  1547   LACTATE mmol/L 1.0 2.4* 2.4* 2.2* 2.3*       I have personally looked at the labs and they are summarized above.  ----------------------------------------------------------------------------------------------------------------------  Detailed radiology reports for the last 24 hours:    Imaging Results (Last 24 Hours)       ** No results found for the last 24 hours. **          Final impressions for the last 30 days of radiology reports:    XR Chest 1 View    Result Date: 7/1/2024  Endotracheal tube placement as above    This report was finalized on 7/1/2024 3:27 PM by Dr. Shilo Leary MD.      XR Chest 1 View    Result Date: 6/29/2024  FINDINGS/IMPRESSION:  Enteric tube in the stomach.   This report was finalized on 6/29/2024 9:02 PM by Alex Pallas, DO.      CT Abdomen Pelvis Without Contrast    Result Date: 6/29/2024  Severe small bowel dilatation consistent with recurrent obstruction. Position zone appears to be in the pelvis.s    [id number] Zip code [id number]      This report was finalized on 6/29/2024 3:43 PM by Dr. Lisa Baltazar MD.     I have personally looked at the radiology images and read the final radiology report.    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  Patient having no abdominal pain and is tolerated diet to this point    Acute respiratory failure patient requiring 3 and half liters of O2 at this time    Hypokalemia replace per protocol    Chronic illness malnutrition encourage p.o. intake in the near future    Hypothyroidism continue Synthroid    GERD--stable    History of bilateral inguinal hernias    History of pacemaker placement    VTE Prophylaxis:   Heparin  The patient is high risk due to the following diagnoses/reasons: Advanced age, acute respiratory failure; bowel resection; adenocarcinoma    Jung Kendrick MD  [facility]  07/04/24  11:49 EDT

## 2024-07-04 NOTE — NURSING NOTE
Patient resting in bed. Son dropped patient wife at bedside. Patient and wife arguing. Patient wife confused. Patient son called to come  wife.

## 2024-07-04 NOTE — PROGRESS NOTES
LOS: 5 days   Patient Care Team:  Uday Roche DO as PCP - General (Internal Medicine)  Ayaka Patel, RN as Ambulatory  ([facility])    Post op day # 3, status post loop colostomy    Subjective     Interval History:  Patient is currently on a clear liquid diet.  Yesterday he did not want to advance but today states he feels better and would like to have some food.  He states he sat up on the side of the bed but has not ambulated.    History taken from patient.      Objective     Vital Signs  Temp:  [97.8 °F (36.6 °C)-98.7 °F (37.1 °C)] 97.8 °F (36.6 °C)  Heart Rate:  [66-74] 67  Resp:  [16-18] 18  BP: (131-166)/(63-83) 166/83    Physical Exam:  This is a well-developed well-nourished elderly male in no acute distress  HEENT examination: Sclera are anicteric  Abdomen: Bowel sounds are present.  Ostomy in left abdomen with flatus and stool in bag.  Ostomy is edematous but viable  Skin/incisions: Incision sites were inspected and demonstrate no drainage or erythema     Results Review:        Results from last 7 days   Lab Units 07/04/24  0142 07/03/24  0034 07/02/24  0637 07/02/24  0021 07/01/24  2105   LACTATE mmol/L  --   --  1.0 2.4* 2.4*   WBC 10*3/mm3 7.45 8.88  --  9.31  --    HEMOGLOBIN g/dL 10.2* 11.5*  --  10.8*  --    HEMATOCRIT % 31.5* 36.8*  --  34.9*  --    PLATELETS 10*3/mm3 200 228  --  214  --      Results from last 7 days   Lab Units 07/02/24  0930   PH, ARTERIAL pH units 7.372   PO2 ART mm Hg 90.1   PCO2, ARTERIAL mm Hg 36.5   HCO3 ART mmol/L 21.2     Results from last 7 days   Lab Units 07/04/24  0431 07/03/24  0034 07/02/24  0021   SODIUM mmol/L 142 140 141   POTASSIUM mmol/L 2.9* 3.8 4.0   MAGNESIUM mg/dL 1.7 2.0 2.0   CHLORIDE mmol/L 111* 110* 112*   CO2 mmol/L 21.9* 21.6* 17.5*   BUN mg/dL 9 16 20   CREATININE mg/dL 0.75* 1.10 1.19   CALCIUM mg/dL 7.3* 7.9* 7.5*   GLUCOSE mg/dL 104* 104* 129*   ALBUMIN g/dL 2.2* 2.7* 2.4*   BILIRUBIN mg/dL 0.3 0.5 0.3   ALK PHOS U/L  "100 112 91   AST (SGOT) U/L 20 22 21   ALT (SGPT) U/L 10 11 9   Estimated Creatinine Clearance: 40.1 mL/min (A) (by C-G formula based on SCr of 0.75 mg/dL (L)).  No results found for: \"AMMONIA\"      No results found for: \"BLOODCX\"  No results found for: \"URINECX\"  No results found for: \"WOUNDCX\"  No results found for: \"STOOLCX\"    Imaging:  Imaging Results (Last 24 Hours)       ** No results found for the last 24 hours. **             Impression:  Postop day 3, status post diverting loop colostomy    Plan:  Leave xiao in place at least 7 to 10 days postprocedure  Physical therapy  Advance to regular diet    Chika Kyle MD  07/04/24  13:12 EDT      Please note that portions of this note were completed with a voice recognition program.    "

## 2024-07-04 NOTE — PROGRESS NOTES
Progress Note Pulmonary      Subjective patient reported mild cough, abdominal discomfort but feels better.  Oxygen  requirement 3.5 L      Interval History: As described above        Review of Systems:    Patient denies chest pain, orthopnea, PND, leg edema, nausea, vomiting, diarrhea, hemoptysis, hematemesis, melena, bright red blood per rectum.      Vital Signs  Temp:  [97.8 °F (36.6 °C)-98.7 °F (37.1 °C)] 97.8 °F (36.6 °C)  Heart Rate:  [66-74] 72  Resp:  [16-18] 18  BP: (126-149)/(63-65) 142/65  Body mass index is 16.88 kg/m².    Intake/Output Summary (Last 24 hours) at 7/4/2024 0937  Last data filed at 7/4/2024 0900  Gross per 24 hour   Intake 2877.05 ml   Output 2100 ml   Net 777.05 ml     I/O this shift:  In: 240 [P.O.:240]  Out: 250 [Stool:250]    Physical Exam:  General- normal in appearance, not in any acute distress    HEENT- pupils equally reactive to light, normal in size, no scleral icterus    Neck- supple    No JVD, no carotid bruit    Respiratory-bilateral air entry, shallow breathing, few right basal rales  Cardiovascular-  Normal S1 and S2. No S3, S4 or murmurs.    GI-surgical site looks clean.  Bowel sounds positive    CNS-alert oriented x2  grossly nonfocal    Extremities- pulses normal bilaterally , no clubbing and edema        Results Review:      Results from last 7 days   Lab Units 07/04/24  0142 07/03/24  0034 07/02/24  0021   WBC 10*3/mm3 7.45 8.88 9.31   HEMOGLOBIN g/dL 10.2* 11.5* 10.8*   PLATELETS 10*3/mm3 200 228 214     Results from last 7 days   Lab Units 07/04/24  0431 07/03/24  0034 07/02/24  0021   SODIUM mmol/L 142 140 141   POTASSIUM mmol/L 2.9* 3.8 4.0   CHLORIDE mmol/L 111* 110* 112*   CO2 mmol/L 21.9* 21.6* 17.5*   BUN mg/dL 9 16 20   CREATININE mg/dL 0.75* 1.10 1.19   CALCIUM mg/dL 7.3* 7.9* 7.5*   GLUCOSE mg/dL 104* 104* 129*   MAGNESIUM mg/dL 1.7 2.0 2.0     Lab Results   Component Value Date    INR 1.10 03/14/2023    INR 1.00 09/12/2021    INR 1.01 09/10/2021    PROTIME  14.5 03/14/2023    PROTIME 13.6 09/12/2021    PROTIME 13.7 09/10/2021     Results from last 7 days   Lab Units 07/04/24  0431 07/03/24  0034 07/02/24  0021   ALK PHOS U/L 100 112 91   BILIRUBIN mg/dL 0.3 0.5 0.3   ALT (SGPT) U/L 10 11 9   AST (SGOT) U/L 20 22 21     Results from last 7 days   Lab Units 07/02/24  0930   PH, ARTERIAL pH units 7.372   PO2 ART mm Hg 90.1   PCO2, ARTERIAL mm Hg 36.5   HCO3 ART mmol/L 21.2     Imaging Results (Last 24 Hours)       ** No results found for the last 24 hours. **                 aspirin, 81 mg, Per G Tube, Daily  Calcium Carb-Cholecalciferol, 1 tablet, Oral, BID  Ferrous Sulfate, 300 mg, Nasogastric, BID  finasteride, 5 mg, Oral, Daily  heparin (porcine), 5,000 Units, Subcutaneous, Q8H  lansoprazole, 30 mg, Oral, QAM AC  levothyroxine, 100 mcg, Oral, QAM AC  lubiprostone, 8 mcg, Oral, BID  multivitamin, 1 tablet, Oral, Daily  polyethylene glycol, 17 g, Oral, Daily  potassium chloride ER, 40 mEq, Oral, Q4H  sodium chloride, 10 mL, Intravenous, Q12H  thiamine (B-1) IV, 500 mg, Intravenous, TID             Medication Review:     Assessment & Plan     #1  Resolved acute hypercapnic hypoxic respiratory failure, post laparotomy, required reintubation.  Did exceptionally well after extubation.  Incentive spirometry every 6 hours.  Titrate oxygen to a saturation of 92%.    2.  Colonic obstruction, status post laparotomy.  Surgery on board.  Colostomy site looks dry.     3.  Hypothyroidism.     4.  Debility.         Maintain hemoglobin above 7     Continue thyroxine, DVT and GI prophylaxis.     I have personally reviewed x-rays, labs, medication list.    Total time spent 30 minutes      Андрей Reyes MD  07/04/24  09:37 EDT

## 2024-07-04 NOTE — PLAN OF CARE
Goal Outcome Evaluation:  Plan of Care Reviewed With: patient           Outcome Evaluation: Patient rested in bed during shift. Patient has no complaints or concerns during shift. Patient sat on side of bed during shift. Patient has had liquid output of colostomy during shift. Patient would like to remain on a clear liquid diet and re-evaluate tomorrow. Will continue with plan of care.

## 2024-07-04 NOTE — THERAPY EVALUATION
Acute Care - Physical Therapy Initial Evaluation   Harvinder     Patient Name: Joaquín Rogers  : 1927  MRN: [id number]  Today's Date: 2024   Onset of Illness/Injury or Date of Surgery: 24 (admission date)  Visit Dx:     ICD-10-CM ICD-9-CM   1. Complete intestinal obstruction, unspecified cause  K56.601 560.9   2. Intestinal obstruction, unspecified cause, unspecified whether partial or complete  K56.609 560.9   3. 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     Patient Active Problem List   Diagnosis    COVID-19    Acute hypoxemic respiratory failure due to COVID-19    Symptomatic bradycardia    Debility    Bowel obstruction    Indeterminate colitis    Severe malnutrition     Past Medical History:   Diagnosis Date    Arthritis     Bradycardia     Enlarged prostate     Pacemaker     Thyroid disease      Past Surgical History:   Procedure Laterality Date    CARDIAC ELECTROPHYSIOLOGY PROCEDURE N/A 2021    Procedure: Pacemaker DC new;  Surgeon: Kendrick Burgess MD;  Location: [facility] INVASIVE LOCATION;  Service: Cardiology;  Laterality: N/A;    HERNIA REPAIR Right     left inguinal/right    INSERT / REPLACE / REMOVE PACEMAKER  2021    [employer] device    SIGMOIDOSCOPY N/A 2024    Procedure: FLEXIBLE SIGMOIDOSCOPY WITH BIOPSY;  Surgeon: Dilma Yanez MD;  Location: [facility] OR;  Service: General;  Laterality: N/A;     PT Assessment (Last 12 Hours)       PT Evaluation and Treatment       Row Name 24 1115          Physical Therapy Time and Intention    Document Type evaluation  -     Mode of Treatment physical therapy  -     Patient Effort adequate  -     Comment Pt seen for PT eval this date, cooperative with PT evaluation. Pt lives at home with wife per RN, utilized SPC for security/balance per report.  -       Row Name 24 1115          General Information    Patient Profile Reviewed yes  -YANETH     Onset of Illness/Injury or Date of Surgery 24  admission date  -YANETH      Patient Observations alert;cooperative  -     General Observations of Patient pt seen supine in hospital bed, cooperative with therapy  -     Equipment Currently Used at Home cane, straight  -     Existing Precautions/Restrictions fall  -     Equipment Issued to Patient --  refused gait belt  -       Row Name 07/04/24 1115          Living Environment    Current Living Arrangements home  -     People in Home spouse  -       Row Name 07/04/24 1115          Range of Motion Comprehensive    Comment, General Range of Motion Grossly functional  -       Row Name 07/04/24 1115          Strength Comprehensive (MMT)    Comment, General Manual Muscle Testing (MMT) Assessment Grossly 5/5 with 3 to 4 knee flex and 4 to 4+ hip abd  -       Row Name 07/04/24 1115          Bed Mobility    Bed Mobility supine-sit  -     Supine-Sit [place] (Bed Mobility) set up;standby assist;supervision;contact guard;minimum assist (75% patient effort)  some assist of utilizing PT to pull upright, grossly CGA/Jason after set up and supervision for portion of transfer  -       Row Name 07/04/24 1115          Transfers    Transfers sit-stand transfer;stand-sit transfer  -       Row Name 07/04/24 1115          Sit-Stand Transfer    Sit-Stand [place] (Transfers) contact guard;minimum assist (75% patient effort)  -     Assistive Device (Sit-Stand Transfers) cane, straight  -       Row Name 07/04/24 1115          Stand-Sit Transfer    Stand-Sit [place] (Transfers) contact guard  -     Assistive Device (Stand-Sit Transfers) cane, straight  -       Row Name 07/04/24 1115          Gait/Stairs (Locomotion)    Comment, (Gait/Stairs) Pt able to take few steps at bedside, deferred further ambulation as pt refused gait belt, educated on how it assists PT to use for fall prevention, also pt seemed frustrated/irritated with getting up out of bed. Pt was fatigued per report.  -       Row Name             Wound  07/01/24 1200 midline abdomen Incision    Wound - Properties Group Placement Date: 07/01/24  -KB Placement Time: 1200  -KB Orientation: midline  -KB Location: abdomen  -KB Primary Wound Type: Incision  -KB    Retired Wound - Properties Group Placement Date: 07/01/24  -KB Placement Time: 1200  -KB Orientation: midline  -KB Location: abdomen  -KB Primary Wound Type: Incision  -KB    Retired Wound - Properties Group Date first assessed: 07/01/24  -KB Time first assessed: 1200  -KB Location: abdomen  -KB Primary Wound Type: Incision  -KB      Row Name 07/04/24 1115          Plan of Care Review    Outcome Evaluation Pt seen for PT evaluation this date, demonstrates he may benefit from therapeutic intervention to increase endurance and strength for safety with functional mobility.  -       Row Name 07/04/24 1115          Positioning and Restraints    Pre-Treatment Position in bed  -     Post Treatment Position bed  -     In Bed sitting EOB;call light within reach;exit alarm on  -       Row Name 07/04/24 1115          Therapy Assessment/Plan (PT)    Functional Level at Time of Evaluation (PT) Recommend CGA for safety  -     PT Diagnosis (PT) fall risk; impaired/decreased functional mobility  -     Rehab Potential (PT) --  grossly fair to good  -     Criteria for Skilled Interventions Met (PT) yes  -     Therapy Frequency (PT) 2 times/wk  2-5x/week, PRN  -       Row Name 07/04/24 1115          Physical Therapy Goals    Bed Mobility Goal Selection (PT) bed mobility, PT goal 1  -     Gait Training Goal Selection (PT) gait training, PT goal 1  -       Row Name 07/04/24 1115          Bed Mobility Goal 1 (PT)    Activity/Assistive Device (Bed Mobility Goal 1, PT) supine to sit;sit to supine;rolling to left;rolling to right;scooting  -     [place] Level/Cues Needed (Bed Mobility Goal 1, PT) independent  -     Time Frame (Bed Mobility Goal 1, PT) long term goal (LTG)  -       Row Name 07/04/24 1115           Gait Training Goal 1 (PT)    Activity/Assistive Device (Gait Training Goal 1, PT) gait (walking locomotion);assistive device use;decrease fall risk;cane, straight  -KH     [place] Level (Gait Training Goal 1, PT) supervision required  -     Distance (Gait Training Goal 1, PT) 50'  -     Time Frame (Gait Training Goal 1, PT) long term goal (LTG)  -               User Key  (r) = Recorded By, (t) = Taken By, (c) = Cosigned By      Initials Name Provider Type    Birgit Mckeon, RN Registered Nurse    Meghan Santiago, PT Physical Therapist                      PT Recommendation and Plan  Planned Therapy Interventions (PT): balance training, gait training, strengthening, transfer training, bed mobility training (add interventions PRN, as appropriate)  Therapy Frequency (PT): 2 times/wk (2-5x/week, PRN)  Outcome Evaluation: Pt seen for PT evaluation this date, demonstrates he may benefit from therapeutic intervention to increase endurance and strength for safety with functional mobility.       Time Calculation:    PT Charges       Row Name 07/04/24 1313             Time Calculation    Start Time 1115  -KH      PT Received On 07/04/24  -      PT Goal Re-Cert Due Date 07/18/24  -                User Key  (r) = Recorded By, (t) = Taken By, (c) = Cosigned By      Initials Name Provider Type    Meghan Santiago, PT Physical Therapist                  Therapy Charges for Today       Code Description Service Date Service Provider Modifiers Qty    58536444997 HC PT EVAL LOW COMPLEXITY 4 7/4/2024 Meghan Herrera, PT GP 1            PT G-Codes  AM-PAC 6 Clicks Score (PT): 10    Meghan Herrera PT  7/4/2024

## 2024-07-04 NOTE — PLAN OF CARE
Goal Outcome Evaluation:  Plan of Care Reviewed With: patient        Progress: no change  Outcome Evaluation: Patient has been resting in bed throughout the night. AOx4. Pt continued to tolerate clear liquid diet. no complaints or concerns this shift. no acute changes noted at this time. will continue with plan of care.

## 2024-07-04 NOTE — PLAN OF CARE
Goal Outcome Evaluation:              Outcome Evaluation: Pt seen for PT evaluation this date, demonstrates he may benefit from therapeutic intervention to increase endurance and strength for safety with functional mobility.      Anticipated Discharge Disposition (PT): other (see comments) (if pt has support at home, may be appropriate for home with home health and 24/7 supervision/assist)

## 2024-07-04 NOTE — THERAPY EVALUATION
Acute Care - Occupational Therapy Initial Evaluation   Harvinder     Patient Name: Joaquín Rogers  : 1927  MRN: [id number]  Today's Date: 2024             Admit Date: 2024       ICD-10-CM ICD-9-CM   1. Complete intestinal obstruction, unspecified cause  K56.601 560.9   2. Intestinal obstruction, unspecified cause, unspecified whether partial or complete  K56.609 560.9   3. 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     Patient Active Problem List   Diagnosis    COVID-19    Acute hypoxemic respiratory failure due to COVID-19    Symptomatic bradycardia    Debility    Bowel obstruction    Indeterminate colitis    Severe malnutrition     Past Medical History:   Diagnosis Date    Arthritis     Bradycardia     Enlarged prostate     Pacemaker     Thyroid disease      Past Surgical History:   Procedure Laterality Date    CARDIAC ELECTROPHYSIOLOGY PROCEDURE N/A 2021    Procedure: Pacemaker DC new;  Surgeon: Kendrick Burgess MD;  Location: [facility] CATH INVASIVE LOCATION;  Service: Cardiology;  Laterality: N/A;    HERNIA REPAIR Right     left inguinal/right    INSERT / REPLACE / REMOVE PACEMAKER  2021    [employer] device    SIGMOIDOSCOPY N/A 2024    Procedure: FLEXIBLE SIGMOIDOSCOPY WITH BIOPSY;  Surgeon: Dilma Yanez MD;  Location: [facility] OR;  Service: General;  Laterality: N/A;         OT ASSESSMENT FLOWSHEET (Last 12 Hours)       OT Evaluation and Treatment       Row Name 24 1014                   OT Time and Intention    Document Type evaluation  -KR        Mode of Treatment occupational therapy  -KR        Patient Effort adequate  -KR           General Information    General Observations of Patient alert/cooperative  -KR        Prior Level of Function independent:  -KR           Living Environment    Current Living Arrangements home  -KR        People in Home spouse;child(raquel), adult;grandchild(raquel)  -KR           Home Use of Assistive/Adaptive Equipment    Equipment Currently Used at  Home cane, straight  -KR           Cognition    Affect/Mental Status (Cognition) WFL  -KR        Orientation Status (Cognition) oriented x 3  -KR        Follows Commands (Cognition) WFL  -KR           Range of Motion Comprehensive    Comment, General Range of Motion BUE WFL  -KR           Strength Comprehensive (MMT)    Comment, General Manual Muscle Testing (MMT) Assessment BUE 3-/5  -KR           Activities of Daily Living    BADL Assessment/Intervention bathing;upper body dressing;lower body dressing;grooming;toileting  -KR           Bathing Assessment/Intervention    [place] Level (Bathing) bathing skills;maximum assist (25% patient effort)  -KR           Upper Body Dressing Assessment/Training    [place] Level (Upper Body Dressing) upper body dressing skills;moderate assist (50% patient effort)  -KR           Lower Body Dressing Assessment/Training    [place] Level (Lower Body Dressing) lower body dressing skills;maximum assist (25% patient effort)  -KR           Grooming Assessment/Training    [place] Level (Grooming) grooming skills;moderate assist (50% patient effort)  -KR           Toileting Assessment/Training    [place] Level (Toileting) toileting skills;dependent (less than 25% patient effort)  -KR           Wound 07/01/24 1200 midline abdomen Incision    Wound - Properties Group Placement Date: 07/01/24  -KB Placement Time: 1200  -KB Orientation: midline  -KB Location: abdomen  -KB Primary Wound Type: Incision  -KB    Retired Wound - Properties Group Placement Date: 07/01/24  -KB Placement Time: 1200  -KB Orientation: midline  -KB Location: abdomen  -KB Primary Wound Type: Incision  -KB    Retired Wound - Properties Group Date first assessed: 07/01/24  -KB Time first assessed: 1200  -KB Location: abdomen  -KB Primary Wound Type: Incision  -KB       Plan of Care Review    Plan of Care Reviewed With patient  -KR           Therapy Assessment/Plan (OT)    Planned Therapy  Interventions (OT) activity tolerance training;BADL retraining  -KR           Therapy Plan Review/Discharge Plan (OT)    Anticipated Discharge Disposition (OT) home with assist  -KR           OT Goals    Dressing Goal Selection (OT) dressing, OT goal 1  -KR        Activity Tolerance Goal Selection (OT) activity tolerance, OT goal 1  -KR           Dressing Goal 1 (OT)    Activity/Device (Dressing Goal 1, OT) dressing skills, all  -KR        [place]/Cues Needed (Dressing Goal 1, OT) minimum assist (75% or more patient effort)  -KR        Time Frame (Dressing Goal 1, OT) by discharge  -KR            Activity Tolerance Goal 1 (OT)    Activity Tolerance Goal 1 (OT) Increase to enhance self care/mobility performance  -KR        Activity Level (Endurance Goal 1, OT) 15 min activity  -KR        Time Frame (Activity Tolerance Goal 1, OT) by discharge  -KR                  User Key  (r) = Recorded By, (t) = Taken By, (c) = Cosigned By      Initials Name Effective Dates    Birgit Mckeon RN 05/26/22 -     Frankie Ramirez OT 06/16/21 -                            OT Recommendation and Plan  Planned Therapy Interventions (OT): activity tolerance training, BADL retraining  Plan of Care Review  Plan of Care Reviewed With: patient  Plan of Care Reviewed With: patient        Time Calculation:     Therapy Charges for Today       Code Description Service Date Service Provider Modifiers Qty    [id number]  OT EVAL MOD COMPLEXITY 4 7/4/2024 Frankie Lilly OT GO 1                 Frankie Lilly OT  7/4/2024

## 2024-07-05 LAB — POTASSIUM SERPL-SCNC: 3.2 MMOL/L (ref 3.5–5.2)

## 2024-07-05 PROCEDURE — 99024 POSTOP FOLLOW-UP VISIT: CPT | Performed by: SURGERY

## 2024-07-05 PROCEDURE — 25010000002 THIAMINE HCL 200 MG/2ML SOLUTION 2 ML VIAL: Performed by: INTERNAL MEDICINE

## 2024-07-05 PROCEDURE — 99232 SBSQ HOSP IP/OBS MODERATE 35: CPT | Performed by: INTERNAL MEDICINE

## 2024-07-05 PROCEDURE — 25010000002 HEPARIN (PORCINE) PER 1000 UNITS: Performed by: INTERNAL MEDICINE

## 2024-07-05 PROCEDURE — 25010000002 CYANOCOBALAMIN PER 1000 MCG: Performed by: INTERNAL MEDICINE

## 2024-07-05 PROCEDURE — 84132 ASSAY OF SERUM POTASSIUM: CPT | Performed by: FAMILY MEDICINE

## 2024-07-05 PROCEDURE — 25010000002 THIAMINE PER 100 MG: Performed by: INTERNAL MEDICINE

## 2024-07-05 RX ORDER — CYANOCOBALAMIN 1000 UG/ML
1000 INJECTION, SOLUTION INTRAMUSCULAR; SUBCUTANEOUS DAILY
Status: DISCONTINUED | OUTPATIENT
Start: 2024-07-05 | End: 2024-07-10 | Stop reason: HOSPADM

## 2024-07-05 RX ORDER — FOLIC ACID 1 MG/1
1 TABLET ORAL DAILY
Status: DISCONTINUED | OUTPATIENT
Start: 2024-07-05 | End: 2024-07-10 | Stop reason: HOSPADM

## 2024-07-05 RX ADMIN — MINERAL SUPPLEMENT IRON 300 MG / 5 ML STRENGTH LIQUID 100 PER BOX UNFLAVORED 300 MG: at 08:18

## 2024-07-05 RX ADMIN — HEPARIN SODIUM 5000 UNITS: 5000 INJECTION INTRAVENOUS; SUBCUTANEOUS at 05:34

## 2024-07-05 RX ADMIN — Medication 1 MG: at 21:56

## 2024-07-05 RX ADMIN — THIAMINE HYDROCHLORIDE 500 MG: 100 INJECTION, SOLUTION INTRAMUSCULAR; INTRAVENOUS at 21:51

## 2024-07-05 RX ADMIN — FINASTERIDE 5 MG: 5 TABLET, FILM COATED ORAL at 08:18

## 2024-07-05 RX ADMIN — Medication 1 TABLET: at 21:53

## 2024-07-05 RX ADMIN — POTASSIUM CHLORIDE 40 MEQ: 1.5 POWDER, FOR SOLUTION ORAL at 05:34

## 2024-07-05 RX ADMIN — HEPARIN SODIUM 5000 UNITS: 5000 INJECTION INTRAVENOUS; SUBCUTANEOUS at 21:53

## 2024-07-05 RX ADMIN — Medication 1 TABLET: at 08:18

## 2024-07-05 RX ADMIN — THIAMINE HYDROCHLORIDE 500 MG: 100 INJECTION, SOLUTION INTRAMUSCULAR; INTRAVENOUS at 16:10

## 2024-07-05 RX ADMIN — LUBIPROSTONE 8 MCG: 8 CAPSULE, GELATIN COATED ORAL at 08:18

## 2024-07-05 RX ADMIN — CYANOCOBALAMIN 1000 MCG: 1000 INJECTION, SOLUTION INTRAMUSCULAR at 21:56

## 2024-07-05 RX ADMIN — MINERAL SUPPLEMENT IRON 300 MG / 5 ML STRENGTH LIQUID 100 PER BOX UNFLAVORED 300 MG: at 21:53

## 2024-07-05 RX ADMIN — POLYETHYLENE GLYCOL (3350) 17 G: 17 POWDER, FOR SOLUTION ORAL at 08:18

## 2024-07-05 RX ADMIN — LANSOPRAZOLE 30 MG: 30 TABLET, ORALLY DISINTEGRATING ORAL at 08:18

## 2024-07-05 RX ADMIN — ASPIRIN 81 MG: 81 TABLET, CHEWABLE ORAL at 08:18

## 2024-07-05 RX ADMIN — LEVOTHYROXINE SODIUM 100 MCG: 0.1 TABLET ORAL at 08:18

## 2024-07-05 RX ADMIN — Medication 10 ML: at 21:53

## 2024-07-05 RX ADMIN — HEPARIN SODIUM 5000 UNITS: 5000 INJECTION INTRAVENOUS; SUBCUTANEOUS at 13:38

## 2024-07-05 RX ADMIN — LUBIPROSTONE 8 MCG: 8 CAPSULE, GELATIN COATED ORAL at 21:53

## 2024-07-05 RX ADMIN — THIAMINE HYDROCHLORIDE 500 MG: 100 INJECTION, SOLUTION INTRAMUSCULAR; INTRAVENOUS at 09:25

## 2024-07-05 NOTE — PROGRESS NOTES
LOS: 6 days   Patient Care Team:  Uday Roche DO as PCP - General (Internal Medicine)  Ayaka Patel, RN as Ambulatory  ([facility])    Post op day # 4, status post loop colostomy    Subjective     Interval History:  Patient advanced to regular, soft tissue diet.  He has not eaten much today stating that the food is hard and dry.  Bourgeois catheter remains in.  Has not yet ambulated with PT he states he sat up on the side of the bed but has not ambulated.    History taken from patient.      Objective     Vital Signs  Temp:  [97.7 °F (36.5 °C)-98.4 °F (36.9 °C)] 97.7 °F (36.5 °C)  Heart Rate:  [60-77] 68  Resp:  [18] 18  BP: (105-146)/(55-74) 146/72    Physical Exam:  This is a well-developed well-nourished elderly male in no acute distress  HEENT examination: Sclera are anicteric  Abdomen: Bowel sounds are present.  Ostomy in left abdomen with flatus and stool in bag.  Ostomy is edematous but viable  Skin/incisions: Incision sites were inspected and demonstrate no drainage or erythema     Results Review:        Results from last 7 days   Lab Units 07/04/24  0142 07/03/24  0034 07/02/24  0637 07/02/24  0021 07/01/24  2105   LACTATE mmol/L  --   --  1.0 2.4* 2.4*   WBC 10*3/mm3 7.45 8.88  --  9.31  --    HEMOGLOBIN g/dL 10.2* 11.5*  --  10.8*  --    HEMATOCRIT % 31.5* 36.8*  --  34.9*  --    PLATELETS 10*3/mm3 200 228  --  214  --      Results from last 7 days   Lab Units 07/02/24  0930   PH, ARTERIAL pH units 7.372   PO2 ART mm Hg 90.1   PCO2, ARTERIAL mm Hg 36.5   HCO3 ART mmol/L 21.2     Results from last 7 days   Lab Units 07/05/24  0743 07/04/24  1831 07/04/24  0431 07/03/24  0034 07/02/24  0021   SODIUM mmol/L  --   --  142 140 141   POTASSIUM mmol/L 3.2* 3.1* 2.9* 3.8 4.0   MAGNESIUM mg/dL  --   --  1.7 2.0 2.0   CHLORIDE mmol/L  --   --  111* 110* 112*   CO2 mmol/L  --   --  21.9* 21.6* 17.5*   BUN mg/dL  --   --  9 16 20   CREATININE mg/dL  --   --  0.75* 1.10 1.19   CALCIUM mg/dL   "--   --  7.3* 7.9* 7.5*   GLUCOSE mg/dL  --   --  104* 104* 129*   ALBUMIN g/dL  --   --  2.2* 2.7* 2.4*   BILIRUBIN mg/dL  --   --  0.3 0.5 0.3   ALK PHOS U/L  --   --  100 112 91   AST (SGOT) U/L  --   --  20 22 21   ALT (SGPT) U/L  --   --  10 11 9   Estimated Creatinine Clearance: 40.1 mL/min (A) (by C-G formula based on SCr of 0.75 mg/dL (L)).  No results found for: \"AMMONIA\"      No results found for: \"BLOODCX\"  No results found for: \"URINECX\"  No results found for: \"WOUNDCX\"  No results found for: \"STOOLCX\"    Imaging:  Imaging Results (Last 24 Hours)       ** No results found for the last 24 hours. **             Impression:  Postop day 4, status post diverting loop colostomy    Plan:  Leave xiao in place for now   physical therapy  Advance to regular diet  Wait until a.m. to remove Christelle Kyle MD  07/05/24  14:30 EDT      Please note that portions of this note were completed with a voice recognition program.    "

## 2024-07-05 NOTE — PROGRESS NOTES
[facility] HOSPITALIST PROGRESS NOTE     Patient Identification:  Name:  Joaquín Rogers  Age:  89 y.o.  Sex:  male  :  1927  MRN:  [id number]  Visit Number:  [id number]  ROOM: [address] [place] [address]     Primary Care Provider:  Uday Roche DO     Date of Admission: 2024    Length of stay in inpatient status:  6    Subjective     Chief Compliant:    Chief Complaint   Patient presents with    Abdominal Pain     History of Presenting Illness: The patient was lying in bed when I saw him.  He is now eating some solid food and he states that he is doing well with that, with no nausea, vomiting, or diarrhea as a result.  He denies any chest pain, trouble breathing, nausea, vomiting, and diarrhea.  Patient also denies any abdominal pain and denies distention.    Objective     Current Hospital Meds:  aspirin, 81 mg, Per G Tube, Daily  Calcium Carb-Cholecalciferol, 1 tablet, Oral, BID  Ferrous Sulfate, 300 mg, Nasogastric, BID  finasteride, 5 mg, Oral, Daily  heparin (porcine), 5,000 Units, Subcutaneous, Q8H  lansoprazole, 30 mg, Oral, QAM AC  levothyroxine, 100 mcg, Oral, QAM AC  lubiprostone, 8 mcg, Oral, BID  multivitamin, 1 tablet, Oral, Daily  polyethylene glycol, 17 g, Oral, Daily  sodium chloride, 10 mL, Intravenous, Q12H  thiamine (B-1) IV, 500 mg, Intravenous, TID       Current Antimicrobial Therapy:  Anti-Infectives (From admission, onward)      None          Current Diuretic Therapy:  Diuretics (From admission, onward)      None          ----------------------------------------------------------------------------------------------------------------------  Vital Signs:  Temp:  [97.7 °F (36.5 °C)-98.4 °F (36.9 °C)] 97.7 °F (36.5 °C)  Heart Rate:  [60-77] 68  Resp:  [18] 18  BP: (105-146)/(55-74) 146/72  SpO2:  [94 %-99 %] 99 %  on  Flow (L/min):  [2-3.5] 2;   Device (Oxygen Therapy): nasal cannula  Body mass index is 16.88 kg/m².    Wt Readings from Last 3 Encounters:   24 50.3 kg (111 lb)    05/31/24 68 kg (150 lb)   04/01/24 67.1 kg (148 lb)     Intake & Output (last 3 days)         07/02 0701 07/03 0700 07/03 0701 07/04 0700 07/04 0701 07/05 0700 07/05 0701 07/06 0700    P.O. 420 720 720 240    I.V. (mL/kg) 2001.8 (39.8) 2157.1 (42.9) 200 (4)     Other 180       IV Piggyback        Total Intake(mL/kg) 2601.8 (51.7) 2877.1 (57.2) 920 (18.3) 240 (4.8)    Urine (mL/kg/hr) 1025 (0.8) 1250 (1) 1050 (0.9)     Emesis/NG output 350       Stool 5295 464 9888 650    Total Output 2625 1850 3750 650    Net -23.2 +1027.1 -2830 -410            Stool Unmeasured Occurrence 1 x             Diet: Regular/House; Texture: Soft to Chew (NDD 3); Soft to Chew: Whole Meat; Fluid Consistency: Thin (IDDSI 0)  ----------------------------------------------------------------------------------------------------------------------  Physical Exam; his exam is similar to yesterday.  Vitals and nursing note reviewed.   Constitutional:       General: He is awake. He is not in acute distress.     Appearance: He is well-developed. He is not ill-appearing, toxic-appearing or diaphoretic.      Comments: Appears chronically ill.   HENT:      Head: Normocephalic and atraumatic.   Cardiovascular:      Rate and Rhythm: Normal rate and regular rhythm.      Pulses: Normal pulses.      Heart sounds: No murmur heard.  Pulmonary:      Effort: Pulmonary effort is normal. No respiratory distress.      Breath sounds: No wheezing or rales.   Musculoskeletal:         General: No swelling or deformity.   Skin:     Capillary Refill: Capillary refill takes less than 2 seconds.      Coloration: Skin is not jaundiced or pale.   Neurological:      Mental Status: He is alert and oriented to person, place, and time. Mental status is at baseline.      Cranial Nerves: No cranial nerve deficit.   Psychiatric:         Mood and Affect: Mood normal.         Behavior: Behavior normal. Behavior is cooperative.         Thought Content: Thought content normal.          Judgment: Judgment normal.   ----------------------------------------------------------------------------------------------------------------------  Tele:  None  ----------------------------------------------------------------------------------------------------------------------  LABS:    Pending test results: None     Latest Reference Range & Units 07/04/24 01:42   Vitamin B-12 211 - 946 pg/mL 204 (L)   Folate 4.78 - 24.20 ng/mL 4.85     CBC and coagulation:  Results from last 7 days   Lab Units 07/04/24  0142 07/03/24  0034 07/02/24  0637 07/02/24  0021 07/01/24  2105 07/01/24  1834 07/01/24  1547 07/01/24  0116 06/30/24  0059 06/29/24  1358   LACTATE mmol/L  --   --  1.0 2.4* 2.4* 2.2* 2.3*  --   --   --    WBC 10*3/mm3 7.45 8.88  --  9.31  --   --  10.72 6.05 7.25 5.65   HEMOGLOBIN g/dL 10.2* 11.5*  --  10.8*  --   --  12.2* 11.8* 12.5* 12.4*   HEMATOCRIT % 31.5* 36.8*  --  34.9*  --   --  38.6 37.2* 40.0 39.0   MCV fL 88.0 89.8  --  90.9  --   --  87.9 89.0 89.3 87.2   MCHC g/dL 32.4 31.3*  --  30.9*  --   --  31.6 31.7 31.3* 31.8   PLATELETS 10*3/mm3 200 228  --  214  --   --  239 242 216 203     Acid/base balance:  Results from last 7 days   Lab Units 07/02/24  0930 07/01/24  1847 07/01/24  1540 07/01/24  1425   PH, ARTERIAL pH units 7.372 7.389 7.309* 7.241*   PCO2, ARTERIAL mm Hg 36.5 32.8* 39.8 51.0*   PO2 ART mm Hg 90.1 181.0* 212.0* 63.7*   HCO3 ART mmol/L 21.2 19.8* 20.0 21.9     Renal and electrolytes:  Results from last 7 days   Lab Units 07/05/24  0743 07/04/24  1831 07/04/24  0431 07/04/24  0142 07/03/24  1821 07/03/24  0034 07/02/24  0021 07/01/24  1547 07/01/24  0116 06/30/24  1526 06/30/24  0059 06/29/24  1513   SODIUM mmol/L  --   --  142  --   --  140 141 143 144  --  139 141   POTASSIUM mmol/L 3.2* 3.1* 2.9*  --   --  3.8 4.0 4.3 3.4*   < > 3.4* 3.3*   MAGNESIUM mg/dL  --   --  1.7  --   --  2.0 2.0  --   --   --  2.2 2.1   CHLORIDE mmol/L  --   --  111*  --   --  110* 112* 111* 110*  --   107 106   CO2 mmol/L  --   --  21.9*  --   --  21.6* 17.5* 20.3* 19.8*  --  19.7* 23.7   BUN mg/dL  --   --  9  --   --  16 20 19 18  --  17 16   CREATININE mg/dL  --   --  0.75*  --   --  1.10 1.19 1.08 1.18  --  1.13 1.22   CALCIUM mg/dL  --   --  7.3*  --   --  7.9* 7.5* 7.9* 8.1*  --  8.4 8.4   IONIZED CALCIUM mmol/L  --   --   --  1.01*  --   --   --   --   --   --   --   --    PHOSPHORUS mg/dL  --   --   --  2.4* 3.0 1.0*  --   --   --   --   --   --    GLUCOSE mg/dL  --   --  104*  --   --  104* 129* 151* 127*  --  137* 119*   ANION GAP mmol/L  --   --  9.1  --   --  8.4 11.5 11.7 14.2  --  12.3 11.3    < > = values in this interval not displayed.     Estimated Creatinine Clearance: 40.1 mL/min (A) (by C-G formula based on SCr of 0.75 mg/dL (L)).    Liver and pancreatic function:  Results from last 7 days   Lab Units 07/04/24  0431 07/03/24  0034 07/02/24  0021 07/01/24  1547 07/01/24  0116 06/29/24  1513   ALBUMIN g/dL 2.2* 2.7* 2.4* 3.1* 3.1* 3.1*   BILIRUBIN mg/dL 0.3 0.5 0.3 0.4 0.3 0.5   ALK PHOS U/L 100 112 91 111 119* 121*   AST (SGOT) U/L 20 22 21 29 20 21   ALT (SGPT) U/L 10 11 9 9 10 11   LIPASE U/L  --   --   --   --   --  23     Endocrine function:  Lab Results   Component Value Date    HGBA1C 5.80 (H) 03/14/2023     Point of care bedside glucose levels:  Results from last 7 days   Lab Units 07/01/24  1503 07/01/24  1137   GLUCOSE mg/dL 128 108     Glucose levels from the CMP:  Results from last 7 days   Lab Units 07/04/24  0431 07/03/24  0034 07/02/24  0021 07/01/24  1547 07/01/24  0116 06/30/24  0059 06/29/24  1513   GLUCOSE mg/dL 104* 104* 129* 151* 127* 137* 119*     Lab Results   Component Value Date    TSH 3.870 06/29/2024    FREET4 1.48 03/14/2023       I have personally looked at the labs and they are summarized above.    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,  status post creation of a loop descending colon colostomy this admission  -Postoperative hypoxic hypercapnic respiratory failure, still requiring 3.5 L/min nasal cannula oxygen  -Severe chronic illness malnutrition that was present on admission, which complicates all aspects of care  -Vitamin B12 deficiency and borderline low folate, present on admission  -Acute hypokalemia and acute hypophosphatemia, improving with supplementation  -History of hypothyroidism  -History of GERD  -History of bradycardia, status post pacemaker placement in the distant past  -History of bilateral inguinal hernias    Patient is tolerating solid food and thus we will continue with strict input and output measurements.  The patient is producing stool into his ostomy.  Hopefully, the patient be able to go home in the next few days; [facility].  Patient is to continue working with PT and OT.  I will order B12 injection daily for 7 days and then PCP can schedule the shots once a week and then once monthly.  I have started the patient on oral folic acid supplementation.  The patient is currently on his home oxygen dose.  His blood pressures are controlled.  The glucose levels are at goal and controlled.  I will repeat the patient's blood work in the morning.  Hopefully, the [facility] will be [facility] up soon and the patient can go home with his son.  We will continue with the potassium and phosphorus replacement per our protocol; will repeat the blood work in the morning.    VTE Prophylaxis:  Subcutaneous heparin     The patient is high risk due to the following diagnoses/reasons:   Rectal cancer causing a bowel blockage, advanced age     Disposition: Hopefully home in 1 to 2 days.    Neymar rCowe MD  [facility]  07/05/24  14:22 EDT

## 2024-07-05 NOTE — CASE MANAGEMENT/SOCIAL WORK
Discharge Planning Assessment  [facility]     Patient Name: Joaquín Rogers  MRN: [id number]  Today's Date: 7/5/2024    Admit Date: 6/29/2024         Discharge Plan       Row Name 07/05/24 1501       Plan    Plan Pt's son Clark has been appointed emergency guardian. Pt lives at home with spouse, adult son and adult grandson. Pt's son and grandson are pt and spouse's caregivers. Pt does not utilize HH services at this time. Pt has (s) cane, walker and BSC via unknown provider. Pt is a . Pt's Guardian has declined SNF placement, prefers for Pt to return home at discharge. Pt would benefit from home health services for skilled nursing. Pt may benefit from a hospital bed or home oxygen. If HH or DME is needed at discharge, a [facility] request for service form will need completed and signed by Physician discharging Pt. Pt discussed with Dr. Crowe who states Pt is not medically stable for discharge at this time. SS to follow.                    MARIAMA [place]

## 2024-07-05 NOTE — PAYOR COMM NOTE
"CONTACT: MELISSA REY RN  UTILIZATION MANAGEMENT DEPT.  [facility]  [address] [id number]  PHONE: [phone]  FAX: [phone]        CLINICAL UPDATE    [id number]         Rashmi Marroquin (89 y.o. Male)       Date of Birth   05/19/1927    Social Security Number       Address   [address] [address]    Home Phone   [phone]    N   [phone]       [place]   None    Marital Status                               Admission Date   6/29/24    Admission Type   Emergency    Admitting Provider   Woody Lam MD    Attending Provider   Neymar Crowe MD    Department, Room/Bed   [address]       Discharge Date       Discharge Disposition       Discharge Destination                                 Attending Provider: Neymar Crowe MD    Allergies: No Known Allergies    Isolation: None   Infection: None   Code Status: No CPR    Ht: 172.7 cm (67.99\")   Wt: 50.3 kg (111 lb)    Admission Cmt: None   Principal Problem: Bowel obstruction [K56.609]                   Active Insurance as of 6/29/2024       Primary Coverage       Payor Plan Insurance Group Employer/Plan Group    MEDICARE MEDICARE A & B        Payor Plan Address Payor Plan Phone Number Payor Plan Fax Number Effective Dates    [address] [phone]  5/1/1992 - None Entered    [facility] [id number]         Subscriber Name Subscriber Birth Date Member ID       RASHMI MARROQUIN 5/19/1927 [id number]               Secondary Coverage       Payor Plan Insurance Group Employer/Plan Group    [facility] DE[id number]        Payor Plan Address Payor Plan Phone Number Payor Plan Fax Number Effective Dates    [facility] OFFICE OF COMMUNITY CARE [phone]  1/1/2021 - None Entered    [address] [address]       [facility] [address]         Subscriber Name Subscriber Birth Date Member ID       RASHMI MARROQUIN 5/19/1927 [id number]               Tertiary Coverage       Payor " Plan Insurance Group Employer/Plan Group    [facility] CCN OPTUM        Payor Plan Address Payor Plan Phone Number Payor Plan Fax Number Effective Dates    [address] [phone]  1/1/2023 - None Entered    [facility] [id number]         Subscriber Name Subscriber Birth Date Member ID       RASHMI MARROQUIN 5/19/1927 [id number]                     Emergency Contacts        (Rel.) Home Phone Work Phone Mobile Phone    Clark Marroquin (Legal Guardian) -- -- [phone]    MAYRA MARROQUIN (Spouse) [phone] -- [phone]    Rustam Marroquin (Son) -- -- [phone]              Current Facility-Administered Medications   Medication Dose Route Frequency Provider Last Rate Last Admin    artificial tears ophthalmic ointment   Both Eyes Q1H PRN Woody Lam MD        aspirin chewable tablet 81 mg  81 mg Per G Tube Daily Woody Lam MD   81 mg at 07/04/24 0816    sennosides-docusate (PERICOLACE) 8.6-50 MG per tablet 2 tablet  2 tablet Oral BID PRN Woody Lma MD        And    polyethylene glycol (MIRALAX) packet 17 g  17 g Oral Daily PRN Woody Lam MD        And    bisacodyl (DULCOLAX) EC tablet 5 mg  5 mg Oral Daily PRN Woody Lam MD        And    bisacodyl (DULCOLAX) suppository 10 mg  10 mg Rectal Daily PRN Woody Lam MD        Calcium Carb-Cholecalciferol 600-20 MG-MCG tablet 1 tablet  1 tablet Oral BID Woody Lam MD   1 tablet at 07/04/24 2104    Calcium Replacement - Follow Nurse / BPA Driven Protocol   Does not apply PRN Woody Lam MD        Ferrous Sulfate 300 (60 Fe) MG/5ML solution 300 mg  300 mg Nasogastric BID Woody Lam MD   300 mg at 07/04/24 2104    finasteride (PROSCAR) tablet 5 mg  5 mg Oral Daily Woody Lam MD   5 mg at 07/04/24 0816    heparin (porcine) 5000 UNIT/ML injection 5,000 Units  5,000 Units Subcutaneous Q8H Woody Lam MD   5,000 Units at 07/05/24 0534    lansoprazole (PREVACID SOLUTAB) disintegrating tablet Tablet  Delayed Release Dispersible 30 mg  30 mg Oral QAM AC Neymar Crowe MD   30 mg at 07/04/24 0817    levothyroxine (SYNTHROID, LEVOTHROID) tablet 100 mcg  100 mcg Oral QAM AC Woody Lam MD   100 mcg at 07/04/24 0816    lubiprostone (AMITIZA) capsule 8 mcg  8 mcg Oral BID Woody Lam MD   8 mcg at 07/04/24 2112    Magnesium Standard Dose Replacement - Follow Nurse / BPA Driven Protocol   Does not apply PRN Woody Lam MD        morphine injection 2 mg  2 mg Intravenous Q3H PRN Woody Lam MD   2 mg at 07/02/24 2101    multivitamin (THERAGRAN) tablet 1 tablet  1 tablet Oral Daily Woody Lam MD   1 tablet at 07/04/24 0816    ondansetron (ZOFRAN) injection 4 mg  4 mg Intravenous Q6H PRN Woody Lam MD   4 mg at 06/30/24 1143    Phosphorus Replacement - Follow Nurse / BPA Driven Protocol   Does not apply PRN Woody Lam MD        polyethylene glycol (MIRALAX) packet 17 g  17 g Oral Daily Woody Lam MD   17 g at 07/04/24 0817    potassium chloride (KLOR-CON) packet 40 mEq  40 mEq Oral Q4H Jung Kendrick MD   40 mEq at 07/05/24 0534    Potassium Replacement - Follow Nurse / BPA Driven Protocol   Does not apply PRWoody Awad MD        sodium chloride 0.9 % flush 10 mL  10 mL Intravenous PRN Woody Lam MD        sodium chloride 0.9 % flush 10 mL  10 mL Intravenous Q12H Woody Lam MD   10 mL at 07/04/24 2104    sodium chloride 0.9 % flush 10 mL  10 mL Intravenous PRN Woody Lam MD        sodium chloride 0.9 % infusion 40 mL  40 mL Intravenous PRN Woody Lam MD        thiamine (B-1) 500 mg in sodium chloride 0.9 % 100 mL IVPB  500 mg Intravenous TID Neymar Crowe  mL/hr at 07/04/24 2104 500 mg at 07/04/24 2104     Orders (last 48 hrs)        Start     Ordered    07/05/24 0850  Potassium  Timed         07/04/24 1949    07/05/24 0100  potassium chloride (KLOR-CON) packet 40 mEq  Every 4 Hours         07/04/24 2200    07/04/24 2100  potassium chloride  (KLOR-CON M20) CR tablet 40 mEq  Every 4 Hours,   Status:  Discontinued         07/04/24 1949 07/04/24 1948  Diet: Regular/House; Texture: Soft to Chew (NDD 3); Soft to Chew: Whole Meat; Fluid Consistency: Thin (IDDSI 0)  Diet Effective Now         07/04/24 1947 07/04/24 1833  Potassium  Timed         07/04/24 0533    07/04/24 1740  Diet: Liquid; Full Liquid; Fluid Consistency: Thin (IDDSI 0)  Diet Effective Now,   Status:  Canceled         07/04/24 1741    07/04/24 0730  lansoprazole (PREVACID SOLUTAB) disintegrating tablet Tablet Delayed Release Dispersible 30 mg  Every Morning Before Breakfast         07/03/24 0850    07/04/24 0600  Comprehensive Metabolic Panel  Morning Draw         07/03/24 1925 07/04/24 0600  CBC & Differential  Morning Draw         07/03/24 1925 07/04/24 0600  Magnesium  Morning Draw         07/03/24 1925 07/04/24 0600  Phosphorus  Morning Draw         07/03/24 1925 07/04/24 0600  PTH, Intact  Morning Draw         07/03/24 1925 07/04/24 0600  Calcium, Ionized  Morning Draw         07/03/24 1925    07/04/24 0600  Folate  Morning Draw         07/03/24 1938    07/04/24 0600  Vitamin B12  Morning Draw         07/03/24 1938    07/04/24 0600  CBC Auto Differential  PROCEDURE ONCE         07/03/24 2202    07/04/24 0600  potassium chloride (KLOR-CON M20) CR tablet 40 mEq  Every 4 Hours         07/04/24 0533    07/04/24 0335  Scan Slide  Once         07/04/24 0334    07/03/24 2100  thiamine (B-1) 500 mg in sodium chloride 0.9 % 100 mL IVPB  3 Times Daily         07/03/24 1938 07/03/24 1938  PT Consult: Eval & Treat as tolerated  Once         07/03/24 1938 07/03/24 1938  OT Consult: Eval & Treat  Once         07/03/24 1938 07/03/24 1935  Advance Diet As Tolerated -Regular diet with thin liquids  Until Discontinued         07/03/24 1935 07/03/24 1810  Phosphorus  Timed         07/03/24 0209    07/03/24 0800  Remove midline incision dressing, leave open to air.  Nursing  "Communication  Once        Comments: Remove midline incision dressing, leave open to air.    07/02/24 1440    07/03/24 0300  sodium phosphates 15 mmol in 250 mL 0.9% sodium chloride IVPB  Every 3 Hours         07/03/24 0209    07/02/24 1428  Assess Stoma  Every Shift       07/02/24 1428    07/02/24 0800  Urinary Catheter Care  Every Shift      Placed in \"And\" Linked Group    07/02/24 0759    07/02/24 0730  levothyroxine (SYNTHROID, LEVOTHROID) tablet 100 mcg  Every Morning Before Breakfast         07/01/24 1548    07/02/24 0730  lansoprazole (PREVACID SOLUTAB) disintegrating tablet Tablet Delayed Release Dispersible 30 mg  Every Morning Before Breakfast,   Status:  Discontinued         07/01/24 1548    07/01/24 2100  Calcium Carb-Cholecalciferol 600-20 MG-MCG tablet 1 tablet  2 Times Daily         07/01/24 1548    07/01/24 2100  lubiprostone (AMITIZA) capsule 8 mcg  2 Times Daily         07/01/24 1548    07/01/24 2100  Ferrous Sulfate 300 (60 Fe) MG/5ML solution 300 mg  2 Times Daily         07/01/24 1548    07/01/24 1645  finasteride (PROSCAR) tablet 5 mg  Daily         07/01/24 1548    07/01/24 1645  multivitamin (THERAGRAN) tablet 1 tablet  Daily         07/01/24 1548    07/01/24 1645  polyethylene glycol (MIRALAX) packet 17 g  Daily         07/01/24 1548    07/01/24 1645  aspirin chewable tablet 81 mg  Daily         07/01/24 1548    07/01/24 1548  artificial tears ophthalmic ointment  Every 1 Hour PRN         07/01/24 1548    07/01/24 1430  fentaNYL 2500 mcg in 250 mL NS infusion  Titrated,   Status:  Discontinued         07/01/24 1428    07/01/24 1429  propofol (DIPRIVAN) infusion 10 mg/mL 100 mL  Titrated,   Status:  Discontinued         07/01/24 1428    06/30/24 1312  morphine injection 2 mg  Every 3 Hours PRN         06/30/24 1312    06/30/24 0800  Oral Care  2 Times Daily       06/29/24 1811    06/29/24 2200  heparin (porcine) 5000 UNIT/ML injection 5,000 Units  Every 8 Hours Scheduled         06/29/24 5106 " "   06/29/24 2200  Incentive Spirometry  Every 4 Hours While Awake       06/29/24 1811 06/29/24 2100  sodium chloride 0.9 % flush 10 mL  Every 12 Hours Scheduled         06/29/24 1811 06/29/24 2000  Vital Signs  Every 4 Hours       06/29/24 1811 06/29/24 1812  Intake & Output  Every Shift       06/29/24 1811 06/29/24 1811  sodium chloride 0.9 % flush 10 mL  As Needed         06/29/24 1811 06/29/24 1811  sodium chloride 0.9 % infusion 40 mL  As Needed         06/29/24 1811 06/29/24 1811  Potassium Replacement - Follow Nurse / BPA Driven Protocol  As Needed         06/29/24 1811 06/29/24 1811  Magnesium Standard Dose Replacement - Follow Nurse / BPA Driven Protocol  As Needed         06/29/24 1811 06/29/24 1811  Phosphorus Replacement - Follow Nurse / BPA Driven Protocol  As Needed         06/29/24 1811 06/29/24 1811  Calcium Replacement - Follow Nurse / BPA Driven Protocol  As Needed         06/29/24 1811 06/29/24 1811  sennosides-docusate (PERICOLACE) 8.6-50 MG per tablet 2 tablet  2 Times Daily PRN        Placed in \"And\" Linked Group    06/29/24 1811 06/29/24 1811  polyethylene glycol (MIRALAX) packet 17 g  Daily PRN        Placed in \"And\" Linked Group    06/29/24 1811 06/29/24 1811  bisacodyl (DULCOLAX) EC tablet 5 mg  Daily PRN        Placed in \"And\" Linked Group    06/29/24 1811    06/29/24 1811  bisacodyl (DULCOLAX) suppository 10 mg  Daily PRN        Placed in \"And\" Linked Group    06/29/24 1811    06/29/24 1641  ondansetron (ZOFRAN) injection 4 mg  Every 6 Hours PRN         06/29/24 1641    06/29/24 1404  sodium chloride 0.9 % infusion  Continuous,   Status:  Discontinued         06/29/24 1348    06/29/24 1348  sodium chloride 0.9 % flush 10 mL  As Needed        Placed in \"And\" Linked Group    06/29/24 1348    Unscheduled  Empty Pouch As Needed  As Needed       07/02/24 1428    Unscheduled  Change Pouch Immediately if Leaking  As Needed       07/02/24 1428    --  tamsulosin " (FLOMAX) 0.4 MG capsule 24 hr capsule  Daily         07/01/24 1413    --  levothyroxine (SYNTHROID, LEVOTHROID) 100 MCG tablet  Daily         07/01/24 1415    --  Multiple Vitamins-Minerals (ICAPS AREDS 2 PO)  Daily         07/01/24 1415                     Physician Progress Notes (last 48 hours)        Chika Kyle MD at 07/04/24 1312               LOS: 5 days   Patient Care Team:  Uday Roche DO as PCP - General (Internal Medicine)  Ayaka Patel, RN as Ambulatory  ([facility])    Post op day # 3, status post loop colostomy    Subjective     Interval History:  Patient is currently on a clear liquid diet.  Yesterday he did not want to advance but today states he feels better and would like to have some food.  He states he sat up on the side of the bed but has not ambulated.    History taken from patient.      Objective     Vital Signs  Temp:  [97.8 °F (36.6 °C)-98.7 °F (37.1 °C)] 97.8 °F (36.6 °C)  Heart Rate:  [66-74] 67  Resp:  [16-18] 18  BP: (131-166)/(63-83) 166/83    Physical Exam:  This is a well-developed well-nourished elderly male in no acute distress  HEENT examination: Sclera are anicteric  Abdomen: Bowel sounds are present.  Ostomy in left abdomen with flatus and stool in bag.  Ostomy is edematous but viable  Skin/incisions: Incision sites were inspected and demonstrate no drainage or erythema     Results Review:        Results from last 7 days   Lab Units 07/04/24  0142 07/03/24  0034 07/02/24  0637 07/02/24  0021 07/01/24  2105   LACTATE mmol/L  --   --  1.0 2.4* 2.4*   WBC 10*3/mm3 7.45 8.88  --  9.31  --    HEMOGLOBIN g/dL 10.2* 11.5*  --  10.8*  --    HEMATOCRIT % 31.5* 36.8*  --  34.9*  --    PLATELETS 10*3/mm3 200 228  --  214  --      Results from last 7 days   Lab Units 07/02/24  0930   PH, ARTERIAL pH units 7.372   PO2 ART mm Hg 90.1   PCO2, ARTERIAL mm Hg 36.5   HCO3 ART mmol/L 21.2     Results from last 7 days   Lab Units 07/04/24  0431 07/03/24  0034  "24  0021   SODIUM mmol/L 142 140 141   POTASSIUM mmol/L 2.9* 3.8 4.0   MAGNESIUM mg/dL 1.7 2.0 2.0   CHLORIDE mmol/L 111* 110* 112*   CO2 mmol/L 21.9* 21.6* 17.5*   BUN mg/dL 9 16 20   CREATININE mg/dL 0.75* 1.10 1.19   CALCIUM mg/dL 7.3* 7.9* 7.5*   GLUCOSE mg/dL 104* 104* 129*   ALBUMIN g/dL 2.2* 2.7* 2.4*   BILIRUBIN mg/dL 0.3 0.5 0.3   ALK PHOS U/L 100 112 91   AST (SGOT) U/L 20 22 21   ALT (SGPT) U/L 10 11 9   Estimated Creatinine Clearance: 40.1 mL/min (A) (by C-G formula based on SCr of 0.75 mg/dL (L)).  No results found for: \"AMMONIA\"      No results found for: \"BLOODCX\"  No results found for: \"URINECX\"  No results found for: \"WOUNDCX\"  No results found for: \"STOOLCX\"    Imaging:  Imaging Results (Last 24 Hours)       ** No results found for the last 24 hours. **             Impression:  Postop day 3, status post diverting loop colostomy    Plan:  Leave xiao in place at least 7 to 10 days postprocedure  Physical therapy  Advance to regular diet    Chika Kyle MD  24  13:12 EDT      Please note that portions of this note were completed with a voice recognition program.      Electronically signed by Chika Kyle MD at 24       Jung Kendrick MD at 24 [facility]9              [facility]  PROGRESS NOTE     Patient Identification:  Name:  Joaquín Rogers  Age:  89 y.o.  Sex:  male  :  1927  MRN:  [id number]  Visit Number:  [id number]  ROOM: [address] [place] [address]     Primary Care Provider:  Uday Roche DO    Length of stay in inpatient status:  5    Subjective     Chief Compliant:    Chief Complaint   Patient presents with    Abdominal Pain       History of Presenting Illness: 89-year-old gentleman who is status post distal large bowel obstruction secondary to adenocarcinoma with circumferential rectal mass with bowel resection and creation of loop descending colon colostomy.  Patient states he is doing reasonably well and feels that he is breathing reasonably well is to. "  Has no new complaints at this time    Objective     Current Hospital Meds:aspirin, 81 mg, Per G Tube, Daily  Calcium Carb-Cholecalciferol, 1 tablet, Oral, BID  Ferrous Sulfate, 300 mg, Nasogastric, BID  finasteride, 5 mg, Oral, Daily  heparin (porcine), 5,000 Units, Subcutaneous, Q8H  lansoprazole, 30 mg, Oral, QAM AC  levothyroxine, 100 mcg, Oral, QAM AC  lubiprostone, 8 mcg, Oral, BID  multivitamin, 1 tablet, Oral, Daily  polyethylene glycol, 17 g, Oral, Daily  potassium chloride ER, 40 mEq, Oral, Q4H  sodium chloride, 10 mL, Intravenous, Q12H  thiamine (B-1) IV, 500 mg, Intravenous, TID       ----------------------------------------------------------------------------------------------------------------------  Vital Signs:  Temp:  [97.8 °F (36.6 °C)-98.7 °F (37.1 °C)] 97.8 °F (36.6 °C)  Heart Rate:  [66-74] 67  Resp:  [16-18] 18  BP: (131-166)/(63-83) 166/83  SpO2:  [91 %-97 %] 96 %  on  Flow (L/min):  [3.5] 3.5;   Device (Oxygen Therapy): nasal cannula  Body mass index is 16.88 kg/m².    Wt Readings from Last 3 Encounters:   07/01/24 50.3 kg (111 lb)   05/31/24 68 kg (150 lb)   04/01/24 67.1 kg (148 lb)     Intake & Output (last 3 days)         07/01 0701 07/02 0700 07/02 0701 07/03 0700 07/03 0701 07/04 0700 07/04 0701 07/05 0700    P.O. 0 420 720 240    I.V. (mL/kg) 1150.9 (22.9) 2001.8 (39.8) 2157.1 (42.9)     Other  180      IV Piggyback 1000       Total Intake(mL/kg) 2150.9 (42.8) 2601.8 (51.7) 2877.1 (57.2) 240 (4.8)    Urine (mL/kg/hr) 325 (0.3) 1025 (0.8) 1250 (1)     Emesis/NG output 10 350      Stool 200 1250 600 250    Total Output 535 2625 1850 250    Net +1615.9 -23.2 +1027.1 -10            Urine Unmeasured Occurrence 1 x       Stool Unmeasured Occurrence 1 x 1 x            Diet: Liquid; Clear Liquid; Fluid Consistency: Thin (IDDSI 0)  ----------------------------------------------------------------------------------------------------------------------  Physical exam:  Constitutional: Elderly  gentleman in no distress  HEENT: Normocephalic atraumatic  Neck:   Supple  Cardiovascular: Regular rate and rhythm  Pulmonary/Chest: Clear  Abdominal:  .  Positive bowel sounds soft; colostomy bag is intact and is draining at this time.  Colostomy bar in place.  Musculoskeletal: No arthropathy  Neurological: No focal deficits  Skin: No rash  Peripheral vascular: No edema  Genitourinary:  ----------------------------------------------------------------------------------------------------------------------    Last echocardiogram:  Results for orders placed during the hospital encounter of 09/01/21    Adult Transthoracic Echo Complete W/ Cont if Necessary Per Protocol    Interpretation Summary  · Left ventricular wall thickness is consistent with mild concentric hypertrophy.  · Left ventricular ejection fraction appears to be 56 - 60%. Left ventricular systolic function is normal.  · Left ventricular diastolic function is consistent with (grade I) impaired relaxation.  · Normal cardiac chamber dimensions.  · Moderate aortic valve regurgitation is present.  · There is no evidence of pericardial effusion.    ----------------------------------------------------------------------------------------------------------------------  Results from last 7 days   Lab Units 07/04/24  0142 07/03/24  0034 07/02/24  0637 07/02/24  0021 07/01/24  2105   LACTATE mmol/L  --   --  1.0 2.4* 2.4*   WBC 10*3/mm3 7.45 8.88  --  9.31  --    HEMOGLOBIN g/dL 10.2* 11.5*  --  10.8*  --    HEMATOCRIT % 31.5* 36.8*  --  34.9*  --    MCV fL 88.0 89.8  --  90.9  --    MCHC g/dL 32.4 31.3*  --  30.9*  --    PLATELETS 10*3/mm3 200 228  --  214  --      Results from last 7 days   Lab Units 07/02/24  0930   PH, ARTERIAL pH units 7.372   PO2 ART mm Hg 90.1   PCO2, ARTERIAL mm Hg 36.5   HCO3 ART mmol/L 21.2     Results from last 7 days   Lab Units 07/04/24  0431 07/04/24  0142 07/03/24  1821 07/03/24  0034 07/02/24  0021   SODIUM mmol/L 142  --   --  140  "141   POTASSIUM mmol/L 2.9*  --   --  3.8 4.0   MAGNESIUM mg/dL 1.7  --   --  2.0 2.0   CHLORIDE mmol/L 111*  --   --  110* 112*   CO2 mmol/L 21.9*  --   --  21.6* 17.5*   BUN mg/dL 9  --   --  16 20   CREATININE mg/dL 0.75*  --   --  1.10 1.19   CALCIUM mg/dL 7.3*  --   --  7.9* 7.5*   IONIZED CALCIUM mmol/L  --  1.01*  --   --   --    PHOSPHORUS mg/dL  --  2.4* 3.0 1.0*  --    GLUCOSE mg/dL 104*  --   --  104* 129*   ALBUMIN g/dL 2.2*  --   --  2.7* 2.4*   BILIRUBIN mg/dL 0.3  --   --  0.5 0.3   ALK PHOS U/L 100  --   --  112 91   AST (SGOT) U/L 20  --   --  22 21   ALT (SGPT) U/L 10  --   --  11 9   Estimated Creatinine Clearance: 40.1 mL/min (A) (by C-G formula based on SCr of 0.75 mg/dL (L)).  No results found for: \"AMMONIA\"              Glucose   Date/Time Value Ref Range Status   07/01/2024 1503 128 70 - 130 mg/dL Final     Lab Results   Component Value Date    TSH 3.870 06/29/2024    FREET4 1.48 03/14/2023     No results found for: \"PREGTESTUR\", \"PREGSERUM\", \"HCG\", \"HCGQUANT\"  Pain Management Panel           No data to display              Brief Urine Lab Results  (Last result in the past 365 days)        Color   Clarity   Blood   Leuk Est   Nitrite   Protein   CREAT   Urine HCG        06/29/24 2219 Yellow   Cloudy   Large (3+)   Negative   Negative   30 mg/dL (1+)                 No results found for: \"BLOODCX\"  Results from last 7 days   Lab Units 06/29/24  2219   NITRITE UA  Negative   WBC UA /HPF None Seen   BACTERIA UA /HPF Trace*   SQUAM EPITHEL UA /HPF 3-6*     No results found for: \"URINECX\"  No results found for: \"WOUNDCX\"  No results found for: \"STOOLCX\"  Results from last 7 days   Lab Units 07/02/24  0637 07/02/24  0021 07/01/24  2105 07/01/24  1834 07/01/24  1547   LACTATE mmol/L 1.0 2.4* 2.4* 2.2* 2.3*       I have personally looked at the labs and they are summarized " above.  ----------------------------------------------------------------------------------------------------------------------  Detailed radiology reports for the last 24 hours:    Imaging Results (Last 24 Hours)       ** No results found for the last 24 hours. **          Final impressions for the last 30 days of radiology reports:    XR Chest 1 View    Result Date: 7/1/2024  Endotracheal tube placement as above    This report was finalized on 7/1/2024 3:27 PM by Dr. Shilo Leary MD.      XR Chest 1 View    Result Date: 6/29/2024  FINDINGS/IMPRESSION:  Enteric tube in the stomach.   This report was finalized on 6/29/2024 9:02 PM by Alex Pallas, DO.      CT Abdomen Pelvis Without Contrast    Result Date: 6/29/2024  Severe small bowel dilatation consistent with recurrent obstruction. Position zone appears to be in the pelvis.s    [id number] Zip code [id number]      This report was finalized on 6/29/2024 3:43 PM by Dr. Lisa Baltazar MD.     I have personally looked at the radiology images and read the final radiology report.    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  Patient having no abdominal pain and is tolerated diet to this point    Acute respiratory failure patient requiring 3 and half liters of O2 at this time    Hypokalemia replace per protocol    Chronic illness malnutrition encourage p.o. 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 Discussed advancement of diet appropriate but patient preferred to stick with a clear liquid diet for now.     OK to advance diet as tolerated.     Ostomy bar to be removed tomorrow.     Dilma Hermosillo MD       General Surgeon  [facility]      Electronically signed by Dilma Yanez MD at 07/03/24 1907       Андрей Reyes MD at 07/03/24 1035          Progress Note Pulmonary      Subjective patient reported mild cough, abdominal discomfort but feels better.  Patient was successfully extubated yesterday after weaning trial.        Interval History: As described above        Review of Systems:    Patient denies chest pain, orthopnea, PND, leg edema, nausea, vomiting, diarrhea, hemoptysis, hematemesis, melena, bright red blood per rectum.      Vital Signs  Temp:  [97.7 °F (36.5 °C)-99 °F (37.2 °C)] 98.7 °F (37.1 °C)  Heart Rate:  [71-89] 71  Resp:  [15-18] 16  BP: (109-150)/(62-80) 126/63  Body mass index is 16.88 kg/m².    Intake/Output Summary (Last 24 hours) at 7/3/2024 1036  Last data filed at 7/3/2024 0514  Gross per 24 hour   Intake 1216.25 ml   Output 2625 ml   Net -1408.75 ml     No intake/output data recorded.    Physical Exam:  General- normal in appearance, not in any acute distress    HEENT- pupils equally reactive to light, normal in size, no scleral icterus    Neck- supple    No JVD, no carotid bruit    Respiratory-bilateral air entry, shallow breathing, few right basal rales  Cardiovascular-  Normal S1 and S2. No S3, S4 or murmurs.    GI-surgical site looks clean.  Bowel sounds positive    CNS-alert oriented x2  grossly nonfocal    Extremities- pulses normal bilaterally , no clubbing and edema        Results Review:       Results from last 7 days   Lab Units 07/03/24  0034 07/02/24  0021 07/01/24  1547   WBC 10*3/mm3 8.88 9.31 10.72   HEMOGLOBIN g/dL 11.5* 10.8* 12.2*   PLATELETS 10*3/mm3 228 214 239     Results from last 7 days   Lab Units 07/03/24  0034 07/02/24  0021 07/01/24  1547 06/30/24  1526 06/30/24  0059   SODIUM mmol/L 140 141 143   < > 139   POTASSIUM mmol/L 3.8 4.0 4.3   < > 3.4*   CHLORIDE mmol/L 110* 112* 111*   < > 107   CO2 mmol/L 21.6* 17.5* 20.3*   < > 19.7*   BUN mg/dL 16 20 19   < > 17   CREATININE mg/dL 1.10 1.19 1.08   < > 1.13   CALCIUM mg/dL 7.9* 7.5* 7.9*   < > 8.4   GLUCOSE mg/dL 104* 129* 151*   < > 137*   MAGNESIUM mg/dL 2.0 2.0  --   --  2.2    < > = values in this interval not displayed.     Lab Results   Component Value Date    INR 1.10 03/14/2023    INR 1.00 09/12/2021    INR 1.01 09/10/2021    PROTIME 14.5 03/14/2023    PROTIME 13.6 09/12/2021    PROTIME 13.7 09/10/2021     Results from last 7 days   Lab Units 07/03/24  0034 07/02/24  0021 07/01/24  1547   ALK PHOS U/L 112 91 111   BILIRUBIN mg/dL 0.5 0.3 0.4   ALT (SGPT) U/L 11 9 9   AST (SGOT) U/L 22 21 29     Results from last 7 days   Lab Units 07/02/24  0930   PH, ARTERIAL pH units 7.372   PO2 ART mm Hg 90.1   PCO2, ARTERIAL mm Hg 36.5   HCO3 ART mmol/L 21.2     Imaging Results (Last 24 Hours)       ** No results found for the last 24 hours. **                 aspirin, 81 mg, Per G Tube, Daily  Calcium Carb-Cholecalciferol, 1 tablet, Oral, BID  Ferrous Sulfate, 300 mg, Nasogastric, BID  finasteride, 5 mg, Oral, Daily  heparin (porcine), 5,000 Units, Subcutaneous, Q8H  [START ON 7/4/2024] lansoprazole, 30 mg, Oral, QAM AC  levothyroxine, 100 mcg, Oral, QAM AC  lubiprostone, 8 mcg, Oral, BID  multivitamin, 1 tablet, Oral, Daily  polyethylene glycol, 17 g, Oral, Daily  sodium chloride, 10 mL, Intravenous, Q12H  sodium phosphate, 15 mmol, Intravenous, Q3H      sodium chloride, 75 mL/hr, Last Rate: 75 mL/hr (07/02/24 2102)        Medication  Review:     Assessment & Plan     #1  Resolved acute hypercapnic hypoxic respiratory failure, post laparotomy, required reintubation.  Did exceptionally well after extubation.  Incentive spirometry every 6 hours.  Titrate oxygen to a saturation of 92%.    2.  Colonic obstruction, status post laparotomy.  Surgery on board.  Colostomy site looks dry.     3.  Hypothyroidism.     4.  Debility.         Maintain hemoglobin above 7     Continue thyroxine, DVT and GI prophylaxis.     I have personally reviewed x-rays, labs, medication list.    Total time spent 30 minutes      Андрей Reyes MD  24  10:36 EDT      Electronically signed by Андрей Reyes MD at 24 1041       Neymar Crowe MD at 24 1008              [facility]     Patient Identification:  Name:  Joaquín Rogers  Age:  89 y.o.  Sex:  male  :  1927  MRN:  [id number]  Visit Number:  [id number]  ROOM: [address] [place] [address]     Primary Care Provider:  Uday Roche DO     Date of Admission: 2024    Length of stay in inpatient status:  4    Subjective     Chief Compliant:    Chief Complaint   Patient presents with    Abdominal Pain     History of Presenting Illness:  Patient was laying in bed when I entered the room; the  had just left the room.  The patient states that he feels better; he does not have abdominal pain, nausea, vomiting.  He also denies chest pain and trouble breathing.    Objective     Current Hospital Meds:  aspirin, 81 mg, Per G Tube, Daily  Calcium Carb-Cholecalciferol, 1 tablet, Oral, BID  Ferrous Sulfate, 300 mg, Nasogastric, BID  finasteride, 5 mg, Oral, Daily  heparin (porcine), 5,000 Units, Subcutaneous, Q8H  [START ON 2024] lansoprazole, 30 mg, Oral, QAM AC  levothyroxine, 100 mcg, Oral, QAM AC  lubiprostone, 8 mcg, Oral, BID  multivitamin, 1 tablet, Oral, Daily  polyethylene glycol, 17 g, Oral, Daily  sodium chloride, 10 mL, Intravenous, Q12H  sodium phosphate, 15  mmol, Intravenous, Q3H    sodium chloride, 75 mL/hr, Last Rate: 75 mL/hr (07/02/24 2102)      Current Antimicrobial Therapy:  Anti-Infectives (From admission, onward)      None          Current Diuretic Therapy:  Diuretics (From admission, onward)      None          ----------------------------------------------------------------------------------------------------------------------  Vital Signs:  Temp:  [97.7 °F (36.5 °C)-99 °F (37.2 °C)] 97.7 °F (36.5 °C)  Heart Rate:  [74-89] 74  Resp:  [15-18] 18  BP: (109-150)/(62-80) 124/62  SpO2:  [91 %-98 %] 93 %  on  Flow (L/min):  [2-3.5] 3.5;   Device (Oxygen Therapy): nasal cannula  Body mass index is 16.88 kg/m².    Wt Readings from Last 3 Encounters:   07/01/24 50.3 kg (111 lb)   05/31/24 68 kg (150 lb)   04/01/24 67.1 kg (148 lb)     Intake & Output (last 3 days)         06/30 0701  07/01 0700 07/01 0701  07/02 0700 07/02 0701  07/03 0700 07/03 0701  07/04 0700    P.O. 0 0 420     I.V. (mL/kg) 747.8 (11.2) 1150.9 (22.9) 2001.8 (39.8)     Other   180     IV Piggyback  1000      Total Intake(mL/kg) 747.8 (11.2) 2150.9 (42.8) 2601.8 (51.7)     Urine (mL/kg/hr) 650 (0.4) 325 (0.3) 1025 (0.8)     Emesis/NG output 300 10 350     Stool  200 1250     Total Output      Net -202.2 +1615.9 -23.2             Urine Unmeasured Occurrence  1 x      Stool Unmeasured Occurrence  1 x 1 x           Diet: Liquid; Clear Liquid; Fluid Consistency: Thin (IDDSI 0)  ----------------------------------------------------------------------------------------------------------------------  Physical Exam  Vitals and nursing note reviewed.   Constitutional:       General: He is awake. He is not in acute distress.     Appearance: He is well-developed. He is not ill-appearing, toxic-appearing or diaphoretic.      Comments: Appears chronically ill.   HENT:      Head: Normocephalic and atraumatic.   Cardiovascular:      Rate and Rhythm: Normal rate and regular rhythm.      Pulses: Normal  pulses.      Heart sounds: No murmur heard.  Pulmonary:      Effort: Pulmonary effort is normal. No respiratory distress.      Breath sounds: No wheezing or rales.   Musculoskeletal:         General: No swelling or deformity.   Skin:     Capillary Refill: Capillary refill takes less than 2 seconds.      Coloration: Skin is not jaundiced or pale.   Neurological:      Mental Status: He is alert and oriented to person, place, and time. Mental status is at baseline.      Cranial Nerves: No cranial nerve deficit.   Psychiatric:         Mood and Affect: Mood normal.         Behavior: Behavior normal. Behavior is cooperative.         Thought Content: Thought content normal.         Judgment: Judgment normal.      ----------------------------------------------------------------------------------------------------------------------  Tele:  None  ----------------------------------------------------------------------------------------------------------------------  LABS:    Pending test results:    CBC and coagulation:  Results from last 7 days   Lab Units 07/03/24  0034 07/02/24  0637 07/02/24  0021 07/01/24  2105 07/01/24  1834 07/01/24  1547 07/01/24  0116 06/30/24  0059 06/29/24  1358   LACTATE mmol/L  --  1.0 2.4* 2.4* 2.2* 2.3*  --   --   --    WBC 10*3/mm3 8.88  --  9.31  --   --  10.72 6.05 7.25 5.65   HEMOGLOBIN g/dL 11.5*  --  10.8*  --   --  12.2* 11.8* 12.5* 12.4*   HEMATOCRIT % 36.8*  --  34.9*  --   --  38.6 37.2* 40.0 39.0   MCV fL 89.8  --  90.9  --   --  87.9 89.0 89.3 87.2   MCHC g/dL 31.3*  --  30.9*  --   --  31.6 31.7 31.3* 31.8   PLATELETS 10*3/mm3 228  --  214  --   --  239 242 216 203     Acid/base balance:  Results from last 7 days   Lab Units 07/02/24  0930 07/01/24  1847 07/01/24  1540 07/01/24  1425   PH, ARTERIAL pH units 7.372 7.389 7.309* 7.241*   PCO2, ARTERIAL mm Hg 36.5 32.8* 39.8 51.0*   PO2 ART mm Hg 90.1 181.0* 212.0* 63.7*   HCO3 ART mmol/L 21.2 19.8* 20.0 21.9     Renal and  electrolytes:  Results from last 7 days   Lab Units 07/03/24  0034 07/02/24  0021 07/01/24  1547 07/01/24  0116 06/30/24  1526 06/30/24  0059 06/29/24  1513   SODIUM mmol/L 140 141 143 144  --  139 141   POTASSIUM mmol/L 3.8 4.0 4.3 3.4* 4.6 3.4* 3.3*   MAGNESIUM mg/dL 2.0 2.0  --   --   --  2.2 2.1   CHLORIDE mmol/L 110* 112* 111* 110*  --  107 106   CO2 mmol/L 21.6* 17.5* 20.3* 19.8*  --  19.7* 23.7   BUN mg/dL 16 20 19 18  --  17 16   CREATININE mg/dL 1.10 1.19 1.08 1.18  --  1.13 1.22   CALCIUM mg/dL 7.9* 7.5* 7.9* 8.1*  --  8.4 8.4   PHOSPHORUS mg/dL 1.0*  --   --   --   --   --   --    GLUCOSE mg/dL 104* 129* 151* 127*  --  137* 119*   ANION GAP mmol/L 8.4 11.5 11.7 14.2  --  12.3 11.3     Estimated Creatinine Clearance: 27.3 mL/min (by C-G formula based on SCr of 1.1 mg/dL).    Liver and pancreatic function:  Results from last 7 days   Lab Units 07/03/24  0034 07/02/24  0021 07/01/24  1547 07/01/24  0116 06/29/24  1513   ALBUMIN g/dL 2.7* 2.4* 3.1* 3.1* 3.1*   BILIRUBIN mg/dL 0.5 0.3 0.4 0.3 0.5   ALK PHOS U/L 112 91 111 119* 121*   AST (SGOT) U/L 22 21 29 20 21   ALT (SGPT) U/L 11 9 9 10 11   LIPASE U/L  --   --   --   --  23     Endocrine function:  Lab Results   Component Value Date    HGBA1C 5.80 (H) 03/14/2023     Point of care bedside glucose levels:  Results from last 7 days   Lab Units 07/01/24  1503 07/01/24  1137   GLUCOSE mg/dL 128 108     Glucose levels from the CMP:  Results from last 7 days   Lab Units 07/03/24  0034 07/02/24  0021 07/01/24  1547 07/01/24  0116 06/30/24  0059 06/29/24  1513   GLUCOSE mg/dL 104* 129* 151* 127* 137* 119*     Lab Results   Component Value Date    TSH 3.870 06/29/2024    FREET4 1.48 03/14/2023     Cultures:  Lab Results   Component Value Date    COLORU Yellow 06/29/2024    CLARITYU Cloudy (A) 06/29/2024    PHUR 5.5 06/29/2024    GLUCOSEU Negative 06/29/2024    KETONESU Trace (A) 06/29/2024    BLOODU Large (3+) (A) 06/29/2024    NITRITEU Negative 06/29/2024     LEUKOCYTESUR Negative 06/29/2024    BILIRUBINUR Negative 06/29/2024    UROBILINOGEN 0.2 E.U./dL 06/29/2024    RBCUA 21-50 (A) 06/29/2024    WBCUA None Seen 06/29/2024    BACTERIA Trace (A) 06/29/2024       I have personally looked at the labs and they are summarized above.    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  Surgery has allowed the patient to advance his diet as tolerated but the patient prefers to be on clear liquid still.  If the patient desires, his diet can be advanced to a regular diet with thin liquids.  We will continue monitoring his electrolytes closely; please note that the phosphorus level did not improve today after supplementation.  Will give empiric high dose IV thiamine and obtain vitamin B12 and folate levels.  A multivitamin has already been ordered.  I have consulted PT and OT.  The [facility], as 100% service connected veterans can receive 8 hours of in home care per day everyday.  Please note that the blood pressures and glucose levels are at goal and stable at this time.  I have discontinued the IVF in an attempt to not decrease his thirst response.    VTE Prophylaxis:  Subcutaneous heparin      The patient is high risk due to the following  diagnoses/reasons:  Rectal cancer causing a bowel blockage, advanced age    Disposition: Son desires to take the patient home    Neymar Crowe MD  [facility]  07/03/24  10:08 EDT      Electronically signed by Neymar Crowe MD at 07/03/24 1945       Consult Notes (last 48 hours)  Notes from 07/03/24 0703 through 07/05/24 0703   No notes of this type exist for this encounter.

## 2024-07-05 NOTE — PLAN OF CARE
Goal Outcome Evaluation:  Plan of Care Reviewed With: patient, son        Progress: no change  Outcome Evaluation: Patient is alert & oriented. VSS. Patient refused activitiy today, was not agreeable to get up in chair. Colostomy bag intact. Regular diet in place now, patient is tolerating well with no issues. No other requests or complaints at this time. Will continue with POC at this time.

## 2024-07-05 NOTE — PLAN OF CARE
Goal Outcome Evaluation:  Plan of Care Reviewed With: patient        Progress: no change  Outcome Evaluation: Patient has been resting in bed throughout the night. VSS. potassium replaced this shift.  no pt complaints or concerns. no acute changes noted at this time. will continue with plan of care.                                no

## 2024-07-05 NOTE — PROGRESS NOTES
Progress Note Pulmonary      Subjective patient reported that he had a restful sleep last night.  He is on 2 L oxygen which according to him his baseline requirement.    Reported mild shortness of breath on exertion    Interval History: As described above        Review of Systems:    Patient denies chest pain, orthopnea, PND, leg edema, nausea, vomiting, diarrhea, hemoptysis, hematemesis, melena, bright red blood per rectum.      Vital Signs  Temp:  [97.7 °F (36.5 °C)-98.4 °F (36.9 °C)] 97.7 °F (36.5 °C)  Heart Rate:  [60-77] 66  Resp:  [18] 18  BP: (105-145)/(55-74) 133/67  Body mass index is 16.88 kg/m².    Intake/Output Summary (Last 24 hours) at 7/5/2024 1008  Last data filed at 7/5/2024 0900  Gross per 24 hour   Intake 800 ml   Output 3700 ml   Net -2900 ml     I/O this shift:  In: 120 [P.O.:120]  Out: 200 [Stool:200]    Physical Exam:  General- normal in appearance, not in any acute distress    HEENT- pupils equally reactive to light, normal in size, no scleral icterus    Neck- supple    No JVD, no carotid bruit    Respiratory-bilateral air entry, shallow breathing, no more rales.      Cardiovascular-  Normal S1 and S2. No S3, S4 or murmurs.    GI-surgical site looks clean.  Bowel sounds positive    CNS-alert oriented x2  grossly nonfocal    Extremities- pulses normal bilaterally , no clubbing and edema        Results Review:      Results from last 7 days   Lab Units 07/04/24  0142 07/03/24  0034 07/02/24  0021   WBC 10*3/mm3 7.45 8.88 9.31   HEMOGLOBIN g/dL 10.2* 11.5* 10.8*   PLATELETS 10*3/mm3 200 228 214     Results from last 7 days   Lab Units 07/05/24  0743 07/04/24  1831 07/04/24  0431 07/03/24  0034 07/02/24  0021   SODIUM mmol/L  --   --  142 140 141   POTASSIUM mmol/L 3.2* 3.1* 2.9* 3.8 4.0   CHLORIDE mmol/L  --   --  111* 110* 112*   CO2 mmol/L  --   --  21.9* 21.6* 17.5*   BUN mg/dL  --   --  9 16 20   CREATININE mg/dL  --   --  0.75* 1.10 1.19   CALCIUM mg/dL  --   --  7.3* 7.9* 7.5*   GLUCOSE  mg/dL  --   --  104* 104* 129*   MAGNESIUM mg/dL  --   --  1.7 2.0 2.0     Lab Results   Component Value Date    INR 1.10 03/14/2023    INR 1.00 09/12/2021    INR 1.01 09/10/2021    PROTIME 14.5 03/14/2023    PROTIME 13.6 09/12/2021    PROTIME 13.7 09/10/2021     Results from last 7 days   Lab Units 07/04/24  0431 07/03/24  0034 07/02/24  0021   ALK PHOS U/L 100 112 91   BILIRUBIN mg/dL 0.3 0.5 0.3   ALT (SGPT) U/L 10 11 9   AST (SGOT) U/L 20 22 21     Results from last 7 days   Lab Units 07/02/24  0930   PH, ARTERIAL pH units 7.372   PO2 ART mm Hg 90.1   PCO2, ARTERIAL mm Hg 36.5   HCO3 ART mmol/L 21.2     Imaging Results (Last 24 Hours)       ** No results found for the last 24 hours. **                 aspirin, 81 mg, Per G Tube, Daily  Calcium Carb-Cholecalciferol, 1 tablet, Oral, BID  Ferrous Sulfate, 300 mg, Nasogastric, BID  finasteride, 5 mg, Oral, Daily  heparin (porcine), 5,000 Units, Subcutaneous, Q8H  lansoprazole, 30 mg, Oral, QAM AC  levothyroxine, 100 mcg, Oral, QAM AC  lubiprostone, 8 mcg, Oral, BID  multivitamin, 1 tablet, Oral, Daily  polyethylene glycol, 17 g, Oral, Daily  sodium chloride, 10 mL, Intravenous, Q12H  thiamine (B-1) IV, 500 mg, Intravenous, TID             Medication Review:     Assessment & Plan     #1  Resolved acute hypercapnic hypoxic respiratory failure, post laparotomy, required reintubation.  Oxygen requirement back to his baseline at 2 L oxygen via nasal cannula.    2.  Colonic obstruction, status post laparotomy.  Surgery on board.  Colostomy site looks dry.     3.  Hypothyroidism.     4.  Debility.         Maintain hemoglobin above 7     Continue thyroxine, DVT and GI prophylaxis.     I have personally reviewed x-rays, labs, medication list.    Follow-up as an outpatient.  I will sign off.    Андрей Reyes MD  07/05/24  10:08 EDT

## 2024-07-06 LAB
ALBUMIN SERPL-MCNC: 2.4 G/DL (ref 3.5–5.2)
ALBUMIN/GLOB SERPL: 1.1 G/DL
ALP SERPL-CCNC: 105 U/L (ref 39–117)
ALT SERPL W P-5'-P-CCNC: 12 U/L (ref 1–41)
ANION GAP SERPL CALCULATED.3IONS-SCNC: 9.3 MMOL/L (ref 5–15)
ANISOCYTOSIS BLD QL: NORMAL
AST SERPL-CCNC: 25 U/L (ref 1–40)
BASOPHILS # BLD AUTO: 0.02 10*3/MM3 (ref 0–0.2)
BASOPHILS NFR BLD AUTO: 0.3 % (ref 0–1.5)
BILIRUB SERPL-MCNC: 0.3 MG/DL (ref 0–1.2)
BUN SERPL-MCNC: 7 MG/DL (ref 8–23)
BUN/CREAT SERPL: 7.6 (ref 7–25)
CALCIUM SPEC-SCNC: 8.1 MG/DL (ref 8.2–9.6)
CHLORIDE SERPL-SCNC: 109 MMOL/L (ref 98–107)
CO2 SERPL-SCNC: 22.7 MMOL/L (ref 22–29)
CREAT SERPL-MCNC: 0.92 MG/DL (ref 0.76–1.27)
DEPRECATED RDW RBC AUTO: 70.6 FL (ref 37–54)
EGFRCR SERPLBLD CKD-EPI 2021: 75.7 ML/MIN/1.73
ELLIPTOCYTES BLD QL SMEAR: NORMAL
EOSINOPHIL # BLD AUTO: 0.26 10*3/MM3 (ref 0–0.4)
EOSINOPHIL NFR BLD AUTO: 4 % (ref 0.3–6.2)
ERYTHROCYTE [DISTWIDTH] IN BLOOD BY AUTOMATED COUNT: 22.7 % (ref 12.3–15.4)
GLOBULIN UR ELPH-MCNC: 2.2 GM/DL
GLUCOSE SERPL-MCNC: 120 MG/DL (ref 65–99)
HCT VFR BLD AUTO: 35.1 % (ref 37.5–51)
HGB BLD-MCNC: 11.2 G/DL (ref 13–17.7)
HYPOCHROMIA BLD QL: NORMAL
IMM GRANULOCYTES # BLD AUTO: 0.07 10*3/MM3 (ref 0–0.05)
IMM GRANULOCYTES NFR BLD AUTO: 1.1 % (ref 0–0.5)
LARGE PLATELETS: NORMAL
LYMPHOCYTES # BLD AUTO: 1.9 10*3/MM3 (ref 0.7–3.1)
LYMPHOCYTES NFR BLD AUTO: 29.4 % (ref 19.6–45.3)
MAGNESIUM SERPL-MCNC: 2 MG/DL (ref 1.7–2.3)
MCH RBC QN AUTO: 28.4 PG (ref 26.6–33)
MCHC RBC AUTO-ENTMCNC: 31.9 G/DL (ref 31.5–35.7)
MCV RBC AUTO: 89.1 FL (ref 79–97)
MONOCYTES # BLD AUTO: 0.66 10*3/MM3 (ref 0.1–0.9)
MONOCYTES NFR BLD AUTO: 10.2 % (ref 5–12)
NEUTROPHILS NFR BLD AUTO: 3.55 10*3/MM3 (ref 1.7–7)
NEUTROPHILS NFR BLD AUTO: 55 % (ref 42.7–76)
NRBC BLD AUTO-RTO: 0 /100 WBC (ref 0–0.2)
PHOSPHATE SERPL-MCNC: 2.5 MG/DL (ref 2.5–4.5)
PLATELET # BLD AUTO: 221 10*3/MM3 (ref 140–450)
PMV BLD AUTO: 9.4 FL (ref 6–12)
POTASSIUM SERPL-SCNC: 2.8 MMOL/L (ref 3.5–5.2)
POTASSIUM SERPL-SCNC: 3.3 MMOL/L (ref 3.5–5.2)
PROT SERPL-MCNC: 4.6 G/DL (ref 6–8.5)
RBC # BLD AUTO: 3.94 10*6/MM3 (ref 4.14–5.8)
SODIUM SERPL-SCNC: 141 MMOL/L (ref 136–145)
WBC NRBC COR # BLD AUTO: 6.46 10*3/MM3 (ref 3.4–10.8)

## 2024-07-06 PROCEDURE — 99024 POSTOP FOLLOW-UP VISIT: CPT | Performed by: SURGERY

## 2024-07-06 PROCEDURE — 25010000002 HEPARIN (PORCINE) PER 1000 UNITS: Performed by: INTERNAL MEDICINE

## 2024-07-06 PROCEDURE — 25010000002 THIAMINE HCL 200 MG/2ML SOLUTION 2 ML VIAL: Performed by: INTERNAL MEDICINE

## 2024-07-06 PROCEDURE — 85007 BL SMEAR W/DIFF WBC COUNT: CPT | Performed by: INTERNAL MEDICINE

## 2024-07-06 PROCEDURE — 84100 ASSAY OF PHOSPHORUS: CPT | Performed by: INTERNAL MEDICINE

## 2024-07-06 PROCEDURE — 83735 ASSAY OF MAGNESIUM: CPT | Performed by: INTERNAL MEDICINE

## 2024-07-06 PROCEDURE — 25010000002 CYANOCOBALAMIN PER 1000 MCG: Performed by: INTERNAL MEDICINE

## 2024-07-06 PROCEDURE — 80053 COMPREHEN METABOLIC PANEL: CPT | Performed by: INTERNAL MEDICINE

## 2024-07-06 PROCEDURE — 84132 ASSAY OF SERUM POTASSIUM: CPT | Performed by: INTERNAL MEDICINE

## 2024-07-06 PROCEDURE — 99232 SBSQ HOSP IP/OBS MODERATE 35: CPT | Performed by: STUDENT IN AN ORGANIZED HEALTH CARE EDUCATION/TRAINING PROGRAM

## 2024-07-06 PROCEDURE — 85025 COMPLETE CBC W/AUTO DIFF WBC: CPT | Performed by: INTERNAL MEDICINE

## 2024-07-06 PROCEDURE — 25010000002 THIAMINE PER 100 MG: Performed by: INTERNAL MEDICINE

## 2024-07-06 RX ORDER — POTASSIUM CHLORIDE 20 MEQ/1
40 TABLET, EXTENDED RELEASE ORAL EVERY 4 HOURS
Qty: 6 TABLET | Refills: 0 | Status: COMPLETED | OUTPATIENT
Start: 2024-07-06 | End: 2024-07-06

## 2024-07-06 RX ORDER — POTASSIUM CHLORIDE 20 MEQ/1
40 TABLET, EXTENDED RELEASE ORAL EVERY 4 HOURS
Status: DISPENSED | OUTPATIENT
Start: 2024-07-06 | End: 2024-07-07

## 2024-07-06 RX ORDER — POTASSIUM CHLORIDE 20 MEQ/1
40 TABLET, EXTENDED RELEASE ORAL DAILY
Status: DISCONTINUED | OUTPATIENT
Start: 2024-07-06 | End: 2024-07-10 | Stop reason: HOSPADM

## 2024-07-06 RX ADMIN — LANSOPRAZOLE 30 MG: 30 TABLET, ORALLY DISINTEGRATING ORAL at 09:17

## 2024-07-06 RX ADMIN — LEVOTHYROXINE SODIUM 100 MCG: 0.1 TABLET ORAL at 09:17

## 2024-07-06 RX ADMIN — THIAMINE HYDROCHLORIDE 500 MG: 100 INJECTION, SOLUTION INTRAMUSCULAR; INTRAVENOUS at 20:52

## 2024-07-06 RX ADMIN — POTASSIUM CHLORIDE 40 MEQ: 1500 TABLET, EXTENDED RELEASE ORAL at 14:25

## 2024-07-06 RX ADMIN — FINASTERIDE 5 MG: 5 TABLET, FILM COATED ORAL at 09:17

## 2024-07-06 RX ADMIN — Medication 10 ML: at 20:56

## 2024-07-06 RX ADMIN — HEPARIN SODIUM 5000 UNITS: 5000 INJECTION INTRAVENOUS; SUBCUTANEOUS at 05:38

## 2024-07-06 RX ADMIN — Medication 1 TABLET: at 09:17

## 2024-07-06 RX ADMIN — ASPIRIN 81 MG: 81 TABLET, CHEWABLE ORAL at 09:17

## 2024-07-06 RX ADMIN — POTASSIUM CHLORIDE 40 MEQ: 1500 TABLET, EXTENDED RELEASE ORAL at 05:39

## 2024-07-06 RX ADMIN — POTASSIUM CHLORIDE 40 MEQ: 1500 TABLET, EXTENDED RELEASE ORAL at 20:54

## 2024-07-06 RX ADMIN — CYANOCOBALAMIN 1000 MCG: 1000 INJECTION, SOLUTION INTRAMUSCULAR at 09:16

## 2024-07-06 RX ADMIN — POLYETHYLENE GLYCOL (3350) 17 G: 17 POWDER, FOR SOLUTION ORAL at 09:16

## 2024-07-06 RX ADMIN — THIAMINE HYDROCHLORIDE 500 MG: 100 INJECTION, SOLUTION INTRAMUSCULAR; INTRAVENOUS at 17:19

## 2024-07-06 RX ADMIN — LUBIPROSTONE 8 MCG: 8 CAPSULE, GELATIN COATED ORAL at 09:17

## 2024-07-06 RX ADMIN — HEPARIN SODIUM 5000 UNITS: 5000 INJECTION INTRAVENOUS; SUBCUTANEOUS at 14:25

## 2024-07-06 RX ADMIN — THIAMINE HYDROCHLORIDE 500 MG: 100 INJECTION, SOLUTION INTRAMUSCULAR; INTRAVENOUS at 09:18

## 2024-07-06 RX ADMIN — MINERAL SUPPLEMENT IRON 300 MG / 5 ML STRENGTH LIQUID 100 PER BOX UNFLAVORED 300 MG: at 20:55

## 2024-07-06 RX ADMIN — LUBIPROSTONE 8 MCG: 8 CAPSULE, GELATIN COATED ORAL at 20:55

## 2024-07-06 RX ADMIN — HEPARIN SODIUM 5000 UNITS: 5000 INJECTION INTRAVENOUS; SUBCUTANEOUS at 20:55

## 2024-07-06 RX ADMIN — POTASSIUM CHLORIDE 40 MEQ: 1500 TABLET, EXTENDED RELEASE ORAL at 09:18

## 2024-07-06 RX ADMIN — MINERAL SUPPLEMENT IRON 300 MG / 5 ML STRENGTH LIQUID 100 PER BOX UNFLAVORED 300 MG: at 09:16

## 2024-07-06 RX ADMIN — Medication 1 MG: at 09:17

## 2024-07-06 RX ADMIN — Medication 1 TABLET: at 20:55

## 2024-07-06 NOTE — PLAN OF CARE
Goal Outcome Evaluation:  Plan of Care Reviewed With: patient           Outcome Evaluation: Pt rested in bed this shift. No new complaints or concerns, VSS and will continue with POC.

## 2024-07-06 NOTE — PROGRESS NOTES
Postoperative day 5: Diverting loop colostomy secondary to obstructing rectal adenocarcinoma    Patient doing well this morning and tolerating diet.  Loop colostomy viable and functioning with stoma bar removed today.  Bourgeois catheter removed with patient able to void post removal    No acute distress, alert and orient x 3  Clear to auscultation bilaterally  Abdomen soft, appropriately tender, loop colostomy viable and functioning    89-year-old male with obstructing rectal mass status post diverting loop colostomy.     -Continue to advance diet as tolerated  -Ambulate

## 2024-07-06 NOTE — PROGRESS NOTES
[facility]     Patient Identification:  Name:  Joaquín Rogers  Age:  89 y.o.  Sex:  male  :  1927  MRN:  [id number]  Visit Number:  [id number]  ROOM: [address] [place] [address]     Primary Care Provider:  Uday Roche DO     Date of Admission: 2024    Length of stay in inpatient status:  7    Subjective     Chief Compliant:    Chief Complaint   Patient presents with    Abdominal Pain       Patient with improving ostomy output. Tolerating diet. Bourgeois removed with mild penile swelling noted but no significant dysuria. Using urinal during rounds this am        Objective       Vital Signs:  Temp:  [97.6 °F (36.4 °C)-98.4 °F (36.9 °C)] 98.3 °F (36.8 °C)  Heart Rate:  [66-73] 67  Resp:  [18] 18  BP: (132-149)/(66-71) 132/66  SpO2:  [96 %-99 %] 96 %  on  Flow (L/min):  [2] 2;   Device (Oxygen Therapy): nasal cannula  Body mass index is 16.88 kg/m².      ----------------------------------------------------------------------------------------------------------------------  Physical Exam  Vitals and nursing note reviewed.   Constitutional:       General: He is not in acute distress.  HENT:      Head: Normocephalic and atraumatic.   Eyes:      General: No scleral icterus.     Extraocular Movements: Extraocular movements intact.   Cardiovascular:      Rate and Rhythm: Normal rate.   Pulmonary:      Effort: Pulmonary effort is normal. No respiratory distress.   Abdominal:      Palpations: Abdomen is soft.      Comments: LLQ ostomy in place with large amount soft appearing stool   Musculoskeletal:      Right lower leg: No edema.      Left lower leg: No edema.   Skin:     General: Skin is warm.      Coloration: Skin is pale.   Neurological:      General: No focal deficit present.      Mental Status: He is alert.      Cranial Nerves: No cranial nerve deficit.      Motor: Weakness present.        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, status post creation of a loop descending colon colostomy this admission  -Postoperative hypoxic hypercapnic respiratory failure, still requiring 3.5 L/min nasal cannula oxygen  -Severe chronic illness malnutrition that was present on admission, which complicates all aspects of care  -Vitamin B12 deficiency and borderline low folate, present on admission  -Acute hypokalemia and acute hypophosphatemia, improving with supplementation  -History of hypothyroidism  -History of GERD  -History of bradycardia, 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 Repeat renal panel in am and thompson replacement dosing based on ongoing requirements and likely GI losses  Anticipate dc home tomorrow      Code Status and Medical Interventions:   Ordered at: 06/29/24 1888     Medical Intervention Limits:    No intubation (DNI)     Code Status (Patient has no pulse and is not breathing):    No CPR (Do Not Attempt to Resuscitate)     Medical Interventions (Patient has pulse or is breathing):    Limited Support         Disposition: f/u renal panel, dc <48hrs    I have reviewed any copied/forwarded text or data, verified its accuracy, and updated as necessary above.    Angelo Kenney MD  [facility]  07/06/24  16:49 EDT

## 2024-07-06 NOTE — PLAN OF CARE
Goal Outcome Evaluation:  Plan of Care Reviewed With: patient        Progress: no change  Outcome Evaluation: Patient resting in bed at this itme. A&O. VSS. No acute changes noted. D/C smith cath this AM, tolerated well. Patient with good UOP this shift. Patient was able to ambulate in room to chair and sat up in chair this shift. No requests or complaints at this time. Will continue with POC.

## 2024-07-07 LAB
ALBUMIN SERPL-MCNC: 2.4 G/DL (ref 3.5–5.2)
ANION GAP SERPL CALCULATED.3IONS-SCNC: 5.8 MMOL/L (ref 5–15)
BUN SERPL-MCNC: 6 MG/DL (ref 8–23)
BUN/CREAT SERPL: 5.5 (ref 7–25)
CALCIUM SPEC-SCNC: 8.2 MG/DL (ref 8.2–9.6)
CHLORIDE SERPL-SCNC: 110 MMOL/L (ref 98–107)
CO2 SERPL-SCNC: 26.2 MMOL/L (ref 22–29)
CREAT SERPL-MCNC: 1.09 MG/DL (ref 0.76–1.27)
EGFRCR SERPLBLD CKD-EPI 2021: 61.7 ML/MIN/1.73
GLUCOSE SERPL-MCNC: 106 MG/DL (ref 65–99)
PHOSPHATE SERPL-MCNC: 2.2 MG/DL (ref 2.5–4.5)
PHOSPHATE SERPL-MCNC: 3.2 MG/DL (ref 2.5–4.5)
POTASSIUM SERPL-SCNC: 3.6 MMOL/L (ref 3.5–5.2)
POTASSIUM SERPL-SCNC: 3.6 MMOL/L (ref 3.5–5.2)
SODIUM SERPL-SCNC: 142 MMOL/L (ref 136–145)

## 2024-07-07 PROCEDURE — 99024 POSTOP FOLLOW-UP VISIT: CPT | Performed by: SURGERY

## 2024-07-07 PROCEDURE — 80069 RENAL FUNCTION PANEL: CPT | Performed by: STUDENT IN AN ORGANIZED HEALTH CARE EDUCATION/TRAINING PROGRAM

## 2024-07-07 PROCEDURE — 99231 SBSQ HOSP IP/OBS SF/LOW 25: CPT | Performed by: STUDENT IN AN ORGANIZED HEALTH CARE EDUCATION/TRAINING PROGRAM

## 2024-07-07 PROCEDURE — 25810000003 SODIUM CHLORIDE 0.9 % SOLUTION: Performed by: STUDENT IN AN ORGANIZED HEALTH CARE EDUCATION/TRAINING PROGRAM

## 2024-07-07 PROCEDURE — 84100 ASSAY OF PHOSPHORUS: CPT | Performed by: STUDENT IN AN ORGANIZED HEALTH CARE EDUCATION/TRAINING PROGRAM

## 2024-07-07 PROCEDURE — 84132 ASSAY OF SERUM POTASSIUM: CPT | Performed by: STUDENT IN AN ORGANIZED HEALTH CARE EDUCATION/TRAINING PROGRAM

## 2024-07-07 PROCEDURE — 25010000002 HEPARIN (PORCINE) PER 1000 UNITS: Performed by: INTERNAL MEDICINE

## 2024-07-07 PROCEDURE — 25010000002 CYANOCOBALAMIN PER 1000 MCG: Performed by: INTERNAL MEDICINE

## 2024-07-07 PROCEDURE — 25010000002 THIAMINE HCL 200 MG/2ML SOLUTION 2 ML VIAL: Performed by: INTERNAL MEDICINE

## 2024-07-07 RX ADMIN — HEPARIN SODIUM 5000 UNITS: 5000 INJECTION INTRAVENOUS; SUBCUTANEOUS at 17:24

## 2024-07-07 RX ADMIN — POLYETHYLENE GLYCOL (3350) 17 G: 17 POWDER, FOR SOLUTION ORAL at 08:41

## 2024-07-07 RX ADMIN — Medication 1 MG: at 08:43

## 2024-07-07 RX ADMIN — SODIUM PHOSPHATE, MONOBASIC, MONOHYDRATE AND SODIUM PHOSPHATE, DIBASIC, ANHYDROUS 15 MMOL: 142; 276 INJECTION, SOLUTION INTRAVENOUS at 08:40

## 2024-07-07 RX ADMIN — POTASSIUM CHLORIDE 40 MEQ: 1500 TABLET, EXTENDED RELEASE ORAL at 08:43

## 2024-07-07 RX ADMIN — FINASTERIDE 5 MG: 5 TABLET, FILM COATED ORAL at 08:43

## 2024-07-07 RX ADMIN — Medication 10 ML: at 20:43

## 2024-07-07 RX ADMIN — MINERAL SUPPLEMENT IRON 300 MG / 5 ML STRENGTH LIQUID 100 PER BOX UNFLAVORED 300 MG: at 20:44

## 2024-07-07 RX ADMIN — LUBIPROSTONE 8 MCG: 8 CAPSULE, GELATIN COATED ORAL at 08:43

## 2024-07-07 RX ADMIN — THIAMINE HYDROCHLORIDE 500 MG: 100 INJECTION, SOLUTION INTRAMUSCULAR; INTRAVENOUS at 08:41

## 2024-07-07 RX ADMIN — Medication 1 TABLET: at 20:44

## 2024-07-07 RX ADMIN — LUBIPROSTONE 8 MCG: 8 CAPSULE, GELATIN COATED ORAL at 20:44

## 2024-07-07 RX ADMIN — MINERAL SUPPLEMENT IRON 300 MG / 5 ML STRENGTH LIQUID 100 PER BOX UNFLAVORED 300 MG: at 08:43

## 2024-07-07 RX ADMIN — HEPARIN SODIUM 5000 UNITS: 5000 INJECTION INTRAVENOUS; SUBCUTANEOUS at 21:16

## 2024-07-07 RX ADMIN — LANSOPRAZOLE 30 MG: 30 TABLET, ORALLY DISINTEGRATING ORAL at 08:43

## 2024-07-07 RX ADMIN — ASPIRIN 81 MG: 81 TABLET, CHEWABLE ORAL at 08:43

## 2024-07-07 RX ADMIN — CYANOCOBALAMIN 1000 MCG: 1000 INJECTION, SOLUTION INTRAMUSCULAR at 08:43

## 2024-07-07 RX ADMIN — THIAMINE HYDROCHLORIDE 500 MG: 100 INJECTION, SOLUTION INTRAMUSCULAR; INTRAVENOUS at 20:45

## 2024-07-07 RX ADMIN — Medication 1 TABLET: at 08:43

## 2024-07-07 RX ADMIN — THIAMINE HYDROCHLORIDE 500 MG: 100 INJECTION, SOLUTION INTRAMUSCULAR; INTRAVENOUS at 17:24

## 2024-07-07 RX ADMIN — HEPARIN SODIUM 5000 UNITS: 5000 INJECTION INTRAVENOUS; SUBCUTANEOUS at 05:15

## 2024-07-07 RX ADMIN — LEVOTHYROXINE SODIUM 100 MCG: 0.1 TABLET ORAL at 08:43

## 2024-07-07 NOTE — PLAN OF CARE
Goal Outcome Evaluation:  Plan of Care Reviewed With: patient           Outcome Evaluation: Pt rested in bed this shift. Pt was put on K+ protocol this shift. No other complaints or concerns, VSS and will continue with POC.

## 2024-07-07 NOTE — PROGRESS NOTES
[facility]     Patient Identification:  Name:  Joaquín Rogers  Age:  89 y.o.  Sex:  male  :  1927  MRN:  [id number]  Visit Number:  [id number]  ROOM: [address] [place] [address]     Primary Care Provider:  Uday Roche DO     Date of Admission: 2024    Length of stay in inpatient status:  8    Subjective     Chief Compliant:    Chief Complaint   Patient presents with    Abdominal Pain       Tolerating diet, good ostomy output        Objective       Vital Signs:  Temp:  [97.7 °F (36.5 °C)-98.3 °F (36.8 °C)] 98.1 °F (36.7 °C)  Heart Rate:  [64-71] 71  Resp:  [17-20] 18  BP: (111-135)/(56-71) 120/60  SpO2:  [96 %-99 %] 97 %  on  Flow (L/min):  [2] 2;   Device (Oxygen Therapy): nasal cannula  Body mass index is 16.88 kg/m².      ----------------------------------------------------------------------------------------------------------------------  Physical Exam  Vitals and nursing note reviewed.   Constitutional:       General: He is not in acute distress.  HENT:      Head: Normocephalic and atraumatic.   Eyes:      General: No scleral icterus.     Extraocular Movements: Extraocular movements intact.   Cardiovascular:      Rate and Rhythm: Normal rate.   Pulmonary:      Effort: Pulmonary effort is normal. No respiratory distress.   Abdominal:      Palpations: Abdomen is soft.      Comments: LLQ ostomy in place with large amount soft appearing stool   Musculoskeletal:      Right lower leg: No edema.      Left lower leg: No edema.   Skin:     General: Skin is warm.      Coloration: Skin is pale.   Neurological:      General: No focal deficit present.      Mental Status: He is alert.      Cranial Nerves: No cranial nerve deficit.      Motor: Weakness present.       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, status post creation of a loop descending colon colostomy this admission  -Postoperative hypoxic hypercapnic respiratory failure, still requiring 3.5 L/min nasal cannula oxygen  -Severe chronic illness malnutrition that was present on admission, which complicates all aspects of care  -Vitamin B12 deficiency and borderline low folate, present on admission  -Acute hypokalemia and acute hypophosphatemia, improving with supplementation  -History of hypothyroidism  -History of GERD  -History of bradycardia, 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    Angelo Kenney MD  [facility]  07/07/24  16:19 EDT

## 2024-07-07 NOTE — PLAN OF CARE
21-Aug-2017 Goal Outcome Evaluation:  Plan of Care Reviewed With: patient        Progress: no change  Outcome Evaluation: Patient sitting up in chair at this time. A&O. VSS. Patient c/o left great toe pain this shift, provider notified. Patient refused pain medication. No other requests or complaints at this time. Will continue with POC.

## 2024-07-07 NOTE — PROGRESS NOTES
Postoperative day 6: Diverting loop colostomy secondary to obstructing rectal adenocarcinoma     Patient doing well this morning and tolerating diet.  Loop colostomy viable and functioning with stoma bar removed yesterday.No acute distress, alert and orient x 3  Clear to auscultation bilaterally  Abdomen soft, appropriately tender, loop colostomy viable and functioning     89-year-old male with obstructing rectal mass status post diverting loop colostomy.     -Continue regular diet  -Ambulate  -From surgical standpoint okay to discharge home with 1 week follow-up with Dr. Yanez

## 2024-07-08 PROCEDURE — 25010000002 THIAMINE HCL 200 MG/2ML SOLUTION 2 ML VIAL: Performed by: INTERNAL MEDICINE

## 2024-07-08 PROCEDURE — 25010000002 CYANOCOBALAMIN PER 1000 MCG: Performed by: INTERNAL MEDICINE

## 2024-07-08 PROCEDURE — 97535 SELF CARE MNGMENT TRAINING: CPT

## 2024-07-08 PROCEDURE — 25010000002 HEPARIN (PORCINE) PER 1000 UNITS: Performed by: INTERNAL MEDICINE

## 2024-07-08 PROCEDURE — 99231 SBSQ HOSP IP/OBS SF/LOW 25: CPT | Performed by: INTERNAL MEDICINE

## 2024-07-08 RX ORDER — FOLIC ACID 1 MG/1
1 TABLET ORAL DAILY
Qty: 7 TABLET | Refills: 0 | Status: SHIPPED | OUTPATIENT
Start: 2024-07-09 | End: 2024-07-16

## 2024-07-08 RX ORDER — POTASSIUM CHLORIDE 20 MEQ/1
20 TABLET, EXTENDED RELEASE ORAL DAILY
Qty: 7 TABLET | Refills: 0 | Status: SHIPPED | OUTPATIENT
Start: 2024-07-09 | End: 2024-07-16

## 2024-07-08 RX ADMIN — Medication 1 MG: at 08:39

## 2024-07-08 RX ADMIN — MINERAL SUPPLEMENT IRON 300 MG / 5 ML STRENGTH LIQUID 100 PER BOX UNFLAVORED 300 MG: at 08:38

## 2024-07-08 RX ADMIN — ASPIRIN 81 MG: 81 TABLET, CHEWABLE ORAL at 08:40

## 2024-07-08 RX ADMIN — HEPARIN SODIUM 5000 UNITS: 5000 INJECTION INTRAVENOUS; SUBCUTANEOUS at 05:43

## 2024-07-08 RX ADMIN — MINERAL SUPPLEMENT IRON 300 MG / 5 ML STRENGTH LIQUID 100 PER BOX UNFLAVORED 300 MG: at 21:08

## 2024-07-08 RX ADMIN — Medication 1 TABLET: at 08:39

## 2024-07-08 RX ADMIN — POLYETHYLENE GLYCOL (3350) 17 G: 17 POWDER, FOR SOLUTION ORAL at 08:38

## 2024-07-08 RX ADMIN — LANSOPRAZOLE 30 MG: 30 TABLET, ORALLY DISINTEGRATING ORAL at 08:39

## 2024-07-08 RX ADMIN — LUBIPROSTONE 8 MCG: 8 CAPSULE, GELATIN COATED ORAL at 08:39

## 2024-07-08 RX ADMIN — Medication 1 TABLET: at 21:08

## 2024-07-08 RX ADMIN — THIAMINE HYDROCHLORIDE 500 MG: 100 INJECTION, SOLUTION INTRAMUSCULAR; INTRAVENOUS at 10:00

## 2024-07-08 RX ADMIN — LEVOTHYROXINE SODIUM 100 MCG: 0.1 TABLET ORAL at 08:39

## 2024-07-08 RX ADMIN — HEPARIN SODIUM 5000 UNITS: 5000 INJECTION INTRAVENOUS; SUBCUTANEOUS at 21:08

## 2024-07-08 RX ADMIN — Medication 10 ML: at 21:08

## 2024-07-08 RX ADMIN — POTASSIUM CHLORIDE 40 MEQ: 1500 TABLET, EXTENDED RELEASE ORAL at 08:40

## 2024-07-08 RX ADMIN — CYANOCOBALAMIN 1000 MCG: 1000 INJECTION, SOLUTION INTRAMUSCULAR at 08:38

## 2024-07-08 RX ADMIN — LUBIPROSTONE 8 MCG: 8 CAPSULE, GELATIN COATED ORAL at 21:08

## 2024-07-08 RX ADMIN — FINASTERIDE 5 MG: 5 TABLET, FILM COATED ORAL at 08:39

## 2024-07-08 RX ADMIN — THIAMINE HYDROCHLORIDE 500 MG: 100 INJECTION, SOLUTION INTRAMUSCULAR; INTRAVENOUS at 17:20

## 2024-07-08 NOTE — PLAN OF CARE
Goal Outcome Evaluation:      Patient alert/oriented and resting in bed during shift. VSS. No complaints or concerns noted at this time. Will continue with plan of care.

## 2024-07-08 NOTE — NURSING NOTE
"Patient resting in bed at this time. A&O. VSS on room air. Patient ambulated in hallway this shift. Colostomy bag remains in place. Patient to be discharged Wednesday, per son \"room is not prepared for patient at this time\". No requests or complaints at this time. Will continue with POC.   "

## 2024-07-08 NOTE — PAYOR COMM NOTE
"CONTACT:  TORO WILLIAM RN  UTILIZATION MANAGEMENT DEPT.   [facility]   [address], [facility], [address]   PHONE:  [phone]   FAX: [phone]           CLINICAL UPDATES FOR REVIEW---AUTH PENDING    [id number]             Rashmi Marroquin (89 y.o. Male)       Date of Birth   05/19/1927    Social Security Number       Address   [address] [address]    Home Phone   [phone]    MRN   [id number]       P[employer]alism   None    Marital Status                               Admission Date   6/29/24    Admission Type   Emergency    Admitting Provider   Woody Lam MD    Attending Provider   Woody Lam MD    Department, Room/Bed   [place] [address]/       Discharge Date       Discharge Disposition   Home or Self Care    Discharge Destination                                 Attending Provider: Woody Lam MD    Allergies: No Known Allergies    Isolation: None   Infection: None   Code Status: No CPR    Ht: 172.7 cm (67.99\")   Wt: 50.3 kg (111 lb)    Admission Cmt: None   Principal Problem: Bowel obstruction [K56.609]                   Active Insurance as of 6/29/2024       Primary Coverage       Payor Plan Insurance Group Employer/Plan Group    MEDICARE MEDICARE A & B        Payor Plan Address Payor Plan Phone Number Payor Plan Fax Number Effective Dates    [address] [phone]  5/1/1992 - None Entered    [facility] [id number]         Subscriber Name Subscriber Birth Date Member ID       RASHMI MARROQUIN 5/19/1927 [id number]               Secondary Coverage       Payor Plan Insurance Group Employer/Plan Group    [facility] ADMINISTRATION [place] DEPT [id number]        Payor Plan Address Payor Plan Phone Number Payor Plan Fax Number Effective Dates    [facility] OFFICE OF COMMUNITY CARE [phone]  1/1/2021 - None Entered    [address] [address]       [place] [address]         Subscriber Name Subscriber Birth Date Member ID       RASHMI MARROQUIN 5/19/1927 " [id number]               Tertiary Coverage       Payor Plan Insurance Group Employer/Plan Group    [facility] [employer] OPTUM        Payor Plan Address Payor Plan Phone Number Payor Plan Fax Number Effective Dates    [address] [phone]  1/1/2023 - None Entered    [place] [id number]         Subscriber Name Subscriber Birth Date Member ID       RASHMI MARROQUIN 5/19/1927 [id number]                     Emergency Contacts        (Rel.) Home Phone Work Phone Mobile Phone    Clark Marroquin (Legal Guardian) -- -- [phone]    MAYRA MARROQUIN (Spouse) [phone] -- [phone]    Rustam Marroquin (Son) -- -- [phone]              Orders (last 48 hrs)        Start     Ordered    07/08/24 1159  Discharge patient  Once         07/08/24 1158    07/07/24 1520  Phosphorus  Timed         07/07/24 0520    07/07/24 0730  sodium phosphates 15 mmol in 250 mL 0.9% sodium chloride IVPB  Once         07/07/24 0520    07/07/24 0600  Renal Function Panel  Morning Draw         07/06/24 1941    07/07/24 0424  Potassium  Timed         07/06/24 1924    07/06/24 2100  potassium chloride (KLOR-CON M20) CR tablet 40 mEq  Every 4 Hours         07/06/24 1924    07/06/24 2030  potassium chloride (KLOR-CON M20) CR tablet 40 mEq  Daily         07/06/24 1941    07/06/24 1807  Potassium  Timed         07/06/24 0507    07/06/24 0600  potassium chloride (KLOR-CON M20) CR tablet 40 mEq  Every 4 Hours         07/06/24 0507    07/05/24 2015  cyanocobalamin injection 1,000 mcg  Daily         07/05/24 1924 07/05/24 2015  folic acid (FOLVITE) tablet 1 mg  Daily         07/05/24 1924 07/05/24 1200  Dietary Nutrition Supplements Boost Plus (Ensure Enlive, Ensure Plus)  Daily With Lunch & Dinner       07/05/24 0907    07/04/24 0730  lansoprazole (PREVACID SOLUTAB) disintegrating tablet Tablet Delayed Release Dispersible 30 mg  Every Morning Before Breakfast         07/03/24 0850    07/03/24 2100  thiamine (B-1) 500 mg in  "sodium chloride 0.9 % 100 mL IVPB  3 Times Daily         07/03/24 1938    07/02/24 1428  Assess Stoma  Every Shift       07/02/24 1428    07/02/24 0800  Urinary Catheter Care  Every Shift      Placed in \"And\" Linked Group    07/02/24 0759    07/02/24 0730  levothyroxine (SYNTHROID, LEVOTHROID) tablet 100 mcg  Every Morning Before Breakfast         07/01/24 1548    07/01/24 2100  Calcium Carb-Cholecalciferol 600-20 MG-MCG tablet 1 tablet  2 Times Daily         07/01/24 1548    07/01/24 2100  lubiprostone (AMITIZA) capsule 8 mcg  2 Times Daily         07/01/24 1548    07/01/24 2100  Ferrous Sulfate 300 (60 Fe) MG/5ML solution 300 mg  2 Times Daily         07/01/24 1548    07/01/24 1645  finasteride (PROSCAR) tablet 5 mg  Daily         07/01/24 1548    07/01/24 1645  multivitamin (THERAGRAN) tablet 1 tablet  Daily         07/01/24 1548    07/01/24 1645  polyethylene glycol (MIRALAX) packet 17 g  Daily         07/01/24 1548    07/01/24 1645  aspirin chewable tablet 81 mg  Daily         07/01/24 1548    07/01/24 1548  artificial tears ophthalmic ointment  Every 1 Hour PRN         07/01/24 1548    06/30/24 0800  Oral Care  2 Times Daily       06/29/24 1811    06/29/24 2200  heparin (porcine) 5000 UNIT/ML injection 5,000 Units  Every 8 Hours Scheduled         06/29/24 1811 06/29/24 2200  Incentive Spirometry  Every 4 Hours While Awake       06/29/24 1811 06/29/24 2100  sodium chloride 0.9 % flush 10 mL  Every 12 Hours Scheduled         06/29/24 1811 06/29/24 2000  Vital Signs  Every 4 Hours       06/29/24 1811 06/29/24 1812  Intake & Output  Every Shift       06/29/24 1811 06/29/24 1811  sodium chloride 0.9 % flush 10 mL  As Needed         06/29/24 1811 06/29/24 1811  sodium chloride 0.9 % infusion 40 mL  As Needed         06/29/24 1811 06/29/24 1811  Potassium Replacement - Follow Nurse / BPA Driven Protocol  As Needed         06/29/24 1811 06/29/24 1811  Magnesium Standard Dose Replacement - " "Follow Nurse / BPA Driven Protocol  As Needed         24 1811    24 181  Phosphorus Replacement - Follow Nurse / BPA Driven Protocol  As Needed         24 181  Calcium Replacement - Follow Nurse / BPA Driven Protocol  As Needed         24 181  sennosides-docusate (PERICOLACE) 8.6-50 MG per tablet 2 tablet  2 Times Daily PRN        Placed in \"And\" Linked Group    24 1811    24 1811  polyethylene glycol (MIRALAX) packet 17 g  Daily PRN        Placed in \"And\" Linked Group    24 1811    24 1811  bisacodyl (DULCOLAX) EC tablet 5 mg  Daily PRN        Placed in \"And\" Linked Group    24 18124 1811  bisacodyl (DULCOLAX) suppository 10 mg  Daily PRN        Placed in \"And\" Linked Group    24 18124 1641  ondansetron (ZOFRAN) injection 4 mg  Every 6 Hours PRN         24 1641    24 1348  sodium chloride 0.9 % flush 10 mL  As Needed        Placed in \"And\" Linked Group    24 1348    Unscheduled  Empty Pouch As Needed  As Needed       24 1428    Unscheduled  Change Pouch Immediately if Leaking  As Needed       24 1428    --  tamsulosin (FLOMAX) 0.4 MG capsule 24 hr capsule  Daily         24 1413    --  levothyroxine (SYNTHROID, LEVOTHROID) 100 MCG tablet  Daily         24 1415    --  Multiple Vitamins-Minerals (ICAPS AREDS 2 PO)  Daily         24 1415                     Physician Progress Notes (last 48 hours)        Angelo Kenney MD at 24 1619              [facility]     Patient Identification:  Name:  Joaquín Rogers  Age:  89 y.o.  Sex:  male  :  1927  MRN:  [id number]  Visit Number:  [id number]  ROOM: [address] [place] [address]     Primary Care Provider:  Roche, Uday N., DO     Date of Admission: 2024    Length of stay in inpatient status:  8    Subjective     Chief Compliant:    Chief Complaint   Patient presents with    " Abdominal Pain       Tolerating diet, good ostomy output        Objective       Vital Signs:  Temp:  [97.7 °F (36.5 °C)-98.3 °F (36.8 °C)] 98.1 °F (36.7 °C)  Heart Rate:  [64-71] 71  Resp:  [17-20] 18  BP: (111-135)/(56-71) 120/60  SpO2:  [96 %-99 %] 97 %  on  Flow (L/min):  [2] 2;   Device (Oxygen Therapy): nasal cannula  Body mass index is 16.88 kg/m².      ----------------------------------------------------------------------------------------------------------------------  Physical Exam  Vitals and nursing note reviewed.   Constitutional:       General: He is not in acute distress.  HENT:      Head: Normocephalic and atraumatic.   Eyes:      General: No scleral icterus.     Extraocular Movements: Extraocular movements intact.   Cardiovascular:      Rate and Rhythm: Normal rate.   Pulmonary:      Effort: Pulmonary effort is normal. No respiratory distress.   Abdominal:      Palpations: Abdomen is soft.      Comments: LLQ ostomy in place with large amount soft appearing stool   Musculoskeletal:      Right lower leg: No edema.      Left lower leg: No edema.   Skin:     General: Skin is warm.      Coloration: Skin is pale.   Neurological:      General: No focal deficit present.      Mental Status: He is alert.      Cranial Nerves: No cranial nerve deficit.      Motor: Weakness present.       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, status post creation of a loop descending colon colostomy this admission  -Postoperative hypoxic hypercapnic respiratory failure, still requiring 3.5 L/min nasal cannula oxygen  -Severe chronic illness malnutrition that was present on admission, which complicates all aspects of care  -Vitamin B12 deficiency and borderline low folate,  present on admission  -Acute hypokalemia and acute hypophosphatemia, improving with supplementation  -History of hypothyroidism  -History of GERD  -History of bradycardia, 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    Angelo Kenney MD  [facility]  24  16:19 EDT      Electronically signed by Angelo Kenney MD at 24 1620       Geovany Velasquez MD at 24 0931          Postoperative day 6: Diverting loop colostomy secondary to obstructing rectal adenocarcinoma     Patient doing well this morning and tolerating diet.  Loop colostomy viable and functioning with stoma bar removed yesterday.No acute distress, alert and orient x 3  Clear to auscultation bilaterally  Abdomen soft, appropriately tender, loop colostomy viable and functioning     89-year-old male with obstructing rectal mass status post diverting loop colostomy.     -Continue regular diet  -Ambulate  -From surgical standpoint okay to discharge home with 1 week follow-up with Dr. Yanez    Electronically signed by Geovany Velasquez MD at 24 0932       Angelo Kenney MD at 24 1647              [facility]     Patient Identification:  Name:  Joaquín Rogers  Age:  89 y.o.  Sex:  male  :  1927  MRN:  [id number]  Visit Number:  [id number]  ROOM: [address] [place] [address]     Primary Care  Provider:  Uday Roche DO     Date of Admission: 6/29/2024    Length of stay in inpatient status:  7    Subjective     Chief Compliant:    Chief Complaint   Patient presents with    Abdominal Pain       Patient with improving ostomy output. Tolerating diet. Bourgeois removed with mild penile swelling noted but no significant dysuria. Using urinal during rounds this am        Objective       Vital Signs:  Temp:  [97.6 °F (36.4 °C)-98.4 °F (36.9 °C)] 98.3 °F (36.8 °C)  Heart Rate:  [66-73] 67  Resp:  [18] 18  BP: (132-149)/(66-71) 132/66  SpO2:  [96 %-99 %] 96 %  on  Flow (L/min):  [2] 2;   Device (Oxygen Therapy): nasal cannula  Body mass index is 16.88 kg/m².      ----------------------------------------------------------------------------------------------------------------------  Physical Exam  Vitals and nursing note reviewed.   Constitutional:       General: He is not in acute distress.  HENT:      Head: Normocephalic and atraumatic.   Eyes:      General: No scleral icterus.     Extraocular Movements: Extraocular movements intact.   Cardiovascular:      Rate and Rhythm: Normal rate.   Pulmonary:      Effort: Pulmonary effort is normal. No respiratory distress.   Abdominal:      Palpations: Abdomen is soft.      Comments: LLQ ostomy in place with large amount soft appearing stool   Musculoskeletal:      Right lower leg: No edema.      Left lower leg: No edema.   Skin:     General: Skin is warm.      Coloration: Skin is pale.   Neurological:      General: No focal deficit present.      Mental Status: He is alert.      Cranial Nerves: No cranial nerve deficit.      Motor: Weakness present.       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, status post creation of a  loop descending colon colostomy this admission  -Postoperative hypoxic hypercapnic respiratory failure, still requiring 3.5 L/min nasal cannula oxygen  -Severe chronic illness malnutrition that was present on admission, which complicates all aspects of care  -Vitamin B12 deficiency and borderline low folate, present on admission  -Acute hypokalemia and acute hypophosphatemia, improving with supplementation  -History of hypothyroidism  -History of GERD  -History of bradycardia, 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 Repeat renal panel in am and thompson replacement dosing based on ongoing requirements and likely GI losses  Anticipate dc home tomorrow      Code Status and Medical Interventions:   Ordered at: 06/29/24 1831     Medical Intervention Limits:    No intubation (DNI)     Code Status (Patient has no pulse and is not breathing):    No CPR (Do Not Attempt to Resuscitate)     Medical Interventions (Patient has pulse or is breathing):    Limited Support         Disposition: f/u renal panel, dc <48hrs    I have reviewed any copied/forwarded text or data, verified its accuracy, and updated as necessary above.    Angelo Kenney MD  [facility]  07/06/24  16:49 EDT      Electronically signed by Angelo Kenney MD at 07/06/24 1652       Consult Notes (last 48 hours)  Notes from 07/06/24 1218 through 07/08/24 1218   No notes of this type exist for this encounter.

## 2024-07-08 NOTE — CASE MANAGEMENT/SOCIAL WORK
Discharge Planning Assessment   Harvinder     Patient Name: Joaquín Rogers  MRN: [id number]  Today's Date: 7/8/2024    Admit Date: 6/29/2024    Plan: SS received a message from [employer] on this date requesting for [facility].       Discharge Plan       Row Name 07/08/24 1011       Plan    Plan SS received a message from [employer] on this date requesting for [facility].    1145: [facility] to [phone]. SS contacted pt's son Clark to follow up.     1314: SS received a message RN and physician stating that pt's son is not wanting to take pt home on this date due to having to get things arranged at home and requesting additional days to work with therapy.           Continued Care and Services - Admitted Since 6/29/2024    No active coordination exists for this encounter.  Expected Discharge Date and Time       Expected Discharge Date Expected Discharge Time    Jul 10, 2024          MARIAMA Prado

## 2024-07-08 NOTE — CASE MANAGEMENT/SOCIAL WORK
Discharge Planning Assessment   Harvinder     Patient Name: Joaquín Rogers  MRN: [id number]  Today's Date: 7/8/2024    Admit Date: 6/29/2024         Discharge Plan       Row Name 07/08/24 1311       Plan    Plan CM spoke with pt's son Clark and he is agreeble to use local DME for hospital bed and oxygen if needed. CM faxed order for hospital bed to [facility] and they will communicate with Clark for delivery.                  Helen Corona RN

## 2024-07-08 NOTE — PROGRESS NOTES
"Palliative Care Daily Progress Note     S: Medical record reviewed,sitting up in bed, talkative , a&ox4, complains of clear rectal drainage. He denies any pain, discomfort, nausea, vomiting. He also denies any dyspnea, no tachypnea or chest pain. He reports being able to walk independently and wants to go home.       O:   Palliative Performance Scale Score:     /60 (BP Location: Right arm, Patient Position: Lying)   Pulse 68   Temp 97.9 °F (36.6 °C) (Oral)   Resp 20   Ht 172.7 cm (67.99\")   Wt 50.3 kg (111 lb)   SpO2 90% Comment: walking oximetry  BMI 16.88 kg/m²     Intake/Output Summary (Last 24 hours) at 7/8/2024 1441  Last data filed at 7/8/2024 1208  Gross per 24 hour   Intake 240 ml   Output 1325 ml   Net -1085 ml       PE:    General Appearance:    Chronically ill appearing, alert, cooperative, NAD   HEENT:    NC/AT, without obvious abnormality, EOMI, anicteric    Neck:   supple, trachea midline, no JVD   Lungs:     CTAB without w/r/r    Heart:    RRR, normal S1 and S2, no M/R/G   Abdomen:     Soft, NT, ND, NABS , colostomy with new ostomy site noted, ostomy is red, enlarged , clear rectal drainage    Extremities:   Moves all extremities, no edema   Pulses:   Pulses palpable and equal bilaterally   Skin:   Warm, dry   Neurologic:   A/Ox3, cooperative   Psych:   Calm, appropriate         Meds: Reviewed and changes noted    Labs:   Results from last 7 days   Lab Units 07/06/24  0035   WBC 10*3/mm3 6.46   HEMOGLOBIN g/dL 11.2*   HEMATOCRIT % 35.1*   PLATELETS 10*3/mm3 221     Results from last 7 days   Lab Units 07/07/24  0418   SODIUM mmol/L 142   POTASSIUM mmol/L 3.6  3.6   CHLORIDE mmol/L 110*   CO2 mmol/L 26.2   BUN mg/dL 6*   CREATININE mg/dL 1.09   GLUCOSE mg/dL 106*   CALCIUM mg/dL 8.2     Results from last 7 days   Lab Units 07/07/24  0418 07/06/24  1706 07/06/24  0035   SODIUM mmol/L 142  --  141   POTASSIUM mmol/L 3.6  3.6   < > 2.8*   CHLORIDE mmol/L 110*  --  109*   CO2 mmol/L 26.2  -- "  22.7   BUN mg/dL 6*  --  7*   CREATININE mg/dL 1.09  --  0.92   CALCIUM mg/dL 8.2  --  8.1*   BILIRUBIN mg/dL  --   --  0.3   ALK PHOS U/L  --   --  105   ALT (SGPT) U/L  --   --  12   AST (SGOT) U/L  --   --  25   GLUCOSE mg/dL 106*  --  120*    < > = values in this interval not displayed.     Imaging Results (Last 72 Hours)       ** No results found for the last 72 hours. **              Diagnostics: Reviewed    A: Joaquín Rogers is sitting up in bed, able to hold conversation and reports that he feels much better than he did when he arrived to the hospital. He does report clear rectal discharge with no blood. His most recent labs egfr 61.7, bun 6, albumin 2.4, H&H 11.2/35.1. bp 131/60 hr 68 rr 20 sat 90      P: Will continue to provide symptomatic and supportive palliative care for patient and family. Will assist with disposition as needed. Pt plans to discharge home with [facility] home health services. Pt reports that he was told he would likely be discharged today.       We will continue to follow along. Please do not hesitate to contact us regarding further sx mgmt or GOC needs, including after hours or on weekends via our on call provider at [phone].     Giselle Simpson, APRN    7/8/2024

## 2024-07-08 NOTE — DISCHARGE SUMMARY
[facility] HOSPITALISTS DISCHARGE SUMMARY    Patient Identification:  Name:  Joaquín Rogers  Age:  89 y.o.  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, status post creation of a loop descending colon colostomy this admission  #Postoperative hypoxic hypercapnic respiratory failure, still requiring 3.5 L/min nasal cannula oxygen  #Severe chronic illness malnutrition that was present on admission, which complicates all aspects of care  #Vitamin B12 deficiency and borderline low folate, present on admission  #Acute hypokalemia and acute hypophosphatemia, improving with supplementation  #History of hypothyroidism  #History of GERD  #History of bradycardia, 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 male presented on  to [facility] complaining of abdominal pain.  Please see the admitting history and physical for further details.        Mr. Rogers is our 89 yo M with hx BPH, hypothyroidism, constipation on home linzess and miralax, GERD, inguinal hernias, pacemaker who presents with abdominal pain and no BM for 3 days which is abnormal for patient. In the ED patient found to have distal colonic obstruction on CT. NG tube placed. Surgery consulted and recommended to admit to medicine and would follow for possible surgical intervention. Patient underwent flex sig on  that revealed a partially obstructive large mass in the rectum.  Biopsies obtained. Patient taken for exploratory laparotomy and creation of loop descending colon colostomy. Patient hypercapnic in postoperative area and reintubated. Patient extubated the following day and has done well. Patient's son obtained guardianship while he was in the hospital. [facility]. 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 Patient to also f/u with PCP in 1 week, recommended repeat labs at that time. Walking oximetry revealed patient did not need O2 even on exertion. Discharge delayed by family preparing home this week. Patient has continued to improve. Linzess likely started in response to obstruction which now has been relieved. Patient notes cost has been a barrier and I am not sure he will need it moving forward, will hold for now.     VITAL SIGNS:  Temp:  [98.1 °F (36.7 °C)-98.5 °F (36.9 °C)] 98.4 °F (36.9 °C)  Heart Rate:  [67-71] 70  Resp:  [18-20] 20  BP: (120-151)/(59-69) 126/59  SpO2:  [97 %-98 %] 98 %  on  Flow (L/min):  [2] 2;   Device (Oxygen Therapy): nasal cannula    Body mass index is 16.88 kg/m².  Wt Readings from Last 3 Encounters:   07/01/24 50.3 kg (111 lb)   05/31/24 68 kg (150 lb)   04/01/24 67.1 kg (148 lb)       PHYSICAL EXAM:  Constitutional:  Elderly appearing male.   HENT:  Head:  Normocephalic and atraumatic.  Mouth:  Moist mucous membranes.    Eyes:  Conjunctivae and EOM are normal.  Pupils are equal, round, and reactive to light.  No scleral icterus.    Cardiovascular:  Normal rate, regular rhythm and normal heart sounds with no murmur.  Pulmonary/Chest:  No respiratory distress, no wheezes, no crackles, with normal breath sounds and good air movement.  Abdominal:  Soft.  Bowel sounds are normal.  No distension and no tenderness. Ostomy and midline incision noted. Stool in ostomy. Incision well approximated with no significant surrounding redness or any noted drainage.    Musculoskeletal:  No edema, no tenderness, and no deformity.  No red or swollen joints anywhere.    Neurological:  Alert and oriented to person, place, and time.  No gross neurological deficit.   Skin:  Skin is warm and dry. No rash noted. No pallor.   Peripheral vascular:  Strong pulses in all 4 extremities with no clubbing, no cyanosis, no edema.    DISCHARGE DISPOSITION   Stable    DISCHARGE MEDICATIONS:     Discharge Medications        New Medications        Instructions Start Date   folic acid 1 MG tablet  Commonly known as: FOLVITE   1 mg, Oral, Daily   Start Date: July 9, 2024     potassium chloride 20 MEQ CR tablet  Commonly known as: KLOR-CON M20   20 mEq, Oral, Daily   Start Date: July 9, 2024            Continue These Medications        Instructions Start Date   aspirin 81 MG EC tablet   81 mg, Oral, Daily      calcium carbonate-cholecalciferol 500-400 MG-UNIT tablet tablet   1 tablet, Oral, 2 Times Daily      carboxymethylcellulose 0.5 % solution  Commonly known as: REFRESH PLUS   1 drop, Both Eyes, 3 Times Daily PRN      ferrous sulfate 325 (65 FE) MG tablet   325 mg, Oral, 2 Times Daily      finasteride 5 MG tablet  Commonly known as: PROSCAR   5 mg, Oral, Daily      ICAPS AREDS 2 PO   1 tablet, Oral, Daily      levothyroxine 100 MCG tablet  Commonly known as: SYNTHROID, LEVOTHROID   100 mcg, Oral, Daily      linaclotide 72 MCG capsule capsule  Commonly known as: LINZESS   72 mcg, Oral, Every Morning Before Breakfast      omeprazole 40 MG capsule  Commonly known as: priLOSEC   40 mg, Oral, Daily      polyethylene glycol 17 g packet  Commonly known as: MIRALAX   17 g, Oral, Daily      tamsulosin 0.4 MG capsule 24 hr capsule  Commonly known as: FLOMAX   1 capsule, Oral, Daily                 Additional Instructions for the Follow-ups that You Need to Schedule       Ambulatory Referral to Home Health   As directed      Face to Face Visit Date: 7/8/2024   Follow-up provider for Plan of Care?: I treated  the patient in an acute care facility and will not continue treatment after discharge.   Follow-up provider: TODD WHALEY [[id number]]   Reason/Clinical Findings: debility, bowel surgery, advanced age   Describe mobility limitations that make leaving home difficult: debility, bowel surgery, advanced age   Nursing/Therapeutic Services Requested: Skilled Nursing Physical Therapy   Skilled nursing orders: Medication education   PT orders: Home safety assessment Strengthening   Frequency: 1 Week 1               Follow-up Information       Uday Whaley DO .    Specialty: Internal Medicine  Contact information:  [address] [facility] [address]  [place] [id number]  [phone]               Uday Whaley DO Follow up in 1 week(s).    Specialty: Internal Medicine  Why: Repeat BMP, mag, CBC at visit.  Contact information:  [address] [facility] [address]  [place] [id number] [phone]               Dilma Yanez MD Follow up in 1 week(s).    Specialties: General Surgery, Emergency Medicine  Contact information:  [address]  [facility] [place] [address] [phone]                              TEST  RESULTS PENDING AT DISCHARGE       CODE STATUS  Code Status and Medical Interventions:   Ordered at: 06/29/24 1835     Medical Intervention Limits:    No intubation (DNI)     Code Status (Patient has no pulse and is not breathing):    No CPR (Do Not Attempt to Resuscitate)     Medical Interventions (Patient has pulse or is breathing):    Limited Support       Woody Lam MD  [facility]  07/08/24  12:30 EDT    Please note that this discharge summary required more than 30 minutes to complete.

## 2024-07-08 NOTE — PROGRESS NOTES
[facility] HOSPITALIST PROGRESS NOTE     Patient Identification:  Name:  Joaquín Rogers  Age:  89 y.o.  Sex:  male  :  1927  MRN:  [id number]  Visit Number:  [id number]  ROOM: [address] [place] [address]     Primary Care Provider:  Uday Roche DO    Length of stay in inpatient status:  9    Subjective     Chief Compliant:    Chief Complaint   Patient presents with    Abdominal Pain       History of Presenting Illness:    Patient denied any new complaints. Son visited later in day reporting they are not ready at home for him yet but anticipate being ready on 7/10.     ROS:  Otherwise 10 point ROS negative other than documented above in HPI.     Objective     Current Hospital Meds:aspirin, 81 mg, Per G Tube, Daily  Calcium Carb-Cholecalciferol, 1 tablet, Oral, BID  cyanocobalamin, 1,000 mcg, Intramuscular, Daily  Ferrous Sulfate, 300 mg, Nasogastric, BID  finasteride, 5 mg, Oral, Daily  folic acid, 1 mg, Oral, Daily  heparin (porcine), 5,000 Units, Subcutaneous, Q8H  lansoprazole, 30 mg, Oral, QAM AC  levothyroxine, 100 mcg, Oral, QAM AC  lubiprostone, 8 mcg, Oral, BID  multivitamin, 1 tablet, Oral, Daily  polyethylene glycol, 17 g, Oral, Daily  potassium chloride, 40 mEq, Oral, Daily  sodium chloride, 10 mL, Intravenous, Q12H  thiamine (B-1) IV, 500 mg, Intravenous, TID         Current Antimicrobial Therapy:  Anti-Infectives (From admission, onward)      None          Current Diuretic Therapy:  Diuretics (From admission, onward)      None          ----------------------------------------------------------------------------------------------------------------------  Vital Signs:  Temp:  [97.9 °F (36.6 °C)-98.5 °F (36.9 °C)] 97.9 °F (36.6 °C)  Heart Rate:  [67-71] 68  Resp:  [20] 20  BP: (122-151)/(59-69) 131/60  SpO2:  [90 %-98 %] 90 %  on  Flow (L/min):  [2] 2;   Device (Oxygen Therapy): room air  Body mass index is 16.88 kg/m².    Wt Readings from Last 3 Encounters:   24 50.3 kg (111 lb)   24  68 kg (150 lb)   04/01/24 67.1 kg (148 lb)     Intake & Output (last 3 days)         07/05 0701 07/06 0700 07/06 0701 07/07 0700 07/07 0701 07/08 0700 07/08 0701 07/09 0700    P.O. 240 600 480 360    I.V. (mL/kg)        Total Intake(mL/kg) 240 (4.8) 600 (11.9) 480 (9.5) 360 (7.2)    Urine (mL/kg/hr) 1850 (1.5) 1385 (1.1) 850 (0.7) 475 (0.9)    Stool 1275 400 800     Total Output 3125 1785 1650 475    Net -2885 -1185 -1170 -115            Urine Unmeasured Occurrence  1 x            Diet: Regular/House; Texture: Soft to Chew (NDD 3); Soft to Chew: Whole Meat; Fluid Consistency: Thin (IDDSI 0)  ----------------------------------------------------------------------------------------------------------------------  Physical exam:  Constitutional:  Elderly appearing male.    HENT:  Head:  Normocephalic and atraumatic.  Mouth:  Moist mucous membranes.    Eyes:  Conjunctivae and EOM are normal. No scleral icterus.    Neck:  Neck supple.  No JVD present.    Cardiovascular:  Normal rate, regular rhythm and normal heart sounds with no murmur.  Pulmonary/Chest:  No respiratory distress, no wheezes, no crackles, with normal breath sounds and good air movement.  Abdominal:  Soft.  Bowel sounds are normal.  Midline incision well approximated with no significant drainage.   Musculoskeletal:  No edema, no tenderness, and no deformity.  No red or swollen joints anywhere.    Neurological:  Alert and oriented to person, place, and time.  No cranial nerve deficit.  No tongue deviation.  No facial droop.  No slurred speech.   Skin:  Skin is warm and dry. No rash noted. No pallor.   Peripheral vascular:  Pulses in all 4 extremities with no clubbing, no cyanosis, no edema.  ----------------------------------------------------------------------------------------------------------------------  Tele:    ----------------------------------------------------------------------------------------------------------------------  Results from last  7 days   Lab Units 07/06/24  0035 07/04/24  0142 07/03/24  0034 07/02/24  0637 07/02/24  0021 07/02/24  0021 07/01/24  2105   LACTATE mmol/L  --   --   --  1.0  --  2.4* 2.4*   WBC 10*3/mm3 6.46 7.45 8.88  --    < > 9.31  --    HEMOGLOBIN g/dL 11.2* 10.2* 11.5*  --    < > 10.8*  --    HEMATOCRIT % 35.1* 31.5* 36.8*  --    < > 34.9*  --    MCV fL 89.1 88.0 89.8  --    < > 90.9  --    MCHC g/dL 31.9 32.4 31.3*  --    < > 30.9*  --    PLATELETS 10*3/mm3 221 200 228  --    < > 214  --     < > = values in this interval not displayed.     Results from last 7 days   Lab Units 07/02/24  0930   PH, ARTERIAL pH units 7.372   PO2 ART mm Hg 90.1   PCO2, ARTERIAL mm Hg 36.5   HCO3 ART mmol/L 21.2     Results from last 7 days   Lab Units 07/07/24  1611 07/07/24  0418 07/06/24  1706 07/06/24  0035 07/04/24  1831 07/04/24  0431 07/04/24  0142 07/03/24  1821 07/03/24  0034   SODIUM mmol/L  --  142  --  141  --  142  --   --  140   POTASSIUM mmol/L  --  3.6  3.6 3.3* 2.8*   < > 2.9*  --   --  3.8   MAGNESIUM mg/dL  --   --   --  2.0  --  1.7  --   --  2.0   CHLORIDE mmol/L  --  110*  --  109*  --  111*  --   --  110*   CO2 mmol/L  --  26.2  --  22.7  --  21.9*  --   --  21.6*   BUN mg/dL  --  6*  --  7*  --  9  --   --  16   CREATININE mg/dL  --  1.09  --  0.92  --  0.75*  --   --  1.10   CALCIUM mg/dL  --  8.2  --  8.1*  --  7.3*  --   --  7.9*   IONIZED CALCIUM mmol/L  --   --   --   --   --   --  1.01*  --   --    PHOSPHORUS mg/dL 3.2 2.2*  --  2.5  --   --  2.4*   < > 1.0*   GLUCOSE mg/dL  --  106*  --  120*  --  104*  --   --  104*   ALBUMIN g/dL  --  2.4*  --  2.4*  --  2.2*  --   --  2.7*   BILIRUBIN mg/dL  --   --   --  0.3  --  0.3  --   --  0.5   ALK PHOS U/L  --   --   --  105  --  100  --   --  112   AST (SGOT) U/L  --   --   --  25  --  20  --   --  22   ALT (SGPT) U/L  --   --   --  12  --  10  --   --  11    < > = values in this interval not displayed.   Estimated Creatinine Clearance: 27.6 mL/min (by C-G formula  "based on SCr of 1.09 mg/dL).  No results found for: \"AMMONIA\"              No results found for: \"HGBA1C\", \"POCGLU\"  Lab Results   Component Value Date    TSH 3.870 06/29/2024    FREET4 1.48 03/14/2023     No results found for: \"PREGTESTUR\", \"PREGSERUM\", \"HCG\", \"HCGQUANT\"  Pain Management Panel           No data to display              Brief Urine Lab Results  (Last result in the past 365 days)        Color   Clarity   Blood   Leuk Est   Nitrite   Protein   CREAT   Urine HCG        06/29/24 2219 Yellow   Cloudy   Large (3+)   Negative   Negative   30 mg/dL (1+)                 No results found for: \"BLOODCX\"  No results found for: \"URINECX\"  No results found for: \"WOUNDCX\"  No results found for: \"STOOLCX\"  No results found for: \"RESPCX\"  No results found for: \"AFBCX\"  Results from last 7 days   Lab Units 07/02/24  0637 07/02/24  0021 07/01/24  2105 07/01/24  1834   LACTATE mmol/L 1.0 2.4* 2.4* 2.2*       I have personally looked at the labs and they are summarized above.  ----------------------------------------------------------------------------------------------------------------------  Detailed radiology reports for the last 24 hours:    Imaging Results (Last 24 Hours)       ** No results found for the last 24 hours. 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, status post creation of a loop descending colon colostomy this admission  -Postoperative hypoxic hypercapnic respiratory failure, still requiring 3.5 L/min nasal cannula oxygen  -Severe chronic illness malnutrition that was present on admission, which complicates all aspects of care  -Vitamin B12 deficiency and borderline low folate, present on admission  -Acute hypokalemia and acute hypophosphatemia, improving with supplementation  -History of hypothyroidism  -History of GERD  -History of bradycardia, status post pacemaker " placement in the distant past  -History of bilateral inguinal hernias     Patient ambulated without O2. No need for supplemental O2 at discharge. Family preparing home for his return. Hospital bed ordered. Anticipate discharge home on 7/10 per family. 1 wk f/u with surgery at discharge.     Family requesting hospital bed. Patient has a medical condition which requires positioning of the body in ways that are not feasible with an ordinary bed in order to alleviate pain. Requires frequent changes in body position.     Code status: DNR/DNI    Dispo: Likely home on 7/10       Woody Lam MD  [facility] Hospitalist  07/08/24  17:34 EDT

## 2024-07-08 NOTE — DISCHARGE INSTRUCTIONS
Please take medications as prescribed. Please go to follow-up appointments as recommended. Please seek medical attention if you have worsening of any symptoms. Continue ostomy care. Patient notes he feels he can do much of it but will depend on home health to change bags, which should be adequate if done every 2-4 days. HHPT, hospital bed ordered.

## 2024-07-08 NOTE — DISCHARGE PLACEMENT REQUEST
"Joaquín Marroquin (89 y.o. Male)       Date of Birth   05/19/1927    Social Security Number       Address   [address] [address]    Home Phone   [phone]    MRN   [id number]       Protestant   None    Marital Status                               Admission Date   6/29/24    Admission Type   Emergency    Admitting Provider   Woody Lam MD    Attending Provider   Woody Lam MD    Department, Room/Bed   [address], [facility]/       Discharge Date       Discharge Disposition   Home or Self Care    Discharge Destination                                 Attending Provider: Woody Lam MD    Allergies: No Known Allergies    Isolation: None   Infection: None   Code Status: No CPR    Ht: 172.7 cm (67.99\")   Wt: 50.3 kg (111 lb)    Admission Cmt: None   Principal Problem: Bowel obstruction [K56.609]                   Active Insurance as of 6/29/2024       Primary Coverage       Payor Plan Insurance Group Employer/Plan Group    MEDICARE MEDICARE A & B        Payor Plan Address Payor Plan Phone Number Payor Plan Fax Number Effective Dates    [address] [phone]  5/1/1992 - None Entered    [facility] [id number]         Subscriber Name Subscriber Birth Date Member ID       JOAQUÍN MARROQUIN 5/19/1927 [id number]               Secondary Coverage       Payor Plan Insurance Group Employer/Plan Group    [facility] DE[id number]        Payor Plan Address Payor Plan Phone Number Payor Plan Fax Number Effective Dates    [facility] OFFICE OF COMMUNITY CARE [phone]  1/1/2021 - None Entered    [address] [address]       [facility] [address]         Subscriber Name Subscriber Birth Date Member ID       JOAQUÍN MARROQUIN 5/19/1927 [id number]               Tertiary Coverage       Payor Plan Insurance Group Employer/Plan Group    [facility] CC[facility] OPTUM        Payor Plan Address Payor Plan Phone Number Payor Plan Fax Number Effective Dates    [address] [phone]  " "2023 - None Entered    [facility] [id number]         Subscriber Name Subscriber Birth Date Member ID       JOAQUÍN MARROQUIN 1927 [id number]                     Emergency Contacts        (Rel.) Home Phone Work Phone Mobile Phone    Clark Marroquin (Legal Guardian) -- -- [phone]    MAYRA MARROQUIN (Spouse) [phone] -- [phone]    Rustam Marroquin (Son) -- -- [phone]          [address] [address]  Dept. Phone:  [phone]  Dept. Fax:  [phone] Date Ordered: 2024         Patient:  Joaquín Marroquin MRN:  [id number]   [address] [place] [address] :  1927  SSN:    Phone: [phone] Sex:  M     Weight: 50.3 kg (111 lb)         Ht Readings from Last 1 Encounters:   24 172.7 cm (67.99\")         Hospital Bed  (Order ID: [id number])    Diagnosis:  Complete intestinal obstruction, unspecified cause (K56.601 [ICD-10-CM] 560.9 [ICD-9-CM])   Quantity:  1     Semi-Electric Bed Type:  Head / Foot Adjustment, Side Rails & Mattress  Length of Need: 99 Months = Lifetime        Authorizing Provider's Phone: [phone]  Authorizing Provider:Woody Lam MD  Authorizing Provider's NPI: [id number]  Order Entered By: Woody Lam MD 2024 12:30 PM     Electronically signed by: Woody Lam MD 2024 12:30 PM          Physician Progress Notes (most recent note)        Angelo Kenney MD at 24 1619              [facility] HOSPITALIST PROGRESS NOTE     Patient Identification:  Name:  Joaquín Marroquin  Age:  89 y.o.  Sex:  male  :  1927  MRN:  [id number]  Visit Number:  [id number]  ROOM: [address] [place] [address]     Primary Care Provider:  Uday Roche DO     Date of Admission: 2024    Length of stay in inpatient status:  8    Subjective     Chief Compliant:    Chief Complaint   Patient presents with    Abdominal Pain       Tolerating diet, good ostomy output        Objective       Vital " Signs:  Temp:  [97.7 °F (36.5 °C)-98.3 °F (36.8 °C)] 98.1 °F (36.7 °C)  Heart Rate:  [64-71] 71  Resp:  [17-20] 18  BP: (111-135)/(56-71) 120/60  SpO2:  [96 %-99 %] 97 %  on  Flow (L/min):  [2] 2;   Device (Oxygen Therapy): nasal cannula  Body mass index is 16.88 kg/m².      ----------------------------------------------------------------------------------------------------------------------  Physical Exam  Vitals and nursing note reviewed.   Constitutional:       General: He is not in acute distress.  HENT:      Head: Normocephalic and atraumatic.   Eyes:      General: No scleral icterus.     Extraocular Movements: Extraocular movements intact.   Cardiovascular:      Rate and Rhythm: Normal rate.   Pulmonary:      Effort: Pulmonary effort is normal. No respiratory distress.   Abdominal:      Palpations: Abdomen is soft.      Comments: LLQ ostomy in place with large amount soft appearing stool   Musculoskeletal:      Right lower leg: No edema.      Left lower leg: No edema.   Skin:     General: Skin is warm.      Coloration: Skin is pale.   Neurological:      General: No focal deficit present.      Mental Status: He is alert.      Cranial Nerves: No cranial nerve deficit.      Motor: Weakness present.       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, status post creation of a loop descending colon colostomy this admission  -Postoperative hypoxic hypercapnic respiratory failure, still requiring 3.5 L/min nasal cannula oxygen  -Severe chronic illness malnutrition that was present on admission, which complicates all aspects of care  -Vitamin B12 deficiency and borderline low folate, present on admission  -Acute hypokalemia and acute hypophosphatemia, improving with  supplementation  -History of hypothyroidism  -History of GERD  -History of bradycardia, status post pacemaker placement in the distant past  -History of bilateral inguinal hernias    1 wk f/u surgery  [facility] health unable to arrange on weekend, confirm in am and dc      Code Status and Medical Interventions:   Ordered at: 06/29/24 9037     Medical Intervention Limits:    No intubation (DNI)     Code Status (Patient has no pulse and is not breathing):    No CPR (Do Not Attempt to Resuscitate)     Medical Interventions (Patient has pulse or is breathing):    Limited Support         Disposition: home in am    I have reviewed any copied/forwarded text or data, verified its accuracy, and updated as necessary above.    Angelo Kenney MD  [facility] Hospitalist  07/07/24  16:19 EDT      Electronically signed by Angelo Kenney MD at 07/07/24 4517

## 2024-07-08 NOTE — NURSING NOTE
Educated PT and son on colostomy care. Changed flange and bag with son able to repeat steps back. PT learned how to empty bag independently and did a return demonstration. [facility].  Reviewed ostomy supply ordering and provided book with marked pages. PT to discharge Wednesday 7/10

## 2024-07-08 NOTE — THERAPY TREATMENT NOTE
Acute Care - Occupational Therapy Treatment Note  [facility]     Patient Name: Joaquín Rogers  : 1927  MRN: [id number]  Today's Date: 2024  Onset of Illness/Injury or Date of Surgery: 24 (admission date)          Admit Date: 2024       ICD-10-CM ICD-9-CM   1. Complete intestinal obstruction, unspecified cause  K56.601 560.9   2. Intestinal obstruction, unspecified cause, unspecified whether partial or complete  K56.609 560.9   3. 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     Patient Active Problem List   Diagnosis    COVID-19    Acute hypoxemic respiratory failure due to COVID-19    Symptomatic bradycardia    Debility    Bowel obstruction    Indeterminate colitis    Severe malnutrition     Past Medical History:   Diagnosis Date    Arthritis     Bradycardia     Enlarged prostate     Pacemaker     Thyroid disease      Past Surgical History:   Procedure Laterality Date    CARDIAC ELECTROPHYSIOLOGY PROCEDURE N/A 2021    Procedure: Pacemaker DC new;  Surgeon: Kendrick Burgess MD;  Location: [facility] CATH INVASIVE LOCATION;  Service: Cardiology;  Laterality: N/A;    COLOSTOMY N/A 2024    Procedure: COLOSTOMY LOOP;  Surgeon: Dilma Yanez MD;  Location: [facility] OR;  Service: General;  Laterality: N/A;    EXPLORATORY LAPAROTOMY N/A 2024    Procedure: LAPAROTOMY EXPLORATORY;  Surgeon: Dilma Yanez MD;  Location: [facility] OR;  Service: General;  Laterality: N/A;    HERNIA REPAIR Right     left inguinal/right    INSERT / REPLACE / REMOVE PACEMAKER  2021    [facility] device    SIGMOIDOSCOPY N/A 2024    Procedure: FLEXIBLE SIGMOIDOSCOPY WITH BIOPSY;  Surgeon: Dilma Yanez MD;  Location: [facility];  Service: General;  Laterality: N/A;         OT ASSESSMENT FLOWSHEET (Last 12 Hours)       OT Evaluation and Treatment       Row Name 24 1442                   OT Time and Intention    Subjective Information complains of;weakness;fatigue  -LM        Document Type therapy note  (daily note)  -LM        Mode of Treatment occupational therapy  -LM        Patient Effort adequate  -LM        Comment Patient seen this date for adl retraining/education and fxl mobility.  Patient requires min assist with bed mobility, min assist with fxl transfers.  Mod assist with bathing, dressing tasks, max assist with toileting.  Setup with feeding and light grooming.  spouse present.  Son present.  Educated patient on safe adl performance while seated with overall moderate assist.  Also, discussed benefit from continued OT services upon discharge at [facility] versus  and 24 hour care at [facility].  -LM           General Information    Existing Precautions/Restrictions fall  colostomy  -LM           Cognition    Affect/Mental Status (Cognition) WFL  -LM        Orientation Status (Cognition) oriented x 3  -LM           Wound 07/01/24 1200 midline abdomen Incision    Wound - Properties Group Placement Date: 07/01/24  -KB Placement Time: 1200  -KB Orientation: midline  -KB Location: abdomen  -KB Primary Wound Type: Incision  -KB    Retired Wound - Properties Group Placement Date: 07/01/24  -KB Placement Time: 1200  -KB Orientation: midline  -KB Location: abdomen  -KB Primary Wound Type: Incision  -KB    Retired Wound - Properties Group Date first assessed: 07/01/24  -KB Time first assessed: 1200  -KB Location: abdomen  -KB Primary Wound Type: Incision  -KB       Positioning and Restraints    Post Treatment Position bed  -LM        In Bed supine;call light within reach;encouraged to call for assist;exit alarm on  -LM                  User Key  (r) = Recorded By, (t) = Taken By, (c) = Cosigned By      Initials Name Effective Dates    Birgit Mckeon RN 05/26/22 -     LM Sally Arroyo OT 06/16/21 -                            OT Recommendation and Plan              Time Calculation:     Therapy Charges for Today       Code Description Service Date Service Provider Modifiers Qty    [id number]  OT SELF  CARE/MGMT/TRAIN EA 15 MIN 7/8/2024 Sally Arroyo, OT GO 1                 Sally Arroyo, KATRINA  7/8/2024

## 2024-07-09 LAB
ANION GAP SERPL CALCULATED.3IONS-SCNC: 6.3 MMOL/L (ref 5–15)
ANISOCYTOSIS BLD QL: NORMAL
BASOPHILS # BLD AUTO: 0.03 10*3/MM3 (ref 0–0.2)
BASOPHILS NFR BLD AUTO: 0.4 % (ref 0–1.5)
BUN SERPL-MCNC: 8 MG/DL (ref 8–23)
BUN/CREAT SERPL: 6.7 (ref 7–25)
CALCIUM SPEC-SCNC: 8.2 MG/DL (ref 8.2–9.6)
CHLORIDE SERPL-SCNC: 107 MMOL/L (ref 98–107)
CO2 SERPL-SCNC: 27.7 MMOL/L (ref 22–29)
CREAT SERPL-MCNC: 1.19 MG/DL (ref 0.76–1.27)
DEPRECATED RDW RBC AUTO: 70.7 FL (ref 37–54)
EGFRCR SERPLBLD CKD-EPI 2021: 55.6 ML/MIN/1.73
EOSINOPHIL # BLD AUTO: 0.21 10*3/MM3 (ref 0–0.4)
EOSINOPHIL NFR BLD AUTO: 3.1 % (ref 0.3–6.2)
ERYTHROCYTE [DISTWIDTH] IN BLOOD BY AUTOMATED COUNT: 22.2 % (ref 12.3–15.4)
GLUCOSE SERPL-MCNC: 118 MG/DL (ref 65–99)
HCT VFR BLD AUTO: 33.3 % (ref 37.5–51)
HGB BLD-MCNC: 10.6 G/DL (ref 13–17.7)
IMM GRANULOCYTES # BLD AUTO: 0.07 10*3/MM3 (ref 0–0.05)
IMM GRANULOCYTES NFR BLD AUTO: 1 % (ref 0–0.5)
LYMPHOCYTES # BLD AUTO: 1.78 10*3/MM3 (ref 0.7–3.1)
LYMPHOCYTES NFR BLD AUTO: 26.2 % (ref 19.6–45.3)
MAGNESIUM SERPL-MCNC: 1.6 MG/DL (ref 1.7–2.3)
MCH RBC QN AUTO: 28.3 PG (ref 26.6–33)
MCHC RBC AUTO-ENTMCNC: 31.8 G/DL (ref 31.5–35.7)
MCV RBC AUTO: 88.8 FL (ref 79–97)
MONOCYTES # BLD AUTO: 0.7 10*3/MM3 (ref 0.1–0.9)
MONOCYTES NFR BLD AUTO: 10.3 % (ref 5–12)
NEUTROPHILS NFR BLD AUTO: 4.01 10*3/MM3 (ref 1.7–7)
NEUTROPHILS NFR BLD AUTO: 59 % (ref 42.7–76)
NRBC BLD AUTO-RTO: 0 /100 WBC (ref 0–0.2)
PLAT MORPH BLD: NORMAL
PLATELET # BLD AUTO: 166 10*3/MM3 (ref 140–450)
PMV BLD AUTO: 8.9 FL (ref 6–12)
POIKILOCYTOSIS BLD QL SMEAR: NORMAL
POTASSIUM SERPL-SCNC: 3.1 MMOL/L (ref 3.5–5.2)
RBC # BLD AUTO: 3.75 10*6/MM3 (ref 4.14–5.8)
SODIUM SERPL-SCNC: 141 MMOL/L (ref 136–145)
WBC NRBC COR # BLD AUTO: 6.8 10*3/MM3 (ref 3.4–10.8)

## 2024-07-09 PROCEDURE — 80048 BASIC METABOLIC PNL TOTAL CA: CPT | Performed by: INTERNAL MEDICINE

## 2024-07-09 PROCEDURE — 99231 SBSQ HOSP IP/OBS SF/LOW 25: CPT | Performed by: INTERNAL MEDICINE

## 2024-07-09 PROCEDURE — 25010000002 HEPARIN (PORCINE) PER 1000 UNITS: Performed by: INTERNAL MEDICINE

## 2024-07-09 PROCEDURE — 83735 ASSAY OF MAGNESIUM: CPT | Performed by: INTERNAL MEDICINE

## 2024-07-09 PROCEDURE — 85025 COMPLETE CBC W/AUTO DIFF WBC: CPT | Performed by: INTERNAL MEDICINE

## 2024-07-09 PROCEDURE — 25010000002 CYANOCOBALAMIN PER 1000 MCG: Performed by: INTERNAL MEDICINE

## 2024-07-09 PROCEDURE — 85007 BL SMEAR W/DIFF WBC COUNT: CPT | Performed by: INTERNAL MEDICINE

## 2024-07-09 RX ORDER — POTASSIUM CHLORIDE 1.5 G/1.58G
40 POWDER, FOR SOLUTION ORAL ONCE
Status: COMPLETED | OUTPATIENT
Start: 2024-07-09 | End: 2024-07-09

## 2024-07-09 RX ADMIN — CYANOCOBALAMIN 1000 MCG: 1000 INJECTION, SOLUTION INTRAMUSCULAR at 08:09

## 2024-07-09 RX ADMIN — LEVOTHYROXINE SODIUM 100 MCG: 0.1 TABLET ORAL at 08:09

## 2024-07-09 RX ADMIN — HEPARIN SODIUM 5000 UNITS: 5000 INJECTION INTRAVENOUS; SUBCUTANEOUS at 21:10

## 2024-07-09 RX ADMIN — POTASSIUM CHLORIDE 40 MEQ: 1.5 POWDER, FOR SOLUTION ORAL at 12:14

## 2024-07-09 RX ADMIN — Medication 1 MG: at 08:09

## 2024-07-09 RX ADMIN — ASPIRIN 81 MG: 81 TABLET, CHEWABLE ORAL at 08:09

## 2024-07-09 RX ADMIN — Medication 1 TABLET: at 08:09

## 2024-07-09 RX ADMIN — MINERAL SUPPLEMENT IRON 300 MG / 5 ML STRENGTH LIQUID 100 PER BOX UNFLAVORED 300 MG: at 20:21

## 2024-07-09 RX ADMIN — MINERAL SUPPLEMENT IRON 300 MG / 5 ML STRENGTH LIQUID 100 PER BOX UNFLAVORED 300 MG: at 08:09

## 2024-07-09 RX ADMIN — FINASTERIDE 5 MG: 5 TABLET, FILM COATED ORAL at 08:10

## 2024-07-09 RX ADMIN — Medication 10 ML: at 08:10

## 2024-07-09 RX ADMIN — Medication 1 TABLET: at 20:22

## 2024-07-09 RX ADMIN — HEPARIN SODIUM 5000 UNITS: 5000 INJECTION INTRAVENOUS; SUBCUTANEOUS at 05:32

## 2024-07-09 RX ADMIN — POTASSIUM CHLORIDE 40 MEQ: 1500 TABLET, EXTENDED RELEASE ORAL at 08:09

## 2024-07-09 RX ADMIN — LUBIPROSTONE 8 MCG: 8 CAPSULE, GELATIN COATED ORAL at 08:09

## 2024-07-09 RX ADMIN — HEPARIN SODIUM 5000 UNITS: 5000 INJECTION INTRAVENOUS; SUBCUTANEOUS at 14:03

## 2024-07-09 RX ADMIN — POLYETHYLENE GLYCOL (3350) 17 G: 17 POWDER, FOR SOLUTION ORAL at 08:10

## 2024-07-09 RX ADMIN — LUBIPROSTONE 8 MCG: 8 CAPSULE, GELATIN COATED ORAL at 20:22

## 2024-07-09 RX ADMIN — Medication 10 ML: at 20:22

## 2024-07-09 RX ADMIN — LANSOPRAZOLE 30 MG: 30 TABLET, ORALLY DISINTEGRATING ORAL at 08:09

## 2024-07-09 NOTE — PROGRESS NOTES
[facility] HOSPITALIST PROGRESS NOTE     Patient Identification:  Name:  Joaquín Rogers  Age:  89 y.o.  Sex:  male  :  1927  MRN:  [id number]  Visit Number:  [id number]  ROOM: [address] [place] [address]     Primary Care Provider:  Uday Roche DO    Length of stay in inpatient status:  10    Subjective     Chief Compliant:    Chief Complaint   Patient presents with    Abdominal Pain       History of Presenting Illness:    Patient sitting in bedside chair. Noted feeling better today with improved mobility. Noted small amount of rectal drainage but amount decreasing.       ROS:  Otherwise 10 point ROS negative other than documented above in HPI.     Objective     Current Hospital Meds:aspirin, 81 mg, Per G Tube, Daily  Calcium Carb-Cholecalciferol, 1 tablet, Oral, BID  cyanocobalamin, 1,000 mcg, Intramuscular, Daily  Ferrous Sulfate, 300 mg, Nasogastric, BID  finasteride, 5 mg, Oral, Daily  folic acid, 1 mg, Oral, Daily  heparin (porcine), 5,000 Units, Subcutaneous, Q8H  lansoprazole, 30 mg, Oral, QAM AC  levothyroxine, 100 mcg, Oral, QAM AC  lubiprostone, 8 mcg, Oral, BID  multivitamin, 1 tablet, Oral, Daily  polyethylene glycol, 17 g, Oral, Daily  potassium chloride, 40 mEq, Oral, Daily  sodium chloride, 10 mL, Intravenous, Q12H         Current Antimicrobial Therapy:  Anti-Infectives (From admission, onward)      None          Current Diuretic Therapy:  Diuretics (From admission, onward)      None          ----------------------------------------------------------------------------------------------------------------------  Vital Signs:  Temp:  [97.9 °F (36.6 °C)-98.5 °F (36.9 °C)] 98.3 °F (36.8 °C)  Heart Rate:  [66-74] 70  Resp:  [17-20] 20  BP: (101-133)/(44-68) 121/58     on   ;   Device (Oxygen Therapy): room air  Body mass index is 16.88 kg/m².    Wt Readings from Last 3 Encounters:   24 50.3 kg (111 lb)   24 68 kg (150 lb)   24 67.1 kg (148 lb)     Intake & Output (last 3 days)          07/06 0701 07/07 0700 07/07 0701  07/08 0700 07/08 0701 07/09 0700 07/09 0701  07/10 0700    P.O. 600 480 600 480    Total Intake(mL/kg) 600 (11.9) 480 (9.5) 600 (11.9) 480 (9.5)    Urine (mL/kg/hr) 1385 (1.1) 850 (0.7) 1925 (1.6) 1200 (2.3)    Stool 400 800 300 0    Total Output 1785 1650 2225 1200    Net -1185 -1170 -1625 -720            Urine Unmeasured Occurrence 1 x       Stool Unmeasured Occurrence   2 x 1 x          Diet: Regular/House; Texture: Soft to Chew (NDD 3); Soft to Chew: Whole Meat; Fluid Consistency: Thin (IDDSI 0)  ----------------------------------------------------------------------------------------------------------------------  Physical exam:  Constitutional:  Elderly male sitting in bedside chair.     HENT:  Head:  Normocephalic and atraumatic.  Mouth:  Moist mucous membranes.    Eyes:  Conjunctivae and EOM are normal. No scleral icterus.    Neck:  Neck supple.  No JVD present.    Cardiovascular:  Normal rate, regular rhythm and normal heart sounds with no murmur.  Pulmonary/Chest:  No respiratory distress, no wheezes, no crackles, with normal breath sounds and good air movement.  Abdominal:  Soft.  Bowel sounds are normal.  No distension and no tenderness.   Musculoskeletal:  No edema, no tenderness, and no deformity.  No red or swollen joints anywhere.    Neurological:  Alert and oriented to person, place, and time.  No cranial nerve deficit.    Skin:  Skin is warm and dry. No rash noted. No pallor.   Peripheral vascular:  Pulses in all 4 extremities with no clubbing, no cyanosis, no edema.  ----------------------------------------------------------------------------------------------------------------------  Tele:    ----------------------------------------------------------------------------------------------------------------------  Results from last 7 days   Lab Units 07/09/24  0957 07/06/24  0035 07/04/24  0142   WBC 10*3/mm3 6.80 6.46 7.45   HEMOGLOBIN g/dL 10.6* 11.2* 10.2*  "  HEMATOCRIT % 33.3* 35.1* 31.5*   MCV fL 88.8 89.1 88.0   MCHC g/dL 31.8 31.9 32.4   PLATELETS 10*3/mm3 166 221 200         Results from last 7 days   Lab Units 07/09/24  0957 07/07/24  1611 07/07/24  0418 07/06/24  1706 07/06/24  0035 07/04/24  1831 07/04/24  0431 07/04/24  0142 07/03/24  1821 07/03/24  0034   SODIUM mmol/L 141  --  142  --  141  --  142  --   --  140   POTASSIUM mmol/L 3.1*  --  3.6  3.6 3.3* 2.8*   < > 2.9*  --   --  3.8   MAGNESIUM mg/dL 1.6*  --   --   --  2.0  --  1.7  --   --  2.0   CHLORIDE mmol/L 107  --  110*  --  109*  --  111*  --   --  110*   CO2 mmol/L 27.7  --  26.2  --  22.7  --  21.9*  --   --  21.6*   BUN mg/dL 8  --  6*  --  7*  --  9  --   --  16   CREATININE mg/dL 1.19  --  1.09  --  0.92  --  0.75*  --   --  1.10   CALCIUM mg/dL 8.2  --  8.2  --  8.1*  --  7.3*  --   --  7.9*   IONIZED CALCIUM mmol/L  --   --   --   --   --   --   --  1.01*  --   --    PHOSPHORUS mg/dL  --  3.2 2.2*  --  2.5  --   --  2.4*   < > 1.0*   GLUCOSE mg/dL 118*  --  106*  --  120*  --  104*  --   --  104*   ALBUMIN g/dL  --   --  2.4*  --  2.4*  --  2.2*  --   --  2.7*   BILIRUBIN mg/dL  --   --   --   --  0.3  --  0.3  --   --  0.5   ALK PHOS U/L  --   --   --   --  105  --  100  --   --  112   AST (SGOT) U/L  --   --   --   --  25  --  20  --   --  22   ALT (SGPT) U/L  --   --   --   --  12  --  10  --   --  11    < > = values in this interval not displayed.   Estimated Creatinine Clearance: 25.2 mL/min (by C-G formula based on SCr of 1.19 mg/dL).  No results found for: \"AMMONIA\"              No results found for: \"HGBA1C\", \"POCGLU\"  Lab Results   Component Value Date    TSH 3.870 06/29/2024    FREET4 1.48 03/14/2023     No results found for: \"PREGTESTUR\", \"PREGSERUM\", \"HCG\", \"HCGQUANT\"  Pain Management Panel           No data to display              Brief Urine Lab Results  (Last result in the past 365 days)        Color   Clarity   Blood   Leuk Est   Nitrite   Protein   CREAT   Urine HCG        " "06/29/24 2219 Yellow   Cloudy   Large (3+)   Negative   Negative   30 mg/dL (1+)                 No results found for: \"BLOODCX\"  No results found for: \"URINECX\"  No results found for: \"WOUNDCX\"  No results found for: \"STOOLCX\"  No results found for: \"RESPCX\"  No results found for: \"AFBCX\"        I have personally looked at the labs and they are summarized above.  ----------------------------------------------------------------------------------------------------------------------  Detailed radiology reports for the last 24 hours:    Imaging Results (Last 24 Hours)       ** No results found for the last 24 hours. 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, status post creation of a loop descending colon colostomy this admission  -Postoperative hypoxic hypercapnic respiratory failure, still requiring 3.5 L/min nasal cannula oxygen  -Severe chronic illness malnutrition that was present on admission, which complicates all aspects of care  -Vitamin B12 deficiency and borderline low folate, present on admission  -Acute hypokalemia and acute hypophosphatemia, improving with supplementation  -History of hypothyroidism  -History of GERD  -History of bradycardia, status post pacemaker placement in the distant past  -History of bilateral inguinal hernias     Replaced potassium. Patient clinically improving.     Patient ambulated without O2. No need for supplemental O2 at discharge. Family preparing home for his return. Hospital bed ordered. Patient continues to work with PT/OT. Anticipate discharge home on 7/10 per family. 1 wk f/u with surgery at discharge.      Family requesting hospital bed. Patient has a medical condition which requires positioning of the body in ways that are not feasible with an ordinary bed in order to alleviate pain. Requires frequent changes in body position.      Code " status: DNR/DNI     Dispo: Likely home on 7/10     Woody Lam MD  [facility]  07/09/24  17:15 EDT

## 2024-07-09 NOTE — PLAN OF CARE
Goal Outcome Evaluation:      Patient resting in bed during shift. VSS on room air. Patient ambulated in hallway with walker and standby assistance. No complaints or concerns noted at this time. Will continue with plan of care.

## 2024-07-09 NOTE — DISCHARGE PLACEMENT REQUEST
"Joaquín Marroquin (89 y.o. Male)       Date of Birth   05/19/1927    Social Security Number       Address   [address] [address]    Home Phone   [phone]    MRN   [id number]       Rastafari   None    Marital Status                               Admission Date   6/29/24    Admission Type   Emergency    Admitting Provider   Woody Lam MD    Attending Provider   Woody Lam MD    Department, Room/Bed   [address], 3327/       Discharge Date       Discharge Disposition       Discharge Destination                                 Attending Provider: Woody Lam MD    Allergies: No Known Allergies    Isolation: None   Infection: None   Code Status: No CPR    Ht: 172.7 cm (67.99\")   Wt: 50.3 kg (111 lb)    Admission Cmt: None   Principal Problem: Bowel obstruction [K56.609]                   Active Insurance as of 6/29/2024       Primary Coverage       Payor Plan Insurance Group Employer/Plan Group    MEDICARE MEDICARE A & B        Payor Plan Address Payor Plan Phone Number Payor Plan Fax Number Effective Dates    [address] [phone]  5/1/1992 - None Entered    Former[facility] [id number]         Subscriber Name Subscriber Birth Date Member ID       JOAQUÍN MARROQUIN 5/19/1927 [id number]               Secondary Coverage       Payor Plan Insurance Group Employer/Plan Group    [facility] DEPT [id number]        Payor Plan Address Payor Plan Phone Number Payor Plan Fax Number Effective Dates    [facility] OFFICE OF COMMUNITY CARE [phone]  1/1/2021 - None Entered    [address] [address]       [facility] [id number]         Subscriber Name Subscriber Birth Date Member ID       JOAQUÍN MARROQUIN 5/19/1927 [id number]               Tertiary Coverage       Payor Plan Insurance Group Employer/Plan Group    [facility] CCN OPTUM        Payor Plan Address Payor Plan Phone Number Payor Plan Fax Number Effective Dates    [address] [phone]  1/1/2023 - None " Entered    [facility] [id number]         Subscriber Name Subscriber Birth Date Member ID       RASHMI MARROQUIN 1927 [id number]                     Emergency Contacts        (Rel.) Home Phone Work Phone Mobile Phone    Clark Marroquin (Legal Guardian) -- -- [phone]    MAYRA MARROQUIN (Spouse) [phone] -- [phone]    Rustam Marroquin (Son) -- -- [phone]                 Physician Progress Notes (most recent note)        Woody Lam MD at 24 1734              [facility]     Patient Identification:  Name:  Rashmi Marroquin  Age:  89 y.o.  Sex:  male  :  1927  MRN:  [id number]  Visit Number:  [id number]  ROOM: [address] [place] [address]     Primary Care Provider:  Uday Roche DO    Length of stay in inpatient status:  9    Subjective     Chief Compliant:    Chief Complaint   Patient presents with    Abdominal Pain       History of Presenting Illness:    Patient denied any new complaints. Son visited later in day reporting they are not ready at home for him yet but anticipate being ready on 7/10.     ROS:  Otherwise 10 point ROS negative other than documented above in HPI.     Objective     Current Hospital Meds:aspirin, 81 mg, Per G Tube, Daily  Calcium Carb-Cholecalciferol, 1 tablet, Oral, BID  cyanocobalamin, 1,000 mcg, Intramuscular, Daily  Ferrous Sulfate, 300 mg, Nasogastric, BID  finasteride, 5 mg, Oral, Daily  folic acid, 1 mg, Oral, Daily  heparin (porcine), 5,000 Units, Subcutaneous, Q8H  lansoprazole, 30 mg, Oral, QAM AC  levothyroxine, 100 mcg, Oral, QAM AC  lubiprostone, 8 mcg, Oral, BID  multivitamin, 1 tablet, Oral, Daily  polyethylene glycol, 17 g, Oral, Daily  potassium chloride, 40 mEq, Oral, Daily  sodium chloride, 10 mL, Intravenous, Q12H  thiamine (B-1) IV, 500 mg, Intravenous, TID         Current Antimicrobial Therapy:  Anti-Infectives (From admission, onward)      None          Current Diuretic Therapy:  Diuretics (From admission,  onward)      None          ----------------------------------------------------------------------------------------------------------------------  Vital Signs:  Temp:  [97.9 °F (36.6 °C)-98.5 °F (36.9 °C)] 97.9 °F (36.6 °C)  Heart Rate:  [67-71] 68  Resp:  [20] 20  BP: (122-151)/(59-69) 131/60  SpO2:  [90 %-98 %] 90 %  on  Flow (L/min):  [2] 2;   Device (Oxygen Therapy): room air  Body mass index is 16.88 kg/m².    Wt Readings from Last 3 Encounters:   07/01/24 50.3 kg (111 lb)   05/31/24 68 kg (150 lb)   04/01/24 67.1 kg (148 lb)     Intake & Output (last 3 days)         07/05 0701 07/06 0700 07/06 0701 07/07 0700 07/07 0701 07/08 0700 07/08 0701  07/09 0700    P.O. 240 600 480 360    I.V. (mL/kg)        Total Intake(mL/kg) 240 (4.8) 600 (11.9) 480 (9.5) 360 (7.2)    Urine (mL/kg/hr) 1850 (1.5) 1385 (1.1) 850 (0.7) 475 (0.9)    Stool 1275 400 800     Total Output 3125 1785 1650 475    Net -5457 -3595 -1170 -994            Urine Unmeasured Occurrence  1 x            Diet: Regular/House; Texture: Soft to Chew (NDD 3); Soft to Chew: Whole Meat; Fluid Consistency: Thin (IDDSI 0)  ----------------------------------------------------------------------------------------------------------------------  Physical exam:  Constitutional:  Elderly appearing male.    HENT:  Head:  Normocephalic and atraumatic.  Mouth:  Moist mucous membranes.    Eyes:  Conjunctivae and EOM are normal. No scleral icterus.    Neck:  Neck supple.  No JVD present.    Cardiovascular:  Normal rate, regular rhythm and normal heart sounds with no murmur.  Pulmonary/Chest:  No respiratory distress, no wheezes, no crackles, with normal breath sounds and good air movement.  Abdominal:  Soft.  Bowel sounds are normal.  Midline incision well approximated with no significant drainage.   Musculoskeletal:  No edema, no tenderness, and no deformity.  No red or swollen joints anywhere.    Neurological:  Alert and oriented to person, place, and time.  No cranial  nerve deficit.  No tongue deviation.  No facial droop.  No slurred speech.   Skin:  Skin is warm and dry. No rash noted. No pallor.   Peripheral vascular:  Pulses in all 4 extremities with no clubbing, no cyanosis, no edema.  ----------------------------------------------------------------------------------------------------------------------  Tele:    ----------------------------------------------------------------------------------------------------------------------  Results from last 7 days   Lab Units 07/06/24  0035 07/04/24  0142 07/03/24  0034 07/02/24  0637 07/02/24  0021 07/02/24  0021 07/01/24  2105   LACTATE mmol/L  --   --   --  1.0  --  2.4* 2.4*   WBC 10*3/mm3 6.46 7.45 8.88  --    < > 9.31  --    HEMOGLOBIN g/dL 11.2* 10.2* 11.5*  --    < > 10.8*  --    HEMATOCRIT % 35.1* 31.5* 36.8*  --    < > 34.9*  --    MCV fL 89.1 88.0 89.8  --    < > 90.9  --    MCHC g/dL 31.9 32.4 31.3*  --    < > 30.9*  --    PLATELETS 10*3/mm3 221 200 228  --    < > 214  --     < > = values in this interval not displayed.     Results from last 7 days   Lab Units 07/02/24  0930   PH, ARTERIAL pH units 7.372   PO2 ART mm Hg 90.1   PCO2, ARTERIAL mm Hg 36.5   HCO3 ART mmol/L 21.2     Results from last 7 days   Lab Units 07/07/24  1611 07/07/24  0418 07/06/24  1706 07/06/24  0035 07/04/24  1831 07/04/24  0431 07/04/24  0142 07/03/24  1821 07/03/24  0034   SODIUM mmol/L  --  142  --  141  --  142  --   --  140   POTASSIUM mmol/L  --  3.6  3.6 3.3* 2.8*   < > 2.9*  --   --  3.8   MAGNESIUM mg/dL  --   --   --  2.0  --  1.7  --   --  2.0   CHLORIDE mmol/L  --  110*  --  109*  --  111*  --   --  110*   CO2 mmol/L  --  26.2  --  22.7  --  21.9*  --   --  21.6*   BUN mg/dL  --  6*  --  7*  --  9  --   --  16   CREATININE mg/dL  --  1.09  --  0.92  --  0.75*  --   --  1.10   CALCIUM mg/dL  --  8.2  --  8.1*  --  7.3*  --   --  7.9*   IONIZED CALCIUM mmol/L  --   --   --   --   --   --  1.01*  --   --    PHOSPHORUS mg/dL 3.2 2.2*  --   "2.5  --   --  2.4*   < > 1.0*   GLUCOSE mg/dL  --  106*  --  120*  --  104*  --   --  104*   ALBUMIN g/dL  --  2.4*  --  2.4*  --  2.2*  --   --  2.7*   BILIRUBIN mg/dL  --   --   --  0.3  --  0.3  --   --  0.5   ALK PHOS U/L  --   --   --  105  --  100  --   --  112   AST (SGOT) U/L  --   --   --  25  --  20  --   --  22   ALT (SGPT) U/L  --   --   --  12  --  10  --   --  11    < > = values in this interval not displayed.   Estimated Creatinine Clearance: 27.6 mL/min (by C-G formula based on SCr of 1.09 mg/dL).  No results found for: \"AMMONIA\"              No results found for: \"HGBA1C\", \"POCGLU\"  Lab Results   Component Value Date    TSH 3.870 06/29/2024    FREET4 1.48 03/14/2023     No results found for: \"PREGTESTUR\", \"PREGSERUM\", \"HCG\", \"HCGQUANT\"  Pain Management Panel           No data to display              Brief Urine Lab Results  (Last result in the past 365 days)        Color   Clarity   Blood   Leuk Est   Nitrite   Protein   CREAT   Urine HCG        06/29/24 2219 Yellow   Cloudy   Large (3+)   Negative   Negative   30 mg/dL (1+)                 No results found for: \"BLOODCX\"  No results found for: \"URINECX\"  No results found for: \"WOUNDCX\"  No results found for: \"STOOLCX\"  No results found for: \"RESPCX\"  No results found for: \"AFBCX\"  Results from last 7 days   Lab Units 07/02/24  0637 07/02/24  0021 07/01/24  2105 07/01/24  1834   LACTATE mmol/L 1.0 2.4* 2.4* 2.2*       I have personally looked at the labs and they are summarized above.  ----------------------------------------------------------------------------------------------------------------------  Detailed radiology reports for the last 24 hours:    Imaging Results (Last 24 Hours)       ** No results found for the last 24 hours. 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, status post creation of a loop " descending colon colostomy this admission  -Postoperative hypoxic hypercapnic respiratory failure, still requiring 3.5 L/min nasal cannula oxygen  -Severe chronic illness malnutrition that was present on admission, which complicates all aspects of care  -Vitamin B12 deficiency and borderline low folate, present on admission  -Acute hypokalemia and acute hypophosphatemia, improving with supplementation  -History of hypothyroidism  -History of GERD  -History of bradycardia, 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 No need for supplemental O2 at discharge. Family preparing home for his return. Hospital bed ordered. Anticipate discharge home on 7/10 per family. 1 wk f/u with surgery at discharge.     Family requesting hospital bed. Patient has a medical condition which requires positioning of the body in ways that are not feasible with an ordinary bed in order to alleviate pain. Requires frequent changes in body position.     Code status: DNR/DNI    Dispo: Likely home on 7/10       Woody Lam MD  [facility]  07/08/24  17:34 EDT    Electronically signed by Woody Lam MD at 07/09/24 [id number]

## 2024-07-09 NOTE — PLAN OF CARE
Goal Outcome Evaluation:   Pt sitting at bedside chair at this time, tolerating well. Pt has ambulated to bathroom with walker and SBA. Pt has voiced no complaints. No acute changes noted at this time. Will continue to follow plan of care.

## 2024-07-09 NOTE — CASE MANAGEMENT/SOCIAL WORK
Discharge Planning Assessment   Harvinder     Patient Name: Joaquín Rogers  MRN: [id number]  Today's Date: 7/9/2024    Admit Date: 6/29/2024    Plan: SS [facility] with [facility] per Silvia who states VA provider is declining approval for VA services for home health and states pt's local PCP will have to HH orders. SS contacted pt's son Clark on this date who states he would like for home health referral to be sent to [facility] and requested for referral to be sent to [facility]. SS made referral in [facility]. SS to follow.       Discharge Plan       Row Name 07/09/24 1708       Plan    Plan SS followed up with [facility] per Silvia who states VA provider is declining approval for VA services for home health and states pt's local PCP will have to HH orders. SS contacted pt's son Clark on this date who states he would like for home health referral to be sent to [facility] and requested for referral to be sent to [facility]. SS made referral in [facility]. SS to follow.            MARIAMA Prado

## 2024-07-09 NOTE — CASE MANAGEMENT/SOCIAL WORK
Continued Stay Note  SANGEETA Blanton     Patient Name: Joaquín Rogers  MRN: [id number]  Today's Date: 7/9/2024    Admit Date: 6/29/2024     Discharge Plan       Row Name 07/09/24 1127       Plan    Plan CM recieved call from Clay Benedict requesting further documentation for hospital bed. CM faxed most recent progress note. Clay will f/u with pt's son Clark for delivery of the bed.    Patient/Family in Agreement with Plan yes               Helen Corona RN

## 2024-07-10 ENCOUNTER — READMISSION MANAGEMENT (OUTPATIENT)
Dept: CALL CENTER | Facility: HOSPITAL | Age: 89
End: 2024-07-10
Payer: MEDICARE

## 2024-07-10 VITALS
RESPIRATION RATE: 18 BRPM | BODY MASS INDEX: 16.82 KG/M2 | DIASTOLIC BLOOD PRESSURE: 53 MMHG | WEIGHT: 111 LBS | HEIGHT: 68 IN | HEART RATE: 72 BPM | TEMPERATURE: 98.2 F | OXYGEN SATURATION: 93 % | SYSTOLIC BLOOD PRESSURE: 112 MMHG

## 2024-07-10 LAB
ANION GAP SERPL CALCULATED.3IONS-SCNC: 7.6 MMOL/L (ref 5–15)
BUN SERPL-MCNC: 12 MG/DL (ref 8–23)
BUN/CREAT SERPL: 10.6 (ref 7–25)
CALCIUM SPEC-SCNC: 8.4 MG/DL (ref 8.2–9.6)
CHLORIDE SERPL-SCNC: 108 MMOL/L (ref 98–107)
CO2 SERPL-SCNC: 27.4 MMOL/L (ref 22–29)
CREAT SERPL-MCNC: 1.13 MG/DL (ref 0.76–1.27)
EGFRCR SERPLBLD CKD-EPI 2021: 59.1 ML/MIN/1.73
GLUCOSE SERPL-MCNC: 109 MG/DL (ref 65–99)
MAGNESIUM SERPL-MCNC: 1.7 MG/DL (ref 1.7–2.3)
POTASSIUM SERPL-SCNC: 3.7 MMOL/L (ref 3.5–5.2)
SODIUM SERPL-SCNC: 143 MMOL/L (ref 136–145)

## 2024-07-10 PROCEDURE — 97530 THERAPEUTIC ACTIVITIES: CPT

## 2024-07-10 PROCEDURE — 83735 ASSAY OF MAGNESIUM: CPT | Performed by: INTERNAL MEDICINE

## 2024-07-10 PROCEDURE — 97116 GAIT TRAINING THERAPY: CPT

## 2024-07-10 PROCEDURE — 80048 BASIC METABOLIC PNL TOTAL CA: CPT | Performed by: INTERNAL MEDICINE

## 2024-07-10 PROCEDURE — 25010000002 CYANOCOBALAMIN PER 1000 MCG: Performed by: INTERNAL MEDICINE

## 2024-07-10 PROCEDURE — 99239 HOSP IP/OBS DSCHRG MGMT >30: CPT | Performed by: INTERNAL MEDICINE

## 2024-07-10 PROCEDURE — 25010000002 HEPARIN (PORCINE) PER 1000 UNITS: Performed by: INTERNAL MEDICINE

## 2024-07-10 RX ADMIN — CYANOCOBALAMIN 1000 MCG: 1000 INJECTION, SOLUTION INTRAMUSCULAR at 08:36

## 2024-07-10 RX ADMIN — LANSOPRAZOLE 30 MG: 30 TABLET, ORALLY DISINTEGRATING ORAL at 08:36

## 2024-07-10 RX ADMIN — Medication 1 TABLET: at 08:36

## 2024-07-10 RX ADMIN — POTASSIUM CHLORIDE 40 MEQ: 1500 TABLET, EXTENDED RELEASE ORAL at 08:36

## 2024-07-10 RX ADMIN — LUBIPROSTONE 8 MCG: 8 CAPSULE, GELATIN COATED ORAL at 08:36

## 2024-07-10 RX ADMIN — LEVOTHYROXINE SODIUM 100 MCG: 0.1 TABLET ORAL at 08:36

## 2024-07-10 RX ADMIN — Medication 1 MG: at 08:36

## 2024-07-10 RX ADMIN — ASPIRIN 81 MG: 81 TABLET, CHEWABLE ORAL at 08:36

## 2024-07-10 RX ADMIN — FINASTERIDE 5 MG: 5 TABLET, FILM COATED ORAL at 08:36

## 2024-07-10 RX ADMIN — HEPARIN SODIUM 5000 UNITS: 5000 INJECTION INTRAVENOUS; SUBCUTANEOUS at 06:41

## 2024-07-10 RX ADMIN — Medication 10 ML: at 08:36

## 2024-07-10 RX ADMIN — POLYETHYLENE GLYCOL (3350) 17 G: 17 POWDER, FOR SOLUTION ORAL at 08:34

## 2024-07-10 RX ADMIN — MINERAL SUPPLEMENT IRON 300 MG / 5 ML STRENGTH LIQUID 100 PER BOX UNFLAVORED 300 MG: at 08:35

## 2024-07-10 NOTE — DISCHARGE PLACEMENT REQUEST
[address] [address]  Phone:  [phone]  Fax:  [phone] Date: 2024      Ambulatory Referral to Home Health     Patient:  Joaquín Rogers MRN:  [id number]   Ck POSEY  [facility] [address] :  1927  SSN:    Phone: [phone] Sex:  M      INSURANCE PAYOR PLAN GROUP # SUBSCRIBER ID   Primary:    MEDICARE [id number]   [id number]      Referring Provider Information:  PRABHAKAR LI Phone: [phone] Fax: [phone]       Referral Information:   # Visits:  999 Referral Type: Home Health [42]   Urgency:  Routine Referral Reason: Specialty Services Required   Start Date: 2024 End Date:  To be determined by Insurer   Diagnosis: Complete intestinal obstruction, unspecified cause (K56.601 [ICD-10-CM] 560.9 [ICD-9-CM])      Refer to Dept:   Refer to Provider:   Refer to Provider Phone:   Refer to Facility:       Face to Face Visit Date: 2024  Follow-up provider for Plan of Care? I treated the patient in an acute care facility and will not continue treatment after discharge.  Follow-up provider: TODD WHALEY [[id number]]  Reason/Clinical Findings: debility, bowel surgery, advanced age  Describe mobility limitations that make leaving home difficult: debility, bowel surgery, advanced age  Nursing/Therapeutic Services Requested: Skilled Nursing  Nursing/Therapeutic Services Requested: Physical Therapy  Skilled nursing orders: Medication education  PT orders: Home safety assessment  PT orders: Strengthening  Frequency: 1 Week 1     This document serves as a request of services and does not constitute Insurance authorization or approval of services.  To determine eligibility, please contact the members Insurance carrier to verify and review coverage.     If you have medical questions regarding this request for services. Please contact [address] at [phone] during normal business hours.        Authorizing  "Provider:Woody Lam MD  Authorizing Provider's NPI: [id number]  Order Entered By: Woody Lam MD 7/8/2024 12:30 PM     Electronically signed by: Woody Lam MD 7/8/2024 12:30 PM             Rashmi Marroquin (89 y.o. Male)       Date of Birth   05/19/1927    Social Security Number       Address   [address] [place] [address]    Home Phone   [phone]    MRN   [id number]       Mu-ism   None    Marital Status                               Admission Date   6/29/24    Admission Type   Emergency    Admitting Provider   Woody Lam MD    Attending Provider   Woody Lam MD    Department, Room/Bed   [address], [facility]/       Discharge Date       Discharge Disposition   Home or Self Care    Discharge Destination                                 Attending Provider: Woody Lam MD    Allergies: No Known Allergies    Isolation: None   Infection: None   Code Status: No CPR    Ht: 172.7 cm (67.99\")   Wt: 50.3 kg (111 lb)    Admission Cmt: None   Principal Problem: Bowel obstruction [K56.609]                   Active Insurance as of 6/29/2024       Primary Coverage       Payor Plan Insurance Group Employer/Plan Group    MEDICARE MEDICARE A & B        Payor Plan Address Payor Plan Phone Number Payor Plan Fax Number Effective Dates    [address] [phone]  5/1/1992 - None Entered    [place] [address]         Subscriber Name Subscriber Birth Date Member ID       RASHMI MARROQUIN 5/19/1927 [id number]                     Emergency Contacts        (Rel.) Home Phone Work Phone Mobile Phone    Clark Marroquin (Legal Guardian) -- -- [phone]    CARAMAYRA (Spouse) [phone] -- [phone]    Rustam Marroquin (Son) -- -- [phone]              Insurance Information                  MEDICARE/MEDICARE A & B Phone: [phone]    Subscriber: MarroquinRashmi Subscriber#: [id number]    Group#: -- Precert#: --             History & Physical    "     Woody Lam MD at 24 1832              [facility] HISTORY AND PHYSICAL    Patient Identification:  Name:  Joaquín Rogers  Age:  89 y.o.  Sex:  male  :  1927  MRN:  [id number]   Visit Number:  [id number]  Admit Date: 2024   Room number:  3342/2S  Primary Care Physician:  Uday Roche,      Subjective     Chief complaint:    Chief Complaint   Patient presents with    Abdominal Pain       History of presenting illness:  Mr. Rogers is our 89 yo M with hx BPH, hypothyroidism, constipation on home linzess and miralax, GERD, inguinal hernias, pacemaker who presents with abdominal pain and no BM for 3 days which is abnormal for patient. In the ED patient found to have colonic obstruction. NG tube placed. Surgery consulted and recommended to admit to medicine and would follow.    Patient had just had NG tube placed before transfer upstairs. He had small amount of bleeding from nose and cough. Denies any bleeding before NG placement. NG tube clamped as we await radiology looking at plain film for placement. Patient's wife supportive bedside. Patient noted feeling about the same as admission.   ---------------------------------------------------------------------------------------------------------------------   Review of Systems   Constitutional: Negative.    HENT: Negative.     Eyes: Negative.    Respiratory: Negative.     Cardiovascular: Negative.    Gastrointestinal:  Positive for abdominal distention, abdominal pain, nausea and vomiting.   Endocrine: Negative.    Genitourinary: Negative.    Musculoskeletal: Negative.    Skin: Negative.    Allergic/Immunologic: Negative.    Neurological: Negative.    Hematological: Negative.    Psychiatric/Behavioral: Negative.       ---------------------------------------------------------------------------------------------------------------------   Past Medical History:   Diagnosis Date    Bradycardia     Thyroid disease       Past Surgical History:   Procedure Laterality Date    CARDIAC ELECTROPHYSIOLOGY PROCEDURE N/A 2021    Procedure: Pacemaker DC new;  Surgeon: Kendrick Burgess MD;  Location: [facility] INVASIVE LOCATION;  Service: Cardiology;  Laterality: N/A;    HERNIA REPAIR      INSERT / REPLACE / REMOVE PACEMAKER  2021    [facility] device     No family history on file.  Social History     Socioeconomic History    Marital status:    Tobacco Use    Smoking status: Former     Current packs/day: 0.00     Types: Cigarettes     Quit date:      Years since quittin.5     Passive exposure: Past    Smokeless tobacco: Former     Types: Chew     Quit date:    Vaping Use    Vaping status: Never Used   Substance and Sexual Activity    Alcohol use: Never    Drug use: Never    Sexual activity: Defer     ---------------------------------------------------------------------------------------------------------------------   Allergies:  Patient has no known allergies.  ---------------------------------------------------------------------------------------------------------------------   Medications below are reported home medications pulling from within the system; at this time, these medications have not been reconciled unless otherwise specified and are in the verification process for further verifcation as current home medications.    Prior to Admission Medications       Prescriptions Last Dose Informant Patient Reported? Taking?    aspirin 81 MG EC tablet  Medication Bottle Yes No    Take 1 tablet by mouth Daily.    calcium carbonate-cholecalciferol 500-400 MG-UNIT tablet tablet  Medication Bottle Yes No    Take 1 tablet by mouth 2 (Two) Times a Day.    carboxymethylcellulose (REFRESH PLUS) 0.5 % solution  Medication Bottle Yes No    Administer 1 drop to both eyes 3 (Three) Times a Day As Needed for Dry Eyes.    ferrous sulfate 325 (65 FE) MG tablet  Medication Bottle Yes No    Take 1 tablet by mouth 2 (Two)  Times a Day.    finasteride (PROSCAR) 5 MG tablet  Medication Bottle Yes No    Take 1 tablet by mouth Daily.    levothyroxine (SYNTHROID, LEVOTHROID) 75 MCG tablet  Medication Bottle Yes No    Take 1 tablet by mouth Daily.    linaclotide (LINZESS) 72 MCG capsule capsule  Medication Bottle Yes No    Take 1 capsule by mouth Every Morning Before Breakfast.    omeprazole (priLOSEC) 40 MG capsule  Medication Bottle Yes No    Take 1 capsule by mouth Daily.    polyethylene glycol (MIRALAX) 17 g packet  Medication Bottle Yes No    Take 17 g by mouth Daily.    terazosin (HYTRIN) 2 MG capsule  Medication Bottle Yes No    Take 1 capsule by mouth Every Night.          Objective     Vital Signs:  Temp:  [98 °F (36.7 °C)-98.2 °F (36.8 °C)] 98 °F (36.7 °C)  Heart Rate:  [] 93  Resp:  [20] 20  BP: (104-170)/(55-95) 129/73    Mean Arterial Pressure (Non-Invasive) for the past 24 hrs (Last 3 readings):   Noninvasive MAP (mmHg)   06/29/24 1730 111   06/29/24 1715 110   06/29/24 1700 118     SpO2:  [90 %-96 %] 91 %  on   ;      Body mass index is 22.46 kg/m².    Wt Readings from Last 3 Encounters:   06/29/24 67 kg (147 lb 11.3 oz)   05/31/24 68 kg (150 lb)   04/01/24 67.1 kg (148 lb)      ---------------------------------------------------------------------------------------------------------------------   Physical Exam:  Constitutional:  Well-developed and well-nourished.  No respiratory distress. Elderly male laying supine in bed with small amount of dried blood under NG that was placed at 60 cm deep.      HENT:  Head: Normocephalic and atraumatic.  Mouth:  Moist mucous membranes.    Eyes:  Conjunctivae and EOM are normal.  Pupils are equal, round, and reactive to light.  No scleral icterus.  Cardiovascular:  Normal rate, regular rhythm and normal heart sounds with no murmur.  Pulmonary/Chest:  No respiratory distress, no wheezes, no crackles, with normal breath sounds and good air movement.  Abdominal:  Distended. No rebound  or guarding.   Musculoskeletal:  No edema, no tenderness, and no deformity.  No red or swollen joints anywhere.    Neurological:  Alert and oriented to person, place, and time.  No cranial nerve deficit.  No focal neurological deficit.   Skin:  Skin is warm and dry.  No rash noted.  No pallor.   Peripheral vascular:  No edema and strong pulses on all 4 extremities.    ---------------------------------------------------------------------------------------------------------------------  EKG:  Will get preoperative EKG.   No orders to display       Telemetry:      I have personally looked at both the EKG and the telemetry strips.    Last echocardiogram:  Results for orders placed during the hospital encounter of 09/01/21    Adult Transthoracic Echo Complete W/ Cont if Necessary Per Protocol    Interpretation Summary  · Left ventricular wall thickness is consistent with mild concentric hypertrophy.  · Left ventricular ejection fraction appears to be 56 - 60%. Left ventricular systolic function is normal.  · Left ventricular diastolic function is consistent with (grade I) impaired relaxation.  · Normal cardiac chamber dimensions.  · Moderate aortic valve regurgitation is present.  · There is no evidence of pericardial effusion.    --------------------------------------------------------------------------------------------------------------------  Labs:  Results from last 7 days   Lab Units 06/29/24  1358   WBC 10*3/mm3 5.65   HEMOGLOBIN g/dL 12.4*   HEMATOCRIT % 39.0   MCV fL 87.2   MCHC g/dL 31.8   PLATELETS 10*3/mm3 203         Results from last 7 days   Lab Units 06/29/24  1513   SODIUM mmol/L 141   POTASSIUM mmol/L 3.3*   MAGNESIUM mg/dL 2.1   CHLORIDE mmol/L 106   CO2 mmol/L 23.7   BUN mg/dL 16   CREATININE mg/dL 1.22   CALCIUM mg/dL 8.4   GLUCOSE mg/dL 119*   ALBUMIN g/dL 3.1*   BILIRUBIN mg/dL 0.5   ALK PHOS U/L 121*   AST (SGOT) U/L 21   ALT (SGPT) U/L 11   Estimated Creatinine Clearance: 32.8 mL/min (by C-G  "formula based on SCr of 1.22 mg/dL).    No results found for: \"AMMONIA\"          No results found for: \"HGBA1C\", \"POCGLU\"  Lab Results   Component Value Date    TSH 3.870 06/29/2024    FREET4 1.48 03/14/2023     No results found for: \"PREGTESTUR\", \"PREGSERUM\", \"HCG\", \"HCGQUANT\"  Pain Management Panel           No data to display              Brief Urine Lab Results  (Last result in the past 365 days)        Color   Clarity   Blood   Leuk Est   Nitrite   Protein   CREAT   Urine HCG        05/31/24 1747 Yellow   Clear   Negative   Negative   Negative   30 mg/dL (1+)                 No results found for: \"BLOODCX\"  No results found for: \"URINECX\"  No results found for: \"WOUNDCX\"  No results found for: \"STOOLCX\"    I have personally looked at the labs and they are summarized above.  ----------------------------------------------------------------------------------------------------------------------  Detailed radiology reports for the last 24 hours:    Imaging Results (Last 24 Hours)       Procedure Component Value Units Date/Time    XR Chest 1 View [[id number]] Resulted: 06/29/24 1746     Updated: 06/29/24 1754    CT Abdomen Pelvis Without Contrast [[id number]] Collected: 06/29/24 1537     Updated: 06/29/24 1545    Narrative:      VERIFICATION OBSERVER NAME: Lisa Baltazar MD.     Technique: Axial images were obtained along with coronal and sagittal  reconstruction. DLP in mGycm reported in the EMR records. Dose lowering  technique: Automated exposure control with adjustment of the MA and/or  KV, use of iterative reconstruction. Data included in the medical  records.     HISTORY/COMPARISON/FINDINGS:     Comparison: 5/31/2024     HISTORY: Abdominal pain and distention     Findings:      Severe dilatation of small bowel consistent with high-grade small bowel  obstruction.  This is appears to be recurrence from prior study.  Liver and spleen pancreas appeared unremarkable.  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  No hydronephrosis.  Area " of narrowing in the pelvis in the  distal sigmoid colon.  Findings suspicious for presence of LEFT  hemicolectomy.          Impression:      Severe small bowel dilatation consistent with recurrent obstruction.   Position zone appears to be in the pelvis.s           [id number]  Zip code [id number]                 This report was finalized on 6/29/2024 3:43 PM by Dr. Lisa Baltazar MD.             Final impressions for the last 30 days of radiology reports:    CT Abdomen Pelvis Without Contrast    Result Date: 6/29/2024  Severe small bowel dilatation consistent with recurrent obstruction. Position zone appears to be in the pelvis.s    [id number] Zip code [id number]      This report was finalized on 6/29/2024 3:43 PM by Dr. Lisa Baltazar MD.      CT Abdomen Pelvis Without Contrast    Result Date: 5/31/2024   1.  Air and fluid-filled large and small bowel segments with occasional air-fluid levels identified throughout the bowel segments suggestive of possible mild generalized ileus. 2.  Mild stranding noted along the margins of the sigmoid colon possibly due to mild distal colitis. 3.  No features of acute appendicitis 4.  Enlarged prostate gland. 5.  No free fluid or free air. 6.  Mild chronic bilateral renal cortical volume loss. 7.  No free fluid or free air. No abscess or hematoma. 8.  Mild diverticulosis at the splenic flecture and proximal descending colon segment.  This report was finalized on 5/31/2024 6:34 PM by Regan Davis MD.     I have personally looked at the radiology images and read the final radiology report.    Assessment & Plan    #Large bowel obstruction   #Chronic constipation   - Likely obstruction at level of rectum with fluid stool behind   - NG tube placed in ED. Awaiting plain film read to confirm placement.   - Discussed with surgery, will admit to med/surg with plan for flex sig tomorrow which will hopefully be therapeutic   - NPO except ice chips as per surgery until then.     #Hypothyroidism   - Will  get TSH. Continue home regimen.     #GERD  - PPI once tolerating oral    #Hx of bradycardia w/ pacemaker placement   #Hx of inguinal hernias     Code status: DNR/DNI    Dispo: Admit to med/surg       Woody Lam MD  [facility]  06/29/24  18:32 EDT    Electronically signed by Woody Lam MD at 06/29/24 1840       Current Facility-Administered Medications   Medication Dose Route Frequency Provider Last Rate Last Admin    artificial tears ophthalmic ointment   Both Eyes Q1H PRN Woody Lam MD        aspirin chewable tablet 81 mg  81 mg Per G Tube Daily Woody Lam MD   81 mg at 07/10/24 0836    sennosides-docusate (PERICOLACE) 8.6-50 MG per tablet 2 tablet  2 tablet Oral BID PRN Woody Lam MD        And    polyethylene glycol (MIRALAX) packet 17 g  17 g Oral Daily PRN Woody Lam MD        And    bisacodyl (DULCOLAX) EC tablet 5 mg  5 mg Oral Daily PRN Woody Lam MD        And    bisacodyl (DULCOLAX) suppository 10 mg  10 mg Rectal Daily PRN Woody Lam MD        Calcium Carb-Cholecalciferol 600-20 MG-MCG tablet 1 tablet  1 tablet Oral BID Woody Lam MD   1 tablet at 07/10/24 0836    Calcium Replacement - Follow Nurse / BPA Driven Protocol   Does not apply PRN Woody Lam MD        cyanocobalamin injection 1,000 mcg  1,000 mcg Intramuscular Daily Neymar Crowe MD   1,000 mcg at 07/10/24 0836    Ferrous Sulfate 300 (60 Fe) MG/5ML solution 300 mg  300 mg Nasogastric BID Woody Lam MD   300 mg at 07/10/24 0835    finasteride (PROSCAR) tablet 5 mg  5 mg Oral Daily Woody Lam MD   5 mg at 07/10/24 0836    folic acid (FOLVITE) tablet 1 mg  1 mg Oral Daily Neymar Crowe MD   1 mg at 07/10/24 0836    heparin (porcine) 5000 UNIT/ML injection 5,000 Units  5,000 Units Subcutaneous Q8H Woody Lam MD   5,000 Units at 07/10/24 0641    lansoprazole (PREVACID SOLUTAB) disintegrating tablet Tablet Delayed Release Dispersible 30 mg  30 mg Oral EARL  Neymar Rae MD   30 mg at 07/10/24 0836    levothyroxine (SYNTHROID, LEVOTHROID) tablet 100 mcg  100 mcg Oral QAM AC Woody Lam MD   100 mcg at 07/10/24 0836    lubiprostone (AMITIZA) capsule 8 mcg  8 mcg Oral BID Woody Lam MD   8 mcg at 07/10/24 0836    Magnesium Standard Dose Replacement - Follow Nurse / BPA Driven Protocol   Does not apply PRN Woody Lam MD        multivitamin (THERAGRAN) tablet 1 tablet  1 tablet Oral Daily Woody Lam MD   1 tablet at 07/10/24 0836    ondansetron (ZOFRAN) injection 4 mg  4 mg Intravenous Q6H PRN Woody Lam MD   4 mg at 24 1143    Phosphorus Replacement - Follow Nurse / BPA Driven Protocol   Does not apply PRN Woody Lam MD        polyethylene glycol (MIRALAX) packet 17 g  17 g Oral Daily Woody Lam MD   17 g at 07/10/24 0834    potassium chloride (KLOR-CON M20) CR tablet 40 mEq  40 mEq Oral Daily Angelo Kenney MD   40 mEq at 07/10/24 0836    Potassium Replacement - Follow Nurse / BPA Driven Protocol   Does not apply PRN Woody Lam MD        sodium chloride 0.9 % flush 10 mL  10 mL Intravenous PRN Woody Lam MD        sodium chloride 0.9 % flush 10 mL  10 mL Intravenous Q12H Woody Lam MD   10 mL at 07/10/24 0836    sodium chloride 0.9 % flush 10 mL  10 mL Intravenous PRN Woody Lam MD        sodium chloride 0.9 % infusion 40 mL  40 mL Intravenous PRN Woody Lam MD            Physician Progress Notes (most recent note)        Woody Lam MD at 24 1715              [facility]     Patient Identification:  Name:  Joaquín Rogers  Age:  89 y.o.  Sex:  male  :  1927  MRN:  [id number]  Visit Number:  [id number]  ROOM: [address] [place] [address]     Primary Care Provider:  Uday Roche DO    Length of stay in inpatient status:  10    Subjective     Chief Compliant:    Chief Complaint   Patient presents with    Abdominal Pain       History of Presenting Illness:     Patient sitting in bedside chair. Noted feeling better today with improved mobility. Noted small amount of rectal drainage but amount decreasing.       ROS:  Otherwise 10 point ROS negative other than documented above in HPI.     Objective     Current Hospital Meds:aspirin, 81 mg, Per G Tube, Daily  Calcium Carb-Cholecalciferol, 1 tablet, Oral, BID  cyanocobalamin, 1,000 mcg, Intramuscular, Daily  Ferrous Sulfate, 300 mg, Nasogastric, BID  finasteride, 5 mg, Oral, Daily  folic acid, 1 mg, Oral, Daily  heparin (porcine), 5,000 Units, Subcutaneous, Q8H  lansoprazole, 30 mg, Oral, QAM AC  levothyroxine, 100 mcg, Oral, QAM AC  lubiprostone, 8 mcg, Oral, BID  multivitamin, 1 tablet, Oral, Daily  polyethylene glycol, 17 g, Oral, Daily  potassium chloride, 40 mEq, Oral, Daily  sodium chloride, 10 mL, Intravenous, Q12H         Current Antimicrobial Therapy:  Anti-Infectives (From admission, onward)      None          Current Diuretic Therapy:  Diuretics (From admission, onward)      None          ----------------------------------------------------------------------------------------------------------------------  Vital Signs:  Temp:  [97.9 °F (36.6 °C)-98.5 °F (36.9 °C)] 98.3 °F (36.8 °C)  Heart Rate:  [66-74] 70  Resp:  [17-20] 20  BP: (101-133)/(44-68) 121/58     on   ;   Device (Oxygen Therapy): room air  Body mass index is 16.88 kg/m².    Wt Readings from Last 3 Encounters:   07/01/24 50.3 kg (111 lb)   05/31/24 68 kg (150 lb)   04/01/24 67.1 kg (148 lb)     Intake & Output (last 3 days)         07/06 0701 07/07 0700 07/07 0701  07/08 0700 07/08 0701 07/09 0700 07/09 0701  07/10 0700    P.O. 600 480 600 480    Total Intake(mL/kg) 600 (11.9) 480 (9.5) 600 (11.9) 480 (9.5)    Urine (mL/kg/hr) 1385 (1.1) 850 (0.7) 1925 (1.6) 1200 (2.3)    Stool 400 800 300 0    Total Output 1785 1650 2225 1200    Net -1185 -1170 -1625 -720            Urine Unmeasured Occurrence 1 x       Stool Unmeasured Occurrence   2 x 1 x           Diet: Regular/House; Texture: Soft to Chew (NDD 3); Soft to Chew: Whole Meat; Fluid Consistency: Thin (IDDSI 0)  ----------------------------------------------------------------------------------------------------------------------  Physical exam:  Constitutional:  Elderly male sitting in bedside chair.     HENT:  Head:  Normocephalic and atraumatic.  Mouth:  Moist mucous membranes.    Eyes:  Conjunctivae and EOM are normal. No scleral icterus.    Neck:  Neck supple.  No JVD present.    Cardiovascular:  Normal rate, regular rhythm and normal heart sounds with no murmur.  Pulmonary/Chest:  No respiratory distress, no wheezes, no crackles, with normal breath sounds and good air movement.  Abdominal:  Soft.  Bowel sounds are normal.  No distension and no tenderness.   Musculoskeletal:  No edema, no tenderness, and no deformity.  No red or swollen joints anywhere.    Neurological:  Alert and oriented to person, place, and time.  No cranial nerve deficit.    Skin:  Skin is warm and dry. No rash noted. No pallor.   Peripheral vascular:  Pulses in all 4 extremities with no clubbing, no cyanosis, no edema.  ----------------------------------------------------------------------------------------------------------------------  Tele:    ----------------------------------------------------------------------------------------------------------------------  Results from last 7 days   Lab Units 07/09/24  0957 07/06/24 0035 07/04/24  0142   WBC 10*3/mm3 6.80 6.46 7.45   HEMOGLOBIN g/dL 10.6* 11.2* 10.2*   HEMATOCRIT % 33.3* 35.1* 31.5*   MCV fL 88.8 89.1 88.0   MCHC g/dL 31.8 31.9 32.4   PLATELETS 10*3/mm3 166 221 200         Results from last 7 days   Lab Units 07/09/24  0957 07/07/24  1611 07/07/24  0418 07/06/24  1706 07/06/24  0035 07/04/24  1831 07/04/24  0431 07/04/24  0142 07/03/24  1821 07/03/24  0034   SODIUM mmol/L 141  --  142  --  141  --  142  --   --  140   POTASSIUM mmol/L 3.1*  --  3.6  3.6 3.3* 2.8*   < >  "2.9*  --   --  3.8   MAGNESIUM mg/dL 1.6*  --   --   --  2.0  --  1.7  --   --  2.0   CHLORIDE mmol/L 107  --  110*  --  109*  --  111*  --   --  110*   CO2 mmol/L 27.7  --  26.2  --  22.7  --  21.9*  --   --  21.6*   BUN mg/dL 8  --  6*  --  7*  --  9  --   --  16   CREATININE mg/dL 1.19  --  1.09  --  0.92  --  0.75*  --   --  1.10   CALCIUM mg/dL 8.2  --  8.2  --  8.1*  --  7.3*  --   --  7.9*   IONIZED CALCIUM mmol/L  --   --   --   --   --   --   --  1.01*  --   --    PHOSPHORUS mg/dL  --  3.2 2.2*  --  2.5  --   --  2.4*   < > 1.0*   GLUCOSE mg/dL 118*  --  106*  --  120*  --  104*  --   --  104*   ALBUMIN g/dL  --   --  2.4*  --  2.4*  --  2.2*  --   --  2.7*   BILIRUBIN mg/dL  --   --   --   --  0.3  --  0.3  --   --  0.5   ALK PHOS U/L  --   --   --   --  105  --  100  --   --  112   AST (SGOT) U/L  --   --   --   --  25  --  20  --   --  22   ALT (SGPT) U/L  --   --   --   --  12  --  10  --   --  11    < > = values in this interval not displayed.   Estimated Creatinine Clearance: 25.2 mL/min (by C-G formula based on SCr of 1.19 mg/dL).  No results found for: \"AMMONIA\"              No results found for: \"HGBA1C\", \"POCGLU\"  Lab Results   Component Value Date    TSH 3.870 06/29/2024    FREET4 1.48 03/14/2023     No results found for: \"PREGTESTUR\", \"PREGSERUM\", \"HCG\", \"HCGQUANT\"  Pain Management Panel           No data to display              Brief Urine Lab Results  (Last result in the past 365 days)        Color   Clarity   Blood   Leuk Est   Nitrite   Protein   CREAT   Urine HCG        06/29/24 2219 Yellow   Cloudy   Large (3+)   Negative   Negative   30 mg/dL (1+)                 No results found for: \"BLOODCX\"  No results found for: \"URINECX\"  No results found for: \"WOUNDCX\"  No results found for: \"STOOLCX\"  No results found for: \"RESPCX\"  No results found for: \"AFBCX\"        I have personally looked at the labs and they are summarized " above.  ----------------------------------------------------------------------------------------------------------------------  Detailed radiology reports for the last 24 hours:    Imaging Results (Last 24 Hours)       ** No results found for the last 24 hours. 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, status post creation of a loop descending colon colostomy this admission  -Postoperative hypoxic hypercapnic respiratory failure, still requiring 3.5 L/min nasal cannula oxygen  -Severe chronic illness malnutrition that was present on admission, which complicates all aspects of care  -Vitamin B12 deficiency and borderline low folate, present on admission  -Acute hypokalemia and acute hypophosphatemia, improving with supplementation  -History of hypothyroidism  -History of GERD  -History of bradycardia, status post pacemaker placement in the distant past  -History of bilateral inguinal hernias     Replaced potassium. Patient clinically improving.     Patient ambulated without O2. No need for supplemental O2 at discharge. Family preparing home for his return. Hospital bed ordered. Patient continues to work with PT/OT. Anticipate discharge home on 7/10 per family. 1 wk f/u with surgery at discharge.      Family requesting hospital bed. Patient has a medical condition which requires positioning of the body in ways that are not feasible with an ordinary bed in order to alleviate pain. Requires frequent changes in body position.      Code status: DNR/DNI     Dispo: Likely home on 7/10     Woody Lam MD  [facility]  07/09/24  17:15 EDT    Electronically signed by Woody Lam MD at 07/09/24 1486          Physical Therapy Notes (most recent note)        Meghan Herrera, PT at 07/04/24 1316  Version 1 of 1         Goal Outcome Evaluation:              Outcome  Evaluation: Pt seen for PT evaluation this date, demonstrates he may benefit from therapeutic intervention to increase endurance and strength for safety with functional mobility.      Anticipated Discharge Disposition (PT): other (see comments) (if pt has support at home, may be appropriate for home with home health and  supervision/assist)                          Electronically signed by Meghan Herrera, PT at 24 1315          Occupational Therapy Notes (most recent note)        Sally Arroyo, OT at 24 1446          Acute Care - Occupational Therapy Treatment Note   Harvinder     Patient Name: Joaquín Rogers  : 1927  MRN: [id number]  Today's Date: 2024  Onset of Illness/Injury or Date of Surgery: 24 (admission date)          Admit Date: 2024       ICD-10-CM ICD-9-CM   1. Complete intestinal obstruction, unspecified cause  K56.601 560.9   2. Intestinal obstruction, unspecified cause, unspecified whether partial or complete  K56.609 560.9   3. 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     Patient Active Problem List   Diagnosis    COVID-19    Acute hypoxemic respiratory failure due to COVID-19    Symptomatic bradycardia    Debility    Bowel obstruction    Indeterminate colitis    Severe malnutrition     Past Medical History:   Diagnosis Date    Arthritis     Bradycardia     Enlarged prostate     Pacemaker     Thyroid disease      Past Surgical History:   Procedure Laterality Date    CARDIAC ELECTROPHYSIOLOGY PROCEDURE N/A 2021    Procedure: Pacemaker DC new;  Surgeon: Kendrick Burgess MD;  Location: Located within [facility] INVASIVE LOCATION;  Service: Cardiology;  Laterality: N/A;    COLOSTOMY N/A 2024    Procedure: COLOSTOMY LOOP;  Surgeon: Dilma Yanez MD;  Location: [facility] OR;  Service: General;  Laterality: N/A;    EXPLORATORY LAPAROTOMY N/A 2024    Procedure: LAPAROTOMY EXPLORATORY;  Surgeon: Dilma Yanez MD;  Location: [facility] OR;  Service: General;  Laterality:  N/A;    HERNIA REPAIR Right     left inguinal/right    INSERT / REPLACE / REMOVE PACEMAKER  09/13/2021    [employer] device    SIGMOIDOSCOPY N/A 6/30/2024    Procedure: FLEXIBLE SIGMOIDOSCOPY WITH BIOPSY;  Surgeon: Dilma Yanez MD;  Location: [facility];  Service: General;  Laterality: N/A;         OT ASSESSMENT FLOWSHEET (Last 12 Hours)       OT Evaluation and Treatment       Row Name 07/08/24 1442                   OT Time and Intention    Subjective Information complains of;weakness;fatigue  -LM        Document Type therapy note (daily note)  -LM        Mode of Treatment occupational therapy  -LM        Patient Effort adequate  -LM        Comment Patient seen this date for adl retraining/education and fxl mobility.  Patient requires min assist with bed mobility, min assist with fxl transfers.  Mod assist with bathing, dressing tasks, max assist with toileting.  Setup with feeding and light grooming.  spouse present.  Son present.  Educated patient on safe adl performance while seated with overall moderate assist.  Also, discussed benefit from continued OT services upon discharge at [facility] versus  and 24 hour care at [facility].  -LM           General Information    Existing Precautions/Restrictions fall  colostomy  -LM           Cognition    Affect/Mental Status (Cognition) WFL  -LM        Orientation Status (Cognition) oriented x 3  -LM           Wound 07/01/24 1200 midline abdomen Incision    Wound - Properties Group Placement Date: 07/01/24  -KB Placement Time: 1200  -KB Orientation: midline  -KB Location: abdomen  -KB Primary Wound Type: Incision  -KB    Retired Wound - Properties Group Placement Date: 07/01/24  -KB Placement Time: 1200  -KB Orientation: midline  -KB Location: abdomen  -KB Primary Wound Type: Incision  -KB    Retired Wound - Properties Group Date first assessed: 07/01/24  -KB Time first assessed: 1200  -KB Location: abdomen  -KB Primary Wound Type: Incision  -KB       Positioning and Restraints     Post Treatment Position bed  -LM        In Bed supine;call light within reach;encouraged to call for assist;exit alarm on  -LM                  User Key  (r) = Recorded By, (t) = Taken By, (c) = Cosigned By      Initials Name Effective Dates    NAYELY Birgit Velasquez RN 22 -     LM Sally Arroyo OT 21 -                            OT Recommendation and Plan              Time Calculation:     Therapy Charges for Today       Code Description Service Date Service Provider Modifiers Qty    [id number] HC OT SELF CARE/MGMT/TRAIN EA 15 MIN 2024 Sally Arroyo OT GO 1                 Sally Arroyo OT  2024    Electronically signed by Sally Arroyo OT at 24 1447          Speech Language Pathology Notes (most recent note)        Rubi Shea MS CCC-SLP at 24 1456          Acute Care - Speech Language Pathology   Swallow Initial Evaluation [facility]  CLINICAL DYSPHAGIA ASSESSMENT     Patient Name: Joaquín Rogers  : 1927  MRN: [id number]  Today's Date: 2024               Admit Date: 2024  Joaquín Rogers  was seen at bedside this pm on CCU-6 to assess safety/efficacy of swallowing fnx, determine safest/least restrictive diet tolerance. He has a PMH significant for BPH, hypothyroidism, GERD, inguinal hernias, pacemaker placement, and bradycardia.    He is unfamiliar to SLP department of [facility] prior to this consultation.     Mr Rogers presented to [facility] ED w/ abdominal pain and no BM for 3 days. In ED he was found to have colonic obstruction, NG tube was placed, and surgery was consulted. He underwent endoscopy for sigmoidoscopy on  and on  had surgery for bowel resection and ostomy creation. He ws intubated for procedure and remained intubated post procedure. He was extubated this am to nasal cannula.     He is currently NPO awaiting this evaluation.     Social History     Socioeconomic History    Marital status:    Tobacco Use    Smoking status: Former      Current packs/day: 0.00     Types: Cigarettes     Quit date:      Years since quittin.5     Passive exposure: Past    Smokeless tobacco: Former     Types: Chew     Quit date:    Vaping Use    Vaping status: Never Used   Substance and Sexual Activity    Alcohol use: Never    Drug use: Never    Sexual activity: Defer   Imaging:  XR CHEST 1 VW-     CLINICAL INDICATION: ET placement verification; K56.601-Complete  intestinal obstruction, unspecified as to cause; K56.609-Unspecified  intestinal obstruction, unspecified as to partial versus complete  obstruction; 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     FINDINGS:     LUNGS: Endotracheal tube overlies the tracheal air column well above the  ishaan.  Nasogastric tube is in the stomach     HEART AND MEDIASTINUM: Heart and mediastinal contours are unremarkable     SKELETON: Bony and soft tissue structures are unremarkable.     IMPRESSION:  Endotracheal tube placement as above    This report was finalized on 2024 3:27 PM by Dr. Shilo Leary MD.  Labs:   Latest Reference Range & Units 24 00:59 24 01:16 24 15:47 24 00:21   WBC 3.40 - 10.80 10*3/mm3 7.25 6.05 10.72 9.31   RBC 4.14 - 5.80 10*6/mm3 4.48 4.18 4.39 3.84 (L)   Hemoglobin 13.0 - 17.7 g/dL 12.5 (L) 11.8 (L) 12.2 (L) 10.8 (L)   Hematocrit 37.5 - 51.0 % 40.0 37.2 (L) 38.6 34.9 (L)   Platelets 140 - 450 10*3/mm3 216 242 239 214   RDW 12.3 - 15.4 % 24.5 (H) 24.1 (H) 23.9 (H) 24.3 (H)   MCV 79.0 - 97.0 fL 89.3 89.0 87.9 90.9   MCH 26.6 - 33.0 pg 27.9 28.2 27.8 28.1   MCHC 31.5 - 35.7 g/dL 31.3 (L) 31.7 31.6 30.9 (L)   MPV 6.0 - 12.0 fL 9.7 9.6 9.2 9.5   RDW-SD 37.0 - 54.0 fl 74.8 (H) 73.8 (H) 72.3 (H) 76.0 (H)   (L): Data is abnormally low  (H): Data is abnormally high  Diet Orders (active) (From admission, onward)       Start     Ordered    24 1036  NPO Diet NPO Type: Strict NPO  Diet Effective Now         Comments: Should Remain in Effect Until a Complete SLP Evaluation Occurs & a Superceding Order is Received    07/02/24 1035                  Mr Rogers was observed on 2L nasal cannula w/o complications across this assessment. NG tube is present in place and set to suction.     Per brief period of time intubated (<24 hrs) he is felt to most benefit from subjective dysphagia assessment at this time.     He was positioned upright and centered in bed to accept multiple po presentations of ice chips, puree, and thin liquids via spoon, cup, and straw.  He was able to self provide po trials w/ noted generalized weakness for lifting cup.     Facial/oral structures were symmetrical upon observation. Lingual protrusion revealed no deviation. Oral mucosa were moist, pink, and clean. Secretions were clear, thin, and well controlled. OROM/QUINTIN was generally weak overall but wfl to imitate oral postures. He is edentulous and reports no use of dentures at baseline. Gag is not assessed. Volitional cough was intact w/ adequate intensity, clear in quality, non-productive. Voice was slightly weak in intensity, mildly hoarse in quality w/ intelligible speech. He reports odynophagia at this time and says he keeps feeling like he needs to cough. He is evidenced w/ cough and use of suction to remove secretions prior to any po presentations.     Upon po presentations, adequate bolus anticipation and acceptance w/ good labial seal for bolus clearance via spoon bowl, cup rim stability and suction via straw. Bolus formation, manipulation and control were wfl for presented consistencies. A-p transit was timely w/o significant oral residue appreciated. No overt s/s aspiration before the swallow.      Pharyngeal swallow was slightly delayed w/ adequate hyolaryngeal elevation per palpation. Spontaneous double swallow is performed w/ all presentations. No overt s/s aspiration evidenced across this evaluation. No silent aspiration suspected.      Visit Dx:     ICD-10-CM ICD-9-CM   1. Complete intestinal obstruction, unspecified cause  K56.601 560.9   2. Intestinal obstruction, unspecified cause, unspecified whether partial or complete  K56.609 560.9   3. 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     Patient Active Problem List   Diagnosis    COVID-19    Acute hypoxemic respiratory failure due to COVID-19    Symptomatic bradycardia    Debility    Bowel obstruction    Indeterminate colitis     Past Medical History:   Diagnosis Date    Arthritis     Bradycardia     Enlarged prostate     Pacemaker     Thyroid disease      Past Surgical History:   Procedure Laterality Date    CARDIAC ELECTROPHYSIOLOGY PROCEDURE N/A 09/13/2021    Procedure: Pacemaker DC new;  Surgeon: Kendrick Burgess MD;  Location: [facility] CATH INVASIVE LOCATION;  Service: Cardiology;  Laterality: N/A;    HERNIA REPAIR Right     left inguinal/right    INSERT / REPLACE / REMOVE PACEMAKER  09/13/2021    [employer] device    SIGMOIDOSCOPY N/A 6/30/2024    Procedure: FLEXIBLE SIGMOIDOSCOPY WITH BIOPSY;  Surgeon: Dilma Yanez MD;  Location: [facility] OR;  Service: General;  Laterality: N/A;     Impression:     Mr Rogers presented w/ an overall generally weak but wfl oropharyngeal swallow w/o s/s concerning for aspiration evidenced across these po presentations. At this time, Mr Rogers is recommended for a po diet including thin liquids w/ diet to be initiated and advanced per MD.     SLP Recommendation and Plan     1. Diet to include thin liquids.    2. Medications whole/crushed in puree/thins per MD  3. Upright and centered for all po intake  4. SUNDAR precautions.  5. Oral care protocol.  6. Diet to be initiated/advanced per MD.    No further formal SLP f/u warranted/recommended at this time.    D/w patient results and recommendations w/ verbal agreement.    D/w RN results and recommendations w/ verbal agreement.    Thank you for allowing me to participate in the care of your patient-  Rubi Shea,  M.S., CCC-SLP        EDUCATION  The patient has been educated in the following areas:   Dysphagia (Swallowing Impairment) Oral Care/Hydration.          Time Calculation:          Rubi Shea MS CCC-SLP  2024    Electronically signed by Rubi Shea MS CCC-SLP at 24 1540       ADL Documentation (most recent)      Flowsheet Row Most Recent Value   Transferring 2 - assistive person   Toileting 1 - assistive equipment   Bathing 2 - assistive person   Dressing 2 - assistive person   Eating 0 - independent   Communication 0 - understands/communicates without difficulty   Swallowing 0 - swallows foods/liquids without difficulty   Equipment Currently Used at Home cane, straight               Discharge Summary        Woody Lam MD at 07/10/24 0900              [facility] DISCHARGE SUMMARY    Patient Identification:  Name:  Joaquín Rogers  Age:  89 y.o.  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, status post creation of a loop descending colon colostomy this admission  #Postoperative hypoxic hypercapnic respiratory failure, still requiring 3.5 L/min nasal cannula oxygen  #Severe chronic illness malnutrition that was present on admission, which complicates all aspects of care  #Vitamin B12 deficiency and borderline low folate, present on admission  #Acute hypokalemia and acute hypophosphatemia, improving with supplementation  #History of hypothyroidism  #History of GERD  #History of bradycardia, status post pacemaker placement in the distant past  #History of bilateral inguinal hernias       CONSULTS   Surgery   Pulmonology   Palliative care     PROCEDURES PERFORMED  Flexible sigmoidoscopy  6/30    Exploratory laparotomy and creation of loop descending colon colostomy 7/1    HOSPITAL COURSE  Patient is a 89 y.o. male presented on 6/29 to [facility] complaining of abdominal pain.  Please see the admitting history and physical for further details.        Mr. Rogers is our 89 yo M with hx BPH, hypothyroidism, constipation on home linzess and miralax, GERD, inguinal hernias, pacemaker who presents with abdominal pain and no BM for 3 days which is abnormal for patient. In the ED patient found to have distal colonic obstruction on CT. NG tube placed. Surgery consulted and recommended to admit to medicine and would follow for possible surgical intervention. Patient underwent flex sig on 6/30 that revealed a partially obstructive large mass in the rectum. Biopsies obtained. Patient taken for exploratory laparotomy and creation of loop descending colon colostomy. Patient hypercapnic in postoperative area and reintubated. Patient extubated the following day and has done well. Patient's son obtained guardianship while he was in the hospital. [facility]. 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 Patient to also f/u with PCP in 1 week, recommended repeat labs at that time. Walking oximetry revealed patient did not need O2 even on exertion. Discharge delayed by family preparing home this week. Patient has continued to improve. Linzess likely started in response to obstruction which now has been relieved. Patient notes cost has been a barrier and I am not sure he will need it moving forward, will hold for now.     VITAL SIGNS:  Temp:  [98.1 °F (36.7 °C)-98.5 °F (36.9 °C)] 98.4 °F (36.9 °C)  Heart Rate:  [67-71] 70  Resp:  [18-20] 20  BP: (120-151)/(59-69) 126/59  SpO2:  [97 %-98 %] 98 %  on  Flow (L/min):  [2] 2;   Device (Oxygen Therapy): nasal cannula    Body mass index is 16.88 kg/m².  Wt Readings from  Last 3 Encounters:   07/01/24 50.3 kg (111 lb)   05/31/24 68 kg (150 lb)   04/01/24 67.1 kg (148 lb)       PHYSICAL EXAM:  Constitutional:  Elderly appearing male.   HENT:  Head:  Normocephalic and atraumatic.  Mouth:  Moist mucous membranes.    Eyes:  Conjunctivae and EOM are normal.  Pupils are equal, round, and reactive to light.  No scleral icterus.    Cardiovascular:  Normal rate, regular rhythm and normal heart sounds with no murmur.  Pulmonary/Chest:  No respiratory distress, no wheezes, no crackles, with normal breath sounds and good air movement.  Abdominal:  Soft.  Bowel sounds are normal.  No distension and no tenderness. Ostomy and midline incision noted. Stool in ostomy. Incision well approximated with no significant surrounding redness or any noted drainage.   Musculoskeletal:  No edema, no tenderness, and no deformity.  No red or swollen joints anywhere.    Neurological:  Alert and oriented to person, place, and time.  No gross neurological deficit.   Skin:  Skin is warm and dry. No rash noted. No pallor.   Peripheral vascular:  Strong pulses in all 4 extremities with no clubbing, no cyanosis, no edema.    DISCHARGE DISPOSITION   Stable    DISCHARGE MEDICATIONS:     Discharge Medications        New Medications        Instructions Start Date   folic acid 1 MG tablet  Commonly known as: FOLVITE   1 mg, Oral, Daily   Start Date: July 9, 2024     potassium chloride 20 MEQ CR tablet  Commonly known as: KLOR-CON M20   20 mEq, Oral, Daily   Start Date: July 9, 2024            Continue These Medications        Instructions Start Date   aspirin 81 MG EC tablet   81 mg, Oral, Daily      calcium carbonate-cholecalciferol 500-400 MG-UNIT tablet tablet   1 tablet, Oral, 2 Times Daily      carboxymethylcellulose 0.5 % solution  Commonly known as: REFRESH PLUS   1 drop, Both Eyes, 3 Times Daily PRN      ferrous sulfate 325 (65 FE) MG tablet   325 mg, Oral, 2 Times Daily      finasteride 5 MG tablet  Commonly known  as: PROSCAR   5 mg, Oral, Daily      ICAPS AREDS 2 PO   1 tablet, Oral, Daily      levothyroxine 100 MCG tablet  Commonly known as: SYNTHROID, LEVOTHROID   100 mcg, Oral, Daily      linaclotide 72 MCG capsule capsule  Commonly known as: LINZESS   72 mcg, Oral, Every Morning Before Breakfast      omeprazole 40 MG capsule  Commonly known as: priLOSEC   40 mg, Oral, Daily      polyethylene glycol 17 g packet  Commonly known as: MIRALAX   17 g, Oral, Daily      tamsulosin 0.4 MG capsule 24 hr capsule  Commonly known as: FLOMAX   1 capsule, Oral, Daily                 Additional Instructions for the Follow-ups that You Need to Schedule       Ambulatory Referral to Home Health   As directed      Face to Face Visit Date: 7/8/2024   Follow-up provider for Plan of Care?: I treated the patient in an acute care facility and will not continue treatment after discharge.   Follow-up provider: TODD WHALEY [[id number]]   Reason/Clinical Findings: debility, bowel surgery, advanced age   Describe mobility limitations that make leaving home difficult: debility, bowel surgery, advanced age   Nursing/Therapeutic Services Requested: Skilled Nursing Physical Therapy   Skilled nursing orders: Medication education   PT orders: Home safety assessment Strengthening   Frequency: 1 Week 1               Follow-up Information       Uday Whaley DO .    Specialty: Internal Medicine  Contact information:  [address]  [facility] [address]  [place] [id number] [phone]               Uday Whaley DO Follow up in 1 week(s).    Specialty: Internal Medicine  Why: Repeat BMP, mag, CBC at visit.  Contact information:  [address]  [facility] [address]  [place] [id number] [phone]               Dilma Yanez MD Follow up in 1 week(s).    Specialties: General Surgery, Emergency Medicine  Contact information:  [address]  [facility] [address] [phone]                              TEST  RESULTS PENDING AT DISCHARGE       CODE  STATUS  Code Status and Medical Interventions:   Ordered at: 06/29/24 1835     Medical Intervention Limits:    No intubation (DNI)     Code Status (Patient has no pulse and is not breathing):    No CPR (Do Not Attempt to Resuscitate)     Medical Interventions (Patient has pulse or is breathing):    Limited Support       Prabhakar Lam MD  [facility]  07/08/24  12:30 EDT    Please note that this discharge summary required more than 30 minutes to complete.      Electronically signed by Prabhakar Lam MD at 07/10/24 1014       Discharge Order (From admission, onward)       Start     Ordered    07/10/24 1010  Discharge patient  Once        Expected Discharge Date: 07/10/24   Expected Discharge Time: Morning   Discharge Disposition: Home or Self Care   Physician of Record for Attribution - Please select from Treatment Team: PRABHAKAR LAM [[id number]]   Review needed by CMO to determine Physician of Record: No      Question Answer Comment   Physician of Record for Attribution - Please select from Treatment Team PRABHAKAR LAM    Review needed by CMO to determine Physician of Record No        07/10/24 1009    07/08/24 1159  Discharge patient  Once,   Status:  Canceled        Expected Discharge Date: 07/08/24   Expected Discharge Time: Morning   Discharge Disposition: Home or Self Care   Physician of Record for Attribution - Please select from Treatment Team: PRABHAKAR LAM [[id number]]   Review needed by CMO to determine Physician of Record: No      Question Answer Comment   Physician of Record for Attribution - Please select from Treatment Team PRABHAKAR LAM    Review needed by CMO to determine Physician of Record No        07/08/24 1158

## 2024-07-10 NOTE — CASE MANAGEMENT/SOCIAL WORK
Discharge Planning Assessment   [facility]     Patient Name: Joaquín Rogers  MRN: [id number]  Today's Date: 7/10/2024    Admit Date: 6/29/2024    Plan: [facility] per [facility] who states [facility] provider is declining approval for [facility] services for home health and states pt's local PCP will have to  orders. SS contacted pt's son Clark on this date who states he would like for home health referral to be sent to [facility] and requested for referral to be sent to [facility]. SS made referral in [facility]. SS to follow.       Discharge Plan       Row Name 07/10/24 1128       Plan    Final Discharge Disposition Code 06 - home with home health care    Final Note Pt is being discharged home on this date with physician order for home health. SS faxed HH referral to [facility] [phone] and discussed pt with [facility] per Rylee. SS contacted [facility] per Olivia per son's request. SS faxed information to [phone] to [facility]. Pt's son to provide transportation on this date. No other needs identified at this time.          Continued Care and Services - Admitted Since 6/29/2024    No active coordination exists for this encounter.       Expected Discharge Date and Time       Expected Discharge Date Expected Discharge Time    Jul 10, 2024               MARIAMA Prado

## 2024-07-10 NOTE — DISCHARGE PLACEMENT REQUEST
[address] [address]  Phone:  [phone]  Fax:  [phone] Date: 2024      Ambulatory Referral to Home Health     Patient:  Joaquín Rogers MRN:  [id number]   [facility] [address] :  1927  SSN:    Phone: [phone] Sex:  M      INSURANCE PAYOR PLAN GROUP # SUBSCRIBER ID   Primary:    MEDICARE [id number]   [id number]      Referring Provider Information:  PRABHAKAR LI Phone: [phone] Fax: [phone]       Referral Information:   # Visits:  999 Referral Type: Home Health [42]   Urgency:  Routine Referral Reason: Specialty Services Required   Start Date: 2024 End Date:  To be determined by Insurer   Diagnosis: Complete intestinal obstruction, unspecified cause (K56.601 [ICD-10-CM] 560.9 [ICD-9-CM])      Refer to Dept:   Refer to Provider:   Refer to Provider Phone:   Refer to Facility:       Face to Face Visit Date: 2024  Follow-up provider for Plan of Care? I treated the patient in an acute care facility and will not continue treatment after discharge.  Follow-up provider: TODD WHALEY [[id number]]  Reason/Clinical Findings: debility, bowel surgery, advanced age  Describe mobility limitations that make leaving home difficult: debility, bowel surgery, advanced age  Nursing/Therapeutic Services Requested: Skilled Nursing  Nursing/Therapeutic Services Requested: Physical Therapy  Skilled nursing orders: Medication education  PT orders: Home safety assessment  PT orders: Strengthening  Frequency: 1 Week 1     This document serves as a request of services and does not constitute Insurance authorization or approval of services.  To determine eligibility, please contact the members Insurance carrier to verify and review coverage.     If you have medical questions regarding this request for services. Please contact [address] at [phone] during normal business hours.        Authorizing  "Provider:Woody Lam MD  Authorizing Provider's NPI: [id number]  Order Entered By: Woody Lam MD 7/8/2024 12:30 PM     Electronically signed by: Woody Lam MD 7/8/2024 12:30 PM       Rashmi Marroquin (89 y.o. Male)       Date of Birth   05/19/1927    Social Security Number       Address   [address] [place] [address]    Home Phone   [phone]    MRN   [id number]       Jain   None    Marital Status                               Admission Date   6/29/24    Admission Type   Emergency    Admitting Provider   Woody Lam MD    Attending Provider       Department, Room/Bed   [place] [id number]/       Discharge Date   7/10/2024    Discharge Disposition   Home or Self Care    Discharge Destination                                 Attending Provider: (none)   Allergies: No Known Allergies    Isolation: None   Infection: None   Code Status: No CPR    Ht: 172.7 cm (67.99\")   Wt: 50.3 kg (111 lb)    Admission Cmt: None   Principal Problem: Bowel obstruction [K56.609]                   Active Insurance as of 6/29/2024       Primary Coverage       Payor Plan Insurance Group Employer/Plan Group    MEDICARE MEDICARE A & B        Payor Plan Address Payor Plan Phone Number Payor Plan Fax Number Effective Dates    [address] [phone]  5/1/1992 - None Entered    [place] [address]         Subscriber Name Subscriber Birth Date Member ID       RASHMI MARROQUIN 5/19/1927 [id number]                     Emergency Contacts        (Rel.) Home Phone Work Phone Mobile Phone    Clark Marroquin (Legal Guardian) -- -- [phone]    CARAMAYRA (Spouse) [phone] -- [phone]    Rustam Marroquin (Son) -- -- [phone]              Insurance Information                  MEDICARE/MEDICARE A & B Phone: [phone]    Subscriber: Cara Rashmi Subscriber#: [id number]    Group#: -- Precert#: --             History & Physical        Woody Lam MD at " 24 183              [facility] HISTORY AND PHYSICAL    Patient Identification:  Name:  Joaquín Rogers  Age:  89 y.o.  Sex:  male  :  1927  MRN:  [id number]   Visit Number:  [id number]  Admit Date: 2024   Room number:  3342/2S  Primary Care Physician:  Uday Roche,      Subjective     Chief complaint:    Chief Complaint   Patient presents with    Abdominal Pain       History of presenting illness:  Mr. Rogers is our 89 yo M with hx BPH, hypothyroidism, constipation on home linzess and miralax, GERD, inguinal hernias, pacemaker who presents with abdominal pain and no BM for 3 days which is abnormal for patient. In the ED patient found to have colonic obstruction. NG tube placed. Surgery consulted and recommended to admit to medicine and would follow.    Patient had just had NG tube placed before transfer upstairs. He had small amount of bleeding from nose and cough. Denies any bleeding before NG placement. NG tube clamped as we await radiology looking at plain film for placement. Patient's wife supportive bedside. Patient noted feeling about the same as admission.   ---------------------------------------------------------------------------------------------------------------------   Review of Systems   Constitutional: Negative.    HENT: Negative.     Eyes: Negative.    Respiratory: Negative.     Cardiovascular: Negative.    Gastrointestinal:  Positive for abdominal distention, abdominal pain, nausea and vomiting.   Endocrine: Negative.    Genitourinary: Negative.    Musculoskeletal: Negative.    Skin: Negative.    Allergic/Immunologic: Negative.    Neurological: Negative.    Hematological: Negative.    Psychiatric/Behavioral: Negative.       ---------------------------------------------------------------------------------------------------------------------   Past Medical History:   Diagnosis Date    Bradycardia     Thyroid disease      Past Surgical History:    Procedure Laterality Date    CARDIAC ELECTROPHYSIOLOGY PROCEDURE N/A 2021    Procedure: Pacemaker DC new;  Surgeon: Kendrick Burgess MD;  [facility]: [facility];  Service: Cardiology;  Laterality: N/A;    HERNIA REPAIR      INSERT / REPLACE / REMOVE PACEMAKER  2021    [employer] device     No family history on file.  Social History     Socioeconomic History    Marital status:    Tobacco Use    Smoking status: Former     Current packs/day: 0.00     Types: Cigarettes     Quit date:      Years since quittin.5     Passive exposure: Past    Smokeless tobacco: Former     Types: Chew     Quit date:    Vaping Use    Vaping status: Never Used   Substance and Sexual Activity    Alcohol use: Never    Drug use: Never    Sexual activity: Defer     ---------------------------------------------------------------------------------------------------------------------   Allergies:  Patient has no known allergies.  ---------------------------------------------------------------------------------------------------------------------   Medications below are reported home medications pulling from within the system; at this time, these medications have not been reconciled unless otherwise specified and are in the verification process for further verifcation as current home medications.    Prior to Admission Medications       Prescriptions Last Dose Informant Patient Reported? Taking?    aspirin 81 MG EC tablet  Medication Bottle Yes No    Take 1 tablet by mouth Daily.    calcium carbonate-cholecalciferol 500-400 MG-UNIT tablet tablet  Medication Bottle Yes No    Take 1 tablet by mouth 2 (Two) Times a Day.    carboxymethylcellulose (REFRESH PLUS) 0.5 % solution  Medication Bottle Yes No    Administer 1 drop to both eyes 3 (Three) Times a Day As Needed for Dry Eyes.    ferrous sulfate 325 (65 FE) MG tablet  Medication Bottle Yes No    Take 1 tablet by mouth 2 (Two) Times a Day.    finasteride  (PROSCAR) 5 MG tablet  Medication Bottle Yes No    Take 1 tablet by mouth Daily.    levothyroxine (SYNTHROID, LEVOTHROID) 75 MCG tablet  Medication Bottle Yes No    Take 1 tablet by mouth Daily.    linaclotide (LINZESS) 72 MCG capsule capsule  Medication Bottle Yes No    Take 1 capsule by mouth Every Morning Before Breakfast.    omeprazole (priLOSEC) 40 MG capsule  Medication Bottle Yes No    Take 1 capsule by mouth Daily.    polyethylene glycol (MIRALAX) 17 g packet  Medication Bottle Yes No    Take 17 g by mouth Daily.    terazosin (HYTRIN) 2 MG capsule  Medication Bottle Yes No    Take 1 capsule by mouth Every Night.          Objective     Vital Signs:  Temp:  [98 °F (36.7 °C)-98.2 °F (36.8 °C)] 98 °F (36.7 °C)  Heart Rate:  [] 93  Resp:  [20] 20  BP: (104-170)/(55-95) 129/73    Mean Arterial Pressure (Non-Invasive) for the past 24 hrs (Last 3 readings):   Noninvasive MAP (mmHg)   06/29/24 1730 111   06/29/24 1715 110   06/29/24 1700 118     SpO2:  [90 %-96 %] 91 %  on   ;      Body mass index is 22.46 kg/m².    Wt Readings from Last 3 Encounters:   06/29/24 67 kg (147 lb 11.3 oz)   05/31/24 68 kg (150 lb)   04/01/24 67.1 kg (148 lb)      ---------------------------------------------------------------------------------------------------------------------   Physical Exam:  Constitutional:  Well-developed and well-nourished.  No respiratory distress. Elderly male laying supine in bed with small amount of dried blood under NG that was placed at 60 cm deep.      HENT:  Head: Normocephalic and atraumatic.  Mouth:  Moist mucous membranes.    Eyes:  Conjunctivae and EOM are normal.  Pupils are equal, round, and reactive to light.  No scleral icterus.  Cardiovascular:  Normal rate, regular rhythm and normal heart sounds with no murmur.  Pulmonary/Chest:  No respiratory distress, no wheezes, no crackles, with normal breath sounds and good air movement.  Abdominal:  Distended. No rebound or guarding.    Musculoskeletal:  No edema, no tenderness, and no deformity.  No red or swollen joints anywhere.    Neurological:  Alert and oriented to person, place, and time.  No cranial nerve deficit.  No focal neurological deficit.   Skin:  Skin is warm and dry.  No rash noted.  No pallor.   Peripheral vascular:  No edema and strong pulses on all 4 extremities.    ---------------------------------------------------------------------------------------------------------------------  EKG:  Will get preoperative EKG.   No orders to display       Telemetry:      I have personally looked at both the EKG and the telemetry strips.    Last echocardiogram:  Results for orders placed during the hospital encounter of 09/01/21    Adult Transthoracic Echo Complete W/ Cont if Necessary Per Protocol    Interpretation Summary  · Left ventricular wall thickness is consistent with mild concentric hypertrophy.  · Left ventricular ejection fraction appears to be 56 - 60%. Left ventricular systolic function is normal.  · Left ventricular diastolic function is consistent with (grade I) impaired relaxation.  · Normal cardiac chamber dimensions.  · Moderate aortic valve regurgitation is present.  · There is no evidence of pericardial effusion.    --------------------------------------------------------------------------------------------------------------------  Labs:  Results from last 7 days   Lab Units 06/29/24  1358   WBC 10*3/mm3 5.65   HEMOGLOBIN g/dL 12.4*   HEMATOCRIT % 39.0   MCV fL 87.2   MCHC g/dL 31.8   PLATELETS 10*3/mm3 203         Results from last 7 days   Lab Units 06/29/24  1513   SODIUM mmol/L 141   POTASSIUM mmol/L 3.3*   MAGNESIUM mg/dL 2.1   CHLORIDE mmol/L 106   CO2 mmol/L 23.7   BUN mg/dL 16   CREATININE mg/dL 1.22   CALCIUM mg/dL 8.4   GLUCOSE mg/dL 119*   ALBUMIN g/dL 3.1*   BILIRUBIN mg/dL 0.5   ALK PHOS U/L 121*   AST (SGOT) U/L 21   ALT (SGPT) U/L 11   Estimated Creatinine Clearance: 32.8 mL/min (by C-G formula based on  "SCr of 1.22 mg/dL).    No results found for: \"AMMONIA\"          No results found for: \"HGBA1C\", \"POCGLU\"  Lab Results   Component Value Date    TSH 3.870 06/29/2024    FREET4 1.48 03/14/2023     No results found for: \"PREGTESTUR\", \"PREGSERUM\", \"HCG\", \"HCGQUANT\"  Pain Management Panel           No data to display              Brief Urine Lab Results  (Last result in the past 365 days)        Color   Clarity   Blood   Leuk Est   Nitrite   Protein   CREAT   Urine HCG        05/31/24 1747 Yellow   Clear   Negative   Negative   Negative   30 mg/dL (1+)                 No results found for: \"BLOODCX\"  No results found for: \"URINECX\"  No results found for: \"WOUNDCX\"  No results found for: \"STOOLCX\"    I have personally looked at the labs and they are summarized above.  ----------------------------------------------------------------------------------------------------------------------  Detailed radiology reports for the last 24 hours:    Imaging Results (Last 24 Hours)       Procedure Component Value Units Date/Time    XR Chest 1 View [[id number]] Resulted: 06/29/24 1746     Updated: 06/29/24 1754    CT Abdomen Pelvis Without Contrast [[id number]] Collected: 06/29/24 1537     Updated: 06/29/24 1545    Narrative:      VERIFICATION OBSERVER NAME: Lisa Baltazar MD.     Technique: Axial images were obtained along with coronal and sagittal  reconstruction. DLP in mGycm reported in the EMR records. Dose lowering  technique: Automated exposure control with adjustment of the MA and/or  KV, use of iterative reconstruction. Data included in the medical  records.     HISTORY/COMPARISON/FINDINGS:     Comparison: 5/31/2024     HISTORY: Abdominal pain and distention     Findings:      Severe dilatation of small bowel consistent with high-grade small bowel  obstruction.  This is appears to be recurrence from prior study.  Liver and spleen pancreas appeared unremarkable.  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  No hydronephrosis.  Area of narrowing in " the pelvis in the  distal sigmoid colon.  Findings suspicious for presence of LEFT  hemicolectomy.          Impression:      Severe small bowel dilatation consistent with recurrent obstruction.   Position zone appears to be in the pelvis.s           [id number]  Zip code [id number]                 This report was finalized on 6/29/2024 3:43 PM by Dr. Lisa Baltazar MD.             Final impressions for the last 30 days of radiology reports:    CT Abdomen Pelvis Without Contrast    Result Date: 6/29/2024  Severe small bowel dilatation consistent with recurrent obstruction. Position zone appears to be in the pelvis.s    [id number] Zip code [id number]      This report was finalized on 6/29/2024 3:43 PM by Dr. Lisa Baltazar MD.      CT Abdomen Pelvis Without Contrast    Result Date: 5/31/2024   1.  Air and fluid-filled large and small bowel segments with occasional air-fluid levels identified throughout the bowel segments suggestive of possible mild generalized ileus. 2.  Mild stranding noted along the margins of the sigmoid colon possibly due to mild distal colitis. 3.  No features of acute appendicitis 4.  Enlarged prostate gland. 5.  No free fluid or free air. 6.  Mild chronic bilateral renal cortical volume loss. 7.  No free fluid or free air. No abscess or hematoma. 8.  Mild diverticulosis at the splenic flecture and proximal descending colon segment.  This report was finalized on 5/31/2024 6:34 PM by Regan Davis MD.     I have personally looked at the radiology images and read the final radiology report.    Assessment & Plan    #Large bowel obstruction   #Chronic constipation   - Likely obstruction at level of rectum with fluid stool behind   - NG tube placed in ED. Awaiting plain film read to confirm placement.   - Discussed with surgery, will admit to med/surg with plan for flex sig tomorrow which will hopefully be therapeutic   - NPO except ice chips as per surgery until then.     #Hypothyroidism   - Will get TSH.  Continue home regimen.     #GERD  - PPI once tolerating oral    #Hx of bradycardia w/ pacemaker placement   #Hx of inguinal hernias     Code status: DNR/DNI    Dispo: Admit to med/surg       Woody Lam MD  [facility]  06/29/24  18:32 EDT    Electronically signed by Woody Lam MD at 06/29/24 1840       Current Facility-Administered Medications   Medication Dose Route Frequency Provider Last Rate Last Admin    artificial tears ophthalmic ointment   Both Eyes Q1H PRN Woody Lam MD        aspirin chewable tablet 81 mg  81 mg Per G Tube Daily Woody Lam MD   81 mg at 07/10/24 0836    sennosides-docusate (PERICOLACE) 8.6-50 MG per tablet 2 tablet  2 tablet Oral BID PRN Woody Lam MD        And    polyethylene glycol (MIRALAX) packet 17 g  17 g Oral Daily PRN Woody Lam MD        And    bisacodyl (DULCOLAX) EC tablet 5 mg  5 mg Oral Daily PRN Woody Lam MD        And    bisacodyl (DULCOLAX) suppository 10 mg  10 mg Rectal Daily PRN Woody Lam MD        Calcium Carb-Cholecalciferol 600-20 MG-MCG tablet 1 tablet  1 tablet Oral BID Woody Lam MD   1 tablet at 07/10/24 0836    Calcium Replacement - Follow Nurse / BPA Driven Protocol   Does not apply PRN Woody Lam MD        cyanocobalamin injection 1,000 mcg  1,000 mcg Intramuscular Daily Neymar Crowe MD   1,000 mcg at 07/10/24 0836    Ferrous Sulfate 300 (60 Fe) MG/5ML solution 300 mg  300 mg Nasogastric BID Woody Lam MD   300 mg at 07/10/24 0835    finasteride (PROSCAR) tablet 5 mg  5 mg Oral Daily Woody Lam MD   5 mg at 07/10/24 0836    folic acid (FOLVITE) tablet 1 mg  1 mg Oral Daily Neymar Crowe MD   1 mg at 07/10/24 0836    heparin (porcine) 5000 UNIT/ML injection 5,000 Units  5,000 Units Subcutaneous Q8H Woody Lam MD   5,000 Units at 07/10/24 0641    lansoprazole (PREVACID SOLUTAB) disintegrating tablet Tablet Delayed Release Dispersible 30 mg  30 mg Oral M AC  Neymar Crowe MD   30 mg at 07/10/24 0836    levothyroxine (SYNTHROID, LEVOTHROID) tablet 100 mcg  100 mcg Oral QAM AC Woody Lam MD   100 mcg at 07/10/24 0836    lubiprostone (AMITIZA) capsule 8 mcg  8 mcg Oral BID Woody Lam MD   8 mcg at 07/10/24 0836    Magnesium Standard Dose Replacement - Follow Nurse / BPA Driven Protocol   Does not apply PRN Woody Lam MD        multivitamin (THERAGRAN) tablet 1 tablet  1 tablet Oral Daily Woody Lam MD   1 tablet at 07/10/24 0836    ondansetron (ZOFRAN) injection 4 mg  4 mg Intravenous Q6H PRN Woody Lam MD   4 mg at 06/30/24 1143    Phosphorus Replacement - Follow Nurse / BPA Driven Protocol   Does not apply PRWoody Awad MD        polyethylene glycol (MIRALAX) packet 17 g  17 g Oral Daily Woody Lam MD   17 g at 07/10/24 0834    potassium chloride (KLOR-CON M20) CR tablet 40 mEq  40 mEq Oral Daily Angelo Kenney MD   40 mEq at 07/10/24 0836    Potassium Replacement - Follow Nurse / BPA Driven Protocol   Does not apply Woody Steiner MD        sodium chloride 0.9 % flush 10 mL  10 mL Intravenous PRN Woody Lam MD        sodium chloride 0.9 % flush 10 mL  10 mL Intravenous Q12H Woody Lam MD   10 mL at 07/10/24 0836    sodium chloride 0.9 % flush 10 mL  10 mL Intravenous PRN Woody Lam MD        sodium chloride 0.9 % infusion 40 mL  40 mL Intravenous Woody Steiner MD         Current Outpatient Medications   Medication Sig Dispense Refill    aspirin 81 MG EC tablet Take 1 tablet by mouth Daily.      calcium carbonate-cholecalciferol 500-400 MG-UNIT tablet tablet Take 1 tablet by mouth 2 (Two) Times a Day.      carboxymethylcellulose (REFRESH PLUS) 0.5 % solution Administer 1 drop to both eyes 3 (Three) Times a Day As Needed for Dry Eyes.      ferrous sulfate 325 (65 FE) MG tablet Take 1 tablet by mouth 2 (Two) Times a Day.      finasteride (PROSCAR) 5 MG tablet Take 1 tablet by mouth Daily.      folic acid  (FOLVITE) 1 MG tablet Take 1 tablet by mouth Daily for 7 days. 7 tablet 0    levothyroxine (SYNTHROID, LEVOTHROID) 100 MCG tablet Take 1 tablet by mouth Daily.      Multiple Vitamins-Minerals (ICAPS AREDS 2 PO) Take 1 tablet by mouth Daily.      omeprazole (priLOSEC) 40 MG capsule Take 1 capsule by mouth Daily.      polyethylene glycol (MIRALAX) 17 g packet Take 17 g by mouth Daily.      potassium chloride (KLOR-CON M20) 20 MEQ CR tablet Take 1 tablet by mouth Daily for 7 days. 7 tablet 0    tamsulosin (FLOMAX) 0.4 MG capsule 24 hr capsule Take 1 capsule by mouth Daily.          Physician Progress Notes (most recent note)        Woody Lam MD at 24 1715              [facility]     Patient Identification:  Name:  Joaquín Rogers  Age:  89 y.o.  Sex:  male  :  1927  MRN:  [id number]  Visit Number:  [id number]  ROOM: [address] [place] [address]     Primary Care Provider:  Uday Roche DO    Length of stay in inpatient status:  10    Subjective     Chief Compliant:    Chief Complaint   Patient presents with    Abdominal Pain       History of Presenting Illness:    Patient sitting in bedside chair. Noted feeling better today with improved mobility. Noted small amount of rectal drainage but amount decreasing.       ROS:  Otherwise 10 point ROS negative other than documented above in HPI.     Objective     Current Hospital Meds:aspirin, 81 mg, Per G Tube, Daily  Calcium Carb-Cholecalciferol, 1 tablet, Oral, BID  cyanocobalamin, 1,000 mcg, Intramuscular, Daily  Ferrous Sulfate, 300 mg, Nasogastric, BID  finasteride, 5 mg, Oral, Daily  folic acid, 1 mg, Oral, Daily  heparin (porcine), 5,000 Units, Subcutaneous, Q8H  lansoprazole, 30 mg, Oral, QAM AC  levothyroxine, 100 mcg, Oral, QAM AC  lubiprostone, 8 mcg, Oral, BID  multivitamin, 1 tablet, Oral, Daily  polyethylene glycol, 17 g, Oral, Daily  potassium chloride, 40 mEq, Oral, Daily  sodium chloride, 10 mL, Intravenous, Q12H          Current Antimicrobial Therapy:  Anti-Infectives (From admission, onward)      None          Current Diuretic Therapy:  Diuretics (From admission, onward)      None          ----------------------------------------------------------------------------------------------------------------------  Vital Signs:  Temp:  [97.9 °F (36.6 °C)-98.5 °F (36.9 °C)] 98.3 °F (36.8 °C)  Heart Rate:  [66-74] 70  Resp:  [17-20] 20  BP: (101-133)/(44-68) 121/58     on   ;   Device (Oxygen Therapy): room air  Body mass index is 16.88 kg/m².    Wt Readings from Last 3 Encounters:   07/01/24 50.3 kg (111 lb)   05/31/24 68 kg (150 lb)   04/01/24 67.1 kg (148 lb)     Intake & Output (last 3 days)         07/06 0701  07/07 0700 07/07 0701  07/08 0700 07/08 0701  07/09 0700 07/09 0701  07/10 0700    P.O. 600 480 600 480    Total Intake(mL/kg) 600 (11.9) 480 (9.5) 600 (11.9) 480 (9.5)    Urine (mL/kg/hr) 1385 (1.1) 850 (0.7) 1925 (1.6) 1200 (2.3)    Stool 400 800 300 0    Total Output 1785 1650 2225 1200    Net -1184 -1175 -2542 -629            Urine Unmeasured Occurrence 1 x       Stool Unmeasured Occurrence   2 x 1 x          Diet: Regular/House; Texture: Soft to Chew (NDD 3); Soft to Chew: Whole Meat; Fluid Consistency: Thin (IDDSI 0)  ----------------------------------------------------------------------------------------------------------------------  Physical exam:  Constitutional:  Elderly male sitting in bedside chair.     HENT:  Head:  Normocephalic and atraumatic.  Mouth:  Moist mucous membranes.    Eyes:  Conjunctivae and EOM are normal. No scleral icterus.    Neck:  Neck supple.  No JVD present.    Cardiovascular:  Normal rate, regular rhythm and normal heart sounds with no murmur.  Pulmonary/Chest:  No respiratory distress, no wheezes, no crackles, with normal breath sounds and good air movement.  Abdominal:  Soft.  Bowel sounds are normal.  No distension and no tenderness.   Musculoskeletal:  No edema, no tenderness, and no  deformity.  No red or swollen joints anywhere.    Neurological:  Alert and oriented to person, place, and time.  No cranial nerve deficit.    Skin:  Skin is warm and dry. No rash noted. No pallor.   Peripheral vascular:  Pulses in all 4 extremities with no clubbing, no cyanosis, no edema.  ----------------------------------------------------------------------------------------------------------------------  Tele:    ----------------------------------------------------------------------------------------------------------------------  Results from last 7 days   Lab Units 07/09/24  0957 07/06/24  0035 07/04/24  0142   WBC 10*3/mm3 6.80 6.46 7.45   HEMOGLOBIN g/dL 10.6* 11.2* 10.2*   HEMATOCRIT % 33.3* 35.1* 31.5*   MCV fL 88.8 89.1 88.0   MCHC g/dL 31.8 31.9 32.4   PLATELETS 10*3/mm3 166 221 200         Results from last 7 days   Lab Units 07/09/24  0957 07/07/24  1611 07/07/24  0418 07/06/24  1706 07/06/24  0035 07/04/24  1831 07/04/24  0431 07/04/24  0142 07/03/24  1821 07/03/24  0034   SODIUM mmol/L 141  --  142  --  141  --  142  --   --  140   POTASSIUM mmol/L 3.1*  --  3.6  3.6 3.3* 2.8*   < > 2.9*  --   --  3.8   MAGNESIUM mg/dL 1.6*  --   --   --  2.0  --  1.7  --   --  2.0   CHLORIDE mmol/L 107  --  110*  --  109*  --  111*  --   --  110*   CO2 mmol/L 27.7  --  26.2  --  22.7  --  21.9*  --   --  21.6*   BUN mg/dL 8  --  6*  --  7*  --  9  --   --  16   CREATININE mg/dL 1.19  --  1.09  --  0.92  --  0.75*  --   --  1.10   CALCIUM mg/dL 8.2  --  8.2  --  8.1*  --  7.3*  --   --  7.9*   IONIZED CALCIUM mmol/L  --   --   --   --   --   --   --  1.01*  --   --    PHOSPHORUS mg/dL  --  3.2 2.2*  --  2.5  --   --  2.4*   < > 1.0*   GLUCOSE mg/dL 118*  --  106*  --  120*  --  104*  --   --  104*   ALBUMIN g/dL  --   --  2.4*  --  2.4*  --  2.2*  --   --  2.7*   BILIRUBIN mg/dL  --   --   --   --  0.3  --  0.3  --   --  0.5   ALK PHOS U/L  --   --   --   --  105  --  100  --   --  112   AST (SGOT) U/L  --   --   --   " --  25  --  20  --   --  22   ALT (SGPT) U/L  --   --   --   --  12  --  10  --   --  11    < > = values in this interval not displayed.   Estimated Creatinine Clearance: 25.2 mL/min (by C-G formula based on SCr of 1.19 mg/dL).  No results found for: \"AMMONIA\"              No results found for: \"HGBA1C\", \"POCGLU\"  Lab Results   Component Value Date    TSH 3.870 06/29/2024    FREET4 1.48 03/14/2023     No results found for: \"PREGTESTUR\", \"PREGSERUM\", \"HCG\", \"HCGQUANT\"  Pain Management Panel           No data to display              Brief Urine Lab Results  (Last result in the past 365 days)        Color   Clarity   Blood   Leuk Est   Nitrite   Protein   CREAT   Urine HCG        06/29/24 2219 Yellow   Cloudy   Large (3+)   Negative   Negative   30 mg/dL (1+)                 No results found for: \"BLOODCX\"  No results found for: \"URINECX\"  No results found for: \"WOUNDCX\"  No results found for: \"STOOLCX\"  No results found for: \"RESPCX\"  No results found for: \"AFBCX\"        I have personally looked at the labs and they are summarized above.  ----------------------------------------------------------------------------------------------------------------------  Detailed radiology reports for the last 24 hours:    Imaging Results (Last 24 Hours)       ** No results found for the last 24 hours. 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, status post creation of a loop descending colon colostomy this admission  -Postoperative hypoxic hypercapnic respiratory failure, still requiring 3.5 L/min nasal cannula oxygen  -Severe chronic illness malnutrition that was present on admission, which complicates all aspects of care  -Vitamin B12 deficiency and borderline low folate, present on admission  -Acute hypokalemia and acute hypophosphatemia, improving with supplementation  -History of " hypothyroidism  -History of GERD  -History of bradycardia, status post pacemaker placement in the distant past  -History of bilateral inguinal hernias     Replaced potassium. Patient clinically improving.     Patient ambulated without O2. No need for supplemental O2 at discharge. Family preparing home for his return. Hospital bed ordered. Patient continues to work with PT/OT. Anticipate discharge home on 7/10 per family. 1 wk f/u with surgery at discharge.      Family requesting hospital bed. Patient has a medical condition which requires positioning of the body in ways that are not feasible with an ordinary bed in order to alleviate pain. Requires frequent changes in body position.      Code status: DNR/DNI     Dispo: Likely home on 7/10     Woody Lam MD  [facility]  24  17:15 EDT    Electronically signed by Woody Lam MD at 24 1720          Physical Therapy Notes (most recent note)        Sherry Serrano PTA at 07/10/24 1258  Version 1 of 1         Acute Care - Physical Therapy Treatment Note  [facility]     Patient Name: Joaquín Rogers  : 1927  MRN: [id number]  Today's Date: 7/10/2024   Onset of Illness/Injury or Date of Surgery: 24 (admission date)  Visit Dx:     ICD-10-CM ICD-9-CM   1. Complete intestinal obstruction, unspecified cause  K56.601 560.9   2. Intestinal obstruction, unspecified cause, unspecified whether partial or complete  K56.609 560.9   3. 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     Patient Active Problem List   Diagnosis    COVID-19    Acute hypoxemic respiratory failure due to COVID-19    Symptomatic bradycardia    Debility    Bowel obstruction    Indeterminate colitis    Severe malnutrition     Past Medical History:   Diagnosis Date    Arthritis     Bradycardia     Enlarged prostate     Pacemaker     Thyroid disease      Past Surgical History:   Procedure Laterality Date    CARDIAC ELECTROPHYSIOLOGY PROCEDURE N/A 2021     Procedure: Pacemaker DC new;  Surgeon: Kendrick Burgess MD;  Location: [facility] CATH INVASIVE LOCATION;  Service: Cardiology;  Laterality: N/A;    COLOSTOMY N/A 7/1/2024    Procedure: COLOSTOMY LOOP;  Surgeon: Dilma Yanez MD;  Location: [facility] OR;  Service: General;  Laterality: N/A;    EXPLORATORY LAPAROTOMY N/A 7/1/2024    Procedure: LAPAROTOMY EXPLORATORY;  Surgeon: Dilma Yanez MD;  Location: [facility] OR;  Service: General;  Laterality: N/A;    HERNIA REPAIR Right     left inguinal/right    INSERT / REPLACE / REMOVE PACEMAKER  09/13/2021    [employer] device    SIGMOIDOSCOPY N/A 6/30/2024    Procedure: FLEXIBLE SIGMOIDOSCOPY WITH BIOPSY;  Surgeon: Dilma Yanez MD;  Location: [facility] OR;  Service: General;  Laterality: N/A;     PT Assessment (Last 12 Hours)       PT Evaluation and Treatment       Row Name 07/10/24 1250          Physical Therapy Time and Intention    Subjective Information no complaints  -     Document Type therapy note (daily note)  -     Mode of Treatment physical therapy  -     Patient Effort adequate  -     Comment Pt and RN in agreement for PT. Pt walked 200' with  SBA. BR transfer with RW CGA.  -       Row Name 07/10/24 1250          General Information    Patient Profile Reviewed yes  -     Equipment Currently Used at Home cane, straight  -     Existing Precautions/Restrictions fall  -       Row Name 07/10/24 1250          Cognition    Personal Safety Interventions fall prevention program maintained;nonskid shoes/slippers when out of bed;supervised activity  -       Row Name 07/10/24 1250          Bed Mobility    Bed Mobility supine-sit  -     Supine-Sit [place] (Bed Mobility) set up;standby assist;supervision;contact guard;minimum assist (75% patient effort)  -       Row Name 07/10/24 1250          Transfers    Transfers sit-stand transfer;stand-sit transfer;toilet transfer  -       Row Name 07/10/24 1250          Sit-Stand Transfer     Sit-Stand Door (Transfers) contact guard  -     Assistive Device (Sit-Stand Transfers) walker, front-wheeled  -HC       Row Name 07/10/24 1250          Stand-Sit Transfer    Stand-Sit Door (Transfers) contact guard  -HC     Assistive Device (Stand-Sit Transfers) walker, front-wheeled  -HC       Row Name 07/10/24 1250          Toilet Transfer    Type (Toilet Transfer) stand pivot/stand step  -HC     Door Level (Toilet Transfer) contact guard  -     Assistive Device (Toilet Transfer) walker, front-wheeled  -HC       Row Name 07/10/24 1250          Gait/Stairs (Locomotion)    Door Level (Gait) contact guard;standby assist  -     Assistive Device (Gait) walker, front-wheeled  -HC     Patient was able to Ambulate yes  -HC     Distance in Feet (Gait) 200  -HC     Deviations/Abnormal Patterns (Gait) base of support, wide;festinating/shuffling  -     Bilateral Gait Deviations forward flexed posture  -       Row Name             Wound 07/01/24 1200 midline abdomen Incision    Wound - Properties Group Placement Date: 07/01/24  -KB Placement Time: 1200  -KB Orientation: midline  -KB Location: abdomen  -KB Primary Wound Type: Incision  -KB    Retired Wound - Properties Group Placement Date: 07/01/24  -KB Placement Time: 1200  -KB Orientation: midline  -KB Location: abdomen  -KB Primary Wound Type: Incision  -KB    Retired Wound - Properties Group Date first assessed: 07/01/24  -KB Time first assessed: 1200  -KB Location: abdomen  -KB Primary Wound Type: Incision  -KB      Row Name 07/10/24 1250          Positioning and Restraints    Pre-Treatment Position in bed  -HC     Post Treatment Position bed  -HC     In Bed sitting;encouraged to call for assist;call light within reach;exit alarm on;side rails up x2  -HC       Row Name 07/10/24 1250          Therapy Assessment/Plan (PT)    Rehab Potential (PT) --  grossly fair to good  -     Criteria for Skilled Interventions Met (PT) yes  -HC      Therapy Frequency (PT) 2 times/wk  2-5x/week, PRN  -HC       Row Name 07/10/24 1250          Physical Therapy Goals    Bed Mobility Goal Selection (PT) bed mobility, PT goal 1  -HC     Gait Training Goal Selection (PT) gait training, PT goal 1  -HC       Row Name 07/10/24 1250          Bed Mobility Goal 1 (PT)    Activity/Assistive Device (Bed Mobility Goal 1, PT) supine to sit;sit to supine;rolling to left;rolling to right;scooting  -HC     [place] Level/Cues Needed (Bed Mobility Goal 1, PT) independent  -HC     Time Frame (Bed Mobility Goal 1, PT) long term goal (LTG)  -       Row Name 07/10/24 1250          Gait Training Goal 1 (PT)    Activity/Assistive Device (Gait Training Goal 1, PT) gait (walking locomotion);assistive device use;decrease fall risk;cane, straight  -HC     [place] Level (Gait Training Goal 1, PT) supervision required  -HC     Distance (Gait Training Goal 1, PT) 50'  -HC     Time Frame (Gait Training Goal 1, PT) long term goal (LTG)  -HC               User Key  (r) = Recorded By, (t) = Taken By, (c) = Cosigned By      Initials Name Provider Type    Birgit Mckeon, RN Registered Nurse    Sherry Spaulding PTA Physical Therapist Assistant                      PT Recommendation and Plan  Anticipated Discharge Disposition (PT): other (see comments) (if pt has support at home, may be appropriate for home with home health and 24/7 supervision/assist)  Therapy Frequency (PT): 2 times/wk (2-5x/week, PRN)          Time Calculation:    PT Charges       Row Name 07/10/24 1257             Time Calculation    PT Received On 07/10/24  -HC         Time Calculation- PT    Total Timed Code Minutes- PT 24 minute(s)  -HC                User Key  (r) = Recorded By, (t) = Taken By, (c) = Cosigned By      Initials Name Provider Type    Sherry Spaulding PTA Physical Therapist Assistant                  Therapy Charges for Today       Code Description Service Date Service  Provider Modifiers Qty    28913208110 HC GAIT TRAINING EA 15 MIN 7/10/2024 Sherry Serrano PTA GP, CQ 1    75999135728 HC PT THERAPEUTIC ACT EA 15 MIN 7/10/2024 Sherry Serrano PTA GP, CQ 1            PT G-Codes  AM-PAC 6 Clicks Score (PT): 17    Sherry JACKLYN Serrano PTA  7/10/2024      Electronically signed by Sherry Serrano PTA at 07/10/24 1259          Occupational Therapy Notes (most recent note)        Sally Arroyo, OT at 24 1446          Acute Care - Occupational Therapy Treatment Note   [facility]     Patient Name: Joaquín Rogers  : 1927  MRN: [id number]  Today's Date: 2024  Onset of Illness/Injury or Date of Surgery: 24 (admission date)          Admit Date: 2024       ICD-10-CM ICD-9-CM   1. Complete intestinal obstruction, unspecified cause  K56.601 560.9   2. Intestinal obstruction, unspecified cause, unspecified whether partial or complete  K56.609 560.9   3. 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 INVASIVE LOCATION;  Service: Cardiology;  Laterality: N/A;    COLOSTOMY N/A 2024    Procedure: COLOSTOMY LOOP;  Surgeon: Dilma Yanez MD;  Location: [facility] OR;  Service: General;  Laterality: N/A;    EXPLORATORY LAPAROTOMY N/A 2024    Procedure: LAPAROTOMY EXPLORATORY;  Surgeon: Dilma Yanez MD;  Location: [facility] OR;  Service: General;  Laterality: N/A;    HERNIA REPAIR Right     left inguinal/right    INSERT /  REPLACE / REMOVE PACEMAKER  09/13/2021    [employer] device    SIGMOIDOSCOPY N/A 6/30/2024    Procedure: FLEXIBLE SIGMOIDOSCOPY WITH BIOPSY;  Surgeon: Dilma Yanez MD;  Location: [facility] OR;  Service: General;  Laterality: N/A;         OT ASSESSMENT FLOWSHEET (Last 12 Hours)       OT Evaluation and Treatment       Row Name 07/08/24 1442                   OT Time and Intention    Subjective Information complains of;weakness;fatigue  -LM        Document Type therapy note (daily note)  -LM        Mode of Treatment occupational therapy  -LM        Patient Effort adequate  -LM        Comment Patient seen this date for adl retraining/education and fxl mobility.  Patient requires min assist with bed mobility, min assist with fxl transfers.  Mod assist with bathing, dressing tasks, max assist with toileting.  Setup with feeding and light grooming.  spouse present.  Son present.  Educated patient on safe adl performance while seated with overall moderate assist.  Also, discussed benefit from continued OT services upon discharge at [facility] versus  and 24 hour care at [facility].  -LM           General Information    Existing Precautions/Restrictions fall  colostomy  -LM           Cognition    Affect/Mental Status (Cognition) WFL  -LM        Orientation Status (Cognition) oriented x 3  -LM           Wound 07/01/24 1200 midline abdomen Incision    Wound - Properties Group Placement Date: 07/01/24  -KB Placement Time: 1200  -KB Orientation: midline  -KB Location: abdomen  -KB Primary Wound Type: Incision  -KB    Retired Wound - Properties Group Placement Date: 07/01/24  -KB Placement Time: 1200  -KB Orientation: midline  -KB Location: abdomen  -KB Primary Wound Type: Incision  -KB    Retired Wound - Properties Group Date first assessed: 07/01/24  -KB Time first assessed: 1200  -KB Location: abdomen  -KB Primary Wound Type: Incision  -KB       Positioning and Restraints    Post Treatment Position bed  -LM        In Bed supine;call  light within reach;encouraged to call for assist;exit alarm on  -LM                  User Key  (r) = Recorded By, (t) = Taken By, (c) = Cosigned By      Initials Name Effective Dates    NAYELY Birgit Velasquez RN 22 -     LM Sally rAroyo OT 21 -                            OT Recommendation and Plan              Time Calculation:     Therapy Charges for Today       Code Description Service Date Service Provider Modifiers Qty    34433472150 HC OT SELF CARE/MGMT/TRAIN EA 15 MIN 2024 Sally Arroyo OT GO 1                 Sally Arroyo OT  2024    Electronically signed by Sally Arroyo OT at 24 1447          Speech Language Pathology Notes (most recent note)        Rubi Shea MS CCC-SLP at 24 1456          Acute Care - Speech Language Pathology   Swallow Initial Evaluation [facility]  CLINICAL DYSPHAGIA ASSESSMENT     Patient Name: Joaquín Rogers  : 1927  MRN: [id number]  Today's Date: 2024               Admit Date: 2024  Joaquín Rogers  was seen at bedside this pm on CCU-6 to assess safety/efficacy of swallowing fnx, determine safest/least restrictive diet tolerance. He has a PMH significant for BPH, hypothyroidism, GERD, inguinal hernias, pacemaker placement, and bradycardia.    He is unfamiliar to SLP department of [facility] prior to this consultation.     Mr Rogers presented to [facility] ED w/ abdominal pain and no BM for 3 days. In ED he was found to have colonic obstruction, NG tube was placed, and surgery was consulted. He underwent endoscopy for sigmoidoscopy on  and on  had surgery for bowel resection and ostomy creation. He ws intubated for procedure and remained intubated post procedure. He was extubated this am to nasal cannula.     He is currently NPO awaiting this evaluation.     Social History     Socioeconomic History    Marital status:    Tobacco Use    Smoking status: Former     Current packs/day: 0.00     Types: Cigarettes     Quit date:       Years since quittin.5     Passive exposure: Past    Smokeless tobacco: Former     Types: Chew     Quit date:    Vaping Use    Vaping status: Never Used   Substance and Sexual Activity    Alcohol use: Never    Drug use: Never    Sexual activity: Defer   Imaging:  XR CHEST 1 VW-     CLINICAL INDICATION: ET placement verification; K56.601-Complete  intestinal obstruction, unspecified as to cause; K56.609-Unspecified  intestinal obstruction, unspecified as to partial versus complete  obstruction; 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     FINDINGS:     LUNGS: Endotracheal tube overlies the tracheal air column well above the  ishaan.  Nasogastric tube is in the stomach     HEART AND MEDIASTINUM: Heart and mediastinal contours are unremarkable     SKELETON: Bony and soft tissue structures are unremarkable.     IMPRESSION:  Endotracheal tube placement as above    This report was finalized on 2024 3:27 PM by Dr. Shilo Leary MD.  Labs:   Latest Reference Range & Units 24 00:59 24 01:16 24 15:47 24 00:21   WBC 3.40 - 10.80 10*3/mm3 7.25 6.05 10.72 9.31   RBC 4.14 - 5.80 10*6/mm3 4.48 4.18 4.39 3.84 (L)   Hemoglobin 13.0 - 17.7 g/dL 12.5 (L) 11.8 (L) 12.2 (L) 10.8 (L)   Hematocrit 37.5 - 51.0 % 40.0 37.2 (L) 38.6 34.9 (L)   Platelets 140 - 450 10*3/mm3 216 242 239 214   RDW 12.3 - 15.4 % 24.5 (H) 24.1 (H) 23.9 (H) 24.3 (H)   MCV 79.0 - 97.0 fL 89.3 89.0 87.9 90.9   MCH 26.6 - 33.0 pg 27.9 28.2 27.8 28.1   MCHC 31.5 - 35.7 g/dL 31.3 (L) 31.7 31.6 30.9 (L)   MPV 6.0 - 12.0 fL 9.7 9.6 9.2 9.5   RDW-SD 37.0 - 54.0 fl 74.8 (H) 73.8 (H) 72.3 (H) 76.0 (H)   (L): Data is abnormally low  (H): Data is abnormally high  Diet Orders (active) (From admission, onward)       Start     Ordered    24 1036  NPO Diet NPO Type: Strict NPO  Diet Effective Now        Comments: Should Remain in Effect Until a Complete SLP Evaluation  Occurs & a Superceding Order is Received    07/02/24 1035                  Mr Rogers was observed on 2L nasal cannula w/o complications across this assessment. NG tube is present in place and set to suction.     Per brief period of time intubated (<24 hrs) he is felt to most benefit from subjective dysphagia assessment at this time.     He was positioned upright and centered in bed to accept multiple po presentations of ice chips, puree, and thin liquids via spoon, cup, and straw.  He was able to self provide po trials w/ noted generalized weakness for lifting cup.     Facial/oral structures were symmetrical upon observation. Lingual protrusion revealed no deviation. Oral mucosa were moist, pink, and clean. Secretions were clear, thin, and well controlled. OROM/QUINTIN was generally weak overall but wfl to imitate oral postures. He is edentulous and reports no use of dentures at baseline. Gag is not assessed. Volitional cough was intact w/ adequate intensity, clear in quality, non-productive. Voice was slightly weak in intensity, mildly hoarse in quality w/ intelligible speech. He reports odynophagia at this time and says he keeps feeling like he needs to cough. He is evidenced w/ cough and use of suction to remove secretions prior to any po presentations.     Upon po presentations, adequate bolus anticipation and acceptance w/ good labial seal for bolus clearance via spoon bowl, cup rim stability and suction via straw. Bolus formation, manipulation and control were wfl for presented consistencies. A-p transit was timely w/o significant oral residue appreciated. No overt s/s aspiration before the swallow.      Pharyngeal swallow was slightly delayed w/ adequate hyolaryngeal elevation per palpation. Spontaneous double swallow is performed w/ all presentations. No overt s/s aspiration evidenced across this evaluation. No silent aspiration suspected.     Visit Dx:     ICD-10-CM ICD-9-CM   1. Complete intestinal  obstruction, unspecified cause  K56.601 560.9   2. Intestinal obstruction, unspecified cause, unspecified whether partial or complete  K56.609 560.9   3. 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     Patient Active Problem List   Diagnosis    COVID-19    Acute hypoxemic respiratory failure due to COVID-19    Symptomatic bradycardia    Debility    Bowel obstruction    Indeterminate colitis     Past Medical History:   Diagnosis Date    Arthritis     Bradycardia     Enlarged prostate     Pacemaker     Thyroid disease      Past Surgical History:   Procedure Laterality Date    CARDIAC ELECTROPHYSIOLOGY PROCEDURE N/A 09/13/2021    Procedure: Pacemaker DC new;  Surgeon: Kendrick Burgess MD;  Location: [facility] CATH INVASIVE LOCATION;  Service: Cardiology;  Laterality: N/A;    HERNIA REPAIR Right     left inguinal/right    INSERT / REPLACE / REMOVE PACEMAKER  09/13/2021    [employer] device    SIGMOIDOSCOPY N/A 6/30/2024    Procedure: FLEXIBLE SIGMOIDOSCOPY WITH BIOPSY;  Surgeon: Dilma Yanez MD;  Location: [facility] OR;  Service: General;  Laterality: N/A;     Impression:     Mr Rogers presented w/ an overall generally weak but wfl oropharyngeal swallow w/o s/s concerning for aspiration evidenced across these po presentations. At this time, Mr Rogers is recommended for a po diet including thin liquids w/ diet to be initiated and advanced per MD.     SLP Recommendation and Plan     1. Diet to include thin liquids.    2. Medications whole/crushed in puree/thins per MD  3. Upright and centered for all po intake  4. SUNDAR precautions.  5. Oral care protocol.  6. Diet to be initiated/advanced per MD.    No further formal SLP f/u warranted/recommended at this time.    D/w patient results and recommendations w/ verbal agreement.    D/w RN results and recommendations w/ verbal agreement.    Thank you for allowing me to participate in the care of your patient-  Rubi Shea M.S., CCC-SLP        EDUCATION  The patient has been educated in  the following areas:   Dysphagia (Swallowing Impairment) Oral Care/Hydration.          Time Calculation:          Rubi Shea MS CCC-SLP  2024    Electronically signed by Rubi Shea MS CCC-SLP at 24 1540       ADL Documentation (most recent)      Flowsheet Row Most Recent Value   Transferring 2 - assistive person   Toileting 1 - assistive equipment   Bathing 2 - assistive person   Dressing 2 - assistive person   Eating 0 - independent   Communication 0 - understands/communicates without difficulty   Swallowing 0 - swallows foods/liquids without difficulty   Equipment Currently Used at Home cane, straight               Discharge Summary        Woody Lam MD at 07/10/24 0900              [facility] DISCHARGE SUMMARY    Patient Identification:  Name:  Joaquín Rogers  Age:  89 y.o.  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, status post creation of a loop descending colon colostomy this admission  #Postoperative hypoxic hypercapnic respiratory failure, still requiring 3.5 L/min nasal cannula oxygen  #Severe chronic illness malnutrition that was present on admission, which complicates all aspects of care  #Vitamin B12 deficiency and borderline low folate, present on admission  #Acute hypokalemia and acute hypophosphatemia, improving with supplementation  #History of hypothyroidism  #History of GERD  #History of bradycardia, status post pacemaker placement in the distant past  #History of bilateral inguinal hernias       CONSULTS   Surgery   Pulmonology   Palliative care     PROCEDURES PERFORMED  Flexible sigmoidoscopy     Exploratory laparotomy and creation of loop descending colon  colostomy 7/1    HOSPITAL COURSE  Patient is a 89 y.o. male presented on 6/29 to [facility] complaining of abdominal pain.  Please see the admitting history and physical for further details.        Mr. Rogers is our 89 yo M with hx BPH, hypothyroidism, constipation on home linzess and miralax, GERD, inguinal hernias, pacemaker who presents with abdominal pain and no BM for 3 days which is abnormal for patient. In the ED patient found to have distal colonic obstruction on CT. NG tube placed. Surgery consulted and recommended to admit to medicine and would follow for possible surgical intervention. Patient underwent flex sig on 6/30 that revealed a partially obstructive large mass in the rectum. Biopsies obtained. Patient taken for exploratory laparotomy and creation of loop descending colon colostomy. Patient hypercapnic in postoperative area and reintubated. Patient extubated the following day and has done well. Patient's son obtained guardianship while he was in the hospital. [facility]. 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 Patient to also f/u with PCP in 1 week, recommended repeat labs at that time. Walking oximetry revealed patient did not need O2 even on exertion. Discharge delayed by family preparing home this week. Patient has continued to improve. Linzess likely started in response to obstruction which now has been relieved. Patient notes cost has been a barrier and I am not sure he will need it moving forward, will hold for now.     VITAL SIGNS:  Temp:  [98.1 °F (36.7 °C)-98.5 °F (36.9 °C)] 98.4 °F (36.9 °C)  Heart Rate:  [67-71] 70  Resp:  [18-20] 20  BP: (120-151)/(59-69) 126/59  SpO2:  [97 %-98 %] 98 %  on  Flow (L/min):  [2] 2;   Device (Oxygen Therapy): nasal cannula    Body mass index is 16.88 kg/m².  Wt Readings from Last 3 Encounters:   07/01/24 50.3 kg (111 lb)   05/31/24 68 kg (150  lb)   04/01/24 67.1 kg (148 lb)       PHYSICAL EXAM:  Constitutional:  Elderly appearing male.   HENT:  Head:  Normocephalic and atraumatic.  Mouth:  Moist mucous membranes.    Eyes:  Conjunctivae and EOM are normal.  Pupils are equal, round, and reactive to light.  No scleral icterus.    Cardiovascular:  Normal rate, regular rhythm and normal heart sounds with no murmur.  Pulmonary/Chest:  No respiratory distress, no wheezes, no crackles, with normal breath sounds and good air movement.  Abdominal:  Soft.  Bowel sounds are normal.  No distension and no tenderness. Ostomy and midline incision noted. Stool in ostomy. Incision well approximated with no significant surrounding redness or any noted drainage.   Musculoskeletal:  No edema, no tenderness, and no deformity.  No red or swollen joints anywhere.    Neurological:  Alert and oriented to person, place, and time.  No gross neurological deficit.   Skin:  Skin is warm and dry. No rash noted. No pallor.   Peripheral vascular:  Strong pulses in all 4 extremities with no clubbing, no cyanosis, no edema.    DISCHARGE DISPOSITION   Stable    DISCHARGE MEDICATIONS:     Discharge Medications        New Medications        Instructions Start Date   folic acid 1 MG tablet  Commonly known as: FOLVITE   1 mg, Oral, Daily   Start Date: July 9, 2024     potassium chloride 20 MEQ CR tablet  Commonly known as: KLOR-CON M20   20 mEq, Oral, Daily   Start Date: July 9, 2024            Continue These Medications        Instructions Start Date   aspirin 81 MG EC tablet   81 mg, Oral, Daily      calcium carbonate-cholecalciferol 500-400 MG-UNIT tablet tablet   1 tablet, Oral, 2 Times Daily      carboxymethylcellulose 0.5 % solution  Commonly known as: REFRESH PLUS   1 drop, Both Eyes, 3 Times Daily PRN      ferrous sulfate 325 (65 FE) MG tablet   325 mg, Oral, 2 Times Daily      finasteride 5 MG tablet  Commonly known as: PROSCAR   5 mg, Oral, Daily      ICAPS AREDS 2 PO   1 tablet,  Oral, Daily      levothyroxine 100 MCG tablet  Commonly known as: SYNTHROID, LEVOTHROID   100 mcg, Oral, Daily      linaclotide 72 MCG capsule capsule  Commonly known as: LINZESS   72 mcg, Oral, Every Morning Before Breakfast      omeprazole 40 MG capsule  Commonly known as: priLOSEC   40 mg, Oral, Daily      polyethylene glycol 17 g packet  Commonly known as: MIRALAX   17 g, Oral, Daily      tamsulosin 0.4 MG capsule 24 hr capsule  Commonly known as: FLOMAX   1 capsule, Oral, Daily                 Additional Instructions for the Follow-ups that You Need to Schedule       Ambulatory Referral to Home Health   As directed      Face to Face Visit Date: 7/8/2024   Follow-up provider for Plan of Care?: I treated the patient in an acute care facility and will not continue treatment after discharge.   Follow-up provider: TODD WHALEY [[id number]]   Reason/Clinical Findings: debility, bowel surgery, advanced age   Describe mobility limitations that make leaving home difficult: debility, bowel surgery, advanced age   Nursing/Therapeutic Services Requested: Skilled Nursing Physical Therapy   Skilled nursing orders: Medication education   PT orders: Home safety assessment Strengthening   Frequency: 1 Week 1               Follow-up Information       Uday Whaley DO .    Specialty: Internal Medicine  Contact information:  [address] [facility] [address]  [place] [address]  [phone]               Uday Whaley DO Follow up in 1 week(s).    Specialty: Internal Medicine  Why: Repeat BMP, mag, CBC at visit.  Contact information:  [address] [facility] [address]  [place] [id number]  [phone]               Dilma Yanez MD Follow up in 1 week(s).    Specialties: General Surgery, Emergency Medicine  Contact information:  [address]  [facility] [address] [phone]                              TEST  RESULTS PENDING AT DISCHARGE       CODE STATUS  Code Status and Medical Interventions:   Ordered at: 06/29/24  1835     Medical Intervention Limits:    No intubation (DNI)     Code Status (Patient has no pulse and is not breathing):    No CPR (Do Not Attempt to Resuscitate)     Medical Interventions (Patient has pulse or is breathing):    Limited Support       Prabhakar Lam MD  [facility] Hospitalist  07/08/24  12:30 EDT    Please note that this discharge summary required more than 30 minutes to complete.      Electronically signed by Prabhakar Lam MD at 07/10/24 1014       Discharge Order (From admission, onward)       Start     Ordered    07/10/24 1010  Discharge patient  Once        Expected Discharge Date: 07/10/24   Expected Discharge Time: Morning   Discharge Disposition: Home or Self Care   Physician of Record for Attribution - Please select from Treatment Team: PRABHAKAR LAM [[id number]]   Review needed by CMO to determine Physician of Record: No      Question Answer Comment   Physician of Record for Attribution - Please select from Treatment Team PRABHAKAR LAM    Review needed by CMO to determine Physician of Record No        07/10/24 1009

## 2024-07-10 NOTE — THERAPY TREATMENT NOTE
Acute Care - Physical Therapy Treatment Note  [facility]     Patient Name: Joaquín Rogers  : 1927  MRN: [id number]  Today's Date: 7/10/2024   Onset of Illness/Injury or Date of Surgery: 24 (admission date)  Visit Dx:     ICD-10-CM ICD-9-CM   1. Complete intestinal obstruction, unspecified cause  K56.601 560.9   2. Intestinal obstruction, unspecified cause, unspecified whether partial or complete  K56.609 560.9   3. 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     Patient Active Problem List   Diagnosis    COVID-19    Acute hypoxemic respiratory failure due to COVID-19    Symptomatic bradycardia    Debility    Bowel obstruction    Indeterminate colitis    Severe malnutrition     Past Medical History:   Diagnosis Date    Arthritis     Bradycardia     Enlarged prostate     Pacemaker     Thyroid disease      Past Surgical History:   Procedure Laterality Date    CARDIAC ELECTROPHYSIOLOGY PROCEDURE N/A 2021    Procedure: Pacemaker DC new;  Surgeon: Kendrick Burgess MD;  Location: [facility] CATH INVASIVE LOCATION;  Service: Cardiology;  Laterality: N/A;    COLOSTOMY N/A 2024    Procedure: COLOSTOMY LOOP;  Surgeon: Dilma Yanez MD;  Location: [facility];  Service: General;  Laterality: N/A;    EXPLORATORY LAPAROTOMY N/A 2024    Procedure: LAPAROTOMY EXPLORATORY;  Surgeon: Dilma Yanez MD;  Location: [facility];  Service: General;  Laterality: N/A;    HERNIA REPAIR Right     left inguinal/right    INSERT / REPLACE / REMOVE PACEMAKER  2021    [employer] device    SIGMOIDOSCOPY N/A 2024    Procedure: FLEXIBLE SIGMOIDOSCOPY WITH BIOPSY;  Surgeon: Dilma Yanez MD;  Location: [facility];  Service: General;  Laterality: N/A;     PT Assessment (Last 12 Hours)       PT Evaluation and Treatment       Row Name 07/10/24 1250          Physical Therapy Time and Intention    Subjective Information no complaints  -HC     Document Type therapy note (daily note)  -HC     Mode of Treatment physical  therapy  -HC     Patient Effort adequate  -HC     Comment Pt and RN in agreement for PT. Pt walked 200' with RW SBA. BR transfer with RW CGA.  -       Row Name 07/10/24 1250          General Information    Patient Profile Reviewed yes  -HC     Equipment Currently Used at Home cane, straight  -HC     Existing Precautions/Restrictions fall  -       Row Name 07/10/24 1250          Cognition    Personal Safety Interventions fall prevention program maintained;nonskid shoes/slippers when out of bed;supervised activity  -       Row Name 07/10/24 1250          Bed Mobility    Bed Mobility supine-sit  -     Supine-Sit Ro[place] (Bed Mobility) set up;standby assist;supervision;contact guard;minimum assist (75% patient effort)  -       Row Name 07/10/24 1250          Transfers    Transfers sit-stand transfer;stand-sit transfer;toilet transfer  -       Row Name 07/10/24 1250          Sit-Stand Transfer    Sit-Stand Rolette (Transfers) contact guard  -     Assistive Device (Sit-Stand Transfers) walker, front-wheeled  -       Row Name 07/10/24 1250          Stand-Sit Transfer    Stand-Sit Rolette (Transfers) contact guard  -     Assistive Device (Stand-Sit Transfers) walker, front-wheeled  -       Row Name 07/10/24 1250          Toilet Transfer    Type (Toilet Transfer) stand pivot/stand step  -     Rolette Level (Toilet Transfer) contact guard  -     Assistive Device (Toilet Transfer) walker, front-wheeled  -HC       Row Name 07/10/24 1250          Gait/Stairs (Locomotion)    Rolette Level (Gait) contact guard;standby assist  -     Assistive Device (Gait) walker, front-wheeled  -HC     Patient was able to Ambulate yes  -     Distance in Feet (Gait) 200  -HC     Deviations/Abnormal Patterns (Gait) base of support, wide;festinating/shuffling  -     Bilateral Gait Deviations forward flexed posture  -       Row Name             Wound 07/01/24 1200 midline abdomen Incision     Wound - Properties Group Placement Date: 07/01/24  -KB Placement Time: 1200  -KB Orientation: midline  -KB Location: abdomen  -KB Primary Wound Type: Incision  -KB    Retired Wound - Properties Group Placement Date: 07/01/24  -KB Placement Time: 1200  -KB Orientation: midline  -KB Location: abdomen  -KB Primary Wound Type: Incision  -KB    Retired Wound - Properties Group Date first assessed: 07/01/24  -KB Time first assessed: 1200  -KB Location: abdomen  -KB Primary Wound Type: Incision  -KB      Row Name 07/10/24 1250          Positioning and Restraints    Pre-Treatment Position in bed  -HC     Post Treatment Position bed  -HC     In Bed sitting;encouraged to call for assist;call light within reach;exit alarm on;side rails up x2  -HC       Row Name 07/10/24 1250          Therapy Assessment/Plan (PT)    Rehab Potential (PT) --  grossly fair to good  -HC     Criteria for Skilled Interventions Met (PT) yes  -HC     Therapy Frequency (PT) 2 times/wk  2-5x/week, PRN  -       Row Name 07/10/24 1250          Physical Therapy Goals    Bed Mobility Goal Selection (PT) bed mobility, PT goal 1  -HC     Gait Training Goal Selection (PT) gait training, PT goal 1  -       Row Name 07/10/24 1250          Bed Mobility Goal 1 (PT)    Activity/Assistive Device (Bed Mobility Goal 1, PT) supine to sit;sit to supine;rolling to left;rolling to right;scooting  -     Flanagan Level/Cues Needed (Bed Mobility Goal 1, PT) independent  -HC     Time Frame (Bed Mobility Goal 1, PT) long term goal (LTG)  -       Row Name 07/10/24 1250          Gait Training Goal 1 (PT)    Activity/Assistive Device (Gait Training Goal 1, PT) gait (walking locomotion);assistive device use;decrease fall risk;cane, straight  -HC     Flanagan Level (Gait Training Goal 1, PT) supervision required  -HC     Distance (Gait Training Goal 1, PT) 50'  -HC     Time Frame (Gait Training Goal 1, PT) long term goal (LTG)  -               User Key  (r) =  Recorded By, (t) = Taken By, (c) = Cosigned By      Initials Name Provider Type    Birgit Mckeon, RN Registered Nurse    Sherry Spaulding PTA Physical Therapist Assistant                      PT Recommendation and Plan  Anticipated Discharge Disposition (PT): other (see comments) (if pt has support at home, may be appropriate for home with home health and 24/7 supervision/assist)  Therapy Frequency (PT): 2 times/wk (2-5x/week, PRN)          Time Calculation:    PT Charges       Row Name 07/10/24 [facility]             Time Calculation    PT Received On 07/10/24  -         Time Calculation- PT    Total Timed Code Minutes- PT 24 minute(s)  -HC                User Key  (r) = Recorded By, (t) = Taken By, (c) = Cosigned By      Initials Name Provider Type    Sherry Spaulding PTA Physical Therapist Assistant                  Therapy Charges for Today       Code Description Service Date Service Provider Modifiers Qty    37866248204 HC GAIT TRAINING EA 15 MIN 7/10/2024 Sherry Serrano PTA GP, CQ 1    74524595307 HC PT THERAPEUTIC ACT EA 15 MIN 7/10/2024 Sherry Serrano PTA GP, CQ 1            PT G-Codes  AM-PAC 6 Clicks Score (PT): 17    Sherry eSrrano PTA  7/10/2024

## 2024-07-10 NOTE — DISCHARGE PLACEMENT REQUEST
"Rashmi Marroquin (89 y.o. Male)       Date of Birth   1927    Social Security Number       Address   [address] [id number]    Home Phone   [phone]    MRN   [id number]       Hinduism   None    Marital Status                               Admission Date   24    Admission Type   Emergency    Admitting Provider   Woody Lam MD    Attending Provider       Department, Room/Bed   [address], [address]/       Discharge Date   7/10/2024    Discharge Disposition   Home or Self Care    Discharge Destination                                 Attending Provider: (none)   Allergies: No Known Allergies    Isolation: None   Infection: None   Code Status: No CPR    Ht: 172.7 cm (67.99\")   Wt: 50.3 kg (111 lb)    Admission Cmt: None   Principal Problem: Bowel obstruction [K56.609]                   Active Insurance as of 2024       Primary Coverage       Payor Plan Insurance Group Employer/Plan Group    MEDICARE MEDICARE A & B        Payor Plan Address Payor Plan Phone Number Payor Plan Fax Number Effective Dates    [address] [phone]  1992 - None Entered    [place] [address]         Subscriber Name Subscriber Birth Date Member ID       RASHMI MARROQUIN 1927 [id number]                     Emergency Contacts        (Rel.) Home Phone Work Phone Mobile Phone    Clark Marroquin (Legal Guardian) -- -- [phone]    CARAMAYRA (Spouse) [phone] -- [phone]    Rustam Marroquin (Son) -- -- [phone]          [address] [address]  Dept. Phone:  [phone]  Dept. Fax:  [phone] Date Ordered: Jul 10, 2024         Patient:  Rashmi Marroquin MRN:  [id number]   [address] [facility] [address] :  1927  SSN:    Phone: [phone] Sex:  M     Weight: 50.3 kg (111 lb)         Ht Readings from Last 1 Encounters:   24 172.7 cm (67.99\")         Walker  Andres " Folding with Wheels              (Order ID: [id number])    Diagnosis:  Debility (R53.81 [ICD-10-CM] 799.3 [ICD-9-CM])   Quantity:  1     Equipment:  Walker Folding with Wheels  Length of Need: 99 Months = Lifetime        Authorizing Provider's Phone: [phone]  Authorizing Provider:Woody Lam MD  Authorizing Provider's NPI: [id number]  Order Entered By: Woody Lam MD 7/10/2024  2:22 PM     Electronically signed by: Woody Lam MD 7/10/2024  2:22 PM

## 2024-07-10 NOTE — CASE MANAGEMENT/SOCIAL WORK
Case Management/Social Work    Patient Name:  Joaquín Rogers  YOB: 1927  MRN: [id number]  Admit Date:  6/29/2024        This CM received an Order for rolling walker and faxed to [facility]. CM called [facility] to follow up on fax. Marichuy informed CM that patient received one in 2023 and his insurance will not cover the walker because it hasn't been 5 years. Marichuy will speak with Clark, patient's son, to explain benefits. CM sent secure chat to Lead Nurse, Huong.        Electronically signed by:  Brandy Chavarria RN  07/10/24 14:28 EDT   Patient is a 89y old  Female who presents with a chief complaint of Severe Sepsis (05 Feb 2020 12:34)      INTERVAL HPI/OVERNIGHT EVENTS:  Agitated in AM. D/w palliative, will be started on haldol ATC. Unable to obtain full ROS. Bear hugger on place.    Review of Systems: 12 point review of systems otherwise negative    MEDICATIONS  (STANDING):  brimonidine 0.2% Ophthalmic Solution 1 Drop(s) Both EYES three times a day  dextrose 5%. 1000 milliLiter(s) (50 mL/Hr) IV Continuous <Continuous>  dextrose 5%. 1000 milliLiter(s) (50 mL/Hr) IV Continuous <Continuous>  dextrose 50% Injectable 12.5 Gram(s) IV Push once  dextrose 50% Injectable 25 Gram(s) IV Push once  dextrose 50% Injectable 25 Gram(s) IV Push once  enoxaparin Injectable 65 milliGRAM(s) SubCutaneous every 12 hours  folic acid Injectable 1 milliGRAM(s) IV Push daily  haloperidol    Injectable 0.5 milliGRAM(s) IV Push every 12 hours  insulin lispro (HumaLOG) corrective regimen sliding scale   SubCutaneous Before meals and at bedtime  latanoprost 0.005% Ophthalmic Solution 1 Drop(s) Both EYES at bedtime  levoFLOXacin  Tablet 750 milliGRAM(s) Oral every 48 hours    MEDICATIONS  (PRN):  acetaminophen    Suspension .. 650 milliGRAM(s) Oral every 6 hours PRN Mild Pain (1 - 3)  dextrose 40% Gel 15 Gram(s) Oral once PRN Blood Glucose LESS THAN 70 milliGRAM(s)/deciliter  glucagon  Injectable 1 milliGRAM(s) IntraMuscular once PRN Glucose LESS THAN 70 milligrams/deciliter  haloperidol    Injectable 0.5 milliGRAM(s) IV Push every 8 hours PRN delirium      Allergies    No Known Allergies    Intolerances          Vital Signs Last 24 Hrs  T(C): 36.5 (05 Feb 2020 09:07), Max: 37 (04 Feb 2020 20:55)  T(F): 97.7 (05 Feb 2020 09:07), Max: 98.6 (04 Feb 2020 20:55)  HR: 94 (05 Feb 2020 09:07) (72 - 94)  BP: 112/68 (05 Feb 2020 05:01) (100/66 - 112/68)  BP(mean): --  RR: 20 (05 Feb 2020 09:07) (20 - 20)  SpO2: 99% (05 Feb 2020 09:07) (96% - 99%)  CAPILLARY BLOOD GLUCOSE      POCT Blood Glucose.: 150 mg/dL (05 Feb 2020 18:38)  POCT Blood Glucose.: 252 mg/dL (05 Feb 2020 15:17)  POCT Blood Glucose.: 88 mg/dL (05 Feb 2020 13:46)  POCT Blood Glucose.: 32 mg/dL (05 Feb 2020 12:18)  POCT Blood Glucose.: 151 mg/dL (05 Feb 2020 08:24)  POCT Blood Glucose.: 109 mg/dL (04 Feb 2020 23:06)      02-04 @ 07:01  -  02-05 @ 07:00  --------------------------------------------------------  IN: 840 mL / OUT: 0 mL / NET: 840 mL        Physical Exam:    Daily     Daily   General:  Not cachetic, bear hugger on place, a bit agitated  HEENT:  Nonicteric, PERRLA  CV:  RRR  Lungs:  Decreased BS B/L, poor effort  Abdomen:  Soft, non-tender, no distended, positive BS, no hepatosplenomegaly  Extremities: no edema    LABS:                        7.3    2.13  )-----------( 65       ( 04 Feb 2020 06:52 )             21.8     02-04    143  |  112<H>  |  17  ----------------------------<  55<L>  3.7   |  20<L>  |  1.04    Ca    8.2<L>      04 Feb 2020 06:52  Phos  2.8     02-04  Mg     2.0     02-04              RADIOLOGY & ADDITIONAL TESTS:  \

## 2024-07-10 NOTE — DISCHARGE INSTR - APPOINTMENTS
You have a follow-up appointment scheduled with Meeta Aden NP on 7-16-24 at 10:15, Office can be reached at [phone]

## 2024-07-10 NOTE — PLAN OF CARE
Goal Outcome Evaluation:   Patient resting in bed at this time. VSS. Patient has ambulated with assistance through out shift. Patient oxygen dropped during ambulation to 76%. Placed patient on 2L NC and oxygen came back up to 97%. Patient now back on RA and tolerating well. Patient voices no concerns at this time. Will continue with plan of care.

## 2024-07-11 NOTE — PAYOR COMM NOTE
"CONTACT:  TORO WILLIAM RN  UTILIZATION MANAGEMENT DEPT.   [facility]   [address], [address]   PHONE:  [phone]   FAX: [phone]     [place] HOME 7/10/24          Joaquín Marroquin (89 y.o. Male)       Date of Birth   1927    Social Security Number       Address   [address] [address]    Home Phone   [phone]    MRN   [id number]       Denominational   None    Marital Status                               Admission Date   24    Admission Type   Emergency    Admitting Provider   Woody Lam MD    Attending Provider       Department, Room/Bed   [address], [address]/       Discharge Date   7/10/2024    Discharge Disposition   Home or Self Care    Discharge Destination                                 Attending Provider: (none)   Allergies: No Known Allergies    Isolation: None   Infection: None   Code Status: Prior    Ht: 172.7 cm (67.99\")   Wt: 50.3 kg (111 lb)    Admission Cmt: None   Principal Problem: Bowel obstruction [K56.609]                   Active Insurance as of 2024       Primary Coverage       Payor Plan Insurance Group Employer/Plan Group    MEDICARE MEDICARE A & B        Payor Plan Address Payor Plan Phone Number Payor Plan Fax Number Effective Dates    [address] [phone]  1992 - None Entered    [place] [address]         Subscriber Name Subscriber Birth Date Member ID       JOAQUÍN MARROQUIN 1927 [id number]                     Emergency Contacts        (Rel.) Home Phone Work Phone Mobile Phone    Clark Marroquin (Legal Guardian) -- -- [phone]    CARAMAYRA (Spouse) [phone] -- [phone]    MarroquinRustam (Son) -- -- [phone]                 Discharge Summary        Woody Lam MD at 07/10/24 0900              [facility]    Patient Identification:  Name:  Joaquín Marroquin  Age:  89 y.o.  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, status post creation of a loop descending colon colostomy this admission  #Postoperative hypoxic hypercapnic respiratory failure, still requiring 3.5 L/min nasal cannula oxygen  #Severe chronic illness malnutrition that was present on admission, which complicates all aspects of care  #Vitamin B12 deficiency and borderline low folate, present on admission  #Acute hypokalemia and acute hypophosphatemia, improving with supplementation  #History of hypothyroidism  #History of GERD  #History of bradycardia, 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 male presented on 6/29 to [facility] complaining of abdominal pain.  Please see the admitting history and physical for further details.        Mr. Rogers is our 89 yo M with hx BPH, hypothyroidism, constipation on home linzess and miralax, GERD, inguinal hernias, pacemaker who presents with abdominal pain and no BM for 3 days which is abnormal for patient. In the ED patient found to have distal colonic obstruction on CT. NG tube placed. Surgery consulted and recommended to admit to medicine and would follow for possible surgical intervention. Patient underwent flex sig on 6/30 that revealed a partially obstructive large mass in the rectum. Biopsies obtained. Patient taken for exploratory laparotomy and creation of loop descending colon colostomy. Patient hypercapnic in postoperative area and  reintubated. Patient extubated the following day and has done well. Patient's son obtained guardianship while he was in the hospital. [facility]. 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 Patient to also f/u with PCP in 1 week, recommended repeat labs at that time. Walking oximetry revealed patient did not need O2 even on exertion. Discharge delayed by family preparing home this week. Patient has continued to improve. Linzess likely started in response to obstruction which now has been relieved. Patient notes cost has been a barrier and I am not sure he will need it moving forward, will hold for now.     VITAL SIGNS:  Temp:  [98.1 °F (36.7 °C)-98.5 °F (36.9 °C)] 98.4 °F (36.9 °C)  Heart Rate:  [67-71] 70  Resp:  [18-20] 20  BP: (120-151)/(59-69) 126/59  SpO2:  [97 %-98 %] 98 %  on  Flow (L/min):  [2] 2;   Device (Oxygen Therapy): nasal cannula    Body mass index is 16.88 kg/m².  Wt Readings from Last 3 Encounters:   07/01/24 50.3 kg (111 lb)   05/31/24 68 kg (150 lb)   04/01/24 67.1 kg (148 lb)       PHYSICAL EXAM:  Constitutional:  Elderly appearing male.   HENT:  Head:  Normocephalic and atraumatic.  Mouth:  Moist mucous membranes.    Eyes:  Conjunctivae and EOM are normal.  Pupils are equal, round, and reactive to light.  No scleral icterus.    Cardiovascular:  Normal rate, regular rhythm and normal heart sounds with no murmur.  Pulmonary/Chest:  No respiratory distress, no wheezes, no crackles, with normal breath sounds and good air movement.  Abdominal:  Soft.  Bowel sounds are normal.  No distension and no tenderness. Ostomy and midline incision noted. Stool in ostomy. Incision well approximated with no significant surrounding redness or any noted drainage.   Musculoskeletal:  No edema, no tenderness, and no deformity.  No red or swollen joints anywhere.    Neurological:  Alert and oriented to person, place,  and time.  No gross neurological deficit.   Skin:  Skin is warm and dry. No rash noted. No pallor.   Peripheral vascular:  Strong pulses in all 4 extremities with no clubbing, no cyanosis, no edema.    DISCHARGE DISPOSITION   Stable    DISCHARGE MEDICATIONS:     Discharge Medications        New Medications        Instructions Start Date   folic acid 1 MG tablet  Commonly known as: FOLVITE   1 mg, Oral, Daily   Start Date: July 9, 2024     potassium chloride 20 MEQ CR tablet  Commonly known as: KLOR-CON M20   20 mEq, Oral, Daily   Start Date: July 9, 2024            Continue These Medications        Instructions Start Date   aspirin 81 MG EC tablet   81 mg, Oral, Daily      calcium carbonate-cholecalciferol 500-400 MG-UNIT tablet tablet   1 tablet, Oral, 2 Times Daily      carboxymethylcellulose 0.5 % solution  Commonly known as: REFRESH PLUS   1 drop, Both Eyes, 3 Times Daily PRN      ferrous sulfate 325 (65 FE) MG tablet   325 mg, Oral, 2 Times Daily      finasteride 5 MG tablet  Commonly known as: PROSCAR   5 mg, Oral, Daily      ICAPS AREDS 2 PO   1 tablet, Oral, Daily      levothyroxine 100 MCG tablet  Commonly known as: SYNTHROID, LEVOTHROID   100 mcg, Oral, Daily      linaclotide 72 MCG capsule capsule  Commonly known as: LINZESS   72 mcg, Oral, Every Morning Before Breakfast      omeprazole 40 MG capsule  Commonly known as: priLOSEC   40 mg, Oral, Daily      polyethylene glycol 17 g packet  Commonly known as: MIRALAX   17 g, Oral, Daily      tamsulosin 0.4 MG capsule 24 hr capsule  Commonly known as: FLOMAX   1 capsule, Oral, Daily                 Additional Instructions for the Follow-ups that You Need to Schedule       Ambulatory Referral to Home Health   As directed      Face to Face Visit Date: 7/8/2024   Follow-up provider for Plan of Care?: I treated the patient in an acute care facility and will not continue treatment after discharge.   Follow-up provider: TODD WHALEY [[id number]]   Reason/Clinical  Findings: debility, bowel surgery, advanced age   Describe mobility limitations that make leaving home difficult: debility, bowel surgery, advanced age   Nursing/Therapeutic Services Requested: Skilled Nursing Physical Therapy   Skilled nursing orders: Medication education   PT orders: Home safety assessment Strengthening   Frequency: 1 Week 1               Follow-up Information       Uday Roche DO .    Specialty: Internal Medicine  Contact information:  [address] [address]  [facility] [address]  [phone]               Uday Roche DO Follow up in 1 week(s).    Specialty: Internal Medicine  Why: Repeat BMP, mag, CBC at visit.  Contact information:  [address]  [facility] [place] [facility] [address]  [phone]               Dilma Yanez MD Follow up in 1 week(s).    Specialties: General Surgery, Emergency Medicine  Contact information:  [address]  [facility] [address] [phone]                              TEST  RESULTS PENDING AT DISCHARGE       CODE STATUS  Code Status and Medical Interventions:   Ordered at: 06/29/24 1835     Medical Intervention Limits:    No intubation (DNI)     Code Status (Patient has no pulse and is not breathing):    No CPR (Do Not Attempt to Resuscitate)     Medical Interventions (Patient has pulse or is breathing):    Limited Support       Prabhakar Lam MD  [facility]  07/08/24  12:30 EDT    Please note that this discharge summary required more than 30 minutes to complete.      Electronically signed by Prabhakar Lam MD at 07/10/24 1014       Discharge Order (From admission, onward)       Start     Ordered    07/10/24 1010  Discharge patient  Once        Expected Discharge Date: 07/10/24   Expected Discharge Time: Morning   Discharge Disposition: Home or Self Care   Physician of Record for Attribution - Please select from Treatment Team: PRABHAKAR LAM [[id number]]   Review needed by CMO to determine Physician of Record: No       Question Answer Comment   Physician of Record for Attribution - Please select from Treatment Team PRABHAKAR LI    Review needed by CMO to determine Physician of Record No        07/10/24 1009    07/08/24 1159  Discharge patient  Once,   Status:  Canceled        Expected Discharge Date: 07/08/24   Expected Discharge Time: Morning   Discharge Disposition: Home or Self Care   Physician of Record for Attribution - Please select from Treatment Team: PRABHAKAR LI [[id number]]   Review needed by CMO to determine Physician of Record: No      Question Answer Comment   Physician of Record for Attribution - Please select from Treatment Team PRABHAKAR LI    Review needed by CMO to determine Physician of Record No        07/08/24 1158

## 2024-07-11 NOTE — OUTREACH NOTE
Prep Survey      Flowsheet Row Responses   [facility] patient discharged from? [facility]   Is LACE score < 7 ? No   Eligibility Readm Mgmt   Discharge diagnosis Exploratory lap   Does the patient have one of the following disease processes/diagnoses(primary or secondary)? General Surgery   Does the patient have Home health ordered? Yes   What is the Home health agency?  [facility]   Is there a DME ordered? No   Prep survey completed? Yes            CHATO JANE - Registered Nurse

## 2024-07-15 ENCOUNTER — READMISSION MANAGEMENT (OUTPATIENT)
Dept: CALL CENTER | Facility: HOSPITAL | Age: 89
End: 2024-07-15
Payer: MEDICARE

## 2024-07-15 NOTE — OUTREACH NOTE
General Surgery Week 1 Survey      Flowsheet Row Responses   [facility] patient discharged from? [facility]   Does the patient have one of the following disease processes/diagnoses(primary or secondary)? General Surgery   Week 1 attempt successful? Yes   Call start time 1714   Call end time 1720   Discharge diagnosis Exploratory lap   Person spoke with today (if not patient) and relationship clifton Easton   Meds reviewed with patient/caregiver? Yes   Is the patient having any side effects they believe may be caused by any medication additions or changes? No   Does the patient have all medications related to this admission filled (includes all antibiotics, pain medications, etc.) N/A  [son states not sure about pt's meds, but will call and check with pt to see that he got the folic acid and potasssium]   Is the patient taking all medications as directed (includes completed medication regime)? N/A  [son will confirm meds with pt]   Does the patient have a follow up appointment scheduled with their surgeon? Yes   Has the patient kept scheduled appointments due by today? N/A   What is the Home health agency?  [facility]    Has home health visited the patient within 72 hours of discharge? Call prior to 72 hours   Psychosocial issues? No   Comments son states pt able to care for colostomy, will have HH visits soon to evaluate colostomy   Did the patient receive a copy of their discharge instructions? Yes   Nursing interventions Reviewed instructions with patient   What is the patient's perception of their health status since discharge? Improving   Nursing interventions Nurse provided patient education   Is the patient /caregiver able to teach back basic post-op care? Continue use of incentive spirometry at least 1 week post discharge, Practice 'cough and deep breath', Lifting as instructed by MD in discharge instructions, Do not remove steri-strips, Keep incision areas clean,dry and protected, No tub bath, swimming, or  hot tub until instructed by MD, Take showers only when approved by MD-sponge bathe until then, Drive as instructed by MD in discharge instructions   Is the patient/caregiver able to teach back signs and symptoms of incisional infection? Increased redness, swelling or pain at the incisonal site, Increased drainage or bleeding, Incisional warmth, Pus or odor from incision, Fever   Is the patient/caregiver able to teach back steps to recovery at home? Set small, achievable goals for return to baseline health, Rest and rebuild strength, gradually increase activity, Eat a well-balance diet   Is the patient/caregiver able to teach back the hierarchy of who to call/visit for symptoms/problems? PCP, Specialist, Home health nurse, Urgent Care, ED, 911 Yes   Week 1 call completed? Yes   Call end time 1720            Cindy GARCIA - Registered Nurse

## 2024-07-16 ENCOUNTER — HOSPITAL ENCOUNTER (OUTPATIENT)
Facility: HOSPITAL | Age: 89
Discharge: HOME | End: 2024-07-16
Admitting: NURSE PRACTITIONER
Payer: MEDICARE

## 2024-07-16 ENCOUNTER — TRANSCRIBE ORDERS (OUTPATIENT)
Facility: HOSPITAL | Age: 89
End: 2024-07-16
Payer: MEDICARE

## 2024-07-16 ENCOUNTER — OFFICE VISIT (OUTPATIENT)
Age: 89
End: 2024-07-16
Payer: MEDICARE

## 2024-07-16 VITALS — BODY MASS INDEX: 16.82 KG/M2 | WEIGHT: 111 LBS | HEIGHT: 68 IN

## 2024-07-16 DIAGNOSIS — C18.9 MALIGNANT NEOPLASM OF COLON, UNSPECIFIED PART OF COLON: Primary | ICD-10-CM

## 2024-07-16 DIAGNOSIS — C18.9 MALIGNANT NEOPLASM OF COLON, UNSPECIFIED PART OF COLON: ICD-10-CM

## 2024-07-16 DIAGNOSIS — E78.00 PURE HYPERCHOLESTEROLEMIA: ICD-10-CM

## 2024-07-16 DIAGNOSIS — D64.9 ANEMIA, UNSPECIFIED TYPE: ICD-10-CM

## 2024-07-16 DIAGNOSIS — D50.0 IRON DEFICIENCY ANEMIA SECONDARY TO BLOOD LOSS (CHRONIC): ICD-10-CM

## 2024-07-16 DIAGNOSIS — E03.9 MYXEDEMA HEART DISEASE: ICD-10-CM

## 2024-07-16 DIAGNOSIS — I51.9 MYXEDEMA HEART DISEASE: ICD-10-CM

## 2024-07-16 DIAGNOSIS — K56.600 PARTIAL INTESTINAL OBSTRUCTION, UNSPECIFIED CAUSE: Primary | ICD-10-CM

## 2024-07-16 LAB
ALBUMIN SERPL-MCNC: 3.4 G/DL (ref 3.5–5.2)
ALBUMIN/GLOB SERPL: 1.4 G/DL
ALP SERPL-CCNC: 155 U/L (ref 39–117)
ALT SERPL W P-5'-P-CCNC: 17 U/L (ref 1–41)
ANION GAP SERPL CALCULATED.3IONS-SCNC: 11 MMOL/L (ref 5–15)
AST SERPL-CCNC: 31 U/L (ref 1–40)
BASOPHILS # BLD AUTO: 0.02 10*3/MM3 (ref 0–0.2)
BASOPHILS NFR BLD AUTO: 0.3 % (ref 0–1.5)
BILIRUB SERPL-MCNC: 0.5 MG/DL (ref 0–1.2)
BUN SERPL-MCNC: 12 MG/DL (ref 8–23)
BUN/CREAT SERPL: 10.9 (ref 7–25)
CALCIUM SPEC-SCNC: 8.7 MG/DL (ref 8.2–9.6)
CHLORIDE SERPL-SCNC: 108 MMOL/L (ref 98–107)
CO2 SERPL-SCNC: 24 MMOL/L (ref 22–29)
CREAT SERPL-MCNC: 1.1 MG/DL (ref 0.76–1.27)
DEPRECATED RDW RBC AUTO: 69.2 FL (ref 37–54)
EGFRCR SERPLBLD CKD-EPI 2021: 61.1 ML/MIN/1.73
EOSINOPHIL # BLD AUTO: 0.14 10*3/MM3 (ref 0–0.4)
EOSINOPHIL NFR BLD AUTO: 2.4 % (ref 0.3–6.2)
ERYTHROCYTE [DISTWIDTH] IN BLOOD BY AUTOMATED COUNT: 20.8 % (ref 12.3–15.4)
GLOBULIN UR ELPH-MCNC: 2.5 GM/DL
GLUCOSE SERPL-MCNC: 102 MG/DL (ref 65–99)
HCT VFR BLD AUTO: 35.2 % (ref 37.5–51)
HGB BLD-MCNC: 11.3 G/DL (ref 13–17.7)
IMM GRANULOCYTES # BLD AUTO: 0.04 10*3/MM3 (ref 0–0.05)
IMM GRANULOCYTES NFR BLD AUTO: 0.7 % (ref 0–0.5)
LYMPHOCYTES # BLD AUTO: 1.64 10*3/MM3 (ref 0.7–3.1)
LYMPHOCYTES NFR BLD AUTO: 27.7 % (ref 19.6–45.3)
MAGNESIUM SERPL-MCNC: 1.8 MG/DL (ref 1.7–2.3)
MCH RBC QN AUTO: 28.7 PG (ref 26.6–33)
MCHC RBC AUTO-ENTMCNC: 32.1 G/DL (ref 31.5–35.7)
MCV RBC AUTO: 89.3 FL (ref 79–97)
MONOCYTES # BLD AUTO: 0.41 10*3/MM3 (ref 0.1–0.9)
MONOCYTES NFR BLD AUTO: 6.9 % (ref 5–12)
NEUTROPHILS NFR BLD AUTO: 3.66 10*3/MM3 (ref 1.7–7)
NEUTROPHILS NFR BLD AUTO: 62 % (ref 42.7–76)
NRBC BLD AUTO-RTO: 0 /100 WBC (ref 0–0.2)
PLATELET # BLD AUTO: 234 10*3/MM3 (ref 140–450)
PMV BLD AUTO: 9.9 FL (ref 6–12)
POTASSIUM SERPL-SCNC: 3 MMOL/L (ref 3.5–5.2)
PROT SERPL-MCNC: 5.9 G/DL (ref 6–8.5)
RBC # BLD AUTO: 3.94 10*6/MM3 (ref 4.14–5.8)
SODIUM SERPL-SCNC: 143 MMOL/L (ref 136–145)
WBC NRBC COR # BLD AUTO: 5.91 10*3/MM3 (ref 3.4–10.8)

## 2024-07-16 PROCEDURE — 1160F RVW MEDS BY RX/DR IN RCRD: CPT | Performed by: SURGERY

## 2024-07-16 PROCEDURE — 1159F MED LIST DOCD IN RCRD: CPT | Performed by: SURGERY

## 2024-07-16 PROCEDURE — 36415 COLL VENOUS BLD VENIPUNCTURE: CPT | Performed by: NURSE PRACTITIONER

## 2024-07-16 PROCEDURE — 99024 POSTOP FOLLOW-UP VISIT: CPT | Performed by: SURGERY

## 2024-07-16 PROCEDURE — 83735 ASSAY OF MAGNESIUM: CPT | Performed by: NURSE PRACTITIONER

## 2024-07-16 PROCEDURE — 85025 COMPLETE CBC W/AUTO DIFF WBC: CPT | Performed by: NURSE PRACTITIONER

## 2024-07-16 PROCEDURE — 80053 COMPREHEN METABOLIC PANEL: CPT | Performed by: NURSE PRACTITIONER

## 2024-07-25 ENCOUNTER — READMISSION MANAGEMENT (OUTPATIENT)
Dept: CALL CENTER | Facility: HOSPITAL | Age: 89
End: 2024-07-25
Payer: MEDICARE

## 2024-07-25 NOTE — OUTREACH NOTE
General Surgery Week 2 Survey      Flowsheet Row Responses   [facility] facility patient discharged from? [facility]   Does the patient have one of the following disease processes/diagnoses(primary or secondary)? General Surgery   Week 2 attempt successful? No   Unsuccessful attempts Attempt 1            Chuck GONZALEZ - Registered Nurse

## 2024-07-30 ENCOUNTER — READMISSION MANAGEMENT (OUTPATIENT)
Dept: CALL CENTER | Facility: HOSPITAL | Age: 89
End: 2024-07-30
Payer: MEDICARE

## 2024-07-30 NOTE — OUTREACH NOTE
General Surgery Week 2 Survey      Flowsheet Row Responses   [facility] [place] facility patient discharged from? [facility]   Does the patient have one of the following disease processes/diagnoses(primary or secondary)? General Surgery   Week 2 attempt successful? No  [son states to call pt no answer from Pt after 3 attempts]   Unsuccessful attempts Attempt 2   Revoke Other  [Unable to reach Pt]   Discharge diagnosis Exploratory ginger RUSH - Registered Nurse

## 2024-08-08 ENCOUNTER — OFFICE VISIT (OUTPATIENT)
Age: 89
End: 2024-08-08
Payer: MEDICARE

## 2024-08-08 ENCOUNTER — CLINICAL SUPPORT NO REQUIREMENTS (OUTPATIENT)
Dept: CARDIOLOGY | Facility: CLINIC | Age: 89
End: 2024-08-08
Payer: MEDICARE

## 2024-08-08 VITALS — WEIGHT: 111 LBS | HEIGHT: 68 IN | BODY MASS INDEX: 16.82 KG/M2

## 2024-08-08 DIAGNOSIS — Z95.0 CARDIAC PACEMAKER IN SITU: Primary | ICD-10-CM

## 2024-08-08 DIAGNOSIS — C20 RECTAL CANCER: Primary | ICD-10-CM

## 2024-08-08 NOTE — PROGRESS NOTES
"Patient Name:  Joaquín Rogers  YOB: 1927  [id number]    Follow Up Note    Date of visit: 8/8/2024    Subjective   Subjective: Presents for staple removal. Reports small amount of blood from backside and stoma site.          Objective     Objective:     Ht 172.7 cm (67.99\")   Wt 50.3 kg (111 lb)   BMI 16.88 kg/m²       CV:  Regular rate and rhythm  L:  Bilateral lung rise and fall, no use of accessory muscles   Abd:  Soft, nontender, nondistended. Ostomy producing.   Ext:  No cyanosis, clubbing, edema      Assessment/ Plan:  Assessment   Diagnoses and all orders for this visit:    1. Rectal cancer (Primary)    Mr Rogers is a 89 yo M w/ history of rectal cancer (has elected to not be evaluated by oncology or radiation oncology). Presents today for staple removal. Staples removed. Reassured patient that small amount of blood from rectal tumor. Advised that large amount of blood passing should prompt visit to emergency department.     Dilma Hermosillo MD       General Surgeon  [facility]       "

## 2024-09-04 ENCOUNTER — NURSE TRIAGE (OUTPATIENT)
Dept: CALL CENTER | Facility: HOSPITAL | Age: 89
End: 2024-09-04
Payer: MEDICARE

## 2024-09-04 NOTE — TELEPHONE ENCOUNTER
"Stoma bulging out and very red. Patient's son was helping him change bag. Unsure if will be able to get it back in place. Son wants to take patient to ER. Advised patient's son to do so.    Reason for Disposition   Nursing judgment    Additional Information   Negative: Sounds like a life-threatening emergency to the triager   Negative: Information only call about a Well Adult (no illness or injury)   Negative: Caller can't be reached by phone    Answer Assessment - Initial Assessment Questions  1. REASON FOR CALL: \"What is your main concern right now?\"      Stoma appears herniated and red  2. ONSET: \"When did the symptoms start?\"      Today   3. SEVERITY: \"How bad is the problem?\"      Stoma \"very enlarged and red\"  4. FEVER: \"Do you have a fever?\"      No   5. OTHER SYMPTOMS: \"Do you have any other new symptoms?\"      No   6. TREATMENTS AND RESPONSE: \"What have you done so far to try to make this better? What medicines have you used?\"      N/A  7. PREGNANCY: \"Is there any chance you are pregnant?\" \"When was your last menstrual period?\"      N/A    Protocols used: No Protocol Available-ADULT-[place]    "

## 2024-09-14 ENCOUNTER — APPOINTMENT (OUTPATIENT)
Dept: CT IMAGING | Facility: HOSPITAL | Age: 89
End: 2024-09-14
Payer: OTHER GOVERNMENT

## 2024-09-14 ENCOUNTER — HOSPITAL ENCOUNTER (OUTPATIENT)
Facility: HOSPITAL | Age: 89
Setting detail: OBSERVATION
Discharge: HOME | End: 2024-09-15
Attending: STUDENT IN AN ORGANIZED HEALTH CARE EDUCATION/TRAINING PROGRAM | Admitting: STUDENT IN AN ORGANIZED HEALTH CARE EDUCATION/TRAINING PROGRAM
Payer: OTHER GOVERNMENT

## 2024-09-14 ENCOUNTER — NURSE TRIAGE (OUTPATIENT)
Dept: CALL CENTER | Facility: HOSPITAL | Age: 89
End: 2024-09-14
Payer: MEDICARE

## 2024-09-14 DIAGNOSIS — K94.09 COLOSTOMY PROLAPSE: Primary | ICD-10-CM

## 2024-09-14 LAB
ALBUMIN SERPL-MCNC: 3.7 G/DL (ref 3.5–5.2)
ALBUMIN/GLOB SERPL: 1.3 G/DL
ALP SERPL-CCNC: 118 U/L (ref 39–117)
ALT SERPL W P-5'-P-CCNC: 13 U/L (ref 1–41)
ANION GAP SERPL CALCULATED.3IONS-SCNC: 8.5 MMOL/L (ref 5–15)
AST SERPL-CCNC: 25 U/L (ref 1–40)
BASOPHILS # BLD AUTO: 0.01 10*3/MM3 (ref 0–0.2)
BASOPHILS NFR BLD AUTO: 0.2 % (ref 0–1.5)
BILIRUB SERPL-MCNC: 0.7 MG/DL (ref 0–1.2)
BILIRUB UR QL STRIP: NEGATIVE
BUN SERPL-MCNC: 15 MG/DL (ref 8–23)
BUN/CREAT SERPL: 14 (ref 7–25)
CALCIUM SPEC-SCNC: 9 MG/DL (ref 8.2–9.6)
CHLORIDE SERPL-SCNC: 104 MMOL/L (ref 98–107)
CLARITY UR: CLEAR
CO2 SERPL-SCNC: 29.5 MMOL/L (ref 22–29)
COLOR UR: YELLOW
CREAT SERPL-MCNC: 1.07 MG/DL (ref 0.76–1.27)
D-LACTATE SERPL-SCNC: 0.9 MMOL/L (ref 0.5–2)
DEPRECATED RDW RBC AUTO: 46.8 FL (ref 37–54)
EGFRCR SERPLBLD CKD-EPI 2021: 63.1 ML/MIN/1.73
EOSINOPHIL # BLD AUTO: 0.15 10*3/MM3 (ref 0–0.4)
EOSINOPHIL NFR BLD AUTO: 3.1 % (ref 0.3–6.2)
ERYTHROCYTE [DISTWIDTH] IN BLOOD BY AUTOMATED COUNT: 13.2 % (ref 12.3–15.4)
GLOBULIN UR ELPH-MCNC: 2.8 GM/DL
GLUCOSE SERPL-MCNC: 102 MG/DL (ref 65–99)
GLUCOSE UR STRIP-MCNC: NEGATIVE MG/DL
HCT VFR BLD AUTO: 37.8 % (ref 37.5–51)
HGB BLD-MCNC: 12.2 G/DL (ref 13–17.7)
HGB UR QL STRIP.AUTO: NEGATIVE
HOLD SPECIMEN: NORMAL
HOLD SPECIMEN: NORMAL
IMM GRANULOCYTES # BLD AUTO: 0.02 10*3/MM3 (ref 0–0.05)
IMM GRANULOCYTES NFR BLD AUTO: 0.4 % (ref 0–0.5)
INR PPP: 1.11 (ref 0.9–1.1)
KETONES UR QL STRIP: NEGATIVE
LEUKOCYTE ESTERASE UR QL STRIP.AUTO: NEGATIVE
LIPASE SERPL-CCNC: 22 U/L (ref 13–60)
LYMPHOCYTES # BLD AUTO: 1.31 10*3/MM3 (ref 0.7–3.1)
LYMPHOCYTES NFR BLD AUTO: 26.7 % (ref 19.6–45.3)
MCH RBC QN AUTO: 30.7 PG (ref 26.6–33)
MCHC RBC AUTO-ENTMCNC: 32.3 G/DL (ref 31.5–35.7)
MCV RBC AUTO: 95.2 FL (ref 79–97)
MONOCYTES # BLD AUTO: 0.36 10*3/MM3 (ref 0.1–0.9)
MONOCYTES NFR BLD AUTO: 7.3 % (ref 5–12)
NEUTROPHILS NFR BLD AUTO: 3.05 10*3/MM3 (ref 1.7–7)
NEUTROPHILS NFR BLD AUTO: 62.3 % (ref 42.7–76)
NITRITE UR QL STRIP: NEGATIVE
NRBC BLD AUTO-RTO: 0 /100 WBC (ref 0–0.2)
PH UR STRIP.AUTO: 6.5 [PH] (ref 5–8)
PLATELET # BLD AUTO: 160 10*3/MM3 (ref 140–450)
PMV BLD AUTO: 9.6 FL (ref 6–12)
POTASSIUM SERPL-SCNC: 3.9 MMOL/L (ref 3.5–5.2)
PROT SERPL-MCNC: 6.5 G/DL (ref 6–8.5)
PROT UR QL STRIP: NEGATIVE
PROTHROMBIN TIME: 14.4 SECONDS (ref 12.1–14.7)
RBC # BLD AUTO: 3.97 10*6/MM3 (ref 4.14–5.8)
SODIUM SERPL-SCNC: 142 MMOL/L (ref 136–145)
SP GR UR STRIP: 1.02 (ref 1–1.03)
UROBILINOGEN UR QL STRIP: NORMAL
WBC NRBC COR # BLD AUTO: 4.9 10*3/MM3 (ref 3.4–10.8)
WHOLE BLOOD HOLD COAG: NORMAL
WHOLE BLOOD HOLD SPECIMEN: NORMAL

## 2024-09-14 PROCEDURE — 85610 PROTHROMBIN TIME: CPT | Performed by: STUDENT IN AN ORGANIZED HEALTH CARE EDUCATION/TRAINING PROGRAM

## 2024-09-14 PROCEDURE — 0 DIATRIZOATE MEGLUMINE & SODIUM PER 1 ML: Performed by: SURGERY

## 2024-09-14 PROCEDURE — 25510000001 IOPAMIDOL 61 % SOLUTION: Performed by: STUDENT IN AN ORGANIZED HEALTH CARE EDUCATION/TRAINING PROGRAM

## 2024-09-14 PROCEDURE — G0378 HOSPITAL OBSERVATION PER HR: HCPCS

## 2024-09-14 PROCEDURE — 83690 ASSAY OF LIPASE: CPT | Performed by: STUDENT IN AN ORGANIZED HEALTH CARE EDUCATION/TRAINING PROGRAM

## 2024-09-14 PROCEDURE — 36415 COLL VENOUS BLD VENIPUNCTURE: CPT

## 2024-09-14 PROCEDURE — 25810000003 DEXTROSE 5% IN LACTATED RINGERS PER 1000 ML: Performed by: SURGERY

## 2024-09-14 PROCEDURE — 85025 COMPLETE CBC W/AUTO DIFF WBC: CPT | Performed by: STUDENT IN AN ORGANIZED HEALTH CARE EDUCATION/TRAINING PROGRAM

## 2024-09-14 PROCEDURE — 74177 CT ABD & PELVIS W/CONTRAST: CPT | Performed by: RADIOLOGY

## 2024-09-14 PROCEDURE — 80053 COMPREHEN METABOLIC PANEL: CPT | Performed by: STUDENT IN AN ORGANIZED HEALTH CARE EDUCATION/TRAINING PROGRAM

## 2024-09-14 PROCEDURE — 83605 ASSAY OF LACTIC ACID: CPT | Performed by: STUDENT IN AN ORGANIZED HEALTH CARE EDUCATION/TRAINING PROGRAM

## 2024-09-14 PROCEDURE — 81003 URINALYSIS AUTO W/O SCOPE: CPT | Performed by: STUDENT IN AN ORGANIZED HEALTH CARE EDUCATION/TRAINING PROGRAM

## 2024-09-14 PROCEDURE — 74177 CT ABD & PELVIS W/CONTRAST: CPT

## 2024-09-14 PROCEDURE — 99285 EMERGENCY DEPT VISIT HI MDM: CPT

## 2024-09-14 RX ORDER — LEVOTHYROXINE SODIUM 100 UG/1
100 TABLET ORAL DAILY
Status: CANCELLED | OUTPATIENT
Start: 2024-09-15

## 2024-09-14 RX ORDER — PANTOPRAZOLE SODIUM 40 MG/1
40 TABLET, DELAYED RELEASE ORAL DAILY PRN
Status: CANCELLED | OUTPATIENT
Start: 2024-09-14

## 2024-09-14 RX ORDER — IOPAMIDOL 612 MG/ML
100 INJECTION, SOLUTION INTRAVASCULAR
Status: COMPLETED | OUTPATIENT
Start: 2024-09-14 | End: 2024-09-14

## 2024-09-14 RX ORDER — CEPHALEXIN 500 MG/1
500 CAPSULE ORAL 2 TIMES DAILY
COMMUNITY

## 2024-09-14 RX ORDER — FINASTERIDE 5 MG/1
5 TABLET, FILM COATED ORAL DAILY
Status: CANCELLED | OUTPATIENT
Start: 2024-09-14

## 2024-09-14 RX ORDER — FINASTERIDE 5 MG/1
5 TABLET, FILM COATED ORAL DAILY
Status: CANCELLED | OUTPATIENT
Start: 2024-09-15

## 2024-09-14 RX ORDER — GINGER ROOT/GINGER ROOT EXT 262.5 MG
1 CAPSULE ORAL 2 TIMES DAILY
Status: CANCELLED | OUTPATIENT
Start: 2024-09-14

## 2024-09-14 RX ORDER — DEXTROSE, SODIUM CHLORIDE, SODIUM LACTATE, POTASSIUM CHLORIDE, AND CALCIUM CHLORIDE 5; .6; .31; .03; .02 G/100ML; G/100ML; G/100ML; G/100ML; G/100ML
50 INJECTION, SOLUTION INTRAVENOUS CONTINUOUS
Status: DISCONTINUED | OUTPATIENT
Start: 2024-09-14 | End: 2024-09-15 | Stop reason: HOSPADM

## 2024-09-14 RX ORDER — SODIUM CHLORIDE 0.9 % (FLUSH) 0.9 %
10 SYRINGE (ML) INJECTION AS NEEDED
Status: DISCONTINUED | OUTPATIENT
Start: 2024-09-14 | End: 2024-09-15 | Stop reason: HOSPADM

## 2024-09-14 RX ORDER — FERROUS SULFATE 325(65) MG
325 TABLET ORAL 2 TIMES DAILY
Status: CANCELLED | OUTPATIENT
Start: 2024-09-14

## 2024-09-14 RX ORDER — CHLORAL HYDRATE 500 MG
2000 CAPSULE ORAL
COMMUNITY

## 2024-09-14 RX ORDER — TAMSULOSIN HYDROCHLORIDE 0.4 MG/1
0.4 CAPSULE ORAL NIGHTLY
Status: CANCELLED | OUTPATIENT
Start: 2024-09-14

## 2024-09-14 RX ORDER — FERROUS SULFATE 325(65) MG
325 TABLET ORAL 2 TIMES DAILY
Status: CANCELLED | OUTPATIENT
Start: 2024-09-15

## 2024-09-14 RX ORDER — PANTOPRAZOLE SODIUM 40 MG/1
40 TABLET, DELAYED RELEASE ORAL
Status: CANCELLED | OUTPATIENT
Start: 2024-09-15

## 2024-09-14 RX ORDER — LEVOTHYROXINE SODIUM 100 UG/1
100 TABLET ORAL DAILY
Status: CANCELLED | OUTPATIENT
Start: 2024-09-14

## 2024-09-14 RX ADMIN — IOPAMIDOL 60 ML: 612 INJECTION, SOLUTION INTRAVENOUS at 11:54

## 2024-09-14 RX ADMIN — SODIUM CHLORIDE, SODIUM LACTATE, POTASSIUM CHLORIDE, CALCIUM CHLORIDE AND DEXTROSE MONOHYDRATE 50 ML/HR: 5; 600; 310; 30; 20 INJECTION, SOLUTION INTRAVENOUS at 15:27

## 2024-09-14 RX ADMIN — DIATRIZOATE MEGLUMINE AND DIATRIZOATE SODIUM 100 ML: 600; 100 SOLUTION ORAL; RECTAL at 22:17

## 2024-09-14 NOTE — TELEPHONE ENCOUNTER
"No output from colostomy for 4 days.   He says it is swollen and hanging out.  Denies vomiting decreased intake He says he feels like he is about to explode  Advised to go to the ED          Reason for Disposition   [1] No stool or gas from COLOSTOMY > 48 hours (2 days) AND [2] abdominal pain or vomiting    Additional Information   Negative: Shock suspected (e.g., cold/pale/clammy skin, too weak to stand, low BP, rapid pulse)   Negative: Sounds like a life-threatening emergency to the triager   Negative: [1] Post-op AND [2] and general post-operative symptoms or questions, unrelated to ostomy   Negative: [1] SEVERE abdominal pain (e.g., excruciating) AND [2] present > 1 hour   Negative: [1] Bleeding from stoma tissue (stoma is moist, pink to red-colored tissue) AND [2] won't stop after 10 minutes of direct pressure (using correct technique)   Negative: Bloody stool (blood clots; or passing blood without stool)   Negative: Black or tarry bowel movements  (Exception: Chronic-unchanged black-grey BMs AND is taking iron pills or Pepto-Bismol.)   Negative: Stoma separates from the surrounding skin at the suture line (e.g., gaping wound next to stoma)   Negative: Stoma turns purple or black   Negative: [1] No stool or gas from ILEOSTOMY > 6 hours AND [2] abdominal pain or vomiting   Negative: [1] No stool or gas from ILEOSTOMY > 6 hours AND [2] no improvement after using Care Advice    Answer Assessment - Initial Assessment Questions  1. TYPE: \"What type of ostomy do you have?\" (e.g., ileostomy, colostomy; temporary, long-term [permanent]).      *No Answer*  2. LOCATION: \"Where is the ostomy located?\"      *No Answer*  3. DURATION: \"How long have you had the ostomy?\" (e.g., days, weeks, months, years).      July 4. MAIN CONCERN: \"What is your main concern or symptom today?\" (e.g., skin redness or irritation, bleeding, leaking, change in output or appearance of ostomy).   Swollen ostomy  5. DAILY OSTOMY OUTPUT DIARY: \"Do " "you keep a daily diary of the output from your ostomy\"?       *No Answer*  6. OSTOMY OUTPUT: \"How much stool output over a 24 hour period are you having?\" (e.g., *No Answer* ml; consistency of water, milkshake, pudding)  No output for 4 days  7. OTHER SYMPTOMS: \"Are you having any other symptoms?\" (e.g., fever, vomiting, blood in stool, abdomen pain, dizziness)    No vomiting abdomen pain    Protocols used: Ostomy Symptoms and Questions-ADULT-    "

## 2024-09-15 ENCOUNTER — APPOINTMENT (OUTPATIENT)
Dept: GENERAL RADIOLOGY | Facility: HOSPITAL | Age: 89
End: 2024-09-15
Payer: OTHER GOVERNMENT

## 2024-09-15 VITALS
HEIGHT: 68 IN | TEMPERATURE: 97.8 F | HEART RATE: 60 BPM | BODY MASS INDEX: 19.93 KG/M2 | OXYGEN SATURATION: 94 % | WEIGHT: 131.5 LBS | SYSTOLIC BLOOD PRESSURE: 156 MMHG | DIASTOLIC BLOOD PRESSURE: 64 MMHG | RESPIRATION RATE: 18 BRPM

## 2024-09-15 PROBLEM — K94.09 COLOSTOMY PROLAPSE: Status: RESOLVED | Noted: 2024-09-14 | Resolved: 2024-09-15

## 2024-09-15 PROCEDURE — G0378 HOSPITAL OBSERVATION PER HR: HCPCS

## 2024-09-15 PROCEDURE — 74018 RADEX ABDOMEN 1 VIEW: CPT

## 2024-09-15 PROCEDURE — 74018 RADEX ABDOMEN 1 VIEW: CPT | Performed by: RADIOLOGY

## 2024-09-15 PROCEDURE — 99235 HOSP IP/OBS SAME DATE MOD 70: CPT | Performed by: SURGERY

## 2024-09-15 RX ORDER — DOCUSATE SODIUM 100 MG/1
100 CAPSULE, LIQUID FILLED ORAL 2 TIMES DAILY
Qty: 60 CAPSULE | Refills: 1 | Status: SHIPPED | OUTPATIENT
Start: 2024-09-15

## 2024-09-21 ENCOUNTER — HOSPITAL ENCOUNTER (EMERGENCY)
Facility: HOSPITAL | Age: 89
Discharge: HOME | End: 2024-09-21
Attending: STUDENT IN AN ORGANIZED HEALTH CARE EDUCATION/TRAINING PROGRAM
Payer: OTHER GOVERNMENT

## 2024-09-21 ENCOUNTER — NURSE TRIAGE (OUTPATIENT)
Dept: CALL CENTER | Facility: HOSPITAL | Age: 89
End: 2024-09-21
Payer: MEDICARE

## 2024-09-21 VITALS
WEIGHT: 137 LBS | TEMPERATURE: 98.1 F | BODY MASS INDEX: 20.76 KG/M2 | HEIGHT: 68 IN | SYSTOLIC BLOOD PRESSURE: 122 MMHG | HEART RATE: 73 BPM | DIASTOLIC BLOOD PRESSURE: 72 MMHG | OXYGEN SATURATION: 96 % | RESPIRATION RATE: 18 BRPM

## 2024-09-21 DIAGNOSIS — Z51.89 VISIT FOR WOUND CHECK: Primary | ICD-10-CM

## 2024-09-21 DIAGNOSIS — Z43.3 COLOSTOMY CARE: ICD-10-CM

## 2024-09-21 PROCEDURE — 99282 EMERGENCY DEPT VISIT SF MDM: CPT

## 2024-10-01 ENCOUNTER — NURSE TRIAGE (OUTPATIENT)
Dept: CALL CENTER | Facility: HOSPITAL | Age: 89
End: 2024-10-01
Payer: MEDICARE

## 2024-10-01 ENCOUNTER — HOSPITAL ENCOUNTER (EMERGENCY)
Facility: HOSPITAL | Age: 89
Discharge: HOME | End: 2024-10-01
Attending: STUDENT IN AN ORGANIZED HEALTH CARE EDUCATION/TRAINING PROGRAM
Payer: OTHER GOVERNMENT

## 2024-10-01 ENCOUNTER — APPOINTMENT (OUTPATIENT)
Dept: CT IMAGING | Facility: HOSPITAL | Age: 89
End: 2024-10-01
Payer: OTHER GOVERNMENT

## 2024-10-01 VITALS
TEMPERATURE: 97.8 F | HEIGHT: 68 IN | DIASTOLIC BLOOD PRESSURE: 80 MMHG | RESPIRATION RATE: 18 BRPM | WEIGHT: 137 LBS | SYSTOLIC BLOOD PRESSURE: 157 MMHG | OXYGEN SATURATION: 98 % | HEART RATE: 69 BPM | BODY MASS INDEX: 20.76 KG/M2

## 2024-10-01 DIAGNOSIS — K59.00 CONSTIPATION, UNSPECIFIED CONSTIPATION TYPE: Primary | ICD-10-CM

## 2024-10-01 LAB
ALBUMIN SERPL-MCNC: 3.7 G/DL (ref 3.5–5.2)
ALBUMIN/GLOB SERPL: 1.2 G/DL
ALP SERPL-CCNC: 152 U/L (ref 39–117)
ALT SERPL W P-5'-P-CCNC: 20 U/L (ref 1–41)
ANION GAP SERPL CALCULATED.3IONS-SCNC: 8.1 MMOL/L (ref 5–15)
AST SERPL-CCNC: 25 U/L (ref 1–40)
BASOPHILS # BLD AUTO: 0.01 10*3/MM3 (ref 0–0.2)
BASOPHILS NFR BLD AUTO: 0.1 % (ref 0–1.5)
BILIRUB SERPL-MCNC: 0.3 MG/DL (ref 0–1.2)
BUN SERPL-MCNC: 25 MG/DL (ref 8–23)
BUN/CREAT SERPL: 18.2 (ref 7–25)
CALCIUM SPEC-SCNC: 9.5 MG/DL (ref 8.2–9.6)
CHLORIDE SERPL-SCNC: 105 MMOL/L (ref 98–107)
CO2 SERPL-SCNC: 28.9 MMOL/L (ref 22–29)
CREAT SERPL-MCNC: 1.37 MG/DL (ref 0.76–1.27)
DEPRECATED RDW RBC AUTO: 48.8 FL (ref 37–54)
EGFRCR SERPLBLD CKD-EPI 2021: 46.9 ML/MIN/1.73
EOSINOPHIL # BLD AUTO: 0.23 10*3/MM3 (ref 0–0.4)
EOSINOPHIL NFR BLD AUTO: 3.3 % (ref 0.3–6.2)
ERYTHROCYTE [DISTWIDTH] IN BLOOD BY AUTOMATED COUNT: 13.5 % (ref 12.3–15.4)
GLOBULIN UR ELPH-MCNC: 3.1 GM/DL
GLUCOSE SERPL-MCNC: 104 MG/DL (ref 65–99)
HCT VFR BLD AUTO: 37.9 % (ref 37.5–51)
HGB BLD-MCNC: 12.1 G/DL (ref 13–17.7)
IMM GRANULOCYTES # BLD AUTO: 0.04 10*3/MM3 (ref 0–0.05)
IMM GRANULOCYTES NFR BLD AUTO: 0.6 % (ref 0–0.5)
LIPASE SERPL-CCNC: 39 U/L (ref 13–60)
LYMPHOCYTES # BLD AUTO: 2.24 10*3/MM3 (ref 0.7–3.1)
LYMPHOCYTES NFR BLD AUTO: 32.1 % (ref 19.6–45.3)
MCH RBC QN AUTO: 30.7 PG (ref 26.6–33)
MCHC RBC AUTO-ENTMCNC: 31.9 G/DL (ref 31.5–35.7)
MCV RBC AUTO: 96.2 FL (ref 79–97)
MONOCYTES # BLD AUTO: 0.63 10*3/MM3 (ref 0.1–0.9)
MONOCYTES NFR BLD AUTO: 9 % (ref 5–12)
NEUTROPHILS NFR BLD AUTO: 3.83 10*3/MM3 (ref 1.7–7)
NEUTROPHILS NFR BLD AUTO: 54.9 % (ref 42.7–76)
NRBC BLD AUTO-RTO: 0 /100 WBC (ref 0–0.2)
PLATELET # BLD AUTO: 158 10*3/MM3 (ref 140–450)
PMV BLD AUTO: 9.5 FL (ref 6–12)
POTASSIUM SERPL-SCNC: 4.2 MMOL/L (ref 3.5–5.2)
PROT SERPL-MCNC: 6.8 G/DL (ref 6–8.5)
RBC # BLD AUTO: 3.94 10*6/MM3 (ref 4.14–5.8)
SODIUM SERPL-SCNC: 142 MMOL/L (ref 136–145)
WBC NRBC COR # BLD AUTO: 6.98 10*3/MM3 (ref 3.4–10.8)

## 2024-10-01 PROCEDURE — 25810000003 LACTATED RINGERS SOLUTION: Performed by: STUDENT IN AN ORGANIZED HEALTH CARE EDUCATION/TRAINING PROGRAM

## 2024-10-01 PROCEDURE — 74176 CT ABD & PELVIS W/O CONTRAST: CPT | Performed by: RADIOLOGY

## 2024-10-01 PROCEDURE — 85025 COMPLETE CBC W/AUTO DIFF WBC: CPT | Performed by: STUDENT IN AN ORGANIZED HEALTH CARE EDUCATION/TRAINING PROGRAM

## 2024-10-01 PROCEDURE — 0 DIATRIZOATE MEGLUMINE & SODIUM PER 1 ML: Performed by: STUDENT IN AN ORGANIZED HEALTH CARE EDUCATION/TRAINING PROGRAM

## 2024-10-01 PROCEDURE — 83690 ASSAY OF LIPASE: CPT | Performed by: STUDENT IN AN ORGANIZED HEALTH CARE EDUCATION/TRAINING PROGRAM

## 2024-10-01 PROCEDURE — 74176 CT ABD & PELVIS W/O CONTRAST: CPT

## 2024-10-01 PROCEDURE — 99284 EMERGENCY DEPT VISIT MOD MDM: CPT

## 2024-10-01 PROCEDURE — 80053 COMPREHEN METABOLIC PANEL: CPT | Performed by: STUDENT IN AN ORGANIZED HEALTH CARE EDUCATION/TRAINING PROGRAM

## 2024-10-01 RX ORDER — DIATRIZOATE MEGLUMINE AND DIATRIZOATE SODIUM 660; 100 MG/ML; MG/ML
100 SOLUTION ORAL; RECTAL ONCE
Status: COMPLETED | OUTPATIENT
Start: 2024-10-01 | End: 2024-10-01

## 2024-10-01 RX ORDER — POLYETHYLENE GLYCOL 3350 17 G/17G
17 POWDER, FOR SOLUTION ORAL DAILY
Qty: 14 PACKET | Refills: 0 | Status: SHIPPED | OUTPATIENT
Start: 2024-10-01 | End: 2024-10-15

## 2024-10-01 RX ADMIN — DIATRIZOATE MEGLUMINE AND DIATRIZOATE SODIUM 100 ML: 600; 100 SOLUTION ORAL; RECTAL at 02:49

## 2024-10-01 RX ADMIN — SODIUM CHLORIDE, POTASSIUM CHLORIDE, SODIUM LACTATE AND CALCIUM CHLORIDE 1000 ML: 600; 310; 30; 20 INJECTION, SOLUTION INTRAVENOUS at 02:46

## 2024-10-01 NOTE — TELEPHONE ENCOUNTER
Having no output in 4 days. Triaged per protocol. Refuses triage disposition. Patient states he does not want to wait till tomorrow. He states he is going to [facility] ER and let them give him what he needs to get his bowels moving.

## 2024-10-01 NOTE — ED PROVIDER NOTES
Subjective   89-year-old male past medical history of rectal cancer and has had a obstructing lesion in the rectal area now has a colostomy bag and has had been present for approximately 2 years presenting with complaint he has not had any output from his ostomy bag for 4 days.  States that he is concerned about obstruction.  When asked if he has had any output at all he states he has had some output it has been yellow-green.  Denies any abdominal pain states that overall the ostomy bag has not irritated to him and he is otherwise asymptomatic with no nausea no vomiting    Review of Systems    Past Medical History:   Diagnosis Date    Arthritis     Bradycardia     Enlarged prostate     Pacemaker     Thyroid disease        No Known Allergies    Past Surgical History:   Procedure Laterality Date    CARDIAC ELECTROPHYSIOLOGY PROCEDURE N/A 2021    Procedure: Pacemaker DC new;  Surgeon: Kendrick Burgess MD;  Location: [facility] CATH INVASIVE LOCATION;  Service: Cardiology;  Laterality: N/A;    COLOSTOMY N/A 2024    Procedure: COLOSTOMY LOOP;  Surgeon: Dilma Yanez MD;  Location: [facility] OR;  Service: General;  Laterality: N/A;    EXPLORATORY LAPAROTOMY N/A 2024    Procedure: LAPAROTOMY EXPLORATORY;  Surgeon: Dilma Yanez MD;  Location: [facility] OR;  Service: General;  Laterality: N/A;    HERNIA REPAIR Right     left inguinal/right    INSERT / REPLACE / REMOVE PACEMAKER  2021    [employer] device    SIGMOIDOSCOPY N/A 2024    Procedure: FLEXIBLE SIGMOIDOSCOPY WITH BIOPSY;  Surgeon: Dilma Yanez MD;  Location: [facility] OR;  Service: General;  Laterality: N/A;       No family history on file.    Social History     Socioeconomic History    Marital status:    Tobacco Use    Smoking status: Former     Current packs/day: 0.00     Types: Cigarettes     Quit date:      Years since quittin.7     Passive exposure: Past    Smokeless tobacco: Former     Types: Chew     Quit date:  1990   Vaping Use    Vaping status: Never Used   Substance and Sexual Activity    Alcohol use: Never    Drug use: Never    Sexual activity: Defer           Objective   Physical Exam  Vitals and nursing note reviewed. Exam conducted with a chaperone present.   Constitutional:       General: He is not in acute distress.     Appearance: Normal appearance. He is not ill-appearing, toxic-appearing or diaphoretic.   HENT:      Head: Normocephalic and atraumatic.      Nose: Nose normal.      Mouth/Throat:      Pharynx: No oropharyngeal exudate or posterior oropharyngeal erythema.   Eyes:      Extraocular Movements: Extraocular movements intact.      Conjunctiva/sclera: Conjunctivae normal.      Pupils: Pupils are equal, round, and reactive to light.   Cardiovascular:      Rate and Rhythm: Normal rate and regular rhythm.   Pulmonary:      Effort: Pulmonary effort is normal. No respiratory distress.      Breath sounds: Normal breath sounds. No stridor. No wheezing, rhonchi or rales.   Abdominal:      General: Abdomen is flat. There is no distension.      Tenderness: There is no abdominal tenderness. There is no guarding or rebound.      Comments: Ostomy bag in place there is yellow-green stool in the bag but it is broken up in chunks and not formed.   Musculoskeletal:         General: No swelling, tenderness, deformity or signs of injury. Normal range of motion.      Cervical back: Normal range of motion.   Skin:     General: Skin is warm and dry.      Capillary Refill: Capillary refill takes less than 2 seconds.      Findings: No erythema or rash.   Neurological:      General: No focal deficit present.      Mental Status: He is alert and oriented to person, place, and time. Mental status is at baseline.      Cranial Nerves: No cranial nerve deficit.      Sensory: No sensory deficit.      Motor: No weakness.   Psychiatric:         Mood and Affect: Mood normal.         Procedures           ED Course  ED Course as of 10/01/24  0508   Tue Oct 01, 2024   0506 No obstruction identified on the CT abdomen pelvis with oral contrast patient has moderate stool burden and sent home Duralex. repeat exam revealing no abdominal tenderness [AK]      ED Course User Index  [AK] Domingo Bowman MD                                             Medical Decision Making  Patient here complaining of a bowel obstruction will get a CT scan with oral contrast to evaluate for passage of contrast to the bowels.  As both diagnostic and potentially therapeutic patient does have output in his ostomy bag. otherwise patient is completely asymptomatic    Problems Addressed:  Constipation, unspecified constipation type: complicated acute illness or injury    Amount and/or Complexity of Data Reviewed  Labs: ordered.  Radiology: ordered.    Risk  OTC drugs.  Prescription drug management.        Final diagnoses:   Constipation, unspecified constipation type       ED Disposition  ED Disposition       ED Disposition   Discharge    Condition   Stable    Comment   --               Diana Aden, APRN  [address] [facility] [place]  [place] [address]  [phone]    In 3 days      [facility] EMERGENCY DEPARTMENT  [address] [facility] [id number] [phone]  Go to   If symptoms worsen    Follow-up with your general surgeon as needed for follow-up ostomy care               Medication List        New Prescriptions      polyethylene glycol 17 g packet  Commonly known as: MIRALAX  Take 17 g by mouth Daily for 14 days.               Where to Get Your Medications        These medications were sent to [id number] - [place] - [address] - [phone] PH - [phone] FX  [address] [place] [address]      Phone: [phone]   polyethylene glycol 17 g packet            Domingo Bowman MD  10/01/24 6341

## 2024-10-01 NOTE — TELEPHONE ENCOUNTER
Reason for Disposition   No stool or gas from COLOSTOMY > 48 hours (2 days)    Additional Information   Negative: Shock suspected (e.g., cold/pale/clammy skin, too weak to stand, low BP, rapid pulse)   Negative: Sounds like a life-threatening emergency to the triager   Negative: [1] Post-op AND [2] and general post-operative symptoms or questions, unrelated to ostomy   Negative: [1] SEVERE abdominal pain (e.g., excruciating) AND [2] present > 1 hour   Negative: [1] Bleeding from stoma tissue (stoma is moist, pink to red-colored tissue) AND [2] won't stop after 10 minutes of direct pressure (using correct technique)   Negative: Bloody stool (blood clots; or passing blood without stool)   Negative: Black or tarry bowel movements  (Exception: Chronic-unchanged black-grey BMs AND is taking iron pills or Pepto-Bismol.)   Negative: Stoma separates from the surrounding skin at the suture line (e.g., gaping wound next to stoma)   Negative: Stoma turns purple or black   Negative: [1] No stool or gas from ILEOSTOMY > 6 hours AND [2] abdominal pain or vomiting   Negative: [1] No stool or gas from ILEOSTOMY > 6 hours AND [2] no improvement after using Care Advice   Negative: [1] No stool or gas from COLOSTOMY > 48 hours (2 days) AND [2] abdominal pain or vomiting   Negative: [1] Vomiting AND [2] contains bile (green color)   Negative: [1] Drinking very little AND [2] dehydration suspected (e.g., no urine > 12 hours, very dry mouth, very lightheaded)   Negative: Patient sounds very sick or weak to the triager   Negative: [1] MILD-MODERATE abdominal pain AND [2] constant AND [3] present > 2 hours   Negative: [1] Vomiting AND [2] abdomen looks much more swollen than usual   Negative: [1] Abdomen skin around stoma looks infected (spreading redness, pain) AND [2] fever   Negative: No stool or gas from ILEOSTOMY > 6 hours (and has not tried Care Advice)   Negative: SEVERE diarrhea (e.g., 1,500 ml or more a day; or twice as much as  "usual)   Negative: [1] Caller has URGENT question AND [2] triager unable to answer question   Negative: [1] Abdomen skin around stoma looks infected (spreading redness, pain) AND [2] large red area (> 2 in. or 5 cm)   Negative: MODERATE diarrhea (e.g., 1,200 ml or more a day; or 50% more than usual)   Negative: [1] MILD diarrhea (e.g., 900 ml or more a day; \"more than usual\", or watery) AND [2] lasting more than 2 days   Negative: [1] Caller has NON-URGENT question AND [2] triager unable to answer question   Negative: [1] Mild constipation (and has colostomy) AND [2] no improvement after using Care Advice   Negative: [1] Mild redness or irritation of abdomen skin around stoma AND [2] no improvement after using Care Advice   Negative: [1] Leaking under seal on barrier shield on pouch (or pouch wafer for 2-piece pouch) AND [2] no improvement after using Care Advice   Negative: Bulge or lump in abdomen skin near the stoma   Negative: Mild diarrhea (e.g., 900 ml or more a day; \"more than usual\", or watery)   Negative: Mild constipation (and has colostomy)   Negative: Mild bleeding from stoma tissue (stoma is moist, pink to red-colored tissue)   Negative: Mild swelling of stoma (in post-operative period)   Negative: Mild redness or irritation of abdomen skin around stoma   Negative: Leaking under seal on barrier shield on pouch (or pouch wafer for 2-piece pouch)   Negative: Bad smells and odors, questions about   Negative: Gas from ostomy, questions about   Negative: Mucus from rectum, questions about    Answer Assessment - Initial Assessment Questions  1. TYPE: \"What type of ostomy do you have?\" (e.g., ileostomy, colostomy; temporary, long-term [permanent]).      colostomy  2. LOCATION: \"Where is the ostomy located?\"      Left side stomach  3. DURATION: \"How long have you had the ostomy?\" (e.g., days, weeks, months, years).      Since July 1, 2024  4. MAIN CONCERN: \"What is your main concern or symptom today?\" (e.g., " "skin redness or irritation, bleeding, leaking, change in output or appearance of ostomy).      No output in 4 days  5. DAILY OSTOMY OUTPUT DIARY: \"Do you keep a daily diary of the output from your ostomy\"?       no  6. OSTOMY OUTPUT: \"How much stool output over a 24 hour period are you having?\" (e.g.,  ml; consistency of water, milkshake, pudding)      None in 4 days  7. OTHER SYMPTOMS: \"Are you having any other symptoms?\" (e.g., fever, vomiting, blood in stool, abdomen pain, dizziness)      No just feel bloated    Protocols used: Ostomy Symptoms and Questions-ADULT-    "

## 2024-10-03 ENCOUNTER — OFFICE VISIT (OUTPATIENT)
Age: 89
End: 2024-10-03
Payer: MEDICARE

## 2024-10-03 VITALS — HEIGHT: 68 IN | BODY MASS INDEX: 20.76 KG/M2 | WEIGHT: 137 LBS

## 2024-10-03 DIAGNOSIS — K59.00 CONSTIPATION, UNSPECIFIED CONSTIPATION TYPE: Primary | ICD-10-CM

## 2024-10-03 NOTE — PROGRESS NOTES
"Patient Name:  Joaquín Rogers  YOB: 1927  [id number]    Follow Up Note    Date of visit: 10/3/2024    Subjective   Subjective: Presents today for evaluation after recent emergency department visit for constipation.  He has been taking his stool softeners but they have not been working well for him.  He did try MiraLAX and had improvement in bowel function.         Objective     Objective:     Ht 172.7 cm (67.99\")   Wt 62.1 kg (137 lb)   BMI 20.84 kg/m²       CV:  Regular rate and rhythm  L:  Bilateral lung rise and fall, no use of accessory muscles   Abd:  Soft, nontender, ostomy prolapse noted.  Ext:  No cyanosis, clubbing, edema      Assessment/ Plan:  Assessment   Diagnoses and all orders for this visit:    1. Constipation, unspecified constipation type (Primary)      Mr. Rogers as twice daily MiraLAX to help with the constipation.  He is agreeable to this.  He continues to deny referral for management of his rectal cancer.    Dilma Hermosillo MD       General Surgeon  [facility]       "

## 2024-10-04 ENCOUNTER — HOSPITAL ENCOUNTER (EMERGENCY)
Facility: HOSPITAL | Age: 89
Discharge: HOME | End: 2024-10-04
Attending: STUDENT IN AN ORGANIZED HEALTH CARE EDUCATION/TRAINING PROGRAM
Payer: OTHER GOVERNMENT

## 2024-10-04 VITALS
OXYGEN SATURATION: 94 % | HEIGHT: 68 IN | WEIGHT: 135 LBS | BODY MASS INDEX: 20.46 KG/M2 | RESPIRATION RATE: 14 BRPM | HEART RATE: 76 BPM | TEMPERATURE: 97.6 F | DIASTOLIC BLOOD PRESSURE: 50 MMHG | SYSTOLIC BLOOD PRESSURE: 108 MMHG

## 2024-10-04 DIAGNOSIS — Z43.3 ENCOUNTER FOR ATTENTION TO COLOSTOMY: Primary | ICD-10-CM

## 2024-10-04 PROCEDURE — 99282 EMERGENCY DEPT VISIT SF MDM: CPT

## 2024-10-04 PROCEDURE — 99283 EMERGENCY DEPT VISIT LOW MDM: CPT

## 2024-10-04 NOTE — ED PROVIDER NOTES
Subjective   History of Present Illness  89-year-old male who presents to the ED today with his wife due to issues with his colostomy.  He states sometime during the night his colostomy bag started leaking.  He states he did not have anyone at home to help him put a new colostomy bag on.  He states his son usually helps him.  He has presented to the ED today for us to assist him with placing a colostomy bag.  The patient is noted to have prolapse of his ostomy but he reports that this is normal for him.  He states he has some mild given irritation just above his ostomy site due to the leakage last night.  He denies any other complaints at this time.    History provided by:  Patient  Illness  Severity:  Mild  Onset quality:  Sudden  Timing:  Constant  Progression:  Unchanged  Chronicity:  New  Associated symptoms: no abdominal pain, no fever and no vomiting        Review of Systems   Constitutional: Negative.  Negative for fever.   HENT: Negative.     Eyes: Negative.    Respiratory: Negative.     Cardiovascular: Negative.    Gastrointestinal:  Negative for abdominal pain and vomiting.   Genitourinary: Negative.    Musculoskeletal: Negative.    Skin: Negative.    Neurological: Negative.    Psychiatric/Behavioral: Negative.     All other systems reviewed and are negative.      Past Medical History:   Diagnosis Date    Arthritis     Bradycardia     Enlarged prostate     Pacemaker     Thyroid disease        No Known Allergies    Past Surgical History:   Procedure Laterality Date    CARDIAC ELECTROPHYSIOLOGY PROCEDURE N/A 09/13/2021    Procedure: Pacemaker DC new;  Surgeon: Kendrick Burgess MD;  Location: [facility] INVASIVE LOCATION;  Service: Cardiology;  Laterality: N/A;    COLOSTOMY N/A 7/1/2024    Procedure: COLOSTOMY LOOP;  Surgeon: Dilma Yanez MD;  Location: [facility] OR;  Service: General;  Laterality: N/A;    EXPLORATORY LAPAROTOMY N/A 7/1/2024    Procedure: LAPAROTOMY EXPLORATORY;  Surgeon: Renata  Dilma NG MD;  Location:  [facility] OR;  Service: General;  Laterality: N/A;    HERNIA REPAIR Right     left inguinal/right    INSERT / REPLACE / REMOVE PACEMAKER  2021    [employer] device    SIGMOIDOSCOPY N/A 2024    Procedure: FLEXIBLE SIGMOIDOSCOPY WITH BIOPSY;  Surgeon: Dilma Yanez MD;  Location:  [facility] OR;  Service: General;  Laterality: N/A;       No family history on file.    Social History     Socioeconomic History    Marital status:    Tobacco Use    Smoking status: Former     Current packs/day: 0.00     Types: Cigarettes     Quit date:      Years since quittin.7     Passive exposure: Past    Smokeless tobacco: Former     Types: Chew     Quit date:    Vaping Use    Vaping status: Never Used   Substance and Sexual Activity    Alcohol use: Never    Drug use: Never    Sexual activity: Defer           Objective   Physical Exam  Vitals and nursing note reviewed.   Constitutional:       General: He is not in acute distress.     Appearance: Normal appearance.   HENT:      Head: Normocephalic and atraumatic.      Right Ear: External ear normal.      Left Ear: External ear normal.      Nose: Nose normal.   Eyes:      Conjunctiva/sclera: Conjunctivae normal.      Pupils: Pupils are equal, round, and reactive to light.   Cardiovascular:      Rate and Rhythm: Normal rate and regular rhythm.      Pulses: Normal pulses.      Heart sounds: Normal heart sounds.   Pulmonary:      Effort: Pulmonary effort is normal.      Breath sounds: Normal breath sounds.   Abdominal:      General: Bowel sounds are normal.      Palpations: Abdomen is soft.      Tenderness: There is no abdominal tenderness. There is no guarding or rebound.      Comments: Stoma prolapse noted.  The tissue is pink and healthy, no concerning features.  No cellulitis to the skin noted.   Musculoskeletal:         General: Normal range of motion.      Cervical back: Normal range of motion and neck supple.   Skin:     General: Skin  is warm and dry.      Capillary Refill: Capillary refill takes less than 2 seconds.   Neurological:      General: No focal deficit present.      Mental Status: He is alert and oriented to person, place, and time.   Psychiatric:         Mood and Affect: Mood normal.         Procedures           ED Course                                             Medical Decision Making  89-year-old male presents to the ED today for assistance with placing a colostomy bag.  Nursing staff assisted with colostomy bag placement.  The patient will be discharged home and he will follow-up as an outpatient.  He will return to the ED if symptoms change or worsen.    Problems Addressed:  Encounter for attention to colostomy: acute illness or injury        Final diagnoses:   Encounter for attention to colostomy       ED Disposition  ED Disposition       ED Disposition   Discharge    Condition   Stable    Comment   --               Dilma Yanez MD  [address] [place], [place] [id number]  [phone]    Schedule an appointment as soon as possible for a visit in 1 week           Medication List      No changes were made to your prescriptions during this visit.            Sandie Yi PA  10/04/24 9839

## 2024-10-04 NOTE — DISCHARGE INSTRUCTIONS
Follow-up with general surgery in the office as needed.  Continue your medications for constipation as prescribed.  Take the Colace twice a day and continue using the MiraLAX.  Return to the ED at any time if symptoms change or worsen.

## 2024-10-06 ENCOUNTER — HOSPITAL ENCOUNTER (EMERGENCY)
Facility: HOSPITAL | Age: 89
Discharge: HOME | End: 2024-10-06
Attending: STUDENT IN AN ORGANIZED HEALTH CARE EDUCATION/TRAINING PROGRAM | Admitting: STUDENT IN AN ORGANIZED HEALTH CARE EDUCATION/TRAINING PROGRAM
Payer: OTHER GOVERNMENT

## 2024-10-06 VITALS
HEART RATE: 73 BPM | RESPIRATION RATE: 14 BRPM | HEIGHT: 68 IN | SYSTOLIC BLOOD PRESSURE: 118 MMHG | OXYGEN SATURATION: 95 % | TEMPERATURE: 97.6 F | DIASTOLIC BLOOD PRESSURE: 48 MMHG | WEIGHT: 137 LBS | BODY MASS INDEX: 20.76 KG/M2

## 2024-10-06 DIAGNOSIS — Z43.3 COLOSTOMY CARE: Primary | ICD-10-CM

## 2024-10-06 PROCEDURE — 99282 EMERGENCY DEPT VISIT SF MDM: CPT

## 2024-10-06 NOTE — ED PROVIDER NOTES
Subjective   History of Present Illness  89 yo male pt with hx of tyroid disease, pacemaker, arthritis, and hx of rectal cancer presents to the ED today with his wife due to issues with his colostomy.  He states sometime during the night his colostomy bag started leaking.  He states he did not have anyone at home to help him put a new colostomy bag on.  He states his son usually helps him.  PT states he was here two days ago for the same thing. He states the bag they put on is too small due to his prolapsed stoma.  He states it fluctuates in size and this has caused the leak.  The patient is noted to have prolapse of his ostomy but he reports that this is normal for him.  He states he has some mild given irritation just above his ostomy site due to the leakage last night.  He denies any other complaints at this time.    History provided by:  Patient   used: No        Review of Systems   Constitutional: Negative.    HENT: Negative.     Eyes: Negative.    Respiratory: Negative.     Cardiovascular: Negative.    Gastrointestinal: Negative.    Endocrine: Negative.    Genitourinary: Negative.    Musculoskeletal: Negative.    Skin: Negative.    Allergic/Immunologic: Negative.    Neurological: Negative.    Hematological: Negative.    Psychiatric/Behavioral: Negative.     All other systems reviewed and are negative.      Past Medical History:   Diagnosis Date    Arthritis     Bradycardia     Enlarged prostate     Pacemaker     Thyroid disease        No Known Allergies    Past Surgical History:   Procedure Laterality Date    CARDIAC ELECTROPHYSIOLOGY PROCEDURE N/A 09/13/2021    Procedure: Pacemaker DC new;  Surgeon: Kendrick Burgess MD;  Location: [facility] INVASIVE LOCATION;  Service: Cardiology;  Laterality: N/A;    COLOSTOMY N/A 7/1/2024    Procedure: COLOSTOMY LOOP;  Surgeon: Dilma Yanez MD;  Location: [facility] OR;  Service: General;  Laterality: N/A;    EXPLORATORY LAPAROTOMY N/A 7/1/2024     Procedure: LAPAROTOMY EXPLORATORY;  Surgeon: Dilma Yanez MD;  Location:  [facility] OR;  Service: General;  Laterality: N/A;    HERNIA REPAIR Right     left inguinal/right    INSERT / REPLACE / REMOVE PACEMAKER  2021    [employer] device    SIGMOIDOSCOPY N/A 2024    Procedure: FLEXIBLE SIGMOIDOSCOPY WITH BIOPSY;  Surgeon: Dilma Yanez MD;  Location:  [facility] OR;  Service: General;  Laterality: N/A;       History reviewed. No pertinent family history.    Social History     Socioeconomic History    Marital status:    Tobacco Use    Smoking status: Former     Current packs/day: 0.00     Types: Cigarettes     Quit date:      Years since quittin.7     Passive exposure: Past    Smokeless tobacco: Former     Types: Chew     Quit date:    Vaping Use    Vaping status: Never Used   Substance and Sexual Activity    Alcohol use: Never    Drug use: Never    Sexual activity: Defer           Objective   Physical Exam  Vitals and nursing note reviewed.   Constitutional:       Appearance: Normal appearance. He is normal weight.   HENT:      Head: Normocephalic and atraumatic.      Right Ear: External ear normal.      Left Ear: External ear normal.      Nose: Nose normal.      Mouth/Throat:      Mouth: Mucous membranes are moist.      Pharynx: Oropharynx is clear.   Eyes:      Extraocular Movements: Extraocular movements intact.      Conjunctiva/sclera: Conjunctivae normal.      Pupils: Pupils are equal, round, and reactive to light.   Cardiovascular:      Rate and Rhythm: Normal rate and regular rhythm.      Pulses: Normal pulses.      Heart sounds: Normal heart sounds.   Pulmonary:      Effort: Pulmonary effort is normal.      Breath sounds: Normal breath sounds.   Abdominal:      General: Abdomen is flat. Bowel sounds are normal.      Palpations: Abdomen is soft.      Comments: prolapsed stoma, otherwise no abnormalities.  Ostomy bag in place there is yellow-green stool in the bag but it is  broken up in chunks and not formed.    Musculoskeletal:         General: Normal range of motion.      Cervical back: Normal range of motion and neck supple.   Skin:     General: Skin is warm and dry.      Capillary Refill: Capillary refill takes less than 2 seconds.   Neurological:      General: No focal deficit present.      Mental Status: He is alert and oriented to person, place, and time. Mental status is at baseline.   Psychiatric:         Mood and Affect: Mood normal.         Behavior: Behavior normal.         Thought Content: Thought content normal.         Judgment: Judgment normal.         Procedures           ED Course  ED Course as of 10/06/24 1917   Sun Oct 06, 2024   1916 Recommended f/u with PCP tomorrow to request home health assistance with stoma care. Pt stoma dressed today.  [ML]      ED Course User Index  [ML] Claudette Rowe PA                                             Medical Decision Making  89 yo male pt with hx of tyroid disease, pacemaker, arthritis, and hx of rectal cancer presents to the ED today with his wife due to issues with his colostomy.  He states sometime during the night his colostomy bag started leaking.  He states he did not have anyone at home to help him put a new colostomy bag on.  He states his son usually helps him.  PT states he was here two days ago for the same thing. He states the bag they put on is too small due to his prolapsed stoma.  He states it fluctuates in size and this has caused the leak.  The patient is noted to have prolapse of his ostomy but he reports that this is normal for him.  He states he has some mild given irritation just above his ostomy site due to the leakage last night.  He denies any other complaints at this time. Stoma dressed. Will f/u with PCP tomorrow.      Problems Addressed:  Colostomy care: acute illness or injury        Final diagnoses:   Colostomy care       ED Disposition  ED Disposition       ED Disposition   Discharge     Condition   Stable    Comment   --               Diana Aden, APRN  [address] [facility] [address]  [place] [address]  [phone]    Schedule an appointment as soon as possible for a visit in 1 day      Sina Esparza MD  [address] [place] [address]  [phone]    Schedule an appointment as soon as possible for a visit in 1 day           Medication List      No changes were made to your prescriptions during this visit.            Claudette Rowe PA  10/06/24 1917

## 2024-10-06 NOTE — DISCHARGE INSTRUCTIONS
Follow up with your primary care provider and request home health to assist you with caring for your ostomy site.  Return to the ED as needed.

## 2024-10-16 ENCOUNTER — APPOINTMENT (OUTPATIENT)
Dept: GENERAL RADIOLOGY | Facility: HOSPITAL | Age: 89
End: 2024-10-16
Payer: OTHER GOVERNMENT

## 2024-10-16 ENCOUNTER — HOSPITAL ENCOUNTER (EMERGENCY)
Facility: HOSPITAL | Age: 89
Discharge: HOME | End: 2024-10-16
Attending: EMERGENCY MEDICINE | Admitting: EMERGENCY MEDICINE
Payer: OTHER GOVERNMENT

## 2024-10-16 VITALS
DIASTOLIC BLOOD PRESSURE: 64 MMHG | RESPIRATION RATE: 18 BRPM | HEIGHT: 68 IN | SYSTOLIC BLOOD PRESSURE: 146 MMHG | WEIGHT: 136.91 LBS | BODY MASS INDEX: 20.75 KG/M2 | HEART RATE: 69 BPM | OXYGEN SATURATION: 97 % | TEMPERATURE: 98 F

## 2024-10-16 DIAGNOSIS — K59.00 CONSTIPATION, UNSPECIFIED CONSTIPATION TYPE: Primary | ICD-10-CM

## 2024-10-16 PROCEDURE — 74018 RADEX ABDOMEN 1 VIEW: CPT | Performed by: RADIOLOGY

## 2024-10-16 PROCEDURE — 99283 EMERGENCY DEPT VISIT LOW MDM: CPT

## 2024-10-16 PROCEDURE — 74018 RADEX ABDOMEN 1 VIEW: CPT

## 2024-10-16 NOTE — ED PROVIDER NOTES
Subjective   History of Present Illness  Patient is a 89-year-old male presents today complaining of constipation.  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  Patient denies any pain.  Patient reports she has had very little bowel movement states has been taking stool softeners and MiraLAX.        Review of Systems   Constitutional: Negative.    HENT: Negative.     Eyes: Negative.    Respiratory: Negative.     Cardiovascular: Negative.    Gastrointestinal:  Positive for constipation.   Endocrine: Negative.    Genitourinary: Negative.    Musculoskeletal: Negative.    Skin: Negative.    Allergic/Immunologic: Negative.    Neurological: Negative.    Hematological: Negative.    Psychiatric/Behavioral: Negative.         Past Medical History:   Diagnosis Date    Arthritis     Bradycardia     Enlarged prostate     Pacemaker     Thyroid disease        No Known Allergies    Past Surgical History:   Procedure Laterality Date    CARDIAC ELECTROPHYSIOLOGY PROCEDURE N/A 09/13/2021    Procedure: Pacemaker DC new;  Surgeon: Kendrick Burgess MD;  Location: [facility] INVASIVE LOCATION;  Service: Cardiology;  Laterality: N/A;    COLOSTOMY N/A 7/1/2024    Procedure: COLOSTOMY LOOP;  Surgeon: Dilma Yanez MD;  Location: [facility] OR;  Service: General;  Laterality: N/A;    EXPLORATORY LAPAROTOMY N/A 7/1/2024    Procedure: LAPAROTOMY EXPLORATORY;  Surgeon: Dilma Yanez MD;  Location: [facility] OR;  Service: General;  Laterality: N/A;    HERNIA REPAIR Right     left inguinal/right    INSERT / REPLACE / REMOVE PACEMAKER  09/13/2021    [employer] device    SIGMOIDOSCOPY N/A 6/30/2024    Procedure: FLEXIBLE SIGMOIDOSCOPY WITH BIOPSY;  Surgeon: Dilma Yanez MD;  Location: [facility];  Service: General;  Laterality: N/A;       No family history on file.    Social History     Socioeconomic History    Marital status:    Tobacco Use    Smoking status: Former     Current packs/day: 0.00     Types:  Cigarettes     Quit date:      Years since quittin.8     Passive exposure: Past    Smokeless tobacco: Former     Types: Chew     Quit date:    Vaping Use    Vaping status: Never Used   Substance and Sexual Activity    Alcohol use: Never    Drug use: Never    Sexual activity: Defer           Objective   Physical Exam  Vitals and nursing note reviewed.   Constitutional:       Appearance: He is well-developed.   HENT:      Head: Normocephalic.      Right Ear: External ear normal.      Left Ear: External ear normal.   Eyes:      Conjunctiva/sclera: Conjunctivae normal.      Pupils: Pupils are equal, round, and reactive to light.   Cardiovascular:      Rate and Rhythm: Normal rate and regular rhythm.      Heart sounds: Normal heart sounds.   Pulmonary:      Effort: Pulmonary effort is normal.      Breath sounds: Normal breath sounds.   Abdominal:      General: Bowel sounds are normal.      Palpations: Abdomen is soft.   Musculoskeletal:         General: Normal range of motion.      Cervical back: Normal range of motion and neck supple.   Skin:     General: Skin is warm and dry.      Capillary Refill: Capillary refill takes less than 2 seconds.   Neurological:      Mental Status: He is alert and oriented to person, place, and time.   Psychiatric:         Behavior: Behavior normal.         Thought Content: Thought content normal.         Procedures           ED Course                                 XR Abdomen KUB   Final Result     No extensive constipation identified.           This report was finalized on 10/16/2024 11:13 AM by Dr. Bernardino Swann MD.                        Medical Decision Making  Patient is a 89-year-old male presents today complaining of constipation.  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  Patient denies any pain.  Patient reports she has had very little bowel movement states has been taking stool softeners and MiraLAX.      Problems  Addressed:  Constipation, unspecified constipation type: complicated acute illness or injury    Amount and/or Complexity of Data Reviewed  Radiology: ordered. Decision-making details documented in ED Course.        Final diagnoses:   Constipation, unspecified constipation type       ED Disposition  ED Disposition       ED Disposition   Discharge    Condition   Stable    Comment   --               Diana Aden, APRN  [address]  [place] [address]  [phone]    Schedule an appointment as soon as possible for a visit   For further evaluation         Medication List        Stop      polyethylene glycol 17 g packet  Commonly known as: MIRALAX                 Solomon Olvera, APRN  10/16/24 8992

## 2024-10-16 NOTE — DISCHARGE INSTRUCTIONS
Follow up with your primary care provider in 1-2 days    Return to the emergency room for worsening symptoms     Please follow up with your primary care physician in the next 1-3 days. If this is not possible, please return to the ED for re-evaluation.    It is IMPORTANT to see your DOCTOR OR PRIMARY CARE PROVIDER. Emergency Care may be incomplete without proper follow-up.  Your evaluation in the emergency department is focused on a specific clinical complaint, at a given moment in time.  Symptoms sometimes change or new symptoms might arise after you leave the Emergency Department. It is important that you call your doctor if you become worse in any way, or promptly return to the Emergency Department should any new, or worsening/changing symptom occur.  You are strongly urged to follow-up with your physician to assure complete and thorough care. PLEASE CALL YOUR DOCTORS OFFICE TODAY, INFORM THEM THAT YOU WERE SEEN IN THE EMERGENCY DEPARTMENT, AND THAT YOU NEED TO BE SEEN IMMEDIATELY FOR CLOSE FOLLOW UP.  If you do not have a primary care doctor we encourage you to proactively seek a local physician for close follow up.  Consider local clinics, free or sliding scale clinics, local [facility].  You can always return to the Emergency Department if you are having difficulty coordinating close follow up.  If medications were prescribed you should fill them at your local pharmacy immediately and take only as prescribed.  Bring your new medications to your doctors follow up visit to discuss any changes that would be necessary. RETURN TO THE EMERGENCY DEPARTMENT IMMEDIATELY FOR ANY NEW OR WORSENING SYMPTOMS.    ADDITIONAL DISCHARGE INSTRUCTIONS:     - If you received IV contrast today with a CT or MRI please stay well hydrated for the next 48-72 hours to protect your kidneys.    - If you have been prescribed an antibiotic, taking an over the counter probiotic may help with gastrointestinal side effects and antibiotic  associated diarrhea.    - Return to the emergency department or seek immediate medical care if any of the following occur:           - Symptoms do not improve in 8-12 hours. If this occurs, please return to the emergency department for re-evaluation or your symptoms or repeat abdominal examination         - Symptoms worsen at any time.         - If you are unable to follow up with a medical provider as instructed.         - You develop any worrisome symptoms such as fever, chills, uncontrolled pain, change or worsening of your pain symptoms, intractable vomiting, uncontrolled diarrhea, blood in your stool, dark/tarry stool, new neurological symptoms etc.    If you have been prescribed a narcotic pain medication, please take a stool softener to prevent constipation.     During your ED visit, you may have been given narcotics (such as morphine, dilaudid, percocet, vicodin) or sedatives (such as ativan, versed). These medications can impair your reflexes so, DO NOT DRIVE or USE ANY MACHINERY for at least 6 hours if you have been given these medications.    REMINDER FOR METFORMIN USERS: If you are using metformin (also known as Glucophage) and if you received a CT scan in which you received IV contrast, you must hold your metformin for a total of 72 hrs.  This will prevent any adverse interactions between the two agents.

## 2024-10-20 ENCOUNTER — HOSPITAL ENCOUNTER (EMERGENCY)
Facility: HOSPITAL | Age: 89
Discharge: HOME | End: 2024-10-20
Attending: STUDENT IN AN ORGANIZED HEALTH CARE EDUCATION/TRAINING PROGRAM | Admitting: STUDENT IN AN ORGANIZED HEALTH CARE EDUCATION/TRAINING PROGRAM
Payer: OTHER GOVERNMENT

## 2024-10-20 VITALS
SYSTOLIC BLOOD PRESSURE: 108 MMHG | HEART RATE: 83 BPM | BODY MASS INDEX: 19.85 KG/M2 | RESPIRATION RATE: 18 BRPM | DIASTOLIC BLOOD PRESSURE: 60 MMHG | WEIGHT: 131 LBS | HEIGHT: 68 IN | TEMPERATURE: 97.5 F | OXYGEN SATURATION: 93 %

## 2024-10-20 DIAGNOSIS — Z43.3 COLOSTOMY CARE: Primary | ICD-10-CM

## 2024-10-20 PROCEDURE — 99282 EMERGENCY DEPT VISIT SF MDM: CPT

## 2024-10-20 NOTE — DISCHARGE INSTRUCTIONS

## 2024-10-20 NOTE — ED PROVIDER NOTES
Subjective   History of Present Illness  MDM:    Escalation of care including admission/observation considered    - Discussions of management with other providers:  None    - Discussed/reviewed with Radiology regarding test interpretation    - Independent interpretation: None    - Additional patient history obtained from: None    - Review of external non-ED record (if available):  Prior Inpt record, Office record, Outpt record, Prior Outpt labs, PCP record, Outside ED record, Other    - Chronic conditions affecting care: See HPI and medical Hx.    - Social Determinants of health significantly affecting care:  None        Medical Decision Making Discussion:    Patient is a 89-year-old male presents today complaining of needing replacement for his colostomy bag.  Patient denies any other complaints.  Patient reports it has been leaking.      The patient has been given very strict return precautions to return to the emergency department should there be any acute change or worsening of their condition.  I have explained my findings and the patient has expressed understanding to me.  I explained that the work-up performed in the ED has been based on the specific complaint and concern, as the nature of emergency medicine is complaint driven and they understand that new symptoms may arise.  I have told them that, should there be any new symptoms, worsening or changing symptoms, a new work-up may be indicated that they are encouraged to return to the emergency department or promptly contact their primary care physician. We have employed a shared decision-making process as the discussion of their disposition.  The patient has been educated as to the nature of the visit, the tests and work-up performed and the findings from today's visit. At this time, there does not appear to be any acute emergent process that necessitates admission to the hospital, however, the patient understands that this can change unexpectedly. At this  time, the patient is stable for discharge home and agrees to follow-up with her primary care physician in the next 24 to 48 hours or earlier should they be able to obtain an appointment.    The patient was counseled regarding diagnostic results and treatment plan and patient has indicated understanding of these instructions.      Illness      Review of Systems   Constitutional: Negative.    HENT: Negative.     Eyes: Negative.    Respiratory: Negative.     Cardiovascular: Negative.    Gastrointestinal: Negative.    Endocrine: Negative.    Genitourinary: Negative.    Musculoskeletal: Negative.    Skin: Negative.    Allergic/Immunologic: Negative.    Neurological: Negative.    Hematological: Negative.    Psychiatric/Behavioral: Negative.         Past Medical History:   Diagnosis Date    Arthritis     Bradycardia     Enlarged prostate     Pacemaker     Thyroid disease        No Known Allergies    Past Surgical History:   Procedure Laterality Date    CARDIAC ELECTROPHYSIOLOGY PROCEDURE N/A 09/13/2021    Procedure: Pacemaker DC new;  Surgeon: Kendrick Burgess MD;  Location: [facility] CATH INVASIVE LOCATION;  Service: Cardiology;  Laterality: N/A;    COLOSTOMY N/A 7/1/2024    Procedure: COLOSTOMY LOOP;  Surgeon: Dilma Yanez MD;  Location: [facility] OR;  Service: General;  Laterality: N/A;    EXPLORATORY LAPAROTOMY N/A 7/1/2024    Procedure: LAPAROTOMY EXPLORATORY;  Surgeon: Dilma Yanez MD;  Location: [facility] OR;  Service: General;  Laterality: N/A;    HERNIA REPAIR Right     left inguinal/right    INSERT / REPLACE / REMOVE PACEMAKER  09/13/2021    [employer] device    SIGMOIDOSCOPY N/A 6/30/2024    Procedure: FLEXIBLE SIGMOIDOSCOPY WITH BIOPSY;  Surgeon: Dilma Yanez MD;  Location: [facility] OR;  Service: General;  Laterality: N/A;       No family history on file.    Social History     Socioeconomic History    Marital status:    Tobacco Use    Smoking status: Former     Current packs/day: 0.00     Types:  Cigarettes     Quit date:      Years since quittin.8     Passive exposure: Past    Smokeless tobacco: Former     Types: Chew     Quit date:    Vaping Use    Vaping status: Never Used   Substance and Sexual Activity    Alcohol use: Never    Drug use: Never    Sexual activity: Defer           Objective   Physical Exam  Vitals and nursing note reviewed.   Constitutional:       Appearance: He is well-developed.   HENT:      Head: Normocephalic.      Right Ear: External ear normal.      Left Ear: External ear normal.   Eyes:      Conjunctiva/sclera: Conjunctivae normal.      Pupils: Pupils are equal, round, and reactive to light.   Cardiovascular:      Rate and Rhythm: Normal rate and regular rhythm.      Heart sounds: Normal heart sounds.   Pulmonary:      Effort: Pulmonary effort is normal.      Breath sounds: Normal breath sounds.   Abdominal:      General: Bowel sounds are normal.      Palpations: Abdomen is soft.   Musculoskeletal:         General: Normal range of motion.      Cervical back: Normal range of motion and neck supple.   Skin:     General: Skin is warm and dry.      Capillary Refill: Capillary refill takes less than 2 seconds.   Neurological:      Mental Status: He is alert and oriented to person, place, and time.   Psychiatric:         Behavior: Behavior normal.         Thought Content: Thought content normal.         Procedures           ED Course                                             Medical Decision Making  MDM:    Escalation of care including admission/observation considered    - Discussions of management with other providers:  None    - Discussed/reviewed with Radiology regarding test interpretation    - Independent interpretation: None    - Additional patient history obtained from: None    - Review of external non-ED record (if available):  Prior Inpt record, Office record, Outpt record, Prior Outpt labs, PCP record, Outside ED record, Other    - Chronic conditions affecting  care: See HPI and medical Hx.    - Social Determinants of health significantly affecting care:  None        Medical Decision Making Discussion:    Patient is a 89-year-old male presents today complaining of needing replacement for his colostomy bag.  Patient denies any other complaints.  Patient reports it has been leaking.      The patient has been given very strict return precautions to return to the emergency department should there be any acute change or worsening of their condition.  I have explained my findings and the patient has expressed understanding to me.  I explained that the work-up performed in the ED has been based on the specific complaint and concern, as the nature of emergency medicine is complaint driven and they understand that new symptoms may arise.  I have told them that, should there be any new symptoms, worsening or changing symptoms, a new work-up may be indicated that they are encouraged to return to the emergency department or promptly contact their primary care physician. We have employed a shared decision-making process as the discussion of their disposition.  The patient has been educated as to the nature of the visit, the tests and work-up performed and the findings from today's visit. At this time, there does not appear to be any acute emergent process that necessitates admission to the hospital, however, the patient understands that this can change unexpectedly. At this time, the patient is stable for discharge home and agrees to follow-up with her primary care physician in the next 24 to 48 hours or earlier should they be able to obtain an appointment.    The patient was counseled regarding diagnostic results and treatment plan and patient has indicated understanding of these instructions.      Problems Addressed:  Colostomy care: acute illness or injury        Final diagnoses:   Colostomy care       ED Disposition  ED Disposition       ED Disposition   Discharge    Condition    Stable    Comment   --               Markie Tere, APRN  [address]  [place] [address]  [phone]      For further evaluation         Medication List      No changes were made to your prescriptions during this visit.            Solomon Olvera, APRN  10/20/24 8665

## 2024-11-11 ENCOUNTER — HOSPITAL ENCOUNTER (EMERGENCY)
Facility: HOSPITAL | Age: 89
Discharge: HOME | End: 2024-11-11
Attending: EMERGENCY MEDICINE
Payer: OTHER GOVERNMENT

## 2024-11-11 VITALS
TEMPERATURE: 98.7 F | RESPIRATION RATE: 20 BRPM | BODY MASS INDEX: 19.4 KG/M2 | WEIGHT: 128 LBS | HEART RATE: 83 BPM | SYSTOLIC BLOOD PRESSURE: 118 MMHG | OXYGEN SATURATION: 96 % | HEIGHT: 68 IN | DIASTOLIC BLOOD PRESSURE: 66 MMHG

## 2024-11-11 DIAGNOSIS — K94.29: Primary | ICD-10-CM

## 2024-11-11 PROCEDURE — 99282 EMERGENCY DEPT VISIT SF MDM: CPT

## 2024-11-12 RX ORDER — DOCUSATE SODIUM 100 MG/1
100 CAPSULE, LIQUID FILLED ORAL 2 TIMES DAILY
Qty: 60 CAPSULE | Refills: 12 | Status: SHIPPED | OUTPATIENT
Start: 2024-11-12

## 2024-11-18 NOTE — ED PROVIDER NOTES
Subjective     History provided by:  Patient   used: No    Abdominal Pain  Pain location:  Generalized  Pain quality: aching, cramping and dull    Pain radiates to:  Does not radiate  Pain severity:  Mild  Onset quality:  Gradual  Duration: Several.  Timing:  Constant  Progression:  Unchanged  Chronicity:  Chronic  Context: not alcohol use, not awakening from sleep, not diet changes, not eating, not laxative use, not medication withdrawal, not previous surgeries, not recent illness, not recent sexual activity, not recent travel, not retching, not sick contacts, not suspicious food intake and not trauma    Relieved by:  Nothing  Worsened by:  Nothing  Ineffective treatments:  None tried  Associated symptoms: no anorexia, no belching, no chest pain, no chills, no constipation, no cough, no diarrhea, no dysuria, no fatigue, no fever, no flatus, no hematemesis, no hematochezia, no hematuria, no melena, no nausea, no shortness of breath, no sore throat and no vomiting    Risk factors: no alcohol abuse, no aspirin use, not elderly, has not had multiple surgeries, no NSAID use, not obese and no recent hospitalization        Review of Systems   Constitutional:  Negative for activity change, appetite change, chills, diaphoresis, fatigue and fever.   HENT:  Negative for congestion, ear pain and sore throat.    Eyes:  Negative for redness.   Respiratory:  Negative for cough, chest tightness, shortness of breath and wheezing.    Cardiovascular:  Negative for chest pain, palpitations and leg swelling.   Gastrointestinal:  Positive for abdominal pain. Negative for anorexia, constipation, diarrhea, flatus, hematemesis, hematochezia, melena, nausea and vomiting.   Genitourinary:  Negative for dysuria, hematuria and urgency.   Musculoskeletal:  Negative for arthralgias, back pain, myalgias and neck pain.   Skin:  Negative for pallor, rash and wound.   Neurological:  Negative for dizziness, speech difficulty,  weakness and headaches.   Psychiatric/Behavioral:  Negative for agitation, behavioral problems, confusion and decreased concentration.    All other systems reviewed and are negative.      Past Medical History:   Diagnosis Date    Arthritis     Bradycardia     Enlarged prostate     Pacemaker     Thyroid disease        No Known Allergies    Past Surgical History:   Procedure Laterality Date    CARDIAC ELECTROPHYSIOLOGY PROCEDURE N/A 2021    Procedure: Pacemaker DC new;  Surgeon: Kendrick Burgess MD;  Location: [facility] CATH INVASIVE LOCATION;  Service: Cardiology;  Laterality: N/A;    COLOSTOMY N/A 2024    Procedure: COLOSTOMY LOOP;  Surgeon: Dilma Yanez MD;  Location: [facility] OR;  Service: General;  Laterality: N/A;    EXPLORATORY LAPAROTOMY N/A 2024    Procedure: LAPAROTOMY EXPLORATORY;  Surgeon: Dilma Yanez MD;  Location: [facility] OR;  Service: General;  Laterality: N/A;    HERNIA REPAIR Right     left inguinal/right    INSERT / REPLACE / REMOVE PACEMAKER  2021    [employer] device    SIGMOIDOSCOPY N/A 2024    Procedure: FLEXIBLE SIGMOIDOSCOPY WITH BIOPSY;  Surgeon: Dilma Yanez MD;  Location: [facility] OR;  Service: General;  Laterality: N/A;       No family history on file.    Social History     Socioeconomic History    Marital status:    Tobacco Use    Smoking status: Former     Current packs/day: 0.00     Types: Cigarettes     Quit date:      Years since quittin.9     Passive exposure: Past    Smokeless tobacco: Former     Types: Chew     Quit date:    Vaping Use    Vaping status: Never Used   Substance and Sexual Activity    Alcohol use: Never    Drug use: Never    Sexual activity: Defer           Objective   Physical Exam  Vitals and nursing note reviewed.   Constitutional:       General: He is not in acute distress.     Appearance: Normal appearance. He is well-developed. He is not toxic-appearing or diaphoretic.   HENT:      Head: Normocephalic and  atraumatic.      Right Ear: External ear normal.      Left Ear: External ear normal.      Nose: Nose normal.      Mouth/Throat:      Pharynx: No oropharyngeal exudate.      Tonsils: No tonsillar exudate.   Eyes:      General: Lids are normal.      Conjunctiva/sclera: Conjunctivae normal.      Pupils: Pupils are equal, round, and reactive to light.   Neck:      Thyroid: No thyromegaly.   Cardiovascular:      Rate and Rhythm: Normal rate and regular rhythm.      Pulses: Normal pulses.      Heart sounds: Normal heart sounds, S1 normal and S2 normal.   Pulmonary:      Effort: Pulmonary effort is normal. No tachypnea or respiratory distress.      Breath sounds: Normal breath sounds. No decreased breath sounds, wheezing or rales.   Chest:      Chest wall: No tenderness.   Abdominal:      General: Bowel sounds are normal. There is no distension.      Palpations: Abdomen is soft.      Tenderness: There is abdominal tenderness. There is no guarding or rebound.   Musculoskeletal:         General: No tenderness or deformity. Normal range of motion.      Cervical back: Full passive range of motion without pain, normal range of motion and neck supple.   Lymphadenopathy:      Cervical: No cervical adenopathy.   Skin:     General: Skin is warm and dry.      Coloration: Skin is not pale.      Findings: No erythema or rash.   Neurological:      Mental Status: He is alert and oriented to person, place, and time.      GCS: GCS eye subscore is 4. GCS verbal subscore is 5. GCS motor subscore is 6.      Cranial Nerves: No cranial nerve deficit.      Sensory: No sensory deficit.   Psychiatric:         Speech: Speech normal.         Behavior: Behavior normal.         Thought Content: Thought content normal.         Judgment: Judgment normal.         Procedures           ED Course                                                       Medical Decision Making      Final diagnoses:   Prolapse of gastrostomy stoma       ED Disposition  ED  Disposition       ED Disposition   Discharge    Condition   Stable    Comment   --               MarkieDiana, APRN  [address]  [place] [address]  [phone]    Schedule an appointment as soon as possible for a visit in 1 day  EVALUATE         Medication List      No changes were made to your prescriptions during this visit.            Tanner Chavez MD  11/18/24 1111

## 2024-11-25 ENCOUNTER — HOSPITAL ENCOUNTER (EMERGENCY)
Facility: HOSPITAL | Age: 89
Discharge: HOME | End: 2024-11-25
Attending: STUDENT IN AN ORGANIZED HEALTH CARE EDUCATION/TRAINING PROGRAM | Admitting: STUDENT IN AN ORGANIZED HEALTH CARE EDUCATION/TRAINING PROGRAM
Payer: OTHER GOVERNMENT

## 2024-11-25 ENCOUNTER — APPOINTMENT (OUTPATIENT)
Dept: CT IMAGING | Facility: HOSPITAL | Age: 89
End: 2024-11-25
Payer: OTHER GOVERNMENT

## 2024-11-25 VITALS
TEMPERATURE: 97.6 F | WEIGHT: 128 LBS | SYSTOLIC BLOOD PRESSURE: 111 MMHG | BODY MASS INDEX: 19.4 KG/M2 | DIASTOLIC BLOOD PRESSURE: 62 MMHG | HEART RATE: 63 BPM | HEIGHT: 68 IN | RESPIRATION RATE: 18 BRPM | OXYGEN SATURATION: 95 %

## 2024-11-25 DIAGNOSIS — Z43.3 COLOSTOMY CARE: Primary | ICD-10-CM

## 2024-11-25 LAB
ALBUMIN SERPL-MCNC: 4 G/DL (ref 3.5–5.2)
ALBUMIN/GLOB SERPL: 1.5 G/DL
ALP SERPL-CCNC: 141 U/L (ref 39–117)
ALT SERPL W P-5'-P-CCNC: 19 U/L (ref 1–41)
ANION GAP SERPL CALCULATED.3IONS-SCNC: 10.6 MMOL/L (ref 5–15)
AST SERPL-CCNC: 26 U/L (ref 1–40)
BASOPHILS # BLD AUTO: 0.02 10*3/MM3 (ref 0–0.2)
BASOPHILS NFR BLD AUTO: 0.3 % (ref 0–1.5)
BILIRUB SERPL-MCNC: 0.3 MG/DL (ref 0–1.2)
BUN SERPL-MCNC: 29 MG/DL (ref 8–23)
BUN/CREAT SERPL: 17.7 (ref 7–25)
CALCIUM SPEC-SCNC: 9.3 MG/DL (ref 8.2–9.6)
CHLORIDE SERPL-SCNC: 104 MMOL/L (ref 98–107)
CO2 SERPL-SCNC: 26.4 MMOL/L (ref 22–29)
CREAT SERPL-MCNC: 1.64 MG/DL (ref 0.76–1.27)
CRP SERPL-MCNC: <0.3 MG/DL (ref 0–0.5)
DEPRECATED RDW RBC AUTO: 48.3 FL (ref 37–54)
EGFRCR SERPLBLD CKD-EPI 2021: 37.8 ML/MIN/1.73
EOSINOPHIL # BLD AUTO: 0.26 10*3/MM3 (ref 0–0.4)
EOSINOPHIL NFR BLD AUTO: 3.3 % (ref 0.3–6.2)
ERYTHROCYTE [DISTWIDTH] IN BLOOD BY AUTOMATED COUNT: 13.6 % (ref 12.3–15.4)
ERYTHROCYTE [SEDIMENTATION RATE] IN BLOOD: 6 MM/HR (ref 0–20)
GLOBULIN UR ELPH-MCNC: 2.6 GM/DL
GLUCOSE SERPL-MCNC: 111 MG/DL (ref 65–99)
HCT VFR BLD AUTO: 40.7 % (ref 37.5–51)
HGB BLD-MCNC: 13 G/DL (ref 13–17.7)
HOLD SPECIMEN: NORMAL
HOLD SPECIMEN: NORMAL
IMM GRANULOCYTES # BLD AUTO: 0.05 10*3/MM3 (ref 0–0.05)
IMM GRANULOCYTES NFR BLD AUTO: 0.6 % (ref 0–0.5)
LYMPHOCYTES # BLD AUTO: 2.19 10*3/MM3 (ref 0.7–3.1)
LYMPHOCYTES NFR BLD AUTO: 27.4 % (ref 19.6–45.3)
MCH RBC QN AUTO: 30.9 PG (ref 26.6–33)
MCHC RBC AUTO-ENTMCNC: 31.9 G/DL (ref 31.5–35.7)
MCV RBC AUTO: 96.7 FL (ref 79–97)
MONOCYTES # BLD AUTO: 0.7 10*3/MM3 (ref 0.1–0.9)
MONOCYTES NFR BLD AUTO: 8.8 % (ref 5–12)
NEUTROPHILS NFR BLD AUTO: 4.76 10*3/MM3 (ref 1.7–7)
NEUTROPHILS NFR BLD AUTO: 59.6 % (ref 42.7–76)
NRBC BLD AUTO-RTO: 0 /100 WBC (ref 0–0.2)
PLATELET # BLD AUTO: 173 10*3/MM3 (ref 140–450)
PMV BLD AUTO: 9.5 FL (ref 6–12)
POTASSIUM SERPL-SCNC: 4.6 MMOL/L (ref 3.5–5.2)
PROT SERPL-MCNC: 6.6 G/DL (ref 6–8.5)
RBC # BLD AUTO: 4.21 10*6/MM3 (ref 4.14–5.8)
SODIUM SERPL-SCNC: 141 MMOL/L (ref 136–145)
WBC NRBC COR # BLD AUTO: 7.98 10*3/MM3 (ref 3.4–10.8)
WHOLE BLOOD HOLD COAG: NORMAL
WHOLE BLOOD HOLD SPECIMEN: NORMAL

## 2024-11-25 PROCEDURE — 85025 COMPLETE CBC W/AUTO DIFF WBC: CPT | Performed by: PHYSICIAN ASSISTANT

## 2024-11-25 PROCEDURE — 85652 RBC SED RATE AUTOMATED: CPT | Performed by: PHYSICIAN ASSISTANT

## 2024-11-25 PROCEDURE — 80053 COMPREHEN METABOLIC PANEL: CPT | Performed by: PHYSICIAN ASSISTANT

## 2024-11-25 PROCEDURE — 99284 EMERGENCY DEPT VISIT MOD MDM: CPT

## 2024-11-25 PROCEDURE — 36415 COLL VENOUS BLD VENIPUNCTURE: CPT | Performed by: PHYSICIAN ASSISTANT

## 2024-11-25 PROCEDURE — 74176 CT ABD & PELVIS W/O CONTRAST: CPT

## 2024-11-25 PROCEDURE — 86140 C-REACTIVE PROTEIN: CPT | Performed by: PHYSICIAN ASSISTANT

## 2024-11-26 ENCOUNTER — OFFICE VISIT (OUTPATIENT)
Age: 89
End: 2024-11-26
Payer: MEDICARE

## 2024-11-26 VITALS — HEIGHT: 68 IN | WEIGHT: 128 LBS | BODY MASS INDEX: 19.4 KG/M2

## 2024-11-26 DIAGNOSIS — K94.09 COLOSTOMY PROLAPSE: Primary | ICD-10-CM

## 2024-11-26 NOTE — ED PROVIDER NOTES
Subjective   History of Present Illness  89-year-old male with a past medical history of bradycardia, hypothyroidism, and a colostomy presents to the ER due to concerns for supply complications with his colostomy.  Patient noted he was unable to keep his home supplies sticking to his abdomen due to an increased amount of stool output.  No obvious signs of hematochezia.  No abdominal pain.  No fever or chills.  Patient present to the ER requesting new medical supplies and assistance with his colostomy.      Review of Systems   Gastrointestinal:         Colostomy issue   All other systems reviewed and are negative.      Past Medical History:   Diagnosis Date    Arthritis     Bradycardia     Enlarged prostate     Pacemaker     Thyroid disease        No Known Allergies    Past Surgical History:   Procedure Laterality Date    CARDIAC ELECTROPHYSIOLOGY PROCEDURE N/A 2021    Procedure: Pacemaker DC new;  Surgeon: Kendrick Burgess MD;  Location: [facility] CATH INVASIVE LOCATION;  Service: Cardiology;  Laterality: N/A;    COLOSTOMY N/A 2024    Procedure: COLOSTOMY LOOP;  Surgeon: Dilma Yanez MD;  Location: [facility] OR;  Service: General;  Laterality: N/A;    EXPLORATORY LAPAROTOMY N/A 2024    Procedure: LAPAROTOMY EXPLORATORY;  Surgeon: Dilma Yanez MD;  Location: [facility] OR;  Service: General;  Laterality: N/A;    HERNIA REPAIR Right     left inguinal/right    INSERT / REPLACE / REMOVE PACEMAKER  2021    [employer] device    SIGMOIDOSCOPY N/A 2024    Procedure: FLEXIBLE SIGMOIDOSCOPY WITH BIOPSY;  Surgeon: Dilma Yanez MD;  Location: [facility] OR;  Service: General;  Laterality: N/A;       No family history on file.    Social History     Socioeconomic History    Marital status:    Tobacco Use    Smoking status: Former     Current packs/day: 0.00     Types: Cigarettes     Quit date:      Years since quittin.9     Passive exposure: Past    Smokeless tobacco: Former      Types: Chew     Quit date: 1990   Vaping Use    Vaping status: Never Used   Substance and Sexual Activity    Alcohol use: Never    Drug use: Never    Sexual activity: Defer           Objective   Physical Exam  Constitutional:       General: He is not in acute distress.     Appearance: He is well-developed. He is not ill-appearing.   HENT:      Head: Normocephalic and atraumatic.   Eyes:      Extraocular Movements: Extraocular movements intact.      Pupils: Pupils are equal, round, and reactive to light.   Neck:      Vascular: No JVD.   Cardiovascular:      Rate and Rhythm: Normal rate and regular rhythm.      Heart sounds: Normal heart sounds. No murmur heard.  Pulmonary:      Effort: No tachypnea, accessory muscle usage or respiratory distress.      Breath sounds: Normal breath sounds. No stridor. No decreased breath sounds, wheezing, rhonchi or rales.   Chest:      Chest wall: No deformity, tenderness or crepitus.   Abdominal:      General: Bowel sounds are normal.      Palpations: Abdomen is soft.      Tenderness: There is no abdominal tenderness. There is no guarding or rebound.          Comments: Left lower quadrant colostomy present.  Stool output noted.  No obvious signs of blood.  Nontender.   Musculoskeletal:         General: Normal range of motion.      Cervical back: Normal range of motion and neck supple.      Right lower leg: No tenderness. No edema.      Left lower leg: No tenderness. No edema.   Lymphadenopathy:      Cervical: No cervical adenopathy.   Skin:     General: Skin is warm and dry.      Coloration: Skin is not cyanotic.      Findings: No ecchymosis or erythema.   Neurological:      General: No focal deficit present.      Mental Status: He is alert and oriented to person, place, and time.      Cranial Nerves: No cranial nerve deficit.      Motor: No weakness.   Psychiatric:         Mood and Affect: Mood normal. Mood is not anxious.         Behavior: Behavior normal. Behavior is not agitated.          Procedures           ED Course                                                       Medical Decision Making  Patient was supplied with new colostomy supplies.  Denied abdominal pain throughout entire ER course.  Patient was comfortable to be discharged home to follow-up with his surgeon tomorrow morning.  Work up and results were discussed throughly with the patient.  The patient will be discharged for further monitoring and management with their PCP.  Red flags, warning signs, worsening symptoms, and when to return to the ER discussed with and understood by the patient.  Patient will follow up with their PCP in a timely manner.  Vitals stable at discharge.    Please follow up with your primary care physician in the next 1-3 days. If this is not possible, please return to the ED for re-evaluation.    It is IMPORTANT to see your DOCTOR OR PRIMARY CARE PROVIDER. Emergency Care may be incomplete without proper follow-up.  Your evaluation in the emergency department is focused on a specific clinical complaint, at a given moment in time.  Symptoms sometimes change or new symptoms might arise after you leave the Emergency Department. It is important that you call your doctor if you become worse in any way, or promptly return to the Emergency Department should any new, or worsening/changing symptom occur.  You are strongly urged to follow-up with your physician to assure complete and thorough care. PLEASE CALL YOUR DOCTORS OFFICE TODAY, INFORM THEM THAT YOU WERE SEEN IN THE EMERGENCY DEPARTMENT, AND THAT YOU NEED TO BE SEEN IMMEDIATELY FOR CLOSE FOLLOW UP.  If you do not have a primary care doctor we encourage you to proactively seek a local physician for close follow up.  Consider local clinics, free or sliding scale clinics, local [facility] [place], [place].  You can always return to the Emergency Department if you are having difficulty coordinating close follow up.  If medications were prescribed you should fill them at  your local pharmacy immediately and take only as prescribed.  Bring your new medications to your doctors follow up visit to discuss any changes that would be necessary. RETURN TO THE EMERGENCY DEPARTMENT IMMEDIATELY FOR ANY NEW OR WORSENING SYMPTOMS.    ADDITIONAL DISCHARGE INSTRUCTIONS:     - If you received IV contrast today with a CT or MRI please stay well hydrated for the next 48-72 hours to protect your kidneys.    - If you have been prescribed an antibiotic, taking an over the counter probiotic may help with gastrointestinal side effects and antibiotic associated diarrhea.    - Return to the emergency department or seek immediate medical care if any of the following occur:           - Symptoms do not improve in 8-12 hours. If this occurs, please return to the emergency department for re-evaluation or your symptoms or repeat abdominal examination         - Symptoms worsen at any time.         - If you are unable to follow up with a medical provider as instructed.         - You develop any worrisome symptoms such as fever, chills, uncontrolled pain, change or worsening of your pain symptoms, intractable vomiting, uncontrolled diarrhea, blood in your stool, dark/tarry stool, new neurological symptoms etc.    If you have been prescribed a narcotic pain medication, please take a stool softener to prevent constipation.     During your ED visit, you may have been given narcotics (such as morphine, dilaudid, percocet, vicodin) or sedatives (such as ativan, versed). These medications can impair your reflexes so, DO NOT DRIVE or USE ANY MACHINERY for at least 6 hours if you have been given these medications.    REMINDER FOR METFORMIN USERS: If you are using metformin (also known as Glucophage) and if you received a CT scan in which you received IV contrast, you must hold your metformin for a total of 72 hrs.  This will prevent any adverse interactions between the two agents.      Amount and/or Complexity of Data  Reviewed  Labs: ordered.  Radiology: ordered.        Final diagnoses:   Colostomy care       ED Disposition  ED Disposition       ED Disposition   Discharge    Condition   Stable    Comment   --               No follow-up provider specified.       Medication List      No changes were made to your prescriptions during this visit.            Ad Curry DO  11/25/24 2040

## 2024-11-27 NOTE — PROGRESS NOTES
"Patient Name:  Joaquín Rogers  YOB: 1927  [id number]    Follow Up Note    Date of visit: 11/26/2024    Subjective   Subjective: Presents for follow up. Has prolapsed ostomy. It is functioning well. He is not interested in further surgical intervention. He has not been in contact with home health.          Objective     Objective:     Ht 172.7 cm (67.99\")   Wt 58.1 kg (128 lb)   BMI 19.47 kg/m²       CV:  Regular rate and rhythm  L:  Bilateral lung rise and fall, no use of accessory muscles   Abd:  Soft, nontender, nondistended  Ext:  No cyanosis, clubbing, edema      Assessment/ Plan:  Assessment   Diagnoses and all orders for this visit:    1. Colostomy prolapse (Primary)    Mr Rogers presents for follow up with a prolapsed ostomy. Discussed that to fix this would require an abdominal surgery. He denies wanting to undergo this. Will plan for follow up PRN.     Encouraged him to reach out to home health to establish care.     Dilma Hermosillo MD       General Surgeon  [facility]       "

## 2024-12-07 VITALS
TEMPERATURE: 98.3 F | DIASTOLIC BLOOD PRESSURE: 80 MMHG | HEART RATE: 80 BPM | OXYGEN SATURATION: 97 % | HEIGHT: 68 IN | WEIGHT: 129 LBS | BODY MASS INDEX: 19.55 KG/M2 | SYSTOLIC BLOOD PRESSURE: 147 MMHG | RESPIRATION RATE: 18 BRPM

## 2024-12-07 PROCEDURE — 99282 EMERGENCY DEPT VISIT SF MDM: CPT

## 2024-12-08 ENCOUNTER — HOSPITAL ENCOUNTER (EMERGENCY)
Facility: HOSPITAL | Age: 89
Discharge: HOME | End: 2024-12-08
Attending: EMERGENCY MEDICINE
Payer: OTHER GOVERNMENT

## 2024-12-08 DIAGNOSIS — Z43.3 COLOSTOMY CARE: Primary | ICD-10-CM

## 2024-12-08 DIAGNOSIS — K94.09 COLOSTOMY PROLAPSE: ICD-10-CM

## 2024-12-08 NOTE — ED PROVIDER NOTES
Subjective   History of Present Illness  Joaquín is a 89-year-old male who presents to the ED for colostomy care.  Patient had there is a colostomy due to bowel blockage in his bowels.  He states that he does not know how to care for it and comes to the ER as his son who used to be doing it moved.  He states the home health supposed to come out and help him.  He states he has a prolapse and its worst at the end of the day compared to early in the morning.  He was previously seen by Dr. JANE was told he needs surgery and he declined that        Review of Systems   All other systems reviewed and are negative.      Past Medical History:   Diagnosis Date    Arthritis     Bradycardia     Enlarged prostate     Pacemaker     Thyroid disease        No Known Allergies    Past Surgical History:   Procedure Laterality Date    CARDIAC ELECTROPHYSIOLOGY PROCEDURE N/A 2021    Procedure: Pacemaker DC new;  Surgeon: Kendrick Burgess MD;  Location: [facility] CATH INVASIVE LOCATION;  Service: Cardiology;  Laterality: N/A;    COLOSTOMY N/A 2024    Procedure: COLOSTOMY LOOP;  Surgeon: Dilma Yanez MD;  Location: [facility] OR;  Service: General;  Laterality: N/A;    EXPLORATORY LAPAROTOMY N/A 2024    Procedure: LAPAROTOMY EXPLORATORY;  Surgeon: Dilma Yanez MD;  Location: [facility] OR;  Service: General;  Laterality: N/A;    HERNIA REPAIR Right     left inguinal/right    INSERT / REPLACE / REMOVE PACEMAKER  2021    [employer] device    SIGMOIDOSCOPY N/A 2024    Procedure: FLEXIBLE SIGMOIDOSCOPY WITH BIOPSY;  Surgeon: Dilma Yanez MD;  Location: [facility] OR;  Service: General;  Laterality: N/A;       No family history on file.    Social History     Socioeconomic History    Marital status:    Tobacco Use    Smoking status: Former     Current packs/day: 0.00     Types: Cigarettes     Quit date:      Years since quittin.9     Passive exposure: Past    Smokeless tobacco: Former     Types: Chew      Quit date: 1990   Vaping Use    Vaping status: Never Used   Substance and Sexual Activity    Alcohol use: Never    Drug use: Never    Sexual activity: Defer           Objective   Physical Exam  Vitals reviewed.   Constitutional:       Appearance: Normal appearance. He is normal weight.   HENT:      Head: Normocephalic and atraumatic.      Right Ear: External ear normal.      Left Ear: External ear normal.      Nose: Nose normal.      Mouth/Throat:      Mouth: Mucous membranes are moist. Mucous membranes are dry.      Pharynx: Oropharynx is clear.   Eyes:      Extraocular Movements: Extraocular movements intact.      Conjunctiva/sclera: Conjunctivae normal.      Pupils: Pupils are equal, round, and reactive to light.   Cardiovascular:      Rate and Rhythm: Normal rate and regular rhythm.      Pulses: Normal pulses.      Heart sounds: Normal heart sounds.   Pulmonary:      Effort: Pulmonary effort is normal.      Breath sounds: Normal breath sounds.   Abdominal:      General: Bowel sounds are normal.      Palpations: Abdomen is soft.          Comments: Prolapsed colostomy, moist mucous membrane   Musculoskeletal:         General: Normal range of motion.      Cervical back: Normal range of motion and neck supple. No rigidity.   Skin:     General: Skin is warm and dry.      Capillary Refill: Capillary refill takes less than 2 seconds.   Neurological:      General: No focal deficit present.      Mental Status: He is alert and oriented to person, place, and time.   Psychiatric:         Mood and Affect: Mood normal.         Procedures           ED Course                                                       Medical Decision Making  Joaquín is a 89-year-old male who presents to the ED for colostomy care.  Patient has a history of colostomy prolapse.  He is previously been seen by Dr. JANE who states that the patient requires trickle intervention for correction however he does not want this.  Patient will have his colostomy  changed and he will be discharged to follow-up as needed.    Problems Addressed:  Colostomy care: acute illness or injury  Colostomy prolapse: acute illness or injury        Final diagnoses:   Colostomy care   Colostomy prolapse       ED Disposition  ED Disposition       ED Disposition   Discharge    Condition   Stable    Comment   --               Diana Aden, APRN  [address]  [place] [address]  [phone]          Diana Aden, APRN  [address]  [place] [address]  [phone]               Medication List      No changes were made to your prescriptions during this visit.            Lance Munoz MD  12/08/24 0120       Lance Munoz MD  12/08/24 0121

## 2025-01-10 PROCEDURE — 93296 REM INTERROG EVL PM/IDS: CPT | Performed by: INTERNAL MEDICINE

## 2025-01-10 PROCEDURE — 93294 REM INTERROG EVL PM/LDLS PM: CPT | Performed by: INTERNAL MEDICINE

## 2025-02-11 ENCOUNTER — OFFICE VISIT (OUTPATIENT)
Age: OVER 89
End: 2025-02-11
Payer: MEDICARE

## 2025-02-11 VITALS — WEIGHT: 129 LBS | HEIGHT: 68 IN | BODY MASS INDEX: 19.55 KG/M2

## 2025-02-11 DIAGNOSIS — K94.09 COLOSTOMY PROLAPSE: Primary | ICD-10-CM

## 2025-02-11 PROCEDURE — 1159F MED LIST DOCD IN RCRD: CPT | Performed by: SURGERY

## 2025-02-11 PROCEDURE — 1160F RVW MEDS BY RX/DR IN RCRD: CPT | Performed by: SURGERY

## 2025-02-11 PROCEDURE — 99213 OFFICE O/P EST LOW 20 MIN: CPT | Performed by: SURGERY

## 2025-02-12 NOTE — PROGRESS NOTES
"Patient Name:  Joaquín Rogers  YOB: 1927  [id number]    Follow Up Note    Date of visit: 2/11/2025    Subjective   Subjective: Presents for colostomy evaluation. Overall well appearing. Reports decrease in bowel function over the last 2 days.        Objective     Objective:     Ht 172.7 cm (67.99\")   Wt 58.5 kg (129 lb)   BMI 19.62 kg/m²       CV:  Regular rate and rhythm  L:  Bilateral lung rise and fall, no use of accessory muscles   Abd:  Soft, nontender, nondistended. Significantly prolapsed ostomy,   Ext:  No cyanosis, clubbing, edema      Assessment/ Plan:  Assessment   Diagnoses and all orders for this visit:    1. Colostomy prolapse (Primary)    Presents for follow up of colostomy prolapse. Recommended aggressive bowel regimen to continue bowel function. Discussed that to fix this, he would require a major abdominal surgery. Patient not agreeable to surgery at this time.     Dilma Hermosillo MD       General Surgeon  [facility]       "

## 2025-03-02 ENCOUNTER — HOSPITAL ENCOUNTER (EMERGENCY)
Facility: HOSPITAL | Age: OVER 89
Discharge: HOME | End: 2025-03-02
Attending: STUDENT IN AN ORGANIZED HEALTH CARE EDUCATION/TRAINING PROGRAM | Admitting: FAMILY MEDICINE
Payer: OTHER GOVERNMENT

## 2025-03-02 ENCOUNTER — APPOINTMENT (OUTPATIENT)
Dept: CT IMAGING | Facility: HOSPITAL | Age: OVER 89
End: 2025-03-02
Payer: OTHER GOVERNMENT

## 2025-03-02 VITALS
RESPIRATION RATE: 18 BRPM | DIASTOLIC BLOOD PRESSURE: 80 MMHG | OXYGEN SATURATION: 92 % | WEIGHT: 135 LBS | SYSTOLIC BLOOD PRESSURE: 149 MMHG | TEMPERATURE: 98.5 F | HEIGHT: 68 IN | BODY MASS INDEX: 20.46 KG/M2 | HEART RATE: 65 BPM

## 2025-03-02 DIAGNOSIS — K59.00 CONSTIPATION, UNSPECIFIED CONSTIPATION TYPE: Primary | ICD-10-CM

## 2025-03-02 DIAGNOSIS — N30.00 ACUTE CYSTITIS WITHOUT HEMATURIA: ICD-10-CM

## 2025-03-02 LAB
ALBUMIN SERPL-MCNC: 3.5 G/DL (ref 3.5–5.2)
ALBUMIN/GLOB SERPL: 1.1 G/DL
ALP SERPL-CCNC: 169 U/L (ref 39–117)
ALT SERPL W P-5'-P-CCNC: 15 U/L (ref 1–41)
ANION GAP SERPL CALCULATED.3IONS-SCNC: 7.6 MMOL/L (ref 5–15)
AST SERPL-CCNC: 24 U/L (ref 1–40)
BACTERIA UR QL AUTO: ABNORMAL /HPF
BASOPHILS # BLD AUTO: 0.02 10*3/MM3 (ref 0–0.2)
BASOPHILS NFR BLD AUTO: 0.3 % (ref 0–1.5)
BILIRUB SERPL-MCNC: 0.7 MG/DL (ref 0–1.2)
BILIRUB UR QL STRIP: NEGATIVE
BUN SERPL-MCNC: 19 MG/DL (ref 8–23)
BUN/CREAT SERPL: 14.8 (ref 7–25)
CALCIUM SPEC-SCNC: 9.2 MG/DL (ref 8.2–9.6)
CHLORIDE SERPL-SCNC: 106 MMOL/L (ref 98–107)
CLARITY UR: CLEAR
CO2 SERPL-SCNC: 27.4 MMOL/L (ref 22–29)
COLOR UR: YELLOW
CREAT SERPL-MCNC: 1.28 MG/DL (ref 0.76–1.27)
D-LACTATE SERPL-SCNC: 1 MMOL/L (ref 0.5–2)
DEPRECATED RDW RBC AUTO: 43.3 FL (ref 37–54)
EGFRCR SERPLBLD CKD-EPI 2021: 50.9 ML/MIN/1.73
EOSINOPHIL # BLD AUTO: 0.36 10*3/MM3 (ref 0–0.4)
EOSINOPHIL NFR BLD AUTO: 5.4 % (ref 0.3–6.2)
ERYTHROCYTE [DISTWIDTH] IN BLOOD BY AUTOMATED COUNT: 12.2 % (ref 12.3–15.4)
GLOBULIN UR ELPH-MCNC: 3.2 GM/DL
GLUCOSE SERPL-MCNC: 98 MG/DL (ref 65–99)
GLUCOSE UR STRIP-MCNC: NEGATIVE MG/DL
HCT VFR BLD AUTO: 42.2 % (ref 37.5–51)
HGB BLD-MCNC: 13.9 G/DL (ref 13–17.7)
HGB UR QL STRIP.AUTO: ABNORMAL
HOLD SPECIMEN: NORMAL
HYALINE CASTS UR QL AUTO: ABNORMAL /LPF
IMM GRANULOCYTES # BLD AUTO: 0.03 10*3/MM3 (ref 0–0.05)
IMM GRANULOCYTES NFR BLD AUTO: 0.5 % (ref 0–0.5)
KETONES UR QL STRIP: NEGATIVE
LEUKOCYTE ESTERASE UR QL STRIP.AUTO: ABNORMAL
LIPASE SERPL-CCNC: 25 U/L (ref 13–60)
LYMPHOCYTES # BLD AUTO: 1.9 10*3/MM3 (ref 0.7–3.1)
LYMPHOCYTES NFR BLD AUTO: 28.7 % (ref 19.6–45.3)
MCH RBC QN AUTO: 31.6 PG (ref 26.6–33)
MCHC RBC AUTO-ENTMCNC: 32.9 G/DL (ref 31.5–35.7)
MCV RBC AUTO: 95.9 FL (ref 79–97)
MONOCYTES # BLD AUTO: 0.6 10*3/MM3 (ref 0.1–0.9)
MONOCYTES NFR BLD AUTO: 9.1 % (ref 5–12)
NEUTROPHILS NFR BLD AUTO: 3.7 10*3/MM3 (ref 1.7–7)
NEUTROPHILS NFR BLD AUTO: 56 % (ref 42.7–76)
NITRITE UR QL STRIP: NEGATIVE
NRBC BLD AUTO-RTO: 0 /100 WBC (ref 0–0.2)
PH UR STRIP.AUTO: 6.5 [PH] (ref 5–8)
PLATELET # BLD AUTO: 161 10*3/MM3 (ref 140–450)
PMV BLD AUTO: 9.6 FL (ref 6–12)
POTASSIUM SERPL-SCNC: 4.7 MMOL/L (ref 3.5–5.2)
PROT SERPL-MCNC: 6.7 G/DL (ref 6–8.5)
PROT UR QL STRIP: NEGATIVE
RBC # BLD AUTO: 4.4 10*6/MM3 (ref 4.14–5.8)
RBC # UR STRIP: ABNORMAL /HPF
REF LAB TEST METHOD: ABNORMAL
SODIUM SERPL-SCNC: 141 MMOL/L (ref 136–145)
SP GR UR STRIP: 1.01 (ref 1–1.03)
SQUAMOUS #/AREA URNS HPF: ABNORMAL /HPF
UROBILINOGEN UR QL STRIP: ABNORMAL
WBC # UR STRIP: ABNORMAL /HPF
WBC NRBC COR # BLD AUTO: 6.61 10*3/MM3 (ref 3.4–10.8)
WHOLE BLOOD HOLD COAG: NORMAL
WHOLE BLOOD HOLD SPECIMEN: NORMAL

## 2025-03-02 PROCEDURE — 36415 COLL VENOUS BLD VENIPUNCTURE: CPT

## 2025-03-02 PROCEDURE — 99285 EMERGENCY DEPT VISIT HI MDM: CPT

## 2025-03-02 PROCEDURE — 74177 CT ABD & PELVIS W/CONTRAST: CPT | Performed by: RADIOLOGY

## 2025-03-02 PROCEDURE — 83605 ASSAY OF LACTIC ACID: CPT | Performed by: STUDENT IN AN ORGANIZED HEALTH CARE EDUCATION/TRAINING PROGRAM

## 2025-03-02 PROCEDURE — 81001 URINALYSIS AUTO W/SCOPE: CPT | Performed by: STUDENT IN AN ORGANIZED HEALTH CARE EDUCATION/TRAINING PROGRAM

## 2025-03-02 PROCEDURE — 25510000001 IOPAMIDOL 61 % SOLUTION: Performed by: STUDENT IN AN ORGANIZED HEALTH CARE EDUCATION/TRAINING PROGRAM

## 2025-03-02 PROCEDURE — 83690 ASSAY OF LIPASE: CPT | Performed by: STUDENT IN AN ORGANIZED HEALTH CARE EDUCATION/TRAINING PROGRAM

## 2025-03-02 PROCEDURE — 74177 CT ABD & PELVIS W/CONTRAST: CPT

## 2025-03-02 PROCEDURE — 85025 COMPLETE CBC W/AUTO DIFF WBC: CPT | Performed by: STUDENT IN AN ORGANIZED HEALTH CARE EDUCATION/TRAINING PROGRAM

## 2025-03-02 PROCEDURE — 80053 COMPREHEN METABOLIC PANEL: CPT | Performed by: STUDENT IN AN ORGANIZED HEALTH CARE EDUCATION/TRAINING PROGRAM

## 2025-03-02 RX ORDER — CEFDINIR 300 MG/1
300 CAPSULE ORAL DAILY
Qty: 5 CAPSULE | Refills: 0 | Status: SHIPPED | OUTPATIENT
Start: 2025-03-02

## 2025-03-02 RX ORDER — SODIUM CHLORIDE 0.9 % (FLUSH) 0.9 %
10 SYRINGE (ML) INJECTION AS NEEDED
Status: DISCONTINUED | OUTPATIENT
Start: 2025-03-02 | End: 2025-03-02 | Stop reason: HOSPADM

## 2025-03-02 RX ORDER — LACTULOSE 10 G/15ML
20 SOLUTION ORAL 2 TIMES DAILY
Qty: 237 ML | Refills: 0 | Status: SHIPPED | OUTPATIENT
Start: 2025-03-02

## 2025-03-02 RX ORDER — SENNOSIDES 8.6 MG
1 CAPSULE ORAL DAILY
Qty: 7 EACH | Refills: 0 | Status: SHIPPED | OUTPATIENT
Start: 2025-03-02 | End: 2025-03-09

## 2025-03-02 RX ORDER — IOPAMIDOL 612 MG/ML
100 INJECTION, SOLUTION INTRAVASCULAR
Status: COMPLETED | OUTPATIENT
Start: 2025-03-02 | End: 2025-03-02

## 2025-03-02 RX ADMIN — IOPAMIDOL 80 ML: 612 INJECTION, SOLUTION INTRAVENOUS at 07:42

## 2025-03-02 NOTE — ED PROVIDER NOTES
Subjective   History of Present Illness  The patient presents to the ED with a chief complaint of abdominal distention and no bowel movement for three days. Patient reports feeling like they are 'going to explode.' Medical history is significant for a colostomy placed in July of last year, though the indication for placement is unclear. Patient denies any vomiting or chills. Patient reports having low blood pressure but denies history of diabetes or heart disease. Patient reports taking multiple medications but is unable to provide specifics as medications are not with them. Patient reports normal urination.              Review of Systems   All other systems reviewed and are negative.      Past Medical History:   Diagnosis Date    Arthritis     Bradycardia     Enlarged prostate     Pacemaker     Thyroid disease        No Known Allergies    Past Surgical History:   Procedure Laterality Date    CARDIAC ELECTROPHYSIOLOGY PROCEDURE N/A 09/13/2021    Procedure: Pacemaker DC new;  Surgeon: Kendrick Burgess MD;  Location: [facility] CATH INVASIVE LOCATION;  Service: Cardiology;  Laterality: N/A;    COLOSTOMY N/A 7/1/2024    Procedure: COLOSTOMY LOOP;  Surgeon: Dilma Yanez MD;  Location: [facility] OR;  Service: General;  Laterality: N/A;    EXPLORATORY LAPAROTOMY N/A 7/1/2024    Procedure: LAPAROTOMY EXPLORATORY;  Surgeon: Dilma Yanez MD;  Location: [facility] OR;  Service: General;  Laterality: N/A;    HERNIA REPAIR Right     left inguinal/right    INSERT / REPLACE / REMOVE PACEMAKER  09/13/2021    [employer] device    SIGMOIDOSCOPY N/A 6/30/2024    Procedure: FLEXIBLE SIGMOIDOSCOPY WITH BIOPSY;  Surgeon: Dilma Yanez MD;  Location: [facility] OR;  Service: General;  Laterality: N/A;       No family history on file.    Social History     Socioeconomic History    Marital status:    Tobacco Use    Smoking status: Former     Current packs/day: 0.00     Types: Cigarettes     Quit date: 1990     Years since  quittin.1     Passive exposure: Past    Smokeless tobacco: Former     Types: Chew     Quit date:    Vaping Use    Vaping status: Never Used   Substance and Sexual Activity    Alcohol use: Never    Drug use: Never    Sexual activity: Defer           Objective   Physical Exam      Constitutional:  General: Patient is not in acute distress.  Appearance: Patient is not diaphoretic.    HENT:  Head: Normocephalic and atraumatic.  Right Ear: External ear normal.  Left Ear: External ear normal.  Nose: Nose normal.    Eyes:  General: No scleral icterus.  Conjunctiva/sclera: Conjunctivae normal.    Cardiovascular:  Rate and Rhythm: Normal rate and regular rhythm.  Heart sounds: No friction rub.    Pulmonary:  Effort: Pulmonary effort is normal. No respiratory distress.  Breath sounds: No stridor. No wheezing.    Abdominal:  Inspection:Bowel prolapse present at baseline.   General: **Patient has colostomy bag in place, abd is soft and non tender. No significant distention.     Musculoskeletal:  General: No deformity.    Skin:  General: Skin is warm and dry. No rashes present. Normal color. Normal turgor.    Neurological:  General: No focal deficit present.  Mental Status: Alert and oriented    Procedures           ED Course  ED Course as of 25 0807   Sun Mar 02, 2025   0634 Lactic acid negative. Negative lipase. Chemistry, as well as wbc WNL. Pending imaging. Will sign out due to change in shift.  [SG]   0747 Patient's labs unremarkable thus far.  Patient urinalysis does show 1+ bacteria too numerous count white blood cells will likely initiate antibiotics.  Awaiting CT scan abdomen pelvis. [BB]      ED Course User Index  [BB] Benedicto Velasquez MD  [SG] Johnnie Shell MD                                                       Medical Decision Making  Patient presents with abdominal distention and constipation in the setting of colostomy.    Plan includes CT scan of abdomen and blood work to evaluate for  potential complications related to colostomy and current bowel obstruction. Patient reports history of having procedures at this facility, which will be reviewed for additional history.    Hx of bowel of colostomy bowel prolapse, at baseline.     Problems Addressed:  Acute cystitis without hematuria: complicated acute illness or injury  Constipation, unspecified constipation type: complicated acute illness or injury    Amount and/or Complexity of Data Reviewed  External Data Reviewed: labs, radiology and notes.  Labs: ordered.  Radiology: ordered and independent interpretation performed.    Risk  OTC drugs.  Prescription drug management.        Final diagnoses:   Constipation, unspecified constipation type   Acute cystitis without hematuria       ED Disposition  ED Disposition       ED Disposition   Discharge    Condition   Stable    Comment   --               Diana Aden, APRN  [address]  [place] [address]  [phone]    In 2 days           Medication List        New Prescriptions      cefdinir 300 MG capsule  Commonly known as: OMNICEF  Take 1 capsule by mouth Daily.     lactulose 10 GM/15ML solution  Commonly known as: CHRONULAC  Take 30 mL by mouth 2 (Two) Times a Day.     Senna 8.6 MG capsule  Take 1 capsule by mouth Daily for 7 days.            Stop      cephalexin 500 MG capsule  Commonly known as: KEFLEX               Where to Get Your Medications        These medications were sent to [id number] - [place], [place] - [address] AT [facility] [address] - [phone] PH - [phone] FX  [address] [place] [address]      Phone: [phone]   cefdinir 300 MG capsule  lactulose 10 GM/15ML solution  Senna 8.6 MG capsule            Benedicto Velasquez MD  03/02/25 7335

## 2025-03-04 ENCOUNTER — HOSPITAL ENCOUNTER (EMERGENCY)
Facility: HOSPITAL | Age: OVER 89
Discharge: HOME | End: 2025-03-04
Attending: STUDENT IN AN ORGANIZED HEALTH CARE EDUCATION/TRAINING PROGRAM | Admitting: STUDENT IN AN ORGANIZED HEALTH CARE EDUCATION/TRAINING PROGRAM
Payer: OTHER GOVERNMENT

## 2025-03-04 ENCOUNTER — APPOINTMENT (OUTPATIENT)
Dept: GENERAL RADIOLOGY | Facility: HOSPITAL | Age: OVER 89
End: 2025-03-04
Payer: OTHER GOVERNMENT

## 2025-03-04 ENCOUNTER — APPOINTMENT (OUTPATIENT)
Dept: CT IMAGING | Facility: HOSPITAL | Age: OVER 89
End: 2025-03-04
Payer: OTHER GOVERNMENT

## 2025-03-04 ENCOUNTER — NURSE TRIAGE (OUTPATIENT)
Dept: CALL CENTER | Facility: HOSPITAL | Age: OVER 89
End: 2025-03-04
Payer: MEDICARE

## 2025-03-04 VITALS
BODY MASS INDEX: 20.45 KG/M2 | TEMPERATURE: 97.7 F | HEIGHT: 68 IN | SYSTOLIC BLOOD PRESSURE: 131 MMHG | DIASTOLIC BLOOD PRESSURE: 78 MMHG | HEART RATE: 73 BPM | RESPIRATION RATE: 16 BRPM | OXYGEN SATURATION: 94 % | WEIGHT: 134.92 LBS
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
## 2025-03-04 DIAGNOSIS — K76.9 HEPATIC LESION: ICD-10-CM

## 2025-03-04 DIAGNOSIS — K68.3 RETROPERITONEAL HEMATOMA: ICD-10-CM

## 2025-03-04 DIAGNOSIS — C79.9: ICD-10-CM

## 2025-03-04 DIAGNOSIS — S09.90XA INJURY OF HEAD, INITIAL ENCOUNTER: ICD-10-CM

## 2025-03-04 DIAGNOSIS — C74.90: ICD-10-CM

## 2025-03-04 LAB
ALBUMIN SERPL-MCNC: 3.9 G/DL (ref 3.5–5.2)
ALBUMIN/GLOB SERPL: 1.3 G/DL
ALP SERPL-CCNC: 182 U/L (ref 39–117)
ALT SERPL W P-5'-P-CCNC: 18 U/L (ref 1–41)
ANION GAP SERPL CALCULATED.3IONS-SCNC: 9.6 MMOL/L (ref 5–15)
AST SERPL-CCNC: 29 U/L (ref 1–40)
BACTERIA UR QL AUTO: ABNORMAL /HPF
BASOPHILS # BLD AUTO: 0.03 10*3/MM3 (ref 0–0.2)
BASOPHILS NFR BLD AUTO: 0.3 % (ref 0–1.5)
BILIRUB SERPL-MCNC: 0.4 MG/DL (ref 0–1.2)
BILIRUB UR QL STRIP: NEGATIVE
BUN SERPL-MCNC: 20 MG/DL (ref 8–23)
BUN/CREAT SERPL: 14.7 (ref 7–25)
CALCIUM SPEC-SCNC: 9.2 MG/DL (ref 8.2–9.6)
CHLORIDE SERPL-SCNC: 108 MMOL/L (ref 98–107)
CLARITY UR: CLEAR
CO2 SERPL-SCNC: 26.4 MMOL/L (ref 22–29)
COLOR UR: YELLOW
CREAT SERPL-MCNC: 1.36 MG/DL (ref 0.76–1.27)
DEPRECATED RDW RBC AUTO: 45.7 FL (ref 37–54)
EGFRCR SERPLBLD CKD-EPI 2021: 47.3 ML/MIN/1.73
EOSINOPHIL # BLD AUTO: 0.34 10*3/MM3 (ref 0–0.4)
EOSINOPHIL NFR BLD AUTO: 3.3 % (ref 0.3–6.2)
ERYTHROCYTE [DISTWIDTH] IN BLOOD BY AUTOMATED COUNT: 12.7 % (ref 12.3–15.4)
GEN 5 1HR TROPONIN T REFLEX: 11 NG/L
GLOBULIN UR ELPH-MCNC: 3.1 GM/DL
GLUCOSE SERPL-MCNC: 168 MG/DL (ref 65–99)
GLUCOSE UR STRIP-MCNC: NEGATIVE MG/DL
HCT VFR BLD AUTO: 34.1 % (ref 37.5–51)
HCT VFR BLD AUTO: 34.3 % (ref 37.5–51)
HCT VFR BLD AUTO: 40.4 % (ref 37.5–51)
HGB BLD-MCNC: 10.9 G/DL (ref 13–17.7)
HGB BLD-MCNC: 11.1 G/DL (ref 13–17.7)
HGB BLD-MCNC: 13 G/DL (ref 13–17.7)
HGB UR QL STRIP.AUTO: NEGATIVE
HOLD SPECIMEN: NORMAL
HYALINE CASTS UR QL AUTO: ABNORMAL /LPF
IMM GRANULOCYTES # BLD AUTO: 0.05 10*3/MM3 (ref 0–0.05)
IMM GRANULOCYTES NFR BLD AUTO: 0.5 % (ref 0–0.5)
KETONES UR QL STRIP: ABNORMAL
LEUKOCYTE ESTERASE UR QL STRIP.AUTO: ABNORMAL
LYMPHOCYTES # BLD AUTO: 2.06 10*3/MM3 (ref 0.7–3.1)
LYMPHOCYTES NFR BLD AUTO: 20 % (ref 19.6–45.3)
MCH RBC QN AUTO: 31.6 PG (ref 26.6–33)
MCHC RBC AUTO-ENTMCNC: 32.2 G/DL (ref 31.5–35.7)
MCV RBC AUTO: 98.1 FL (ref 79–97)
MONOCYTES # BLD AUTO: 0.62 10*3/MM3 (ref 0.1–0.9)
MONOCYTES NFR BLD AUTO: 6 % (ref 5–12)
NEUTROPHILS NFR BLD AUTO: 69.9 % (ref 42.7–76)
NEUTROPHILS NFR BLD AUTO: 7.21 10*3/MM3 (ref 1.7–7)
NITRITE UR QL STRIP: NEGATIVE
NRBC BLD AUTO-RTO: 0 /100 WBC (ref 0–0.2)
PH UR STRIP.AUTO: 5.5 [PH] (ref 5–8)
PLATELET # BLD AUTO: 188 10*3/MM3 (ref 140–450)
PMV BLD AUTO: 9.8 FL (ref 6–12)
POTASSIUM SERPL-SCNC: 4.3 MMOL/L (ref 3.5–5.2)
PROT SERPL-MCNC: 7 G/DL (ref 6–8.5)
PROT UR QL STRIP: ABNORMAL
QT INTERVAL: 444 MS
QTC INTERVAL: 475 MS
RBC # BLD AUTO: 4.12 10*6/MM3 (ref 4.14–5.8)
RBC # UR STRIP: ABNORMAL /HPF
REF LAB TEST METHOD: ABNORMAL
SODIUM SERPL-SCNC: 144 MMOL/L (ref 136–145)
SP GR UR STRIP: 1.02 (ref 1–1.03)
SQUAMOUS #/AREA URNS HPF: ABNORMAL /HPF
TROPONIN T NUMERIC DELTA: -1 NG/L
TROPONIN T SERPL HS-MCNC: 12 NG/L
UROBILINOGEN UR QL STRIP: ABNORMAL
WBC # UR STRIP: ABNORMAL /HPF
WBC NRBC COR # BLD AUTO: 10.31 10*3/MM3 (ref 3.4–10.8)
WHOLE BLOOD HOLD COAG: NORMAL
WHOLE BLOOD HOLD SPECIMEN: NORMAL

## 2025-03-04 PROCEDURE — 74174 CTA ABD&PLVS W/CONTRAST: CPT

## 2025-03-04 PROCEDURE — 96360 HYDRATION IV INFUSION INIT: CPT

## 2025-03-04 PROCEDURE — 25510000001 IOPAMIDOL PER 1 ML: Performed by: STUDENT IN AN ORGANIZED HEALTH CARE EDUCATION/TRAINING PROGRAM

## 2025-03-04 PROCEDURE — 36415 COLL VENOUS BLD VENIPUNCTURE: CPT | Performed by: STUDENT IN AN ORGANIZED HEALTH CARE EDUCATION/TRAINING PROGRAM

## 2025-03-04 PROCEDURE — 84484 ASSAY OF TROPONIN QUANT: CPT | Performed by: STUDENT IN AN ORGANIZED HEALTH CARE EDUCATION/TRAINING PROGRAM

## 2025-03-04 PROCEDURE — 85014 HEMATOCRIT: CPT | Performed by: EMERGENCY MEDICINE

## 2025-03-04 PROCEDURE — 71045 X-RAY EXAM CHEST 1 VIEW: CPT

## 2025-03-04 PROCEDURE — 70450 CT HEAD/BRAIN W/O DYE: CPT

## 2025-03-04 PROCEDURE — 85018 HEMOGLOBIN: CPT | Performed by: EMERGENCY MEDICINE

## 2025-03-04 PROCEDURE — 85025 COMPLETE CBC W/AUTO DIFF WBC: CPT | Performed by: STUDENT IN AN ORGANIZED HEALTH CARE EDUCATION/TRAINING PROGRAM

## 2025-03-04 PROCEDURE — 72125 CT NECK SPINE W/O DYE: CPT

## 2025-03-04 PROCEDURE — 87186 SC STD MICRODIL/AGAR DIL: CPT | Performed by: STUDENT IN AN ORGANIZED HEALTH CARE EDUCATION/TRAINING PROGRAM

## 2025-03-04 PROCEDURE — 25810000003 SODIUM CHLORIDE 0.9 % SOLUTION: Performed by: STUDENT IN AN ORGANIZED HEALTH CARE EDUCATION/TRAINING PROGRAM

## 2025-03-04 PROCEDURE — 87086 URINE CULTURE/COLONY COUNT: CPT | Performed by: STUDENT IN AN ORGANIZED HEALTH CARE EDUCATION/TRAINING PROGRAM

## 2025-03-04 PROCEDURE — 74176 CT ABD & PELVIS W/O CONTRAST: CPT

## 2025-03-04 PROCEDURE — 81001 URINALYSIS AUTO W/SCOPE: CPT | Performed by: STUDENT IN AN ORGANIZED HEALTH CARE EDUCATION/TRAINING PROGRAM

## 2025-03-04 PROCEDURE — 93005 ELECTROCARDIOGRAM TRACING: CPT | Performed by: STUDENT IN AN ORGANIZED HEALTH CARE EDUCATION/TRAINING PROGRAM

## 2025-03-04 PROCEDURE — 80053 COMPREHEN METABOLIC PANEL: CPT | Performed by: STUDENT IN AN ORGANIZED HEALTH CARE EDUCATION/TRAINING PROGRAM

## 2025-03-04 PROCEDURE — 87077 CULTURE AEROBIC IDENTIFY: CPT | Performed by: STUDENT IN AN ORGANIZED HEALTH CARE EDUCATION/TRAINING PROGRAM

## 2025-03-04 PROCEDURE — 71250 CT THORAX DX C-: CPT

## 2025-03-04 PROCEDURE — 93010 ELECTROCARDIOGRAM REPORT: CPT | Performed by: INTERNAL MEDICINE

## 2025-03-04 PROCEDURE — 99285 EMERGENCY DEPT VISIT HI MDM: CPT

## 2025-03-04 RX ORDER — IOPAMIDOL 755 MG/ML
80 INJECTION, SOLUTION INTRAVASCULAR
Status: COMPLETED | OUTPATIENT
Start: 2025-03-04 | End: 2025-03-04

## 2025-03-04 RX ADMIN — SODIUM CHLORIDE 1000 ML: 9 INJECTION, SOLUTION INTRAVENOUS at 05:01

## 2025-03-04 RX ADMIN — IOPAMIDOL 80 ML: 755 INJECTION, SOLUTION INTRAVENOUS at 08:30

## 2025-03-04 NOTE — CASE MANAGEMENT/SOCIAL WORK
Discharge Planning Assessment   [place]     Patient Name: Joaquín Marroquin  MRN: [id number]  Today's Date: 3/4/2025    Admit Date: 3/4/2025    Plan: Spoke with patient at bedside. Patient is A&O x4. Patient lives at home with spouse and plans to return at discharge. Patient is spouses caregiver, Patient uses a standard walker for showers and has a BSC doesn't use at this time via [facility]. Patient refuses any home HH at this time he does [facility]. Patients PCP Markie and [facility]. Patient stated son would transport home at discharge if not he would get home.   Discharge Needs Assessment       Row Name 03/04/25 1035       Living Environment    People in Home spouse    Name(s) of People in Home MAYRA MARROQUIN Spouse [phone]    Potentially Unsafe Housing Conditions none    Primary Care Provided by self    Provides Primary Care For spouse    Family Caregiver if Needed child(raquel), adult    Family Caregiver Names Rustam Marroquin Son [phone]    Quality of Family Relationships unable to assess    Able to Return to Prior Arrangements yes       Resource/Environmental Concerns    Resource/Environmental Concerns none    Transportation Concerns none       Transportation Needs    In the past 12 months, has lack of transportation kept you from medical appointments or from getting medications? no    In the past 12 months, has lack of transportation kept you from meetings, work, or from getting things needed for daily living? No       Food Insecurity    Within the past 12 months, you worried that your food would run out before you got the money to buy more. Never true    Within the past 12 months, the food you bought just didn't last and you didn't have money to get more. Never true       Transition Planning    Patient/Family Anticipates Transition to home    Patient/Family Anticipated Services at Transition none    Transportation Anticipated family or friend will  provide       Discharge Needs Assessment    Readmission Within the Last 30 Days no previous admission in last 30 days    Current Outpatient/Agency/Support Group homecare agency    Equipment Currently Used at Home commode;walker, standard    Concerns to be Addressed discharge planning    Do you want help finding or keeping work or a job? I do not need or want help    Do you want help with school or training? For example, starting or completing job training or getting a high school diploma, GED or equivalent No    Anticipated Changes Related to Illness none    Equipment Needed After Discharge none                   Discharge Plan       Row Name 03/04/25 1037       Plan    Plan Spoke with patient at bedside. Patient is A&O x4. Patient lives at home with spouse and plans to return at discharge. Patient is spouses caregiver, Patient uses a standard walker for showers and has a BSC doesn't use at this time via [facility]. Patient refuses any home HH at this time he does [facility]. Patients PCP Markie and [facility]. Patient stated son would transport home at discharge if not he would get home.    Patient/Family in Agreement with Plan yes                  Continued Care and Services - Admitted Since 3/4/2025    No active coordination exists for this encounter.       Selected Continued Care - Episodes Includes continued care and service providers with selected services from the active episodes listed below      High Risk Care Management Episode start date: 3/3/2025   There are no active outsourced providers for this episode.                    Gem Perdomo

## 2025-03-04 NOTE — ED PROVIDER NOTES
Subjective   History of Present Illness  This is a 89-year-old male not on blood thinners patient has a history of bradycardia with a pacemaker which is capturing at this time, history of DNR in the emergency room for concerns of feeling lightheaded and fallen on the left side of his head.  He denies chest pain, shortness of breath, palpitations, abdominal pain, denies actually passing out.  Patient does report right flank pain after the fall.        Review of Systems   All other systems reviewed and are negative.      Past Medical History:   Diagnosis Date    Arthritis     Bradycardia     Enlarged prostate     Pacemaker     Thyroid disease        No Known Allergies    Past Surgical History:   Procedure Laterality Date    CARDIAC ELECTROPHYSIOLOGY PROCEDURE N/A 2021    Procedure: Pacemaker DC new;  Surgeon: Kendrick Burgess MD;  Location: [facility] CATH INVASIVE LOCATION;  Service: Cardiology;  Laterality: N/A;    COLOSTOMY N/A 2024    Procedure: COLOSTOMY LOOP;  Surgeon: Dilma Yanez MD;  Location: [facility] OR;  Service: General;  Laterality: N/A;    EXPLORATORY LAPAROTOMY N/A 2024    Procedure: LAPAROTOMY EXPLORATORY;  Surgeon: Dilma Yanez MD;  Location: [facility] OR;  Service: General;  Laterality: N/A;    HERNIA REPAIR Right     left inguinal/right    INSERT / REPLACE / REMOVE PACEMAKER  2021    [employer] device    SIGMOIDOSCOPY N/A 2024    Procedure: FLEXIBLE SIGMOIDOSCOPY WITH BIOPSY;  Surgeon: Dilma Yanez MD;  Location: [facility] OR;  Service: General;  Laterality: N/A;       No family history on file.    Social History     Socioeconomic History    Marital status:    Tobacco Use    Smoking status: Former     Current packs/day: 0.00     Types: Cigarettes     Quit date:      Years since quittin.2     Passive exposure: Past    Smokeless tobacco: Former     Types: Chew     Quit date:    Vaping Use    Vaping status: Never Used   Substance and Sexual Activity     Alcohol use: Never    Drug use: Never    Sexual activity: Defer           Objective   Physical Exam  HENT:      Head:      Comments: Left parietal abrasion.      Right Ear: Tympanic membrane normal.      Left Ear: Tympanic membrane normal.   Eyes:      Extraocular Movements: Extraocular movements intact.      Pupils: Pupils are equal, round, and reactive to light.   Cardiovascular:      Rate and Rhythm: Normal rate and regular rhythm.      Pulses: Normal pulses.      Heart sounds: No murmur heard.  Pulmonary:      Effort: Pulmonary effort is normal. No respiratory distress.      Breath sounds: Normal breath sounds. No wheezing, rhonchi or rales.   Abdominal:      General: Bowel sounds are normal. There is no distension.      Palpations: Abdomen is soft.      Tenderness: There is no abdominal tenderness. There is no right CVA tenderness, left CVA tenderness or guarding.   Musculoskeletal:         General: Normal range of motion.      Cervical back: Normal range of motion.   Neurological:      General: No focal deficit present.      Mental Status: He is alert and oriented to person, place, and time. Mental status is at baseline.      Cranial Nerves: No cranial nerve deficit.   Psychiatric:         Mood and Affect: Mood normal.         Procedures           ED Course  ED Course as of 03/06/25 0248   Tue Mar 04, 2025   0521 EKG negative for STEMI.    Electronically signed by Johnnie Shell MD, 03/04/25, 5:21 AM EST.  [SG]   0544 Pacemaker interrogator not available  [SG]   0643 Troponin negative, mild santos, iv fluids given with correction of hypotension. CBC negative for elevated WBC, as well as negative for anemia. Will sign out to Dr. Bartlett pending imaging.  [SG]   1398 Discussed case with on-call neurosurgery who recommended follow-up with urology if the H&H remained stable at this time.  Retroperitoneal hematoma remained stable and will discharge at this time for appropriate outpatient follow-up with urology.   This has been discussed in detail with the patient prior to discharge.  However, prior to discharge I did offer transfer to the [facility] for further evaluation and treatment by urology.  Patient declined/refused and said he had to be discharged home at this time to be with his wife.  Wife has dementia and cannot be left home alone and he has no other support at this time.  Informed him of risk which includes continued bleeding, potentially worsening condition, and sudden death.  Patient understood all of his risk and desired to be discharged at this time. [ES]      ED Course User Index  [ES] Tanner Chavez MD  [SG] Johnnie Shell MD                                                       Medical Decision Making  This is a 89-year-old male DNR history of pacemaker comes to the emergency room for evaluation of dizziness that led to fall on the left side of his head, also complaining of right flank pain after fall.  Positive for hypertension, as well as left scalp abrasion no active bleeding no clinical indication for sutures.  Patient was found to be hypotensive, EKG negative for concerning acute interval or ischemic changes.  IV fluids ordered 1 L of NS.  Pending evaluation of hemoglobin, chemistry, troponin, CT of the brain, neck, abdominal pelvis as well as chest noncontrast.  He is otherwise stable.  Patient has a history of colostomy bag he went to reach to clean that moment he felt dizzy and fell.    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  Radiology: ordered.  ECG/medicine tests: ordered.    Risk  Prescription drug  management.        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            Johnnie Shell MD  03/06/25 0243

## 2025-03-04 NOTE — CASE MANAGEMENT/SOCIAL WORK
Case Management/Social Work    Patient Name:  Joaquín Rogers  YOB: 1927  MRN: [id number]  Admit Date:  3/4/2025        Received consult for assessment of home living conditions. Spoke with patient at bedside. Patient is A&O x4. Patient lives at home with spouse and plans to return at discharge. Patient is spouses caregiver, Patient uses a standard walker for showers and has a BSC doesn't use at this time via [facility]. Patient refuses any HH at this time he does [facility]. Patients PCP Markie and [facility]. Patient stated son would transport home at discharge if not he would get home. Patient stated that he nor his wife are interested in SNF placement. Patient stated they are just fine at home if he thinks of anything they may need he will contact [facility].     Electronically signed by:  Gem Perdomo  03/04/25 10:43 EST

## 2025-03-05 NOTE — TELEPHONE ENCOUNTER
"Answer Assessment - Initial Assessment Questions  1. REASON FOR CALL or QUESTION: \"What is your reason for calling today?\" or \"How can I best help you?\" or \"What question do you have that I can help answer?\"      Thought he was being sent home from ED with new medication for constipation; he already has prescriptions for senna,    Protocols used: Information Only Call - No Triage-ADULT-    "

## 2025-03-05 NOTE — TELEPHONE ENCOUNTER
Failed to complete documentation - patient was already on senna, lactulose, and colace - he didn't realize these medications were for constipation

## 2025-03-06 ENCOUNTER — PATIENT OUTREACH (OUTPATIENT)
Dept: CASE MANAGEMENT | Facility: OTHER | Age: OVER 89
End: 2025-03-06
Payer: MEDICARE

## 2025-03-06 LAB — BACTERIA SPEC AEROBE CULT: ABNORMAL

## 2025-03-06 NOTE — OUTREACH NOTE
AMBULATORY CASE MANAGEMENT NOTE    Names and Relationships of Patient/Support Persons: Contact: Joaquín Rogers; Relationship: Self -     Patient Outreach    RN-ACM outreach to patient for follow-up of recent ED visit from 3/4/25 for a fall; pt also had an ED visit 3/2/25 for constipation. Patient reports feeling well today, denies any additional falls or constipation issues. Pt reports having ambulatory assistive devices. Pt has a nodule in liver, will f/u with providers in-office. Education provided, understanding voiced. Pt denies additional questions or outreach needs at present.     Adult Patient Profile  Questions/Answers      Flowsheet Row Most Recent Value   Symptoms/Conditions Managed at Home musculoskeletal, gastrointestinal   Barriers to Managing Health age   Gastrointestinal Symptoms/Conditions constipation   Gastrointestinal Management Strategies fluid modification, diet modification, medication therapy   Gastrointestinal Self-Management Outcome well   Musculoskeletal Symptoms/Conditions unsteady gait, mobility limited   Musculoskeletal Management Strategies coping strategies, other (see comments)  [has cane, walker, [employer] [facility] [employer]]   Musculoskeletal Self-Management Outcome well   Identifying Health Goals keep illness under control   Taking Medications Not Prescribed no   Missed Doses of Prescribed Medications During Past Week no   Taken Prescribed Medications at Different Time or Schedule During Past Week no   Taken More or Less Medication Than Prescribed no   Barriers to Taking Medication as Prescribed none            Education Documentation  Unresolved/Worsening Symptoms, taught by Ayaka Patel, RN at 3/6/2025  2:50 PM.  Learner: Patient  Readiness: Acceptance  Method: Explanation  Response: Verbalizes Understanding    Medication Management, taught by Ayaka Patel, RN at 3/6/2025  2:50 PM.  Learner: Patient  Readiness: Acceptance  Method: Explanation  Response: Verbalizes Understanding    Diet  Modification, taught by Ayaka Patel, RN at 3/6/2025  2:50 PM.  Learner: Patient  Readiness: Acceptance  Method: Explanation  Response: Verbalizes Understanding    Signs/Symptoms, taught by Ayaka Patel RN at 3/6/2025  2:50 PM.  Learner: Patient  Readiness: Acceptance  Method: Explanation  Response: Verbalizes Understanding    Risk Factors, taught by Ayaka Patel, RN at 3/6/2025  2:50 PM.  Learner: Patient  Readiness: Acceptance  Method: Explanation  Response: Verbalizes Understanding    Activity, taught by Ayaka Patel RN at 3/6/2025  2:50 PM.  Learner: Patient  Readiness: Acceptance  Method: Explanation  Response: Verbalizes Understanding    Safety, taught by Ayaka Patel, RN at 3/6/2025  2:50 PM.  Learner: Patient  Readiness: Acceptance  Method: Explanation  Response: Verbalizes Understanding    Risk Factors, taught by Ayaka Patel, RN at 3/6/2025  2:50 PM.  Learner: Patient  Readiness: Acceptance  Method: Explanation  Response: Verbalizes Understanding          Ayaka GRUBBS  Ambulatory Case Management    3/6/2025, 14:53 EST

## 2025-04-21 ENCOUNTER — APPOINTMENT (OUTPATIENT)
Dept: GENERAL RADIOLOGY | Facility: HOSPITAL | Age: OVER 89
End: 2025-04-21
Payer: OTHER GOVERNMENT

## 2025-04-21 ENCOUNTER — APPOINTMENT (OUTPATIENT)
Dept: CT IMAGING | Facility: HOSPITAL | Age: OVER 89
End: 2025-04-21
Payer: OTHER GOVERNMENT

## 2025-04-21 ENCOUNTER — HOSPITAL ENCOUNTER (EMERGENCY)
Facility: HOSPITAL | Age: OVER 89
Discharge: HOME | End: 2025-04-22
Attending: STUDENT IN AN ORGANIZED HEALTH CARE EDUCATION/TRAINING PROGRAM | Admitting: STUDENT IN AN ORGANIZED HEALTH CARE EDUCATION/TRAINING PROGRAM
Payer: OTHER GOVERNMENT

## 2025-04-21 DIAGNOSIS — R16.0 LIVER MASS: ICD-10-CM

## 2025-04-21 DIAGNOSIS — B71.9 TAPEWORM INFECTION: ICD-10-CM

## 2025-04-21 DIAGNOSIS — E27.8 ADRENAL MASS 1 CM TO 4 CM IN DIAMETER: Primary | ICD-10-CM

## 2025-04-21 DIAGNOSIS — J18.9 COMMUNITY ACQUIRED PNEUMONIA OF LEFT LOWER LOBE OF LUNG: ICD-10-CM

## 2025-04-21 LAB
ALBUMIN SERPL-MCNC: 3.9 G/DL (ref 3.5–5.2)
ALBUMIN/GLOB SERPL: 1.1 G/DL
ALP SERPL-CCNC: 195 U/L (ref 39–117)
ALT SERPL W P-5'-P-CCNC: 15 U/L (ref 1–41)
ANION GAP SERPL CALCULATED.3IONS-SCNC: 10 MMOL/L (ref 5–15)
AST SERPL-CCNC: 23 U/L (ref 1–40)
BASOPHILS # BLD AUTO: 0.02 10*3/MM3 (ref 0–0.2)
BASOPHILS NFR BLD AUTO: 0.2 % (ref 0–1.5)
BILIRUB SERPL-MCNC: 0.4 MG/DL (ref 0–1.2)
BUN SERPL-MCNC: 23 MG/DL (ref 8–23)
BUN/CREAT SERPL: 16.3 (ref 7–25)
CALCIUM SPEC-SCNC: 9.8 MG/DL (ref 8.2–9.6)
CHLORIDE SERPL-SCNC: 106 MMOL/L (ref 98–107)
CO2 SERPL-SCNC: 27 MMOL/L (ref 22–29)
CREAT SERPL-MCNC: 1.41 MG/DL (ref 0.76–1.27)
DEPRECATED RDW RBC AUTO: 46.1 FL (ref 37–54)
EGFRCR SERPLBLD CKD-EPI 2021: 45.3 ML/MIN/1.73
EOSINOPHIL # BLD AUTO: 0.37 10*3/MM3 (ref 0–0.4)
EOSINOPHIL NFR BLD AUTO: 4.5 % (ref 0.3–6.2)
ERYTHROCYTE [DISTWIDTH] IN BLOOD BY AUTOMATED COUNT: 12.8 % (ref 12.3–15.4)
GEN 5 1HR TROPONIN T REFLEX: 13 NG/L
GLOBULIN UR ELPH-MCNC: 3.6 GM/DL
GLUCOSE SERPL-MCNC: 115 MG/DL (ref 65–99)
HCT VFR BLD AUTO: 43.1 % (ref 37.5–51)
HGB BLD-MCNC: 13.6 G/DL (ref 13–17.7)
HOLD SPECIMEN: NORMAL
HOLD SPECIMEN: NORMAL
IMM GRANULOCYTES # BLD AUTO: 0.03 10*3/MM3 (ref 0–0.05)
IMM GRANULOCYTES NFR BLD AUTO: 0.4 % (ref 0–0.5)
LYMPHOCYTES # BLD AUTO: 2.39 10*3/MM3 (ref 0.7–3.1)
LYMPHOCYTES NFR BLD AUTO: 29 % (ref 19.6–45.3)
MCH RBC QN AUTO: 30.8 PG (ref 26.6–33)
MCHC RBC AUTO-ENTMCNC: 31.6 G/DL (ref 31.5–35.7)
MCV RBC AUTO: 97.7 FL (ref 79–97)
MONOCYTES # BLD AUTO: 0.64 10*3/MM3 (ref 0.1–0.9)
MONOCYTES NFR BLD AUTO: 7.8 % (ref 5–12)
NEUTROPHILS NFR BLD AUTO: 4.79 10*3/MM3 (ref 1.7–7)
NEUTROPHILS NFR BLD AUTO: 58.1 % (ref 42.7–76)
NRBC BLD AUTO-RTO: 0 /100 WBC (ref 0–0.2)
PLATELET # BLD AUTO: 166 10*3/MM3 (ref 140–450)
PMV BLD AUTO: 9.7 FL (ref 6–12)
POTASSIUM SERPL-SCNC: 4.8 MMOL/L (ref 3.5–5.2)
PROT SERPL-MCNC: 7.5 G/DL (ref 6–8.5)
RBC # BLD AUTO: 4.41 10*6/MM3 (ref 4.14–5.8)
SODIUM SERPL-SCNC: 143 MMOL/L (ref 136–145)
TROPONIN T NUMERIC DELTA: 0 NG/L
TROPONIN T SERPL HS-MCNC: 13 NG/L
WBC NRBC COR # BLD AUTO: 8.24 10*3/MM3 (ref 3.4–10.8)
WHOLE BLOOD HOLD COAG: NORMAL
WHOLE BLOOD HOLD SPECIMEN: NORMAL

## 2025-04-21 PROCEDURE — 80053 COMPREHEN METABOLIC PANEL: CPT | Performed by: NURSE PRACTITIONER

## 2025-04-21 PROCEDURE — 85025 COMPLETE CBC W/AUTO DIFF WBC: CPT | Performed by: NURSE PRACTITIONER

## 2025-04-21 PROCEDURE — 36415 COLL VENOUS BLD VENIPUNCTURE: CPT

## 2025-04-21 PROCEDURE — 71045 X-RAY EXAM CHEST 1 VIEW: CPT | Performed by: RADIOLOGY

## 2025-04-21 PROCEDURE — 71045 X-RAY EXAM CHEST 1 VIEW: CPT

## 2025-04-21 PROCEDURE — 71250 CT THORAX DX C-: CPT

## 2025-04-21 PROCEDURE — 93005 ELECTROCARDIOGRAM TRACING: CPT | Performed by: NURSE PRACTITIONER

## 2025-04-21 PROCEDURE — 84484 ASSAY OF TROPONIN QUANT: CPT | Performed by: NURSE PRACTITIONER

## 2025-04-21 PROCEDURE — 99284 EMERGENCY DEPT VISIT MOD MDM: CPT

## 2025-04-21 RX ORDER — ASPIRIN 81 MG/1
324 TABLET, CHEWABLE ORAL ONCE
Status: COMPLETED | OUTPATIENT
Start: 2025-04-21 | End: 2025-04-21

## 2025-04-21 RX ORDER — SODIUM CHLORIDE 0.9 % (FLUSH) 0.9 %
10 SYRINGE (ML) INJECTION AS NEEDED
Status: DISCONTINUED | OUTPATIENT
Start: 2025-04-21 | End: 2025-04-22 | Stop reason: HOSPADM

## 2025-04-21 RX ADMIN — ASPIRIN 324 MG: 81 TABLET, CHEWABLE ORAL at 23:00

## 2025-04-22 VITALS
SYSTOLIC BLOOD PRESSURE: 127 MMHG | TEMPERATURE: 98.2 F | RESPIRATION RATE: 18 BRPM | BODY MASS INDEX: 20.46 KG/M2 | HEIGHT: 68 IN | OXYGEN SATURATION: 94 % | WEIGHT: 135 LBS | HEART RATE: 63 BPM | DIASTOLIC BLOOD PRESSURE: 74 MMHG

## 2025-04-22 LAB
QT INTERVAL: 424 MS
QTC INTERVAL: 454 MS

## 2025-04-22 PROCEDURE — 71250 CT THORAX DX C-: CPT | Performed by: RADIOLOGY

## 2025-04-22 RX ORDER — AZITHROMYCIN 250 MG/1
250 TABLET, FILM COATED ORAL DAILY
Qty: 4 TABLET | Refills: 0 | Status: SHIPPED | OUTPATIENT
Start: 2025-04-22 | End: 2025-04-26

## 2025-04-22 RX ORDER — AZITHROMYCIN 250 MG/1
500 TABLET, FILM COATED ORAL ONCE
Status: COMPLETED | OUTPATIENT
Start: 2025-04-22 | End: 2025-04-22

## 2025-04-22 RX ORDER — ALBENDAZOLE 200 MG/1
400 TABLET, FILM COATED ORAL DAILY
Qty: 6 TABLET | Refills: 0 | Status: SHIPPED | OUTPATIENT
Start: 2025-04-22 | End: 2025-04-25

## 2025-04-22 RX ORDER — AMOXICILLIN AND CLAVULANATE POTASSIUM 250; 125 MG/1; MG/1
1 TABLET, FILM COATED ORAL ONCE
Status: COMPLETED | OUTPATIENT
Start: 2025-04-22 | End: 2025-04-22

## 2025-04-22 RX ORDER — AMOXICILLIN AND CLAVULANATE POTASSIUM 500; 125 MG/1; MG/1
1 TABLET, FILM COATED ORAL 2 TIMES DAILY
Qty: 14 TABLET | Refills: 0 | Status: SHIPPED | OUTPATIENT
Start: 2025-04-22 | End: 2025-04-30

## 2025-04-22 RX ADMIN — AZITHROMYCIN DIHYDRATE 500 MG: 250 TABLET ORAL at 01:31

## 2025-04-22 RX ADMIN — AMOXICILLIN AND CLAVULANATE POTASSIUM 1 TABLET: 250; 125 TABLET, FILM COATED ORAL at 01:31

## 2025-04-22 NOTE — ED PROVIDER NOTES
Subjective   History of Present Illness  89-year-old male presents the ED with complaints of right anterior thoracic right upper quadrant pain.  Patient states that it has been hurting for a few days and reports falling maybe a week ago.  He denies coughing fevers or chills nausea or vomiting at this time.    Son is at bedside and states that he is aware of the liver mass and currently follows with the [facility]. Currently he is not receiving any type of oncology care or active pain management care.      Review of Systems    Past Medical History:   Diagnosis Date    Arthritis     Bradycardia     Enlarged prostate     Pacemaker     Thyroid disease        No Known Allergies    Past Surgical History:   Procedure Laterality Date    CARDIAC ELECTROPHYSIOLOGY PROCEDURE N/A 2021    Procedure: Pacemaker DC new;  Surgeon: Kendrick Burgess MD;  Location: [facility] CATH INVASIVE LOCATION;  Service: Cardiology;  Laterality: N/A;    COLOSTOMY N/A 2024    Procedure: COLOSTOMY LOOP;  Surgeon: Dilma Yanez MD;  Location: [facility] OR;  Service: General;  Laterality: N/A;    EXPLORATORY LAPAROTOMY N/A 2024    Procedure: LAPAROTOMY EXPLORATORY;  Surgeon: Dilma Yanez MD;  Location: [facility] OR;  Service: General;  Laterality: N/A;    HERNIA REPAIR Right     left inguinal/right    INSERT / REPLACE / REMOVE PACEMAKER  2021    [employer] device    SIGMOIDOSCOPY N/A 2024    Procedure: FLEXIBLE SIGMOIDOSCOPY WITH BIOPSY;  Surgeon: Dilma Yanez MD;  Location: [facility] OR;  Service: General;  Laterality: N/A;       No family history on file.    Social History     Socioeconomic History    Marital status:    Tobacco Use    Smoking status: Former     Current packs/day: 0.00     Types: Cigarettes     Quit date:      Years since quittin.3     Passive exposure: Past    Smokeless tobacco: Former     Types: Chew     Quit date:    Vaping Use    Vaping status: Never Used   Substance and  Sexual Activity    Alcohol use: Never    Drug use: Never    Sexual activity: Defer           Objective   Physical Exam  Vitals and nursing note reviewed.   Constitutional:       General: He is not in acute distress.     Appearance: He is well-developed. He is not diaphoretic.   HENT:      Head: Normocephalic and atraumatic.      Right Ear: External ear normal.      Left Ear: External ear normal.      Nose: Nose normal.   Eyes:      Conjunctiva/sclera: Conjunctivae normal.      Pupils: Pupils are equal, round, and reactive to light.   Neck:      Vascular: No JVD.      Trachea: No tracheal deviation.   Cardiovascular:      Rate and Rhythm: Normal rate and regular rhythm.      Heart sounds: Normal heart sounds. No murmur heard.  Pulmonary:      Effort: Pulmonary effort is normal. No respiratory distress.      Breath sounds: Normal breath sounds. No wheezing.   Abdominal:      General: Bowel sounds are normal.      Palpations: Abdomen is soft.      Tenderness: There is abdominal tenderness.      Comments: Right upper quadrant tenderness   Musculoskeletal:         General: No deformity. Normal range of motion.      Cervical back: Normal range of motion and neck supple.   Skin:     General: Skin is warm and dry.      Coloration: Skin is not pale.      Findings: No erythema or rash.   Neurological:      Mental Status: He is alert and oriented to person, place, and time.      Cranial Nerves: No cranial nerve deficit.   Psychiatric:         Behavior: Behavior normal.         Thought Content: Thought content normal.         Procedures       Results for orders placed or performed during the hospital encounter of 04/21/25   ECG 12 Lead Chest Pain    Collection Time: 04/21/25  9:04 PM   Result Value Ref Range    QT Interval 424 ms    QTC Interval 454 ms   Comprehensive Metabolic Panel    Collection Time: 04/21/25  9:24 PM    Specimen: Blood   Result Value Ref Range    Glucose 115 (H) 65 - 99 mg/dL    BUN 23 8 - 23 mg/dL     Creatinine 1.41 (H) 0.76 - 1.27 mg/dL    Sodium 143 136 - 145 mmol/L    Potassium 4.8 3.5 - 5.2 mmol/L    Chloride 106 98 - 107 mmol/L    CO2 27.0 22.0 - 29.0 mmol/L    Calcium 9.8 (H) 8.2 - 9.6 mg/dL    Total Protein 7.5 6.0 - 8.5 g/dL    Albumin 3.9 3.5 - 5.2 g/dL    ALT (SGPT) 15 1 - 41 U/L    AST (SGOT) 23 1 - 40 U/L    Alkaline Phosphatase 195 (H) 39 - 117 U/L    Total Bilirubin 0.4 0.0 - 1.2 mg/dL    Globulin 3.6 gm/dL    A/G Ratio 1.1 g/dL    BUN/Creatinine Ratio 16.3 7.0 - 25.0    Anion Gap 10.0 5.0 - 15.0 mmol/L    eGFR 45.3 (L) >60.0 mL/min/1.73   High Sensitivity Troponin T    Collection Time: 04/21/25  9:24 PM    Specimen: Blood   Result Value Ref Range    HS Troponin T 13 <22 ng/L   CBC Auto Differential    Collection Time: 04/21/25  9:24 PM    Specimen: Blood   Result Value Ref Range    WBC 8.24 3.40 - 10.80 10*3/mm3    RBC 4.41 4.14 - 5.80 10*6/mm3    Hemoglobin 13.6 13.0 - 17.7 g/dL    Hematocrit 43.1 37.5 - 51.0 %    MCV 97.7 (H) 79.0 - 97.0 fL    MCH 30.8 26.6 - 33.0 pg    MCHC 31.6 31.5 - 35.7 g/dL    RDW 12.8 12.3 - 15.4 %    RDW-SD 46.1 37.0 - 54.0 fl    MPV 9.7 6.0 - 12.0 fL    Platelets 166 140 - 450 10*3/mm3    Neutrophil % 58.1 42.7 - 76.0 %    Lymphocyte % 29.0 19.6 - 45.3 %    Monocyte % 7.8 5.0 - 12.0 %    Eosinophil % 4.5 0.3 - 6.2 %    Basophil % 0.2 0.0 - 1.5 %    Immature Grans % 0.4 0.0 - 0.5 %    Neutrophils, Absolute 4.79 1.70 - 7.00 10*3/mm3    Lymphocytes, Absolute 2.39 0.70 - 3.10 10*3/mm3    Monocytes, Absolute 0.64 0.10 - 0.90 10*3/mm3    Eosinophils, Absolute 0.37 0.00 - 0.40 10*3/mm3    Basophils, Absolute 0.02 0.00 - 0.20 10*3/mm3    Immature Grans, Absolute 0.03 0.00 - 0.05 10*3/mm3    nRBC 0.0 0.0 - 0.2 /100 WBC   Green Top (Gel)    Collection Time: 04/21/25  9:24 PM   Result Value Ref Range    Extra Tube Hold for add-ons.    Lavender Top    Collection Time: 04/21/25  9:24 PM   Result Value Ref Range    Extra Tube hold for add-on    Gold Top - SST    Collection Time:  04/21/25  9:24 PM   Result Value Ref Range    Extra Tube Hold for add-ons.    Light Blue Top    Collection Time: 04/21/25  9:24 PM   Result Value Ref Range    Extra Tube Hold for add-ons.    High Sensitivity Troponin T 1Hr    Collection Time: 04/21/25 10:44 PM    Specimen: Blood   Result Value Ref Range    HS Troponin T 13 <22 ng/L    Troponin T Numeric Delta 0 Abnormal if >/=3 ng/L     CT Chest Without Contrast Diagnostic   Final Result   Impression:   1.  No infiltrates or effusions.   2.  7 mm cavitary left lower lobe lesion and masslike airspace opacity   at the left lower lobe that are stable from the recent prior study next   3.  3.7 cm right adrenal mass   4.  3.7 cm mass of liver segment 5       This report was finalized on 4/22/2025 12:38 AM by Geovany Wing MD.          XR Chest 1 View   Final Result   1. Mild bibasilar opacity as above. This may be related to scarring or   atelectasis. However, pneumonia cannot be excluded.       This report was finalized on 4/21/2025 11:13 PM by Hans Hurst MD.                  ED Course  ED Course as of 04/22/25 0126   Mon Apr 21, 2025   2109 ECG demonstrates normal sinus rhythm at rate of 69 bpm.  MO interval is normal.  QRS duration is prolonged at 138 ms with a bifascicular bundle branch block pattern.  QTc interval is mildly prolonged at 454 ms.  There are no acute ST-T wave changes. [RA]   Tue Apr 22, 2025   0059 Expressed concern to son that he has metastatic cancer with a mass in the liver, adrenal glands and nodules in the lung.   [LK]   0101 Medical record show the patient was prescribed cefdinir on March 4 when imaging showed similar opacity in the left lower lobe.  Cavitary lesion is likely metastatic cancer in origin given liver mass and adrenal mass seen on CT today.    - Additional antibiotic coverage with Augmentin and doxycycline have been added given that patient had received a cephalosporin prior with potential to resolve the lesion if it is  infectious driven.  Informed patient's son that he should follow-up with primary care and pulmonology given the concern for metastatic disease. [LK]   0105 With pharmacy prior to discharge given patient's renal function and age creatinine clearance per minute is what drives antibiotic choice to be prescribed.      \Augmenttin: 250MG Q hours x 7 days    Azithro: 500 mg given in the ER additional 200 mg x 4 days have been added.     [LK]      ED Course User Index  [LK] Taya Mo DO  [RA] Regan Lopez MD                                                       Medical Decision Making  Problems Addressed:  Adrenal mass 1 cm to 4 cm in diameter: complicated acute illness or injury  Community acquired pneumonia of left lower lobe of lung: complicated acute illness or injury  Liver mass: complicated acute illness or injury  Tapeworm infection: complicated acute illness or injury    Amount and/or Complexity of Data Reviewed  Labs: ordered.  Radiology: ordered.  ECG/medicine tests: ordered.    Risk  OTC drugs.  Prescription drug management.        Final diagnoses:   Adrenal mass 1 cm to 4 cm in diameter   Liver mass   Community acquired pneumonia of left lower lobe of lung   Tapeworm infection       ED Disposition  ED Disposition       ED Disposition   Discharge    Condition   Stable    Comment   --               Markie Tere, APRN  [address] [facility] [address]  [place] [address] [phone]               Medication List        New Prescriptions      albendazole 200 MG tablet  Commonly known as: ALBENZA  Take 2 tablets by mouth Daily for 3 days.     amoxicillin-clavulanate 500-125 MG per tablet  Commonly known as: AUGMENTIN  Take 1 tablet by mouth 2 (Two) Times a Day for 7 days.     azithromycin 250 MG tablet  Commonly known as: ZITHROMAX  Take 1 tablet by mouth Daily for 4 days.               Where to Get Your Medications        These medications were sent to [id number] - [place], [place] - [address] AT [facility] [address] - [phone]  - [phone] FX  [address]  [facility] [address] [place] [address]      Phone: [phone]   albendazole 200 MG tablet  amoxicillin-clavulanate 500-125 MG per tablet  azithromycin 250 MG tablet            Taya Mo DO  04/22/25 0126

## 2025-04-22 NOTE — DISCHARGE INSTRUCTIONS
Please take the antibiotics as prescribed on the bottle from the pharmacy   Patient symptom is likely from metastatic cancer.  The small area of infiltrate in the lungs is likely related to lung nodules versus a true pneumonia.  Recommend repeating CT chest within 2 weeks if you have ongoing chest pain.

## 2025-04-22 NOTE — ED NOTES
MEDICAL SCREENING:    Reason for Visit: Chest pain      Patient initially seen in triage.  The patient was advised further evaluation and diagnostic testing will be needed, some of the treatment and testing will be initiated in the lobby in order to begin the process.  The patient will be returned to the waiting area for the time being and possibly be re-assessed by a subsequent ED provider.  The patient will be brought back to the treatment area in as timely manner as possible.      Solomon Olvera, APRN  04/21/25 6098

## 2025-04-23 ENCOUNTER — PATIENT OUTREACH (OUTPATIENT)
Dept: CASE MANAGEMENT | Facility: OTHER | Age: OVER 89
End: 2025-04-23
Payer: MEDICARE

## 2025-04-30 ENCOUNTER — OFFICE VISIT (OUTPATIENT)
Dept: CARDIOLOGY | Facility: CLINIC | Age: OVER 89
End: 2025-04-30
Payer: MEDICARE

## 2025-04-30 VITALS
DIASTOLIC BLOOD PRESSURE: 54 MMHG | HEIGHT: 68 IN | RESPIRATION RATE: 18 BRPM | HEART RATE: 69 BPM | BODY MASS INDEX: 21.31 KG/M2 | WEIGHT: 140.6 LBS | SYSTOLIC BLOOD PRESSURE: 96 MMHG | OXYGEN SATURATION: 95 %

## 2025-04-30 DIAGNOSIS — I49.5 SSS (SICK SINUS SYNDROME): Primary | ICD-10-CM

## 2025-04-30 PROCEDURE — 1160F RVW MEDS BY RX/DR IN RCRD: CPT | Performed by: NURSE PRACTITIONER

## 2025-04-30 PROCEDURE — 99214 OFFICE O/P EST MOD 30 MIN: CPT | Performed by: NURSE PRACTITIONER

## 2025-04-30 PROCEDURE — 1159F MED LIST DOCD IN RCRD: CPT | Performed by: NURSE PRACTITIONER

## 2025-04-30 RX ORDER — POLYETHYLENE GLYCOL 3350 17 G/17G
17 POWDER, FOR SOLUTION ORAL DAILY
COMMUNITY
Start: 2025-04-14

## 2025-04-30 RX ORDER — ATORVASTATIN CALCIUM 40 MG/1
1 TABLET, FILM COATED ORAL
COMMUNITY
Start: 2025-04-14

## 2025-04-30 RX ORDER — SENNOSIDES 8.6 MG
1 TABLET ORAL DAILY
COMMUNITY
Start: 2025-03-02

## 2025-04-30 NOTE — PROGRESS NOTES
Markie, Diana Lou, APRN  Joaquín Rogers  5/19/1927 04/30/2025    Patient Active Problem List   Diagnosis    COVID-19    Acute hypoxemic respiratory failure due to COVID-19    Symptomatic bradycardia    Debility    Bowel obstruction    Indeterminate colitis    Severe malnutrition    Rectal cancer    Colostomy prolapse       Dear Diana Aden, APRN:    Subjective     Chief Complaint   Patient presents with    Follow-up         History of Present Illness:    Joaquín Rogers is a 89 y.o. male with a past medical history of sick sinus syndrome/symptomatic bradycardia for which she had a permanent pacemaker implanted in September 2021. He is here for regular cardiology follow-up. He has been doing well from a cardiac standpoint. The patient denies chest pain, shortness of breath, palpitations, dizziness, lightheadedness, near syncope, and syncope.  He does reports he was recently diagnosed with a lung mass and liver mass. He states he has decided he is not going to pursue this due to his age.           No Known Allergies:      Current Outpatient Medications:     atorvastatin (LIPITOR) 40 MG tablet, Take 1 tablet by mouth every night at bedtime., Disp: , Rfl:     calcium carbonate-cholecalciferol 500-400 MG-UNIT tablet tablet, Take 1 tablet by mouth 2 (Two) Times a Day., Disp: , Rfl:     docusate sodium (Colace) 100 MG capsule, Take 1 capsule by mouth 2 (Two) Times a Day., Disp: 60 capsule, Rfl: 1    docusate sodium (Colace) 100 MG capsule, Take 1 capsule by mouth 2 (Two) Times a Day., Disp: 60 capsule, Rfl: 12    ferrous sulfate 325 (65 FE) MG tablet, Take 1 tablet by mouth 2 (Two) Times a Day., Disp: , Rfl:     finasteride (PROSCAR) 5 MG tablet, Take 1 tablet by mouth Daily., Disp: , Rfl:     lactulose (CHRONULAC) 10 GM/15ML solution, Take 30 mL by mouth 2 (Two) Times a Day., Disp: 237 mL, Rfl: 0    levothyroxine (SYNTHROID, LEVOTHROID) 100 MCG tablet, Take 1 tablet by mouth Daily., Disp: , Rfl:     Omega-3 Fatty  "Acids (fish oil) 1000 MG capsule capsule, Take 2 capsules by mouth Daily With Breakfast., Disp: , Rfl:     omeprazole (priLOSEC) 40 MG capsule, Take 2 capsules by mouth Daily As Needed (acid reflux)., Disp: , Rfl:     polyethylene glycol (MIRALAX) 17 g packet, Take 17 g by mouth Daily., Disp: , Rfl:     senna 8.6 MG tablet, Take 1 tablet by mouth Daily., Disp: , Rfl:     tamsulosin (FLOMAX) 0.4 MG capsule 24 hr capsule, Take 1 capsule by mouth Every Night., Disp: , Rfl:       The following portions of the patient's history were reviewed and updated as appropriate: allergies, current medications, past family history, past medical history, past social history, past surgical history and problem list.    Social History     Tobacco Use    Smoking status: Former     Current packs/day: 0.00     Types: Cigarettes     Quit date:      Years since quittin.3     Passive exposure: Past    Smokeless tobacco: Former     Types: Chew     Quit date:    Vaping Use    Vaping status: Never Used   Substance Use Topics    Alcohol use: Never    Drug use: Never       Review of Systems   Constitutional: Negative for decreased appetite and malaise/fatigue.   Cardiovascular:  Negative for chest pain, dyspnea on exertion and palpitations.   Respiratory:  Negative for cough and shortness of breath.        Objective   Vitals:    25 1430   BP: 96/54   BP Location: Left arm   Patient Position: Sitting   Cuff Size: Adult   Pulse: 69   Resp: 18   SpO2: 95%   Weight: 63.8 kg (140 lb 9.6 oz)   Height: 172.7 cm (67.99\")     Body mass index is 21.38 kg/m².        Vitals reviewed.   Constitutional:       Appearance: Healthy appearance. Well-developed and not in distress.   HENT:      Head: Normocephalic and atraumatic.   Pulmonary:      Effort: Pulmonary effort is normal.      Breath sounds: Normal breath sounds. No wheezing. No rales.   Cardiovascular:      Normal rate. Regular rhythm.      Murmurs: There is no murmur.      . No S3 and " S4 gallop.   Edema:     Peripheral edema absent.   Abdominal:      General: Bowel sounds are normal.      Palpations: Abdomen is soft.   Skin:     General: Skin is warm and dry.   Neurological:      Mental Status: Alert, oriented to person, place, and time and oriented to person, place and time.   Psychiatric:         Mood and Affect: Mood normal.         Behavior: Behavior normal.         Lab Results   Component Value Date     04/21/2025    K 4.8 04/21/2025     04/21/2025    CO2 27.0 04/21/2025    BUN 23 04/21/2025    CREATININE 1.41 (H) 04/21/2025    GLUCOSE 115 (H) 04/21/2025    CALCIUM 9.8 (H) 04/21/2025    AST 23 04/21/2025    ALT 15 04/21/2025    ALKPHOS 195 (H) 04/21/2025     Lab Results   Component Value Date    WBC 8.24 04/21/2025    HGB 13.6 04/21/2025    HCT 43.1 04/21/2025     04/21/2025     Lab Results   Component Value Date    TSH 3.870 06/29/2024    TRIG 71 03/15/2023    HDL 42 03/15/2023    LDL 88 03/15/2023          Results for orders placed during the hospital encounter of 09/01/21    Adult Transthoracic Echo Complete W/ Cont if Necessary Per Protocol    Interpretation Summary  · Left ventricular wall thickness is consistent with mild concentric hypertrophy.  · Left ventricular ejection fraction appears to be 56 - 60%. Left ventricular systolic function is normal.  · Left ventricular diastolic function is consistent with (grade I) impaired relaxation.  · Normal cardiac chamber dimensions.  · Moderate aortic valve regurgitation is present.  · There is no evidence of pericardial effusion.              Procedures    Assessment & Plan    Diagnosis Plan   1. SSS (sick sinus syndrome), s/p permanent dual-chamber pacemaker implantation                 Recommendations:  Sick sinus syndrome s/p PPM - Pacemaker interrogation scheduled for July. Remote checks reviewed.   Follow up in 1 year or sooner if needed.       Return in about 1 year (around 4/30/2026) for Recheck.    As always, I  appreciate very much the opportunity to participate in the cardiovascular care of your patients.      With Best Regards,    ALFREDO Causey

## 2025-06-10 ENCOUNTER — HOSPITAL ENCOUNTER (EMERGENCY)
Facility: HOSPITAL | Age: OVER 89
Discharge: HOME | End: 2025-06-10
Payer: OTHER GOVERNMENT

## 2025-06-10 VITALS
OXYGEN SATURATION: 94 % | TEMPERATURE: 98.3 F | BODY MASS INDEX: 21.22 KG/M2 | RESPIRATION RATE: 16 BRPM | SYSTOLIC BLOOD PRESSURE: 134 MMHG | HEART RATE: 74 BPM | DIASTOLIC BLOOD PRESSURE: 58 MMHG | HEIGHT: 68 IN | WEIGHT: 140 LBS

## 2025-06-10 DIAGNOSIS — K94.00 COLOSTOMY COMPLICATION: Primary | ICD-10-CM

## 2025-06-10 PROCEDURE — 99282 EMERGENCY DEPT VISIT SF MDM: CPT

## 2025-06-11 NOTE — ED PROVIDER NOTES
Subjective   History of Present Illness  89-year-old male with past medical history of thyroid disease, pacemaker, BPH, and arthritis presents to the emergency room for an issue with his colostomy.  Patient states that he showered earlier this day and attempted to place a new colostomy bag and states that it was not stick he states he threw it away and got a new 1 and reports that it did the same thing.  He states that he has never had this happen before and would like some assistance in reapplying a new bag.  Denies any other complaints or concerns at this time.    History provided by:  Patient   used: No        Review of Systems   Constitutional: Negative.  Negative for fever.   HENT: Negative.     Respiratory: Negative.     Cardiovascular: Negative.  Negative for chest pain.   Gastrointestinal: Negative.  Negative for abdominal pain.   Endocrine: Negative.    Genitourinary: Negative.  Negative for dysuria.   Skin: Negative.    Neurological: Negative.    Psychiatric/Behavioral: Negative.     All other systems reviewed and are negative.      Past Medical History:   Diagnosis Date    Arthritis     Bradycardia     Enlarged prostate     Pacemaker     Thyroid disease        No Known Allergies    Past Surgical History:   Procedure Laterality Date    CARDIAC ELECTROPHYSIOLOGY PROCEDURE N/A 09/13/2021    Procedure: Pacemaker DC new;  Surgeon: Kendrick Burgess MD;  Location: [facility] CATH INVASIVE LOCATION;  Service: Cardiology;  Laterality: N/A;    COLOSTOMY N/A 7/1/2024    Procedure: COLOSTOMY LOOP;  Surgeon: Dilma Ynaez MD;  Location: [facility] OR;  Service: General;  Laterality: N/A;    EXPLORATORY LAPAROTOMY N/A 7/1/2024    Procedure: LAPAROTOMY EXPLORATORY;  Surgeon: Dilma Yanez MD;  Location: [facility] OR;  Service: General;  Laterality: N/A;    HERNIA REPAIR Right     left inguinal/right    INSERT / REPLACE / REMOVE PACEMAKER  09/13/2021    [employer] device    SIGMOIDOSCOPY N/A 6/30/2024     Procedure: FLEXIBLE SIGMOIDOSCOPY WITH BIOPSY;  Surgeon: Dilma Yanez MD;  Location: [facility];  Service: General;  Laterality: N/A;       No family history on file.    Social History     Socioeconomic History    Marital status:    Tobacco Use    Smoking status: Former     Current packs/day: 0.00     Types: Cigarettes     Quit date:      Years since quittin.4     Passive exposure: Past    Smokeless tobacco: Former     Types: Chew     Quit date:    Vaping Use    Vaping status: Never Used   Substance and Sexual Activity    Alcohol use: Never    Drug use: Never    Sexual activity: Defer           Objective   Physical Exam  Vitals and nursing note reviewed.   Constitutional:       General: He is not in acute distress.     Appearance: He is well-developed. He is not diaphoretic.   HENT:      Head: Normocephalic and atraumatic.      Right Ear: External ear normal.      Left Ear: External ear normal.      Nose: Nose normal.   Eyes:      Conjunctiva/sclera: Conjunctivae normal.      Pupils: Pupils are equal, round, and reactive to light.   Neck:      Vascular: No JVD.      Trachea: No tracheal deviation.   Cardiovascular:      Rate and Rhythm: Normal rate and regular rhythm.      Heart sounds: Normal heart sounds. No murmur heard.  Pulmonary:      Effort: Pulmonary effort is normal. No respiratory distress.      Breath sounds: Normal breath sounds. No wheezing.   Abdominal:      General: Bowel sounds are normal.      Palpations: Abdomen is soft.      Tenderness: There is no abdominal tenderness.   Musculoskeletal:         General: No deformity. Normal range of motion.      Cervical back: Normal range of motion and neck supple.   Skin:     General: Skin is warm and dry.      Coloration: Skin is not pale.      Findings: No erythema or rash.   Neurological:      Mental Status: He is alert and oriented to person, place, and time.      Cranial Nerves: No cranial nerve deficit.   Psychiatric:          Behavior: Behavior normal.         Thought Content: Thought content normal.         Procedures           ED Course                                                       Medical Decision Making  Uncomplicated ED course.  Colostomy bag replaced by RN, Harry without difficulty.    Problems Addressed:  Colostomy complication: acute illness or injury        Final diagnoses:   Colostomy complication       ED Disposition  ED Disposition       ED Disposition   Discharge    Condition   Stable    Comment   --               No follow-up provider specified.       Medication List      No changes were made to your prescriptions during this visit.            Charles Peñaloza PA-C  06/10/25 2606

## 2025-07-09 ENCOUNTER — CLINICAL SUPPORT NO REQUIREMENTS (OUTPATIENT)
Dept: CARDIOLOGY | Facility: CLINIC | Age: OVER 89
End: 2025-07-09
Payer: MEDICARE

## 2025-07-09 DIAGNOSIS — Z95.0 CARDIAC PACEMAKER IN SITU: Primary | ICD-10-CM

## 2025-07-09 PROCEDURE — 93288 INTERROG EVL PM/LDLS PM IP: CPT | Performed by: INTERNAL MEDICINE

## 2025-07-11 LAB
MDC_IDC_MSMT_BATTERY_REMAINING_LONGEVITY: 80 MO
MDC_IDC_MSMT_BATTERY_REMAINING_PERCENTAGE: 68 %
MDC_IDC_MSMT_BATTERY_RRT_TRIGGER: 2.6
MDC_IDC_MSMT_BATTERY_STATUS: NORMAL
MDC_IDC_MSMT_BATTERY_VOLTAGE: 2.99
MDC_IDC_MSMT_LEADCHNL_RA_IMPEDANCE_VALUE: 390
MDC_IDC_MSMT_LEADCHNL_RA_PACING_THRESHOLD_POLARITY: NORMAL
MDC_IDC_MSMT_LEADCHNL_RA_SENSING_INTR_AMPL: 3.5
MDC_IDC_MSMT_LEADCHNL_RV_IMPEDANCE_VALUE: 530
MDC_IDC_MSMT_LEADCHNL_RV_PACING_THRESHOLD_POLARITY: NORMAL
MDC_IDC_MSMT_LEADCHNL_RV_SENSING_INTR_AMPL: 10.3
MDC_IDC_PG_IMPLANT_DTM: NORMAL
MDC_IDC_PG_MFG: NORMAL
MDC_IDC_PG_MODEL: NORMAL
MDC_IDC_PG_SERIAL: NORMAL
MDC_IDC_PG_TYPE: NORMAL
MDC_IDC_SESS_DTM: NORMAL
MDC_IDC_SESS_TYPE: NORMAL
MDC_IDC_SET_BRADY_AT_MODE_SWITCH_RATE: 180
MDC_IDC_SET_BRADY_LOWRATE: 60
MDC_IDC_SET_BRADY_MAX_SENSOR_RATE: 120
MDC_IDC_SET_BRADY_MAX_TRACKING_RATE: 120
MDC_IDC_SET_BRADY_MODE: NORMAL
MDC_IDC_SET_BRADY_PAV_DELAY: 225
MDC_IDC_SET_BRADY_SAV_DELAY: 200
MDC_IDC_SET_LEADCHNL_RA_PACING_AMPLITUDE: 2
MDC_IDC_SET_LEADCHNL_RA_PACING_POLARITY: NORMAL
MDC_IDC_SET_LEADCHNL_RA_PACING_PULSEWIDTH: 0.5
MDC_IDC_SET_LEADCHNL_RA_SENSING_POLARITY: NORMAL
MDC_IDC_SET_LEADCHNL_RA_SENSING_SENSITIVITY: 0.5
MDC_IDC_SET_LEADCHNL_RV_PACING_AMPLITUDE: 2
MDC_IDC_SET_LEADCHNL_RV_PACING_POLARITY: NORMAL
MDC_IDC_SET_LEADCHNL_RV_PACING_PULSEWIDTH: 0.5
MDC_IDC_SET_LEADCHNL_RV_SENSING_POLARITY: NORMAL
MDC_IDC_SET_LEADCHNL_RV_SENSING_SENSITIVITY: 2
MDC_IDC_STAT_AT_BURDEN_PERCENT: 0
MDC_IDC_STAT_BRADY_RA_PERCENT_PACED: 44
MDC_IDC_STAT_BRADY_RV_PERCENT_PACED: 1

## 2025-08-23 ENCOUNTER — HOSPITAL ENCOUNTER (EMERGENCY)
Facility: HOSPITAL | Age: OVER 89
Discharge: HOME | End: 2025-08-23
Payer: OTHER GOVERNMENT

## 2025-08-23 ENCOUNTER — APPOINTMENT (OUTPATIENT)
Dept: CT IMAGING | Facility: HOSPITAL | Age: OVER 89
End: 2025-08-23
Payer: OTHER GOVERNMENT

## (undated) DEVICE — PATIENT RETURN ELECTRODE, SINGLE-USE, CONTACT QUALITY MONITORING, ADULT, WITH 9FT CORD, FOR PATIENTS WEIGING OVER 33LBS. (15KG): Brand: MEGADYNE

## (undated) DEVICE — ST EXT IV SMARTSITE 2VLV SP M LL 5ML IV1

## (undated) DEVICE — SUT SILK 3/0 SH CR8 18IN C013D

## (undated) DEVICE — DBD-DRAPE,LAP,CHOLE,W/TROUGHS,STERILE: Brand: MEDLINE

## (undated) DEVICE — ADULT DISPOSABLE SINGLE-PATIENT USE PULSE OXIMETER SENSOR: Brand: NONIN

## (undated) DEVICE — COR PACEMAKER: Brand: MEDLINE INDUSTRIES, INC.

## (undated) DEVICE — PROXIMATE RH ROTATING HEAD SKIN STAPLERS (35 WIDE) CONTAINS 35 STAINLESS STEEL STAPLES: Brand: PROXIMATE

## (undated) DEVICE — BASIN SET PACK: Brand: MEDLINE INDUSTRIES, INC.

## (undated) DEVICE — PENCL SMOKE/EVAC MEGADYNE TELESCP 10FT

## (undated) DEVICE — ENDOGATOR AUXILIARY WATER JET CONNECTOR: Brand: ENDOGATOR

## (undated) DEVICE — GLV SURG PREMIERPRO MIC LTX PF SZ8 BRN

## (undated) DEVICE — ST INF PRI SMRTSTE 20DRP 2VLV 24ML 117

## (undated) DEVICE — UNDERGLV SURG BIOGEL INDICATOR LTX PF 7

## (undated) DEVICE — DRAPE,UTILTY,TAPE,15X26, 4EA/PK: Brand: MEDLINE

## (undated) DEVICE — UNDERGLV SURG BIOGEL INDICAT PF 8 GRN

## (undated) DEVICE — FRCP BX RADJAW4 NDL 2.8 240CM LG OG BX40

## (undated) DEVICE — LN SMPL O2 NASL/ORL SMART/CAPNOLINE PLS A/

## (undated) DEVICE — SUT GUT CHRM 3/0 SH 27IN G122H

## (undated) DEVICE — TOTAL TRAY, 16FR 10ML SIL FOLEY, URN: Brand: MEDLINE

## (undated) DEVICE — SUT PDS 1 TP1 48IN Z880G BX/12

## (undated) DEVICE — SHOULDER IMMOBILIZER: Brand: DEROYAL

## (undated) DEVICE — SUT PDS O CT1 CR/8 18IN Z740D

## (undated) DEVICE — CONN Y IRR DISP 1P/U

## (undated) DEVICE — Device

## (undated) DEVICE — GLV SURG SENSICARE W/ALOE PF LF 7.5 STRL

## (undated) DEVICE — PK BASIC 70

## (undated) DEVICE — PENCL ES MEGADINE EZ/CLEAN BUTN W/HOLSTR 10FT

## (undated) DEVICE — HOLDER: Brand: DEROYAL

## (undated) DEVICE — PACK,UNIVERSAL,NO GOWNS: Brand: MEDLINE

## (undated) DEVICE — 1/2 IN. X 12 IN. LENGTH: Brand: SILICONE TUBING, PENROSE DRAIN

## (undated) DEVICE — GOWN,NON-REINFORCED,SIRUS,SET IN SLV,XXL: Brand: MEDLINE

## (undated) DEVICE — DRSNG PAD ABD 8X10IN STRL

## (undated) DEVICE — PROXIMATE PLUS MD MULTI-DIRECTIONAL RELEASE SKIN STAPLERS CONTAINS 35 STAINLESS STEEL STAPLES APPROXIMATE CLOSED DIMENSIONS: 6.9MM X 3.9MM WIDE: Brand: PROXIMATE

## (undated) DEVICE — Device: Brand: DEFENDO AIR/WATER/SUCTION AND BIOPSY VALVE

## (undated) DEVICE — SUT SILK 2/0 TIES 30IN SA85H

## (undated) DEVICE — SPNG GZ WOVN 4X4IN 12PLY 10/BX STRL

## (undated) DEVICE — DRSNG WND BORDR/ADHS NONADHR/GZ LF 4X14IN STRL

## (undated) DEVICE — GLV SURG TRIUMPH MICRO PF LTX 6.5 STRL